# Patient Record
Sex: MALE | Race: WHITE | NOT HISPANIC OR LATINO | Employment: OTHER | URBAN - METROPOLITAN AREA
[De-identification: names, ages, dates, MRNs, and addresses within clinical notes are randomized per-mention and may not be internally consistent; named-entity substitution may affect disease eponyms.]

---

## 2017-01-17 ENCOUNTER — TELEPHONE (OUTPATIENT)
Dept: CARDIOLOGY | Facility: CLINIC | Age: 72
End: 2017-01-17

## 2017-01-17 DIAGNOSIS — I25.10 CORONARY ARTERY DISEASE, ANGINA PRESENCE UNSPECIFIED, UNSPECIFIED VESSEL OR LESION TYPE, UNSPECIFIED WHETHER NATIVE OR TRANSPLANTED HEART: Primary | ICD-10-CM

## 2017-01-17 DIAGNOSIS — R07.9 CHEST PAIN, UNSPECIFIED TYPE: Primary | ICD-10-CM

## 2017-01-17 NOTE — TELEPHONE ENCOUNTER
"Spoke with patient and notified  of his symptoms.  said that if chest pain was happening frequently or at res, then we should schedule an angiogram. Patient does not want to do this . He wants to " do a stress echo first and then go from there". Dr. Coburn said ok.  However, patient changed his mind about this and now he feels like he would rather get the angiogram done instead. We called Interventional Cardiology and he was scheduled to see  tomorrow 1/18 at 10:40 am.  "

## 2017-01-18 ENCOUNTER — DOCUMENTATION ONLY (OUTPATIENT)
Dept: CARDIOLOGY | Facility: CLINIC | Age: 72
End: 2017-01-18

## 2017-01-18 ENCOUNTER — INITIAL CONSULT (OUTPATIENT)
Dept: CARDIOLOGY | Facility: CLINIC | Age: 72
End: 2017-01-18
Payer: MEDICARE

## 2017-01-18 VITALS
HEART RATE: 60 BPM | OXYGEN SATURATION: 95 % | SYSTOLIC BLOOD PRESSURE: 133 MMHG | WEIGHT: 246.69 LBS | DIASTOLIC BLOOD PRESSURE: 89 MMHG | BODY MASS INDEX: 31.66 KG/M2 | HEIGHT: 74 IN

## 2017-01-18 DIAGNOSIS — R07.9 CHEST PAIN, UNSPECIFIED TYPE: Primary | ICD-10-CM

## 2017-01-18 PROCEDURE — 99205 OFFICE O/P NEW HI 60 MIN: CPT | Mod: S$PBB,,, | Performed by: INTERNAL MEDICINE

## 2017-01-18 PROCEDURE — 99214 OFFICE O/P EST MOD 30 MIN: CPT | Mod: PBBFAC | Performed by: INTERNAL MEDICINE

## 2017-01-18 PROCEDURE — 99999 PR PBB SHADOW E&M-EST. PATIENT-LVL IV: CPT | Mod: PBBFAC,,, | Performed by: INTERNAL MEDICINE

## 2017-01-18 RX ORDER — WARFARIN SODIUM 5 MG/1
2.5 TABLET ORAL
COMMUNITY
Start: 2016-10-23 | End: 2020-02-28

## 2017-01-18 RX ORDER — CLOPIDOGREL BISULFATE 75 MG/1
75 TABLET ORAL DAILY
Qty: 5 TABLET | Refills: 0 | Status: SHIPPED | OUTPATIENT
Start: 2017-01-18 | End: 2017-05-29

## 2017-01-18 RX ORDER — SODIUM CHLORIDE 9 MG/ML
3 INJECTION, SOLUTION INTRAVENOUS CONTINUOUS
Status: CANCELLED | OUTPATIENT
Start: 2017-01-18 | End: 2017-01-18

## 2017-01-18 NOTE — LETTER
January 18, 2017      Angel Coburn MD  [address]  [facility] [address]           [facility]  [address]  [facility] [address]  Phone: [phone]          Patient: Amish Grossman   MR Number: [id number]   YOB: 1945   Date of Visit: 1/18/2017       Dear Dr. Angel Coburn:    Thank you for referring Amish Grossman to me for evaluation. Attached you will find relevant portions of my assessment and plan of care.    If you have questions, please do not hesitate to call me. I look forward to following Amish Grossman along with you.    Sincerely,    Grey Us MD    Enclosure  CC:  No Recipients    If you would like to receive this communication electronically, please contact [email or web address] or [phone] to request more information on [employer] Link access.    For providers and/or their staff who would like to refer a patient to [facility], please contact us through our one-stop-shop provider referral line, [facility], at [phone].    If you feel you have received this communication in error or would no longer like to receive these types of communications, please e-mail [email or web address]

## 2017-01-18 NOTE — PROGRESS NOTES
"Subjective:       Patient ID: Amish Grossman is a 71 y.o. male.    Chief Complaint: Coronary Artery Disease    HPI   Mr. 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 He stated that since last summer, he has been having dull chest pains that start substernally and travel upwards. . He states that over the past month, he has noted that they have become longer and more frequent. He states that he has also noted worsening SOB and SHOOK. His chest pain during his MI was a squeezing chest pain that radiated down both arms. Walking up his lawn or playing golf and getting to the 3rd hole has been causing SOB. He has not tried any NTG. He spoke with his primary cardiologist, Dr. Coburn and was advised to go to interventional cardiology clinic. The patient lives in [facility] and has a 5 hour travel.     Review of Systems   Constitutional: Negative for appetite change, chills, fatigue and fever.   HENT: Negative.    Eyes: Negative.    Respiratory: Positive for shortness of breath. Negative for cough.    Cardiovascular: Positive for chest pain. Negative for palpitations and leg swelling.   Gastrointestinal: Negative for abdominal pain, nausea and vomiting.   Genitourinary: Negative for dysuria and urgency.   Musculoskeletal: Negative for arthralgias and myalgias.   Skin: Negative for color change and rash.   Neurological: Negative for dizziness, light-headedness and headaches.   Psychiatric/Behavioral: Negative.        Objective:     Vitals:    01/18/17 1006 01/18/17 1017   BP: 123/87 133/89   Pulse: 60    SpO2: 95%    Weight: 111.9 kg (246 lb 11.1 oz)    Height: 6' 2" (1.88 m)      Physical Exam   Constitutional: Vital signs are normal. He appears well-developed and well-nourished. He is active and " cooperative. No distress.   HENT:   Head: Normocephalic and atraumatic.   Right Ear: Hearing and external ear normal.   Left Ear: Hearing and external ear normal.   Nose: Nose normal.   Neck: Trachea normal. No tracheal tenderness present. No thyroid mass and no thyromegaly present.   Cardiovascular: Normal rate, regular rhythm, S1 normal, S2 normal, normal heart sounds and normal pulses.  Exam reveals no gallop.    No murmur heard.  Pulses:       Radial pulses are 2+ on the right side, and 2+ on the left side.        Dorsalis pedis pulses are 2+ on the right side, and 2+ on the left side.        Posterior tibial pulses are 2+ on the right side, and 2+ on the left side.   Allens test with adequate ulnar flow.   Pulmonary/Chest: Effort normal and breath sounds normal. No respiratory distress. He has no decreased breath sounds. He has no wheezes. He has no rhonchi. He has no rales.   Abdominal: Soft. Normal appearance.   Skin: Skin is warm, dry and intact.     Chintan Risk Score: JAIRON 7.5%, Dialysis 0.04%    Assessment:       1. Chest pain, unspecified type        Plan:     1.) Chest Pain  --although different than his MI chest pain, it is suspicious enough for intervention  --prepare for C tmrw via R radial artery due to coumadin use  --load with plavix, hold coumadin tonight.  --obtain labs today: CBC, CMP, INR, APTT    The risks, benefits, and alternatives of coronary vascular angiography and possible intervention were discussed with the patient. All questions were answered and informed consent was obtained. I had a detailed discussion with the patient regarding risk of stroke, MI, bleeding access site complications, renal failure, emergent need for heart surgery, acute limb complications including ischemia and loss, contrast allergy and death. Patient understands the risks and benefits of the procedure and wishes to proceed. If stents are needed and there is preference for MAMI, patient understands that would  necessitate aspirin for life with plavix or other anti-platelet agent for at least 1 year. Additionally, patient is aware that non-compliance is likely to result in stent clotting with heart attack, heart failure, and/or death.    Staff addendum to follow    Donna Greene MD   Cardiology PGY4

## 2017-01-18 NOTE — PROGRESS NOTES
OUTPATIENT CATHETERIZATION INSTRUCTIONS    You have been scheduled for a procedure in the catheterization lab on Thursday, January 19, 2017.     Please report to the Cardiology Waiting Area on the Third floor of the hospital and check in at 12 PM.   You will then be taken to the SSCU (Short Stay Cardiac Unit) and prepared for your procedure. Please be aware that this is not the time of your procedure but the time you are to arrive. The procedures are scheduled on an hourly basis; however, emergency cases take precedence over all other cases.       You may not have anything to eat or drink after midnight the night before your test. You may take your regular morning medications with water. If there are any medications that you should not take you will be instructed to hold them that morning. If you are diabetic and on Metformin (Glucophage) do not take it the day before, the day of, and for 2 days after your procedure.      The procedure will take 1-2 hours to perform. After the procedure, you will return to SSCU on the third floor of the hospital. You will need to lie still (or keep your arm still) for the next 4 to 6 hours to minimize bleeding from the puncture site. Your family may remain in the room with you during this time.       You may be able to be discharged home that same afternoon if there is someone to drive you home and there were no complications. If you have one of the balloon, stent, or device procedures you may spend the night in the hospital. Your doctor will determine, based on your progress, the date and time of your discharge. The results of your procedure will be discussed with you before you are discharged. Any further testing or procedures will be scheduled for you either before you leave or you will be called with these appointments.       If you should have any questions, concerns, or need to change the date of your procedure, please call GLORIA Cabrales @ [phone]    Special  Instructions:    Stop Coumadin tonight.    Take 4 Plavix pills tonight and 1 pill in the morning.              THE ABOVE INSTRUCTIONS WERE GIVEN TO THE PATIENT VERBALLY AND THEY VERBALIZED UNDERSTANDING.  THEY DO NOT REQUIRE ANY SPECIAL NEEDS AND DO NOT HAVE ANY LEARNING BARRIERS.          Directions for Reporting to Cardiology Waiting Area in the Hospital  If you park in the Parking Garage:  Take elevators to the1st floor of the parking garage.  Continue past the gift shop, coffee shop, and piano.  Take a right and go to the gold elevators. (Elevator B)  Take the elevator to the 3rd floor.  Follow the arrow on the sign on the wall that says Cath Lab Registration/EP Lab Registration.  Follow the long hallway all the way around until you come to a big open area.  This is the registration area.  Check in at Reception Desk.    OR    If family is dropping you off:  Have them drop you off at the front of the Hospital under the green overhang.  Enter through the doors and take a right.  Take the E elevators to the 3rd floor Cardiology Waiting Area.  Check in at the Reception Desk in the waiting room.

## 2017-01-18 NOTE — MR AVS SNAPSHOT
[facility]  [address]  [place] [address]  Phone: [phone]                  Amish Grossman   2017 10:40 AM   Initial consult    Description:  Male : 1945   Provider:  rGey Us MD   [facility]           Reason for Visit     Coronary Artery Disease           Diagnoses this Visit        Comments    Chest pain, unspecified type    -  Primary            To Do List           Future Appointments        Provider Department Dept Phone    2017 12:00 PM LAB, APPOINTMENT [place] [facility] [phone]    2017 12:15 PM [address]PREP, [address] [facility] [phone]    2017 8:00 AM [facility] [phone]      Your Future Surgeries/Procedures     2017   Surgery with Grey Us MD   [facility]    [address] [facility] [address]   [phone]              Goals (5 Years of Data)     None       These Medications        Disp Refills Start End    clopidogrel (PLAVIX) 75 mg tablet 5 tablet 0 2017    Take 1 tablet (75 mg total) by mouth once daily. - Oral    Pharmacy: [facility] - [place], [place] - [address] Ph #: [phone]         [facility]     [facility] Nurse Care Line -  Assistance  Registered nurses in the [facility] Center provide clinical advisement, health education, appointment booking, and other advisory services.  Call for this free service at [phone].             Medications           Message regarding Medications     Verify the changes and/or additions to your medication regime listed below are the same as discussed with your clinician today.  If any of these changes or additions are incorrect, please notify your healthcare provider.        START taking these NEW  medications        Refills    clopidogrel (PLAVIX) 75 mg tablet 0    Sig: Take 1 tablet (75 mg total) by mouth once daily.    Class: Normal    Route: Oral      STOP taking these medications     apixaban (ELIQUIS) 5 mg Tab Take 1 tablet (5 mg total) by mouth 2 (two) times daily.           Verify that the below list of medications is an accurate representation of the medications you are currently taking.  If none reported, the list may be blank. If incorrect, please contact your healthcare provider. Carry this list with you in case of emergency.           Current Medications     amiodarone (PACERONE) 200 MG Tab Take 1 tablet (200 mg total) by mouth once daily.    amlodipine (NORVASC) 10 MG tablet Take 1 tablet (10 mg total) by mouth once daily.    ascorbic acid (VITAMIN C) 100 MG tablet Take 1,000 mg by mouth once daily.     aspirin (ECOTRIN) 81 MG EC tablet Take 81 mg by mouth once daily.    atorvastatin (LIPITOR) 10 MG tablet Take 1 tablet (10 mg total) by mouth once daily.    carvedilol (COREG) 6.25 MG tablet Take 1 tablet (6.25 mg total) by mouth 2 (two) times daily with meals.    fish oil-omega-3 fatty acids 300-1,000 mg capsule Take 2 g by mouth 2 (two) times daily.     furosemide (LASIX) 40 MG tablet Take 40 mg by mouth as needed.    levothyroxine (SYNTHROID) 75 MCG tablet Take 75 mcg by mouth before breakfast.     lisinopril (PRINIVIL,ZESTRIL) 20 MG tablet TAKE ONE TABLET BY MOUTH ONCE DAILY    multivitamin capsule Take 1 capsule by mouth nightly.     nitroGLYCERIN (NITROSTAT) 0.4 MG SL tablet Place 1 tablet (0.4 mg total) under the tongue every 5 (five) minutes as needed for Chest pain.    omeprazole (PRILOSEC) 20 MG capsule Take 20 mg by mouth nightly.     SLO-NIACIN 750 mg TbSR Take 1 tablet (750 mg total) by mouth 2 (two) times daily.    warfarin (COUMADIN) 5 MG tablet 5 mg. 5  mgs  4dailys   7.5  3  Times  weekly    clopidogrel (PLAVIX) 75 mg tablet Take 1 tablet (75 mg total) by mouth once daily.     "magnesium 200 mg Tab Take 250 mg by mouth as needed.            Clinical Reference Information           Vital Signs - Last Recorded  Most recent update: 1/18/2017 10:18 AM by Yemi Ray MA    BP Pulse Ht Wt SpO2 BMI    133/89 60 6' 2" (1.88 m) 111.9 kg (246 lb 11.1 oz) 95% 31.67 kg/m2      Blood Pressure          Most Recent Value    BP  133/89 [LT]      Allergies as of 1/18/2017     No Known Allergies      Immunizations Administered on Date of Encounter - 1/18/2017     None      Orders Placed During Today's Visit      Normal Orders This Visit    Case Request-Cath Lab: HEART CATH-LEFT       "

## 2017-01-19 ENCOUNTER — HOSPITAL ENCOUNTER (OUTPATIENT)
Facility: HOSPITAL | Age: 72
Discharge: HOME | End: 2017-01-19
Attending: INTERNAL MEDICINE | Admitting: INTERNAL MEDICINE
Payer: MEDICARE

## 2017-01-19 VITALS
WEIGHT: 242 LBS | HEIGHT: 74 IN | OXYGEN SATURATION: 98 % | RESPIRATION RATE: 20 BRPM | TEMPERATURE: 98 F | SYSTOLIC BLOOD PRESSURE: 151 MMHG | BODY MASS INDEX: 31.06 KG/M2 | DIASTOLIC BLOOD PRESSURE: 91 MMHG | HEART RATE: 60 BPM

## 2017-01-19 DIAGNOSIS — R07.9 CHEST PAIN, UNSPECIFIED TYPE: ICD-10-CM

## 2017-01-19 DIAGNOSIS — I25.10 CORONARY ARTERY DISEASE DUE TO LIPID RICH PLAQUE: ICD-10-CM

## 2017-01-19 DIAGNOSIS — I25.83 CORONARY ARTERY DISEASE DUE TO LIPID RICH PLAQUE: ICD-10-CM

## 2017-01-19 DIAGNOSIS — I24.0 CORONARY OCCLUSION WITHOUT MYOCARDIAL INFARCTION: Primary | ICD-10-CM

## 2017-01-19 LAB
ABO + RH BLD: NORMAL
BLD GP AB SCN CELLS X3 SERPL QL: NORMAL
INR PPP: 2.3
PROTHROMBIN TIME: 23.1 SEC

## 2017-01-19 PROCEDURE — 85610 PROTHROMBIN TIME: CPT

## 2017-01-19 PROCEDURE — 86900 BLOOD TYPING SEROLOGIC ABO: CPT

## 2017-01-19 PROCEDURE — 25000003 PHARM REV CODE 250: Performed by: INTERNAL MEDICINE

## 2017-01-19 PROCEDURE — 93005 ELECTROCARDIOGRAM TRACING: CPT

## 2017-01-19 PROCEDURE — 93010 ELECTROCARDIOGRAM REPORT: CPT | Mod: ,,, | Performed by: INTERNAL MEDICINE

## 2017-01-19 PROCEDURE — 93454 CORONARY ARTERY ANGIO S&I: CPT | Mod: 26,,, | Performed by: INTERNAL MEDICINE

## 2017-01-19 PROCEDURE — 86901 BLOOD TYPING SEROLOGIC RH(D): CPT

## 2017-01-19 PROCEDURE — 25000003 PHARM REV CODE 250

## 2017-01-19 PROCEDURE — 63600175 PHARM REV CODE 636 W HCPCS

## 2017-01-19 RX ORDER — ACETAMINOPHEN 325 MG/1
650 TABLET ORAL EVERY 4 HOURS PRN
Status: DISCONTINUED | OUTPATIENT
Start: 2017-01-19 | End: 2017-01-19 | Stop reason: HOSPADM

## 2017-01-19 RX ORDER — ONDANSETRON 8 MG/1
8 TABLET, ORALLY DISINTEGRATING ORAL EVERY 8 HOURS PRN
Status: DISCONTINUED | OUTPATIENT
Start: 2017-01-19 | End: 2017-01-19 | Stop reason: HOSPADM

## 2017-01-19 RX ORDER — SODIUM CHLORIDE 9 MG/ML
3 INJECTION, SOLUTION INTRAVENOUS CONTINUOUS
Status: ACTIVE | OUTPATIENT
Start: 2017-01-19 | End: 2017-01-19

## 2017-01-19 RX ADMIN — SODIUM CHLORIDE 3 ML/KG/HR: 0.9 INJECTION, SOLUTION INTRAVENOUS at 12:01

## 2017-01-19 NOTE — H&P (VIEW-ONLY)
"Subjective:       Patient ID: Amish Grossman is a 71 y.o. male.    Chief Complaint: Coronary Artery Disease    HPI   Mr. 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 He stated that since last summer, he has been having dull chest pains that start substernally and travel upwards. . He states that over the past month, he has noted that they have become longer and more frequent. He states that he has also noted worsening SOB and SHOOK. His chest pain during his MI was a squeezing chest pain that radiated down both arms. Walking up his lawn or playing golf and getting to the 3rd hole has been causing SOB. He has not tried any NTG. He spoke with his primary cardiologist, Dr. Coburn and was advised to go to interventional cardiology clinic. The patient lives in [facility] and has a 5 hour travel.     Review of Systems   Constitutional: Negative for appetite change, chills, fatigue and fever.   HENT: Negative.    Eyes: Negative.    Respiratory: Positive for shortness of breath. Negative for cough.    Cardiovascular: Positive for chest pain. Negative for palpitations and leg swelling.   Gastrointestinal: Negative for abdominal pain, nausea and vomiting.   Genitourinary: Negative for dysuria and urgency.   Musculoskeletal: Negative for arthralgias and myalgias.   Skin: Negative for color change and rash.   Neurological: Negative for dizziness, light-headedness and headaches.   Psychiatric/Behavioral: Negative.        Objective:     Vitals:    01/18/17 1006 01/18/17 1017   BP: 123/87 133/89   Pulse: 60    SpO2: 95%    Weight: 111.9 kg (246 lb 11.1 oz)    Height: 6' 2" (1.88 m)      Physical Exam   Constitutional: Vital signs are normal. He appears well-developed and well-nourished. He is active and " cooperative. No distress.   HENT:   Head: Normocephalic and atraumatic.   Right Ear: Hearing and external ear normal.   Left Ear: Hearing and external ear normal.   Nose: Nose normal.   Neck: Trachea normal. No tracheal tenderness present. No thyroid mass and no thyromegaly present.   Cardiovascular: Normal rate, regular rhythm, S1 normal, S2 normal, normal heart sounds and normal pulses.  Exam reveals no gallop.    No murmur heard.  Pulses:       Radial pulses are 2+ on the right side, and 2+ on the left side.        Dorsalis pedis pulses are 2+ on the right side, and 2+ on the left side.        Posterior tibial pulses are 2+ on the right side, and 2+ on the left side.   Allens test with adequate ulnar flow.   Pulmonary/Chest: Effort normal and breath sounds normal. No respiratory distress. He has no decreased breath sounds. He has no wheezes. He has no rhonchi. He has no rales.   Abdominal: Soft. Normal appearance.   Skin: Skin is warm, dry and intact.     Chintan Risk Score: JAIRON 7.5%, Dialysis 0.04%    Assessment:       1. Chest pain, unspecified type        Plan:     1.) Chest Pain  --although different than his MI chest pain, it is suspicious enough for intervention  --prepare for C tmrw via R radial artery due to coumadin use  --load with plavix, hold coumadin tonight.  --obtain labs today: CBC, CMP, INR, APTT    The risks, benefits, and alternatives of coronary vascular angiography and possible intervention were discussed with the patient. All questions were answered and informed consent was obtained. I had a detailed discussion with the patient regarding risk of stroke, MI, bleeding access site complications, renal failure, emergent need for heart surgery, acute limb complications including ischemia and loss, contrast allergy and death. Patient understands the risks and benefits of the procedure and wishes to proceed. If stents are needed and there is preference for MAMI, patient understands that would  necessitate aspirin for life with plavix or other anti-platelet agent for at least 1 year. Additionally, patient is aware that non-compliance is likely to result in stent clotting with heart attack, heart failure, and/or death.    Staff addendum to follow    Donna Greene MD   Cardiology PGY4

## 2017-01-19 NOTE — INTERVAL H&P NOTE
The patient has been examined and the H&P has been reviewed:    I concur with the findings and no changes have occurred since H&P was written.    Anesthesia/Surgery risks, benefits and alternative options discussed and understood by patient/family.    Did not take coumadin yesterday. Took plavix load yesterday, and 75mg this AM.      Active Hospital Problems    Diagnosis  POA    Chest pain [R07.9]  Yes      Resolved Hospital Problems    Diagnosis Date Resolved POA   No resolved problems to display.

## 2017-01-19 NOTE — PLAN OF CARE
Problem: Cardiac Catheterization (Diagnostic/Interventional) (Adult)  Goal: Signs and Symptoms of Listed Potential Problems Will be Absent, Minimized or Managed (Cardiac Catheterization)  Signs and symptoms of listed potential problems will be absent, minimized or managed by discharge/transition of care (reference Cardiac Catheterization (Diagnostic/Interventional) (Adult) CPG).   Outcome: Ongoing (interventions implemented as appropriate)  Palpable pulse to RUE. Vascular band CDI

## 2017-01-19 NOTE — IP AVS SNAPSHOT
[facility]  [address]  [facility] [address]  Phone: [phone]           Patient Discharge Instructions     Our goal is to set you up for success. This packet includes information on your condition, medications, and your home care. It will help you to care for yourself so you don't get sicker and need to go back to the hospital.     Please ask your nurse if you have any questions.        There are many details to remember when preparing to leave the hospital. Here is what you will need to do:    1. Take your medicine. If you are prescribed medications, review your Medication List in the following pages. You may have new medications to  at the pharmacy and others that you'll need to stop taking. Review the instructions for how and when to take your medications. Talk with your doctor or nurses if you are unsure of what to do.     2. Go to your follow-up appointments. Specific follow-up information is listed in the following pages. Your may be contacted by a transition nurse or clinical provider about future appointments. Be sure we have all of the phone numbers to reach you, if needed. Please contact your provider's office if you are unable to make an appointment.     3. Watch for warning signs. Your doctor or nurse will give you detailed warning signs to watch for and when to call for assistance. These instructions may also include educational information about your condition. If you experience any of warning signs to your health, call your doctor.               [facility]  Unless otherwise directed by your provider, please contact [facility], our nurse care line that is available for 24/7 assistance.     [phone] (toll-free)    Registered nurses in the [facility] provide clinical advisement, health education, appointment booking, and other advisory services.                    ** Verify the list of medication(s) below is accurate and up  to date. Carry this with you in case of emergency. If your medications have changed, please notify your healthcare provider.             Medication List      CONTINUE taking these medications        Additional Info                      amiodarone 200 MG Tab   Commonly known as:  PACERONE   Quantity:  30 tablet   Refills:  3   Dose:  200 mg    Instructions:  Take 1 tablet (200 mg total) by mouth once daily.     Begin Date    AM    Noon    PM    Bedtime       amlodipine 10 MG tablet   Commonly known as:  NORVASC   Quantity:  90 tablet   Refills:  3   Dose:  10 mg    Instructions:  Take 1 tablet (10 mg total) by mouth once daily.     Begin Date    AM    Noon    PM    Bedtime       aspirin 81 MG EC tablet   Commonly known as:  ECOTRIN   Refills:  0   Dose:  81 mg    Instructions:  Take 81 mg by mouth once daily.     Begin Date    AM    Noon    PM    Bedtime       atorvastatin 10 MG tablet   Commonly known as:  LIPITOR   Quantity:  90 tablet   Refills:  3   Dose:  10 mg    Instructions:  Take 1 tablet (10 mg total) by mouth once daily.     Begin Date    AM    Noon    PM    Bedtime       carvedilol 6.25 MG tablet   Commonly known as:  COREG   Quantity:  180 tablet   Refills:  3   Dose:  6.25 mg    Instructions:  Take 1 tablet (6.25 mg total) by mouth 2 (two) times daily with meals.     Begin Date    AM    Noon    PM    Bedtime       clopidogrel 75 mg tablet   Commonly known as:  PLAVIX   Quantity:  5 tablet   Refills:  0   Dose:  75 mg    Instructions:  Take 1 tablet (75 mg total) by mouth once daily.     Begin Date    AM    Noon    PM    Bedtime       fish oil-omega-3 fatty acids 300-1,000 mg capsule   Refills:  0   Dose:  2 g    Instructions:  Take 2 g by mouth 2 (two) times daily.     Begin Date    AM    Noon    PM    Bedtime       furosemide 40 MG tablet   Commonly known as:  LASIX   Refills:  0   Dose:  40 mg    Instructions:  Take 40 mg by mouth as needed.     Begin Date    AM    Noon    PM    Bedtime        levothyroxine 75 MCG tablet   Commonly known as:  SYNTHROID   Refills:  0   Dose:  75 mcg    Instructions:  Take 75 mcg by mouth before breakfast.     Begin Date    AM    Noon    PM    Bedtime       lisinopril 20 MG tablet   Commonly known as:  PRINIVIL,ZESTRIL   Quantity:  90 tablet   Refills:  3    Instructions:  TAKE ONE TABLET BY MOUTH ONCE DAILY     Begin Date    AM    Noon    PM    Bedtime       magnesium 200 mg Tab   Refills:  0   Dose:  250 mg    Instructions:  Take 250 mg by mouth as needed.     Begin Date    AM    Noon    PM    Bedtime       multivitamin capsule   Refills:  0   Dose:  1 capsule    Instructions:  Take 1 capsule by mouth nightly.     Begin Date    AM    Noon    PM    Bedtime       nitroGLYCERIN 0.4 MG SL tablet   Commonly known as:  NITROSTAT   Quantity:  25 tablet   Refills:  4   Dose:  0.4 mg    Instructions:  Place 1 tablet (0.4 mg total) under the tongue every 5 (five) minutes as needed for Chest pain.     Begin Date    AM    Noon    PM    Bedtime       omeprazole 20 MG capsule   Commonly known as:  PRILOSEC   Refills:  0   Dose:  20 mg    Instructions:  Take 20 mg by mouth nightly.     Begin Date    AM    Noon    PM    Bedtime       SLO-NIACIN 750 mg Tbsr   Quantity:  180 each   Refills:  3   Dose:  750 mg   Comments:  This is OTC, not script   Generic drug:  niacin    Instructions:  Take 1 tablet (750 mg total) by mouth 2 (two) times daily.     Begin Date    AM    Noon    PM    Bedtime       VITAMIN C 100 MG tablet   Refills:  0   Dose:  1000 mg   Generic drug:  ascorbic acid (vitamin C)    Instructions:  Take 1,000 mg by mouth once daily.     Begin Date    AM    Noon    PM    Bedtime       warfarin 5 MG tablet   Commonly known as:  COUMADIN   Refills:  0   Dose:  5 mg    Instructions:  5 mg. 5  mgs  4dailys   7.5  3  Times  weekly     Begin Date    AM    Noon    PM    Bedtime                  Please bring to all follow up appointments:    1. A copy of your discharge  "instructions.  2. All medicines you are currently taking in their original bottles.  3. Identification and insurance card.    Please arrive 15 minutes ahead of scheduled appointment time.    Please call 24 hours in advance if you must reschedule your appointment and/or time.        Your Scheduled Appointments     Jan 23, 2017  8:00 AM CST   Remote Interrogation with HOME MONITOR DEVICE CHECK, [facility]   [facility] - Arrhythmia ([facility] )    [address]  [facility] [address] [phone]                Discharge Instructions     Future Orders    Activity as tolerated     Call MD for:  difficulty breathing or increased cough     Call MD for:  increased confusion or weakness     Call MD for:  persistent dizziness, light-headedness, or visual disturbances     Call MD for:  persistent nausea and vomiting or diarrhea     Call MD for:  redness, tenderness, or signs of infection (pain, swelling, redness, odor or green/yellow discharge around incision site)     Call MD for:  severe persistent headache     Call MD for:  severe uncontrolled pain     Call MD for:  temperature >100.4     Call MD for:  worsening rash     Diet general     Questions:    Total calories:      Fat restriction, if any:      Protein restriction, if any:      Na restriction, if any:      Fluid restriction:      Additional restrictions:          Admission Information     Date & Time Provider Department CSN    1/19/2017 11:25 AM Grey Us MD [facility] [id number]      Care Providers     Provider Role Specialty Primary office phone    Grey Us MD Attending Provider Cardiology [phone]      Your Vitals Were     BP Pulse Temp Resp Height Weight    151/91 60 97.9 °F (36.6 °C) (Oral) 20 6' 2" (1.88 m) 109.8 kg (242 lb)    SpO2 BMI             98% 31.07 kg/m2         Recent Lab Values        5/21/2007 12/2/2013                        7:20 AM  2:05 PM          A1C 5.6 5.9                     Allergies as of " 1/19/2017     No Known Allergies      Advance Directives     An advance directive is a document which, in the event you are no longer able to make decisions for yourself, tells your healthcare team what kind of treatment you do or do not want to receive, or who you would like to make those decisions for you.  If you do not currently have an advance directive, [facility] encourages you to create one.  For more information call:  [phone] ([phone]),  or log on to [email or web address].        Language Assistance Services     ATTENTION: Language assistance services are available, free of charge. Please call [phone].      ATENCIÓN: Si elinor luo, tiene a weston disposición servicios gratuitos de asistencia lingüística. Llame al [phone].     CHÚ Ý: N?u b?n nói Ti?ng Vi?t, có các d?ch v? h? tr? ngôn ng? mi?n phí dành cho b?n. G?i s? [phone].        Coumadin Discharge Instructions                         Chronic Kindey Disease Education              [facility] complies with applicable Federal civil rights laws and does not discriminate on the basis of race, color, national origin, age, disability, or sex.

## 2017-01-19 NOTE — PLAN OF CARE
Problem: Patient Care Overview  Goal: Plan of Care Review  Outcome: Ongoing (interventions implemented as appropriate)  Pt arrived to floor ambulatory from home. Pt oriented to room. Iv inserted. Admit assessment completed. Report given to GLORIA Zuniga in cath lab. Will continue to monitor

## 2017-01-19 NOTE — PLAN OF CARE
Problem: Fall Risk (Adult)  Goal: Absence of Falls  Patient will demonstrate the desired outcomes by discharge/transition of care.   Outcome: Ongoing (interventions implemented as appropriate)  Post procedure protocol reviewed, Vascular band as well.

## 2017-01-19 NOTE — IP AVS SNAPSHOT
[facility]  [address]  [facility] [address]  Phone: [phone]           Patient Discharge Instructions     Our goal is to set you up for success. This packet includes information on your condition, medications, and your home care. It will help you to care for yourself so you don't get sicker and need to go back to the hospital.     Please ask your nurse if you have any questions.        There are many details to remember when preparing to leave the hospital. Here is what you will need to do:    1. Take your medicine. If you are prescribed medications, review your Medication List in the following pages. You may have new medications to  at the pharmacy and others that you'll need to stop taking. Review the instructions for how and when to take your medications. Talk with your doctor or nurses if you are unsure of what to do.     2. Go to your follow-up appointments. Specific follow-up information is listed in the following pages. Your may be contacted by a transition nurse or clinical provider about future appointments. Be sure we have all of the phone numbers to reach you, if needed. Please contact your provider's office if you are unable to make an appointment.     3. Watch for warning signs. Your doctor or nurse will give you detailed warning signs to watch for and when to call for assistance. These instructions may also include educational information about your condition. If you experience any of warning signs to your health, call your doctor.               [facility]  Unless otherwise directed by your provider, please contact [facility], our nurse care line that is available for 24/7 assistance.     [phone] (toll-free)    Registered nurses in the [facility] provide clinical advisement, health education, appointment booking, and other advisory services.                    ** Verify the list of medication(s) below is accurate and up  to date. Carry this with you in case of emergency. If your medications have changed, please notify your healthcare provider.             Medication List      CONTINUE taking these medications        Additional Info                      amiodarone 200 MG Tab   Commonly known as:  PACERONE   Quantity:  30 tablet   Refills:  3   Dose:  200 mg    Instructions:  Take 1 tablet (200 mg total) by mouth once daily.     Begin Date    AM    Noon    PM    Bedtime       amlodipine 10 MG tablet   Commonly known as:  NORVASC   Quantity:  90 tablet   Refills:  3   Dose:  10 mg    Instructions:  Take 1 tablet (10 mg total) by mouth once daily.     Begin Date    AM    Noon    PM    Bedtime       aspirin 81 MG EC tablet   Commonly known as:  ECOTRIN   Refills:  0   Dose:  81 mg    Instructions:  Take 81 mg by mouth once daily.     Begin Date    AM    Noon    PM    Bedtime       atorvastatin 10 MG tablet   Commonly known as:  LIPITOR   Quantity:  90 tablet   Refills:  3   Dose:  10 mg    Instructions:  Take 1 tablet (10 mg total) by mouth once daily.     Begin Date    AM    Noon    PM    Bedtime       carvedilol 6.25 MG tablet   Commonly known as:  COREG   Quantity:  180 tablet   Refills:  3   Dose:  6.25 mg    Instructions:  Take 1 tablet (6.25 mg total) by mouth 2 (two) times daily with meals.     Begin Date    AM    Noon    PM    Bedtime       clopidogrel 75 mg tablet   Commonly known as:  PLAVIX   Quantity:  5 tablet   Refills:  0   Dose:  75 mg    Instructions:  Take 1 tablet (75 mg total) by mouth once daily.     Begin Date    AM    Noon    PM    Bedtime       fish oil-omega-3 fatty acids 300-1,000 mg capsule   Refills:  0   Dose:  2 g    Instructions:  Take 2 g by mouth 2 (two) times daily.     Begin Date    AM    Noon    PM    Bedtime       furosemide 40 MG tablet   Commonly known as:  LASIX   Refills:  0   Dose:  40 mg    Instructions:  Take 40 mg by mouth as needed.     Begin Date    AM    Noon    PM    Bedtime        levothyroxine 75 MCG tablet   Commonly known as:  SYNTHROID   Refills:  0   Dose:  75 mcg    Instructions:  Take 75 mcg by mouth before breakfast.     Begin Date    AM    Noon    PM    Bedtime       lisinopril 20 MG tablet   Commonly known as:  PRINIVIL,ZESTRIL   Quantity:  90 tablet   Refills:  3    Instructions:  TAKE ONE TABLET BY MOUTH ONCE DAILY     Begin Date    AM    Noon    PM    Bedtime       magnesium 200 mg Tab   Refills:  0   Dose:  250 mg    Instructions:  Take 250 mg by mouth as needed.     Begin Date    AM    Noon    PM    Bedtime       multivitamin capsule   Refills:  0   Dose:  1 capsule    Instructions:  Take 1 capsule by mouth nightly.     Begin Date    AM    Noon    PM    Bedtime       nitroGLYCERIN 0.4 MG SL tablet   Commonly known as:  NITROSTAT   Quantity:  25 tablet   Refills:  4   Dose:  0.4 mg    Instructions:  Place 1 tablet (0.4 mg total) under the tongue every 5 (five) minutes as needed for Chest pain.     Begin Date    AM    Noon    PM    Bedtime       omeprazole 20 MG capsule   Commonly known as:  PRILOSEC   Refills:  0   Dose:  20 mg    Instructions:  Take 20 mg by mouth nightly.     Begin Date    AM    Noon    PM    Bedtime       SLO-NIACIN 750 mg Tbsr   Quantity:  180 each   Refills:  3   Dose:  750 mg   Comments:  This is OTC, not script   Generic drug:  niacin    Instructions:  Take 1 tablet (750 mg total) by mouth 2 (two) times daily.     Begin Date    AM    Noon    PM    Bedtime       VITAMIN C 100 MG tablet   Refills:  0   Dose:  1000 mg   Generic drug:  ascorbic acid (vitamin C)    Instructions:  Take 1,000 mg by mouth once daily.     Begin Date    AM    Noon    PM    Bedtime       warfarin 5 MG tablet   Commonly known as:  COUMADIN   Refills:  0   Dose:  5 mg    Instructions:  5 mg. 5  mgs  4dailys   7.5  3  Times  weekly     Begin Date    AM    Noon    PM    Bedtime                  Please bring to all follow up appointments:    1. A copy of your discharge  "instructions.  2. All medicines you are currently taking in their original bottles.  3. Identification and insurance card.    Please arrive 15 minutes ahead of scheduled appointment time.    Please call 24 hours in advance if you must reschedule your appointment and/or time.        Your Scheduled Appointments     Jan 23, 2017  8:00 AM CST   Remote Interrogation with HOME MONITOR DEVICE CHECK, [facility]   [facility] - Arrhythmia ([facility] )    [address]  [facility] [address] [phone]                Discharge Instructions     Future Orders    Activity as tolerated     Call MD for:  difficulty breathing or increased cough     Call MD for:  increased confusion or weakness     Call MD for:  persistent dizziness, light-headedness, or visual disturbances     Call MD for:  persistent nausea and vomiting or diarrhea     Call MD for:  redness, tenderness, or signs of infection (pain, swelling, redness, odor or green/yellow discharge around incision site)     Call MD for:  severe persistent headache     Call MD for:  severe uncontrolled pain     Call MD for:  temperature >100.4     Call MD for:  worsening rash     Diet general     Questions:    Total calories:      Fat restriction, if any:      Protein restriction, if any:      Na restriction, if any:      Fluid restriction:      Additional restrictions:          Admission Information     Date & Time Provider Department CSN    1/19/2017 11:25 AM Grey Us MD [facility] [id number]       Admisson Diagnosis: Chest pain, unspecified type, Chest pain, unspecified type, Chest pain, unspecified type      Care Providers     Provider Role Specialty Primary office phone    Grey Us MD Attending Provider Cardiology [phone]      Your Vitals Were     BP Pulse Temp Resp Height Weight    151/91 60 97.9 °F (36.6 °C) (Oral) 20 6' 2" (1.88 m) 109.8 kg (242 lb)    SpO2 BMI             98% 31.07 kg/m2         Recent Lab Values        " 5/21/2007 12/2/2013                        7:20 AM  2:05 PM          A1C 5.6 5.9                     Allergies as of 1/19/2017     No Known Allergies      Advance Directives     An advance directive is a document which, in the event you are no longer able to make decisions for yourself, tells your healthcare team what kind of treatment you do or do not want to receive, or who you would like to make those decisions for you.  If you do not currently have an advance directive, [facility] encourages you to create one.  For more information call:  [phone] ([phone]), or log on to [email or web address].        Translation Services Information     ATTENTION: Language assistance services are available, free of charge. Please call [phone].    ATENCIÓN: Si habla español, tiene a weston disposición servicios gratuitos de asistencia lingüística. Llame al [phone].     CHÚ Ý: N?u b?n nói Ti?ng Vi?t, có các d?ch v? h? tr? ngôn ng? mi?n phí dành cho b?n. G?i s? [phone].        Coumadin Discharge Instructions                         Chronic Kindey Disease Education              [facility] complies with applicable Federal civil rights laws and does not discriminate on the basis of race, color, national origin, age, disability, or sex.

## 2017-01-23 ENCOUNTER — CLINICAL SUPPORT (OUTPATIENT)
Dept: ELECTROPHYSIOLOGY | Facility: CLINIC | Age: 72
End: 2017-01-23
Payer: MEDICARE

## 2017-01-23 DIAGNOSIS — I42.9 CARDIOMYOPATHY: ICD-10-CM

## 2017-01-23 DIAGNOSIS — Z95.810 PRESENCE OF AUTOMATIC CARDIOVERTER/DEFIBRILLATOR (AICD): ICD-10-CM

## 2017-01-23 PROCEDURE — 93296 REM INTERROG EVL PM/IDS: CPT | Mod: PBBFAC | Performed by: INTERNAL MEDICINE

## 2017-01-23 PROCEDURE — 93295 DEV INTERROG REMOTE 1/2/MLT: CPT | Mod: ,,, | Performed by: INTERNAL MEDICINE

## 2017-05-26 DIAGNOSIS — I10 HTN (HYPERTENSION), BENIGN: ICD-10-CM

## 2017-05-26 DIAGNOSIS — I48.91 ATRIAL FIBRILLATION: ICD-10-CM

## 2017-05-26 RX ORDER — CARVEDILOL 6.25 MG/1
TABLET ORAL
Qty: 180 TABLET | Refills: 3 | Status: SHIPPED | OUTPATIENT
Start: 2017-05-26 | End: 2017-05-29 | Stop reason: SDUPTHER

## 2017-05-29 ENCOUNTER — HOSPITAL ENCOUNTER (OUTPATIENT)
Dept: CARDIOLOGY | Facility: CLINIC | Age: 72
Discharge: HOME | End: 2017-05-29
Payer: MEDICARE

## 2017-05-29 ENCOUNTER — CLINICAL SUPPORT (OUTPATIENT)
Dept: ELECTROPHYSIOLOGY | Facility: CLINIC | Age: 72
End: 2017-05-29
Payer: MEDICARE

## 2017-05-29 ENCOUNTER — TELEPHONE (OUTPATIENT)
Dept: CARDIOLOGY | Facility: CLINIC | Age: 72
End: 2017-05-29

## 2017-05-29 ENCOUNTER — HOSPITAL ENCOUNTER (OUTPATIENT)
Dept: PULMONOLOGY | Facility: CLINIC | Age: 72
Discharge: HOME | End: 2017-05-29
Payer: MEDICARE

## 2017-05-29 ENCOUNTER — OFFICE VISIT (OUTPATIENT)
Dept: ELECTROPHYSIOLOGY | Facility: CLINIC | Age: 72
End: 2017-05-29
Payer: MEDICARE

## 2017-05-29 VITALS
SYSTOLIC BLOOD PRESSURE: 134 MMHG | BODY MASS INDEX: 32.23 KG/M2 | HEART RATE: 60 BPM | WEIGHT: 251 LBS | DIASTOLIC BLOOD PRESSURE: 86 MMHG

## 2017-05-29 DIAGNOSIS — Z79.01 ANTICOAGULATED ON COUMADIN: ICD-10-CM

## 2017-05-29 DIAGNOSIS — I47.20 VT (VENTRICULAR TACHYCARDIA): ICD-10-CM

## 2017-05-29 DIAGNOSIS — I48.91 ATRIAL FIBRILLATION: ICD-10-CM

## 2017-05-29 DIAGNOSIS — I42.0 ISCHEMIC DILATED CARDIOMYOPATHY: ICD-10-CM

## 2017-05-29 DIAGNOSIS — I10 HTN (HYPERTENSION), BENIGN: ICD-10-CM

## 2017-05-29 DIAGNOSIS — I25.83 CORONARY ARTERY DISEASE DUE TO LIPID RICH PLAQUE: ICD-10-CM

## 2017-05-29 DIAGNOSIS — I25.5 ISCHEMIC DILATED CARDIOMYOPATHY: ICD-10-CM

## 2017-05-29 DIAGNOSIS — I24.0 CORONARY OCCLUSION WITHOUT MYOCARDIAL INFARCTION: ICD-10-CM

## 2017-05-29 DIAGNOSIS — I48.19 PERSISTENT ATRIAL FIBRILLATION: ICD-10-CM

## 2017-05-29 DIAGNOSIS — Z95.810 PRESENCE OF AUTOMATIC CARDIOVERTER/DEFIBRILLATOR (AICD): ICD-10-CM

## 2017-05-29 DIAGNOSIS — I25.10 CORONARY ARTERY DISEASE DUE TO LIPID RICH PLAQUE: ICD-10-CM

## 2017-05-29 DIAGNOSIS — I42.9 CARDIOMYOPATHY: ICD-10-CM

## 2017-05-29 DIAGNOSIS — R06.02 SHORTNESS OF BREATH: ICD-10-CM

## 2017-05-29 DIAGNOSIS — I47.29 PVT (PAROXYSMAL VENTRICULAR TACHYCARDIA): ICD-10-CM

## 2017-05-29 DIAGNOSIS — I48.19 PERSISTENT ATRIAL FIBRILLATION: Primary | ICD-10-CM

## 2017-05-29 LAB
DIASTOLIC DYSFUNCTION: YES
ESTIMATED PA SYSTOLIC PRESSURE: 19.48
MITRAL VALVE REGURGITATION: ABNORMAL
PRE FEV1 FVC: 77
PRE FEV1: 3.37
PRE FVC: 4.38
PREDICTED FEV1 FVC: 78
PREDICTED FEV1: 3.79
PREDICTED FVC: 4.77
RETIRED EF AND QEF - SEE NOTES: 33 (ref 55–65)
TRICUSPID VALVE REGURGITATION: ABNORMAL

## 2017-05-29 PROCEDURE — 93306 TTE W/DOPPLER COMPLETE: CPT | Mod: 26,S$PBB,, | Performed by: INTERNAL MEDICINE

## 2017-05-29 PROCEDURE — 99999 PR PBB SHADOW E&M-EST. PATIENT-LVL III: CPT | Mod: PBBFAC,,, | Performed by: INTERNAL MEDICINE

## 2017-05-29 PROCEDURE — 99213 OFFICE O/P EST LOW 20 MIN: CPT | Mod: PBBFAC | Performed by: INTERNAL MEDICINE

## 2017-05-29 PROCEDURE — 93283 PRGRMG EVAL IMPLANTABLE DFB: CPT | Mod: 26,S$PBB,, | Performed by: INTERNAL MEDICINE

## 2017-05-29 PROCEDURE — 94729 DIFFUSING CAPACITY: CPT | Mod: 26,S$PBB,, | Performed by: INTERNAL MEDICINE

## 2017-05-29 PROCEDURE — 93010 ELECTROCARDIOGRAM REPORT: CPT | Mod: S$PBB,,, | Performed by: INTERNAL MEDICINE

## 2017-05-29 PROCEDURE — 94010 BREATHING CAPACITY TEST: CPT | Mod: 26,S$PBB,, | Performed by: INTERNAL MEDICINE

## 2017-05-29 PROCEDURE — 93005 ELECTROCARDIOGRAM TRACING: CPT | Mod: PBBFAC | Performed by: INTERNAL MEDICINE

## 2017-05-29 PROCEDURE — 99214 OFFICE O/P EST MOD 30 MIN: CPT | Mod: S$PBB,,, | Performed by: INTERNAL MEDICINE

## 2017-05-29 RX ORDER — CARVEDILOL 12.5 MG/1
12.5 TABLET ORAL 2 TIMES DAILY WITH MEALS
Qty: 180 TABLET | Refills: 3 | Status: SHIPPED | OUTPATIENT
Start: 2017-05-29 | End: 2018-07-18 | Stop reason: SDUPTHER

## 2017-05-29 NOTE — PROGRESS NOTES
"Subjective:    Patient ID:  Amish Grossman is a 71 y.o. male who presents for follow-up of Pacemaker Check      Atrial Fibrillation   Symptoms include palpitations. Symptoms are negative for chest pain, dizziness, shortness of breath, syncope and weakness. Past medical history includes atrial fibrillation.      71 y.o. 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 Hunting. Does get some SHOOK when walking fast.  No CP. Feeling better since "R" added at prior visit. Leg swelling abated.    We did NOE/DCCV on 3/16 in hopes of starting tikosyn, but QTc was too long for that. Instead we started amiodarone. Pt says he felt better for a week, then felt down again. However he thinks that may have been multifactorial and isn't completely sure. Says he feels better now than pre-DCCV, and he's back in AF. ICD shows that NSR lasted 10 days. We repeated DCCV on 4/15. He's maintained NSR since then. Feels very well.    Hx of A noise. P waves ~4. Device shows several AMS episodes, <10 s (no egrams). Pt c/o occasional palps that are reproduced with pacing in device clinic today.    echo 12/15: 30-35%. no clot mentioned  My interpretation of today's ECG is A-pace, V-sense.    Review of Systems   Constitution: Negative. Negative for weakness and malaise/fatigue.   HENT: Negative.  Negative for ear pain and tinnitus.    Eyes: Negative.  Negative for blurred vision.   Cardiovascular: Positive for palpitations. Negative for chest pain, claudication, cyanosis, dyspnea on exertion, irregular heartbeat, leg swelling, near-syncope, orthopnea, paroxysmal nocturnal dyspnea and syncope.        Chest pain with associated left arm pain   Respiratory: Negative.  Negative for shortness of breath.    Endocrine: Negative.  Negative for polyuria.   Hematologic/Lymphatic: " Negative.  Does not bruise/bleed easily.   Skin: Negative.  Negative for rash.   Musculoskeletal: Negative.  Negative for joint pain and muscle weakness.   Gastrointestinal: Negative.  Negative for abdominal pain and change in bowel habit.   Genitourinary: Negative.  Negative for frequency.   Neurological: Positive for light-headedness. Negative for dizziness.   Psychiatric/Behavioral: Negative.  Negative for depression. The patient is not nervous/anxious.    Allergic/Immunologic: Negative for environmental allergies.        Objective:    Physical Exam   Constitutional: He is oriented to person, place, and time. He appears well-developed and well-nourished.   HENT:   Head: Normocephalic and atraumatic.   Eyes: Conjunctivae, EOM and lids are normal. Pupils are equal, round, and reactive to light. No scleral icterus.   Neck: Normal range of motion. Neck supple. No JVD present. No tracheal deviation present. No thyromegaly present.   Cardiovascular: Normal rate, regular rhythm, normal heart sounds and intact distal pulses.   No extrasystoles are present. PMI is not displaced.  Exam reveals no gallop and no friction rub.    No murmur heard.  Pulses:       Radial pulses are 2+ on the right side, and 2+ on the left side.   Pulmonary/Chest: Effort normal and breath sounds normal. No accessory muscle usage. No tachypnea. No respiratory distress. He has no wheezes. He has no rales.   Abdominal: Soft. Bowel sounds are normal. He exhibits no distension. There is no hepatosplenomegaly. There is no tenderness.   Musculoskeletal: Normal range of motion. He exhibits no edema.   Neurological: He is alert and oriented to person, place, and time. He has normal reflexes. He exhibits normal muscle tone.   Skin: Skin is warm and dry. No rash noted.   Psychiatric: He has a normal mood and affect. His behavior is normal.   Nursing note and vitals reviewed.        Assessment:       1. Persistent atrial fibrillation    2. HTN  (hypertension), benign    3. Ischemic dilated cardiomyopathy    4. VT (ventricular tachycardia)    5. Anticoagulated on Coumadin    6. Shortness of breath          Plan:       Doing better now he's in NSR.  Continue amio.    Made RA lead less sensitive to try to avoid oversensing of noise.  Increase coreg for HTN, CHF.  Discussed the indications and possible side effects of the medications prescribed to the patient by me.    Continue a/c    Return in 1 year with echo, or earlier prn.

## 2017-05-30 DIAGNOSIS — I47.29 PVT (PAROXYSMAL VENTRICULAR TACHYCARDIA): Primary | ICD-10-CM

## 2017-08-31 ENCOUNTER — CLINICAL SUPPORT (OUTPATIENT)
Dept: ELECTROPHYSIOLOGY | Facility: CLINIC | Age: 72
End: 2017-08-31
Payer: MEDICARE

## 2017-08-31 DIAGNOSIS — I42.9 CARDIOMYOPATHY: ICD-10-CM

## 2017-08-31 DIAGNOSIS — Z95.810 PRESENCE OF AUTOMATIC CARDIOVERTER/DEFIBRILLATOR (AICD): ICD-10-CM

## 2017-08-31 PROCEDURE — 93296 REM INTERROG EVL PM/IDS: CPT | Mod: PBBFAC | Performed by: INTERNAL MEDICINE

## 2017-09-01 PROCEDURE — 93295 DEV INTERROG REMOTE 1/2/MLT: CPT | Mod: ,,, | Performed by: INTERNAL MEDICINE

## 2017-10-31 ENCOUNTER — TELEPHONE (OUTPATIENT)
Dept: ELECTROPHYSIOLOGY | Facility: CLINIC | Age: 72
End: 2017-10-31

## 2017-10-31 NOTE — TELEPHONE ENCOUNTER
Contacted patient in relation to patient's remote ICD home monitor as to it is not connecting to [facility], no answer, left voicemail.

## 2017-11-01 ENCOUNTER — TELEPHONE (OUTPATIENT)
Dept: ELECTROPHYSIOLOGY | Facility: CLINIC | Age: 72
End: 2017-11-01

## 2017-11-01 NOTE — TELEPHONE ENCOUNTER
Spoke to pt about merlin home monitor being disconnected. Pt is in [employer] deer hunting, he did bring his monitor with him but it will not connect due to poor cell service. Pt will be home 11/10/17.

## 2017-12-07 ENCOUNTER — TELEPHONE (OUTPATIENT)
Dept: ELECTROPHYSIOLOGY | Facility: CLINIC | Age: 72
End: 2017-12-07

## 2017-12-07 NOTE — TELEPHONE ENCOUNTER
Did not receive remote device transmission scheduled on 12/4/17. Called patient to request transmission; however, no answer. Left voicemail for pt to call the clinic@ [phone].

## 2017-12-11 ENCOUNTER — CLINICAL SUPPORT (OUTPATIENT)
Dept: ELECTROPHYSIOLOGY | Facility: CLINIC | Age: 72
End: 2017-12-11
Attending: INTERNAL MEDICINE
Payer: MEDICARE

## 2017-12-11 DIAGNOSIS — Z95.810 AICD (AUTOMATIC CARDIOVERTER/DEFIBRILLATOR) PRESENT: ICD-10-CM

## 2017-12-11 DIAGNOSIS — I47.20 VT (VENTRICULAR TACHYCARDIA): ICD-10-CM

## 2017-12-11 DIAGNOSIS — I25.5 ISCHEMIC CARDIOMYOPATHY: ICD-10-CM

## 2017-12-11 PROCEDURE — 93296 REM INTERROG EVL PM/IDS: CPT | Mod: PBBFAC | Performed by: INTERNAL MEDICINE

## 2017-12-11 PROCEDURE — 93295 DEV INTERROG REMOTE 1/2/MLT: CPT | Mod: ,,, | Performed by: INTERNAL MEDICINE

## 2017-12-15 DIAGNOSIS — Z95.810 AICD (AUTOMATIC CARDIOVERTER/DEFIBRILLATOR) PRESENT: Primary | ICD-10-CM

## 2017-12-15 DIAGNOSIS — I25.5 ISCHEMIC CARDIOMYOPATHY: ICD-10-CM

## 2017-12-15 DIAGNOSIS — I47.20 VT (VENTRICULAR TACHYCARDIA): ICD-10-CM

## 2017-12-27 DIAGNOSIS — N18.1 CKD (CHRONIC KIDNEY DISEASE) STAGE 1, GFR 90 ML/MIN OR GREATER: ICD-10-CM

## 2017-12-27 DIAGNOSIS — I25.10 CORONARY ARTERY DISEASE INVOLVING NATIVE CORONARY ARTERY WITHOUT ANGINA PECTORIS, UNSPECIFIED WHETHER NATIVE OR TRANSPLANTED HEART: ICD-10-CM

## 2017-12-27 DIAGNOSIS — I24.0 CORONARY OCCLUSION WITHOUT MYOCARDIAL INFARCTION: ICD-10-CM

## 2017-12-27 DIAGNOSIS — I47.20 VENTRICULAR TACHYARRHYTHMIA: ICD-10-CM

## 2017-12-27 DIAGNOSIS — I47.20 VT (VENTRICULAR TACHYCARDIA): ICD-10-CM

## 2017-12-27 DIAGNOSIS — I25.2 OLD MYOCARDIAL INFARCTION: ICD-10-CM

## 2017-12-27 DIAGNOSIS — I10 HTN (HYPERTENSION), BENIGN: ICD-10-CM

## 2017-12-27 DIAGNOSIS — I25.5 ISCHEMIC DILATED CARDIOMYOPATHY: ICD-10-CM

## 2017-12-27 DIAGNOSIS — Z79.01 ANTICOAGULATED ON COUMADIN: ICD-10-CM

## 2017-12-27 DIAGNOSIS — I42.0 ISCHEMIC DILATED CARDIOMYOPATHY: ICD-10-CM

## 2017-12-27 RX ORDER — LISINOPRIL 20 MG/1
TABLET ORAL
Qty: 90 TABLET | Refills: 3 | Status: SHIPPED | OUTPATIENT
Start: 2017-12-27 | End: 2018-12-17 | Stop reason: SDUPTHER

## 2018-01-16 ENCOUNTER — TELEPHONE (OUTPATIENT)
Dept: ELECTROPHYSIOLOGY | Facility: CLINIC | Age: 73
End: 2018-01-16

## 2018-01-16 NOTE — TELEPHONE ENCOUNTER
Scheduled Remote ICD/Pacemaker transmission on 1/16/18 was not received.  Contacted the patient on this afternoon.  Patient stated he is currently in [place] and did not bring his monitor with him as to he only anticipated on being gone for a week.  Patient stated he will be returning home on Friday the 19 th after 5:00 pm.  Patient instructed to contact the clinic on Monday (1/22/18) morning for assistance in sending a manual transmission to assess the ICD battery. Patient instructed in the future when going out of town for more than a week he is to bring the monitor with him. Will also put the patient on the schedule on 1/22/18 in case he does not remember to call.  Patient verbalized understanding to all of the above.

## 2018-01-22 ENCOUNTER — TELEPHONE (OUTPATIENT)
Dept: ELECTROPHYSIOLOGY | Facility: CLINIC | Age: 73
End: 2018-01-22

## 2018-01-22 ENCOUNTER — CLINICAL SUPPORT (OUTPATIENT)
Dept: ELECTROPHYSIOLOGY | Facility: CLINIC | Age: 73
End: 2018-01-22
Attending: INTERNAL MEDICINE
Payer: MEDICARE

## 2018-01-22 DIAGNOSIS — I25.5 ISCHEMIC CARDIOMYOPATHY: ICD-10-CM

## 2018-01-22 DIAGNOSIS — I47.20 VT (VENTRICULAR TACHYCARDIA): ICD-10-CM

## 2018-01-22 DIAGNOSIS — Z95.810 AICD (AUTOMATIC CARDIOVERTER/DEFIBRILLATOR) PRESENT: ICD-10-CM

## 2018-01-22 PROCEDURE — 93295 DEV INTERROG REMOTE 1/2/MLT: CPT | Mod: ,,, | Performed by: INTERNAL MEDICINE

## 2018-01-22 PROCEDURE — 93296 REM INTERROG EVL PM/IDS: CPT | Mod: PBBFAC | Performed by: INTERNAL MEDICINE

## 2018-02-21 ENCOUNTER — CLINICAL SUPPORT (OUTPATIENT)
Dept: ELECTROPHYSIOLOGY | Facility: CLINIC | Age: 73
End: 2018-02-21
Payer: MEDICARE

## 2018-02-21 DIAGNOSIS — I47.20 VT (VENTRICULAR TACHYCARDIA): ICD-10-CM

## 2018-02-21 DIAGNOSIS — Z95.810 AICD (AUTOMATIC CARDIOVERTER/DEFIBRILLATOR) PRESENT: ICD-10-CM

## 2018-02-21 DIAGNOSIS — I25.5 ISCHEMIC CARDIOMYOPATHY: ICD-10-CM

## 2018-02-21 PROCEDURE — 93296 REM INTERROG EVL PM/IDS: CPT | Mod: PBBFAC | Performed by: INTERNAL MEDICINE

## 2018-02-21 PROCEDURE — 93295 DEV INTERROG REMOTE 1/2/MLT: CPT | Mod: ,,, | Performed by: INTERNAL MEDICINE

## 2018-03-26 ENCOUNTER — CLINICAL SUPPORT (OUTPATIENT)
Dept: ELECTROPHYSIOLOGY | Facility: CLINIC | Age: 73
End: 2018-03-26
Attending: INTERNAL MEDICINE
Payer: MEDICARE

## 2018-03-26 DIAGNOSIS — I47.20 VT (VENTRICULAR TACHYCARDIA): ICD-10-CM

## 2018-03-26 DIAGNOSIS — I25.5 ISCHEMIC CARDIOMYOPATHY: ICD-10-CM

## 2018-03-26 DIAGNOSIS — Z95.810 AICD (AUTOMATIC CARDIOVERTER/DEFIBRILLATOR) PRESENT: ICD-10-CM

## 2018-03-26 PROCEDURE — 93295 DEV INTERROG REMOTE 1/2/MLT: CPT | Mod: ,,, | Performed by: INTERNAL MEDICINE

## 2018-03-26 PROCEDURE — 93296 REM INTERROG EVL PM/IDS: CPT | Mod: PBBFAC | Performed by: INTERNAL MEDICINE

## 2018-03-29 ENCOUNTER — DOCUMENTATION ONLY (OUTPATIENT)
Dept: ELECTROPHYSIOLOGY | Facility: CLINIC | Age: 73
End: 2018-03-29

## 2018-03-29 NOTE — PROGRESS NOTES
Contacted [facility]/[employer], s/w Alla in relation to this patient as to his ICD battery is nearing GENIE and I wanted to confirm the patient's monitor is functioning properly.  This was due to the last communication from the monitor to [facility] was 3/21/18 and the last alert check date was 3/26/18 which the day his scheduled remote transmission was received.  Alla informed me the patient's monitor is connected and communicating.  However, Alla then stated he would like to call the patient to do a maintenance update.  Informed him this would be fine due to the battery status. After the patient received a call from Alla with [facility] he contacted the clinic and stated he was told his monitor is working, however, Alla informed him he would have a new cell adapter sent to him to that would provide better coverage.  Instructed the patient to contact the clinic upon receipt of the adapter for assistance with sending a manual transmission to assure his monitor is connected.  Patient verbalized understanding to all of the above.

## 2018-04-02 ENCOUNTER — TELEPHONE (OUTPATIENT)
Dept: ELECTROPHYSIOLOGY | Facility: CLINIC | Age: 73
End: 2018-04-02

## 2018-04-02 NOTE — TELEPHONE ENCOUNTER
Attempted to return the patient's call on this morning from a voice mail left on Friday, 3/30 when the clinic was closed for Good Friday.  Message left on the voice mail @ [phone] informing the patient his Remote ICD home monitor is showing as connected and communicating with last update of 4/1/18.  Device Clinic contact # also left on the voice mail.

## 2018-04-10 ENCOUNTER — TELEPHONE (OUTPATIENT)
Dept: ELECTROPHYSIOLOGY | Facility: CLINIC | Age: 73
End: 2018-04-10

## 2018-04-10 NOTE — TELEPHONE ENCOUNTER
----- Message from Camron Isbell MD sent at 4/10/2018 11:20 AM CDT -----  Regarding: RE: Remote Alert - GENIE  ok; gen change please Svitlana  continue coumadin for INR 2-3  DPM    ----- Message -----  From: Kusum Gong  Sent: 4/10/2018   9:38 AM  To: Camron Isbell MD, Hung Pereyra, #  Subject: Remote Alert - GENIE                               Dr. Isbell,    [employer] dual chamber ICD.  Device at GENIE (reached 4/5/18).  2.42 V (GENIE @ 2.45 V).    Thanks,  Kusum

## 2018-04-11 ENCOUNTER — TELEPHONE (OUTPATIENT)
Dept: ELECTROPHYSIOLOGY | Facility: CLINIC | Age: 73
End: 2018-04-11

## 2018-04-11 NOTE — TELEPHONE ENCOUNTER
Patient contacted the Device Clinic on this afternoon with questions about his remote ICD home monitor.  Patient stated the light on the cell adapter was blinking so he unplugged it and then plugged it back in and the light is now on solid.  Informed the patient his ICD has reached recommended replacement as of 4/5/18.  Patient stated he will be going [place] hunting in [place] for 2 weeks leaving on 4/21/18 and returning on 5/5/18.  Informed the patient per Dr. Isbell there is a 2-3 month window as it relates to needing to do the ICD generator change.  Patient stated, if possible he would like to have the procedure scheduled the week of May 7th.  Informed the patient a message would be sent to the doctors nurse letting her know about these dates.  Patient verbalized understanding to all of the above.

## 2018-04-19 ENCOUNTER — TELEPHONE (OUTPATIENT)
Dept: ELECTROPHYSIOLOGY | Facility: CLINIC | Age: 73
End: 2018-04-19

## 2018-04-19 NOTE — TELEPHONE ENCOUNTER
----- Message from Bernadette Masterson MA sent at 4/18/2018  4:03 PM CDT -----  Contact: patient called   Please call the patient at [phone] he would like to talk to someone about his device and his batteries for his device. Thank you.       I spoke with patient. He wanted to be sure the it is fine to wait until the beginning of June for his device generator to be changed out. I informed him that this is fine.

## 2018-05-07 ENCOUNTER — TELEPHONE (OUTPATIENT)
Dept: ELECTROPHYSIOLOGY | Facility: CLINIC | Age: 73
End: 2018-05-07

## 2018-05-07 DIAGNOSIS — I47.29 PVT (PAROXYSMAL VENTRICULAR TACHYCARDIA): Primary | ICD-10-CM

## 2018-05-07 NOTE — TELEPHONE ENCOUNTER
----- Message from Sweetie Ellis MA sent at 5/7/2018  4:18 PM CDT -----  Contact: self  Pt. is calling to make an appt. with  from recall,also said he needs a device check and also talked about a stress test by . Please call [phone].

## 2018-05-07 NOTE — TELEPHONE ENCOUNTER
Returned patient call. Appointment scheduled for follow-up appointment.  Patient requested appointments be made in June 2018 and not sooner due to he's  Going out of town. All appointments  mailed to patient.

## 2018-05-09 ENCOUNTER — TELEPHONE (OUTPATIENT)
Dept: ELECTROPHYSIOLOGY | Facility: CLINIC | Age: 73
End: 2018-05-09

## 2018-05-09 NOTE — TELEPHONE ENCOUNTER
Attempted to reach Pt to schedule procedure. No answer. I left message with number for Pt to return call.

## 2018-05-11 ENCOUNTER — TELEPHONE (OUTPATIENT)
Dept: ELECTROPHYSIOLOGY | Facility: CLINIC | Age: 73
End: 2018-05-11

## 2018-05-11 NOTE — TELEPHONE ENCOUNTER
Returned call to Pt. Procedure scheduled.      ----- Message from Bernadette Masterson MA sent at 5/11/2018 11:13 AM CDT -----  Contact: pt called   Patient called to schedule an appointment to replace the batteries in his  Defibrillator. Please call the patient back on [phone]. Thank you.

## 2018-05-11 NOTE — TELEPHONE ENCOUNTER
Attempted to call Pt back. No anser. Left message with number for Pt to return call.      ----- Message from Dinora Mascorro sent at 5/10/2018  4:43 PM CDT -----  Contact: patient  Please call pt at [phone]. Returning your call.     Thank you

## 2018-05-22 ENCOUNTER — TELEPHONE (OUTPATIENT)
Dept: ELECTROPHYSIOLOGY | Facility: CLINIC | Age: 73
End: 2018-05-22

## 2018-05-22 NOTE — TELEPHONE ENCOUNTER
Returned call to Pt. His INR was 3.4 today. Dr Devi adjusted dosage. Advised Pt that we need his INR between 2-3.0 and needed to have it done weekly until his procedure. Pt voice understanding.        ----- Message from Shannon Solano sent at 5/22/2018  1:05 PM CDT -----  Mr. Grossman called and left a voice message this morning. He is scheduled to have his device generator changed out on 06/07/2018. He said his INR is 3.4 today. (His Coumadin dose was 7.5mg on M/W/F and 5mg others.  He said Dr. Devi in [place], [place]  Follows his INR. He said Dr. Devi knows that he is having his generator changed out on 06/07/18. Dr. Devi changed his Coumadin dose to (7.5mg on M/F and 5mg others.) In his chart is shows that he is on Eliquis but he is not he said Coumadin. He said he could not afford Eliquis. I am not able to change medications in the patient chart. He wants to know if this is an okay INR for his surgery. He would like for you to call him back please.

## 2018-05-28 ENCOUNTER — TELEPHONE (OUTPATIENT)
Dept: ELECTROPHYSIOLOGY | Facility: CLINIC | Age: 73
End: 2018-05-28

## 2018-05-28 DIAGNOSIS — I48.19 PERSISTENT ATRIAL FIBRILLATION: Primary | ICD-10-CM

## 2018-05-28 NOTE — TELEPHONE ENCOUNTER
EXTRACTION / IMPLANTABLE DEVICE EDUCATION CHECKLIST  Dual ICD Gen Change    PRE PROCEDURE TESTING    06/06/18 @ 7:30 AM  Pre-procedure labs have been ordered for you at:  [facility]  Be sure to arrive at your scheduled time. YOU DO NOT HAVE TO FAST FOR THIS LABWORK!      DAY OF PROCEDURE    06/07/18 @ 8:30 AM  Report to Cardiology Waiting Room on 3rd floor of the hospital   · WASH YOUR CHEST WITH HIBICLENS OR AN ANTIBACTERIAL SOAP (SUCH AS DIAL) ON THE NIGHT BEFORE AND THE MORNING OF YOUR PROCEDURE.  · DO NOT EAT OR DRINK ANYTHING AFTER: 12 MN ON THE NIGHT BEFORE YOUR PROCEDURE    Medications  · You may take your usual morning medications with a sip of water      DIRECTIONS TO CARDIOLOGY WAITING ROOM  If you park in the Parking Garage:  Take elevators to the 2nd floor  Walk up ramp and turn right by Gold Elevators  Take elevator to the 3rd floor  Upon exiting the elevator, turn away from the clinic areas  Walk long ching around to front of hospital to area with windows overlooking [facility]  Check in at Reception Desk  OR  If family is dropping you off:  Have them drop you off at the front of the Hospital  (Near the ER, where all the flags are hung).  Take the E elevators to the 3rd floor.  Check in at the Reception Desk in the waiting room.    YOU WILL BE SPENDING THE NIGHT AFTER YOUR PROCEDURE  YOU WILL NEED SOMEONE TO DRIVE YOU HOME THE DAY AFTER YOUR PROCEDURE    YOUR PAIN DURING YOUR PROCEDURE WILL BE MANAGED BY THE ANESTHESIA TEAM    Any need to reschedule or cancel procedures, or any questions regarding your procedures should be addressed directly with the Arrhythmia Department Nurses at the following phone number: [phone]      THE ABOVE INSTRUCTIONS WERE GIVEN TO THE PATIENT VERBALLY AND THEY VERBALIZED UNDERSTANDING. THEY DO NOT REQUIRE ANY SPECIAL NEEDS AND DO NOT HAVE ANY LEARNING BARRIERS.

## 2018-05-29 ENCOUNTER — TELEPHONE (OUTPATIENT)
Dept: ELECTROPHYSIOLOGY | Facility: CLINIC | Age: 73
End: 2018-05-29

## 2018-05-29 NOTE — TELEPHONE ENCOUNTER
Returned call to Pt. He wanted to move his procedure to 6/11. Advised that spot was already filled.         ----- Message from Chula Nguyen sent at 5/29/2018  1:53 PM CDT -----  Contact: Patient  The Pt would like top speak with you regarding his procedures. Please call him back @ [phone]. Thanks, Chula

## 2018-06-04 DIAGNOSIS — I48.19 PERSISTENT ATRIAL FIBRILLATION: Primary | ICD-10-CM

## 2018-06-06 ENCOUNTER — OFFICE VISIT (OUTPATIENT)
Dept: ELECTROPHYSIOLOGY | Facility: CLINIC | Age: 73
End: 2018-06-06
Payer: MEDICARE

## 2018-06-06 ENCOUNTER — TELEPHONE (OUTPATIENT)
Dept: CARDIOLOGY | Facility: CLINIC | Age: 73
End: 2018-06-06

## 2018-06-06 ENCOUNTER — DOCUMENTATION ONLY (OUTPATIENT)
Dept: CARDIOLOGY | Facility: CLINIC | Age: 73
End: 2018-06-06

## 2018-06-06 ENCOUNTER — HOSPITAL ENCOUNTER (OUTPATIENT)
Dept: CARDIOLOGY | Facility: CLINIC | Age: 73
Discharge: HOME | End: 2018-06-06
Payer: MEDICARE

## 2018-06-06 ENCOUNTER — TELEPHONE (OUTPATIENT)
Dept: NEUROLOGY | Facility: CLINIC | Age: 73
End: 2018-06-06

## 2018-06-06 ENCOUNTER — OFFICE VISIT (OUTPATIENT)
Dept: NEUROLOGY | Facility: CLINIC | Age: 73
End: 2018-06-06
Payer: MEDICARE

## 2018-06-06 ENCOUNTER — TELEPHONE (OUTPATIENT)
Dept: ELECTROPHYSIOLOGY | Facility: CLINIC | Age: 73
End: 2018-06-06

## 2018-06-06 VITALS
WEIGHT: 251.13 LBS | SYSTOLIC BLOOD PRESSURE: 114 MMHG | BODY MASS INDEX: 32.23 KG/M2 | DIASTOLIC BLOOD PRESSURE: 70 MMHG | HEART RATE: 58 BPM | HEIGHT: 74 IN

## 2018-06-06 VITALS
BODY MASS INDEX: 32.21 KG/M2 | HEART RATE: 60 BPM | DIASTOLIC BLOOD PRESSURE: 80 MMHG | HEIGHT: 74 IN | WEIGHT: 251 LBS | SYSTOLIC BLOOD PRESSURE: 141 MMHG

## 2018-06-06 DIAGNOSIS — R25.1 TREMOR: ICD-10-CM

## 2018-06-06 DIAGNOSIS — E78.5 DYSLIPIDEMIA: ICD-10-CM

## 2018-06-06 DIAGNOSIS — Z98.61 S/P PTCA (PERCUTANEOUS TRANSLUMINAL CORONARY ANGIOPLASTY): ICD-10-CM

## 2018-06-06 DIAGNOSIS — I48.19 PERSISTENT ATRIAL FIBRILLATION: ICD-10-CM

## 2018-06-06 DIAGNOSIS — I47.20 VENTRICULAR TACHYARRHYTHMIA: ICD-10-CM

## 2018-06-06 DIAGNOSIS — I25.5 ISCHEMIC DILATED CARDIOMYOPATHY: ICD-10-CM

## 2018-06-06 DIAGNOSIS — I42.0 ISCHEMIC DILATED CARDIOMYOPATHY: ICD-10-CM

## 2018-06-06 DIAGNOSIS — I25.10 CORONARY ARTERY DISEASE DUE TO LIPID RICH PLAQUE: ICD-10-CM

## 2018-06-06 DIAGNOSIS — G25.0 BENIGN ESSENTIAL TREMOR: ICD-10-CM

## 2018-06-06 DIAGNOSIS — E03.4 HYPOTHYROIDISM DUE TO ACQUIRED ATROPHY OF THYROID: ICD-10-CM

## 2018-06-06 DIAGNOSIS — I25.2 OLD MYOCARDIAL INFARCTION: Primary | ICD-10-CM

## 2018-06-06 DIAGNOSIS — Z79.01 ANTICOAGULATED ON COUMADIN: Primary | ICD-10-CM

## 2018-06-06 DIAGNOSIS — I10 HTN (HYPERTENSION), BENIGN: ICD-10-CM

## 2018-06-06 DIAGNOSIS — I25.83 CORONARY ARTERY DISEASE DUE TO LIPID RICH PLAQUE: ICD-10-CM

## 2018-06-06 DIAGNOSIS — I47.29 PVT (PAROXYSMAL VENTRICULAR TACHYCARDIA): ICD-10-CM

## 2018-06-06 DIAGNOSIS — Z79.01 ANTICOAGULATED ON COUMADIN: ICD-10-CM

## 2018-06-06 PROBLEM — R07.9 CHEST PAIN: Status: RESOLVED | Noted: 2017-01-19 | Resolved: 2018-06-06

## 2018-06-06 LAB
AORTIC VALVE REGURGITATION: ABNORMAL
DIASTOLIC DYSFUNCTION: YES
ESTIMATED PA SYSTOLIC PRESSURE: 24.16
MITRAL VALVE REGURGITATION: ABNORMAL
RETIRED EF AND QEF - SEE NOTES: 25 (ref 55–65)
TRICUSPID VALVE REGURGITATION: ABNORMAL

## 2018-06-06 PROCEDURE — 93010 ELECTROCARDIOGRAM REPORT: CPT | Mod: S$PBB,,, | Performed by: INTERNAL MEDICINE

## 2018-06-06 PROCEDURE — 99215 OFFICE O/P EST HI 40 MIN: CPT | Mod: S$PBB,,, | Performed by: INTERNAL MEDICINE

## 2018-06-06 PROCEDURE — 99999 PR PBB SHADOW E&M-EST. PATIENT-LVL III: CPT | Mod: PBBFAC,,, | Performed by: PSYCHIATRY & NEUROLOGY

## 2018-06-06 PROCEDURE — 99213 OFFICE O/P EST LOW 20 MIN: CPT | Mod: PBBFAC,25,27 | Performed by: PSYCHIATRY & NEUROLOGY

## 2018-06-06 PROCEDURE — 99204 OFFICE O/P NEW MOD 45 MIN: CPT | Mod: S$PBB,,, | Performed by: PSYCHIATRY & NEUROLOGY

## 2018-06-06 PROCEDURE — 93005 ELECTROCARDIOGRAM TRACING: CPT | Mod: PBBFAC | Performed by: INTERNAL MEDICINE

## 2018-06-06 PROCEDURE — 93306 TTE W/DOPPLER COMPLETE: CPT | Mod: 26,S$PBB,, | Performed by: INTERNAL MEDICINE

## 2018-06-06 PROCEDURE — 99213 OFFICE O/P EST LOW 20 MIN: CPT | Mod: PBBFAC,25 | Performed by: INTERNAL MEDICINE

## 2018-06-06 PROCEDURE — 99999 PR PBB SHADOW E&M-EST. PATIENT-LVL III: CPT | Mod: PBBFAC,,, | Performed by: INTERNAL MEDICINE

## 2018-06-06 NOTE — LETTER
June 6, 2018      Camron Isbell MD  [address]  [facility] [address]           [facility]  Neurology  [address]  [facility] [address]  Phone: [phone]  Fax: [phone]          Patient: Amish Grossman   MR Number: [id number]   YOB: 1945   Date of Visit: 6/6/2018       Dear Dr. Camron Isbell:    Thank you for referring Amish Grossman to me for evaluation. Attached you will find relevant portions of my assessment and plan of care.    If you have questions, please do not hesitate to call me. I look forward to following Amish Grossman along with you.    Sincerely,    Syeda Longoria MD    Enclosure  CC:  No Recipients    If you would like to receive this communication electronically, please contact [email or web address] or [phone] to request more information on "[employer] Link access.    For providers and/or their staff who would like to refer a patient to [facility], please contact us through our one-stop-shop provider referral line, [facility] [place], at [phone].    If you feel you have received this communication in error or would no longer like to receive these types of communications, please e-mail [email or web address]

## 2018-06-06 NOTE — TELEPHONE ENCOUNTER
----- Message from Lashawn Mcneil MA sent at 6/6/2018 11:16 AM CDT -----  Regarding: referral  Hello Dr Longoria.  Dr Isbell in Arrhythmia is referring Mr Amish Grossman to establish care with you.  Unfortunately your schedule is closed, and I am unable to schedule the appt.  He lives about 5 hours away.  He will be having a procedure tomorrow, and will need to come back in about 3 months.  Is there any way that you can see him when he comes back in that time frame?  Thanks, Lashawn Mcneil.

## 2018-06-06 NOTE — PROGRESS NOTES
Amish ROBLES Chief Complaints during this visit:  New Patient visit for  tremor    Camron Isbell MD  [address] [place] [address]    Primary Care Physician  Angel Coburn MD  [address]  [facility] [address]      History of present illness:   72 y.o.  male seen in consultation at the request of  Dr. Isbell for evaluation of tremor.  Accompanied by wife.  In town for a new defibrillator battery tomorrow and it was suggested that he be seen in Neurology to have tremor evaluated.    Reports tremor for about 3-5 years.  Worse with writing, eating.  On my scale of 1-10, he says he is a 2-3.  Not particularly bothered, but here today because doc wanted it checked out.  Father and father's first cousin both had bad tremors.  46 yo son also has tremor.  Mother had drug-induced PD.    Worse when tired.  Drinks 36oz of coffee every day!     No trouble with gait or balance.  Wife says he repeats himself sometimes.    Prior history of anxiety after his heart attack, but doing fine since last angiogram.      II.  Review of systems:  As in HPI,  otherwise, balance 10 systems reviewed and are negative.    III.  Past Medical History:   Diagnosis Date    Anticoagulant long-term use     Atrial fibrillation     Benign essential tremor 6/6/2018    Cardiomyopathy     CHF (congestive heart failure)     Coronary artery disease     Hyperlipidemia     Hypertension     Left ventricular thrombus     Syncope and collapse     Thyroid disease      Family History   Problem Relation Age of Onset    Heart disease Mother     Heart disease Father     Heart disease Brother      Social History     Social History    Marital status:      Spouse name: N/A    Number of children: N/A    Years of education: N/A     Social History Main Topics    Smoking status: Never Smoker    Smokeless tobacco: None    Alcohol use Yes      Comment: occasionally    Drug use: No    Sexual activity: Not Asked     Other  "Topics Concern    None     Social History Narrative    None         Current Outpatient Prescriptions on File Prior to Visit   Medication Sig Dispense Refill    amiodarone (PACERONE) 200 MG Tab Take 1 tablet (200 mg total) by mouth once daily. 30 tablet 3    amlodipine (NORVASC) 10 MG tablet Take 1 tablet (10 mg total) by mouth once daily. 90 tablet 3    ascorbic acid (VITAMIN C) 100 MG tablet Take 1,000 mg by mouth once daily.       aspirin (ECOTRIN) 81 MG EC tablet Take 81 mg by mouth once daily.      atorvastatin (LIPITOR) 10 MG tablet Take 1 tablet (10 mg total) by mouth once daily. 90 tablet 3    carvedilol (COREG) 12.5 MG tablet Take 1 tablet (12.5 mg total) by mouth 2 (two) times daily with meals. 180 tablet 3    fish oil-omega-3 fatty acids 300-1,000 mg capsule Take 2 g by mouth 2 (two) times daily.       furosemide (LASIX) 40 MG tablet Take 40 mg by mouth as needed.      levothyroxine (SYNTHROID) 75 MCG tablet Take 75 mcg by mouth before breakfast.       lisinopril (PRINIVIL,ZESTRIL) 20 MG tablet TAKE ONE TABLET BY MOUTH ONCE DAILY 90 tablet 3    multivitamin capsule Take 1 capsule by mouth nightly.       nitroGLYCERIN (NITROSTAT) 0.4 MG SL tablet Place 1 tablet (0.4 mg total) under the tongue every 5 (five) minutes as needed for Chest pain. 25 tablet 4    omeprazole (PRILOSEC) 20 MG capsule Take 20 mg by mouth nightly.       SLO-NIACIN 750 mg TbSR Take 1 tablet (750 mg total) by mouth 2 (two) times daily. 180 each 3    warfarin (COUMADIN) 5 MG tablet 5 mg. 5  mgs  4dailys   7.5  3  Times  weekly       No current facility-administered medications on file prior to visit.        PRIOR problem-specific medications tried:  NA    Review of patient's allergies indicates:  No Known Allergies    IV. Physical Exam    Vitals:    06/06/18 1434   BP: 114/70   Pulse: (!) 58   Weight: 113.9 kg (251 lb 1.7 oz)   Height: 6' 2" (1.88 m)     General appearance: Well nourished, well developed, no acute " distress.         Cardiovascular:  pedal pulses 2, no edema or cyanosis, heart regular rate and rhythym, no carotid bruits.         -------------------------------------------------------------  Facial Expression: normal       Affect: full       Orientation to time & place:  Oriented to time, place, person and situation       Attention & concentration:  Normal attention span and concentration       Memory:  Recent and remote memory intact  Language: Spontaneous, fluent; able to repeat and name objects        Fund of knowledge:  Aware of current events        Speech:  normal (not dysarthric)  -------------------------------------------------------  Cranial nerves: normal visual acuity, visual fields full, optic discs not visualized, pupils equal round and reactive, extraocular movements intact,       facial sensation intact, face symmetrical, hearing intact to whisper, palate raises midline, shoulder shrug strength normal, tongue protrudes midline.        -------------------------------------------------------  Musculoskeletal  Muscle tone: all 4 extremities normal        Muscle Bulk: all 4 extremities normal        Muscle strength:  5/5 in all 4 extremities        No pronator drift  Sensation: Intact to light touch, pin prick, vibration in all extremities        Deep tendon Reflexes: 2+ bilateral biceps, triceps, patella and ankles        --------------------------------------------------------------  Cerebellar and Coordination  Gait:  normal, able to tandem without difficulty        Finger-nose: no dysmetria       Rapid Alternating Movements (pronation/supination):  R normal; L normal  --------------------------------------------------------------  Abnormality of movement (bradykinesia, hyperkinesia) present? No    Tremor present?   Yes, action tremors of hands, best seen with handwriting     Posture:  normal  Postural stability:  no Geoffrey CASAREZ  Laboratory/ Radiological Data:          Lab Results    Component Value Date    WBC 4.96 06/06/2018    HGB 13.5 (L) 06/06/2018    HCT 40.9 06/06/2018     (H) 06/06/2018     06/06/2018     Lab Results   Component Value Date    TSH 5.190 (H) 06/06/2018        VI. Assessment and Plan            Problem List Items Addressed This Visit        1 - High    Benign essential tremor    Current Assessment & Plan     ET by exam and family history.  Currently annoying, but not disabling.     -> advised to try lowering his caffeine intake   -> given handout on ET   -> explained risks of s/e currently outweigh benefits as he is not bothered by tremor   -> if he gets to point of needing medication, I would suggest either switching carvedilol to nadolol or propranolol OR trying topamax.  He use of coumadin, while not a contraindication, makes primidone a more complicated option (require increase monitoring).            2     Hypothyroid    Current Assessment & Plan     Patient was just advised by PCP RN today to not change synthroid dose despite high tsh.            3     Persistent atrial fibrillation    Anticoagulated on Coumadin - Primary                Follow-up if symptoms worsen or fail to improve.       ___________________________________________________________    I appreciate the opportunity to participate in the care of this patient and will communicate my assessment and plan back to the referring physician via copy of this note.

## 2018-06-06 NOTE — TELEPHONE ENCOUNTER
----- Message from Angel Coburn MD sent at 6/6/2018  3:47 PM CDT -----  Tell him echo was a bit weaker but did not show increased fluid . But with heart weaker and BNP value higher, let's at least do a half of the Furosimide 3 times weekly,CJL  ----- Message -----  From: Berenice Fields MA  Sent: 6/6/2018   2:49 PM  To: Angel Coburn MD    I spoke with the patient he says he gets swelling when he drives and he drove yesterday 5 hours.  He does have Lasix but has taken any it's only PRN 40 mg. Do you think he needs to take some?       ----- Message from Agnel Coburn MD sent at 6/6/2018  8:57 AM CDT -----  Release most labs good byt thyroid a bit low-may increase L-thyroxine if T4 on low side. BNP slightly high-has he been retaining fluid-if so or if echo shows increased fluid, we may increase Lasix a bit-what is dose he is taking now

## 2018-06-06 NOTE — PROGRESS NOTES
"Subjective:    Patient ID:  Amish Grossman is a 72 y.o. male who presents for follow-up of Atrial Fibrillation      Atrial Fibrillation   Symptoms include palpitations. Symptoms are negative for chest pain, dizziness, shortness of breath, syncope and weakness. Past medical history includes atrial fibrillation.      72 y.o. 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 Hunting. Does get some SHOOK when walking fast.  No CP. Feeling better since "R" added at prior visit. Leg swelling abated.    We did NOE/DCCV on 3/16 in hopes of starting tikosyn, but QTc was too long for that. Instead we started amiodarone. Pt says he felt better for a week, then felt down again. However he thinks that may have been multifactorial and isn't completely sure. Says he feels better now than pre-DCCV, and he's back in AF. ICD shows that NSR lasted 10 days. We repeated DCCV on 4/15. He's maintained NSR since then. Feels very well.  Hx of A noise. P waves ~4. Device shows few AMS episodes, <10 s (no egrams). Pt c/o occasional palps that are reproduced with pacing in device clinic.    His ICD has reached GENIE. Leads work well.  TSH, LFTs ok.    echo 12/15: 30-35%. no clot mentioned. Today's pending.  My interpretation of today's ECG is A-pace, V-sense.    Review of Systems   Constitution: Negative. Negative for weakness and malaise/fatigue.   HENT: Negative.  Negative for ear pain and tinnitus.    Eyes: Negative.  Negative for blurred vision.   Cardiovascular: Positive for palpitations. Negative for chest pain, claudication, cyanosis, dyspnea on exertion, irregular heartbeat, leg swelling, near-syncope, orthopnea, paroxysmal nocturnal dyspnea and syncope.        Chest pain with associated left arm pain   Respiratory: Negative.  Negative for shortness of breath.  "   Endocrine: Negative.  Negative for polyuria.   Hematologic/Lymphatic: Negative.  Does not bruise/bleed easily.   Skin: Negative.  Negative for rash.   Musculoskeletal: Negative.  Negative for joint pain and muscle weakness.   Gastrointestinal: Negative.  Negative for abdominal pain and change in bowel habit.   Genitourinary: Negative.  Negative for frequency.   Neurological: Positive for light-headedness and tremors (worsening, bilat hands, on intention.). Negative for dizziness.   Psychiatric/Behavioral: Negative.  Negative for depression. The patient is not nervous/anxious.    Allergic/Immunologic: Negative for environmental allergies.        Objective:    Physical Exam   Constitutional: He is oriented to person, place, and time. He appears well-developed and well-nourished.   HENT:   Head: Normocephalic and atraumatic.   Eyes: Conjunctivae, EOM and lids are normal. Pupils are equal, round, and reactive to light. No scleral icterus.   Neck: Normal range of motion. Neck supple. No JVD present. No tracheal deviation present. No thyromegaly present.   Cardiovascular: Normal rate, regular rhythm, normal heart sounds and intact distal pulses.   No extrasystoles are present. PMI is not displaced.  Exam reveals no gallop and no friction rub.    No murmur heard.  Pulses:       Radial pulses are 2+ on the right side, and 2+ on the left side.   Pulmonary/Chest: Effort normal and breath sounds normal. No accessory muscle usage. No tachypnea. No respiratory distress. He has no wheezes. He has no rales.   Abdominal: Soft. Bowel sounds are normal. He exhibits no distension. There is no hepatosplenomegaly. There is no tenderness.   Musculoskeletal: Normal range of motion. He exhibits no edema.   Neurological: He is alert and oriented to person, place, and time. He has normal reflexes. He exhibits normal muscle tone.   Skin: Skin is warm and dry. No rash noted.   Psychiatric: He has a normal mood and affect. His behavior is  normal.   Nursing note and vitals reviewed.        Assessment:       1. Old myocardial infarction    2. Coronary artery disease due to lipid rich plaque    3. S/P PTCA (percutaneous transluminal coronary angioplasty)    4. Dyslipidemia    5. HTN (hypertension), benign    6. Ischemic dilated cardiomyopathy    7. Ventricular tachyarrhythmia    8. Persistent atrial fibrillation    9. Anticoagulated on Coumadin    10. Tremor         Plan:       Continue amio, which is maintaining NSR well.  Continue a/c.    I spent about a half hour discussing the risks and benefits of ICD generator replacement. Our discussion of risks included (but was not limited to) the possibility of infection, death, stroke, MI, pneumothorax, embolism, cardiac perforation, cardiac tamponade, renal injury, and bleeding.  I discussed with patient risks, indications, benefits, and alternatives of the planned procedure. All questions were answered. Patient understands and wishes to proceed.  Gen change tomorrow.    c/o tremor. Will refer to Dr. Syeda Longoria.

## 2018-06-06 NOTE — TELEPHONE ENCOUNTER
----- Message from Angel Coburn MD sent at 6/6/2018  8:57 AM CDT -----  Release most labs good byt thyroid a bit low-may increase L-thyroxine if T4 on low side. BNP slightly high-has he been retaining fluid-if so or if echo shows increased fluid, we may increase Lasix a bit-what is dose he is taking now?

## 2018-06-06 NOTE — TELEPHONE ENCOUNTER
Attempted to call Pt to confirm procedure for tomm. No answer. Left message reviewing pre op instructions and arrival time. I also left number for Pt to call with any questions.

## 2018-06-06 NOTE — TELEPHONE ENCOUNTER
----- Message from Angel Coburn MD sent at 6/6/2018  9:00 AM CDT -----  T4 fine so we do not need to increase Synthroid-the thyroid abn is due to the Amiodarone.

## 2018-06-06 NOTE — ASSESSMENT & PLAN NOTE
ET by exam and family history.  Currently annoying, but not disabling.     -> advised to try lowering his caffeine intake   -> given handout on ET   -> explained risks of s/e currently outweigh benefits as he is not bothered by tremor   -> if he gets to point of needing medication, I would suggest either switching carvedilol to nadolol or propranolol OR trying topamax.  He use of coumadin, while not a contraindication, makes primidone a more complicated option (require increase monitoring).

## 2018-06-07 ENCOUNTER — ANESTHESIA EVENT (OUTPATIENT)
Dept: MEDSURG UNIT | Facility: HOSPITAL | Age: 73
End: 2018-06-07
Payer: MEDICARE

## 2018-06-07 ENCOUNTER — ANESTHESIA (OUTPATIENT)
Dept: MEDSURG UNIT | Facility: HOSPITAL | Age: 73
End: 2018-06-07
Payer: MEDICARE

## 2018-06-07 ENCOUNTER — HOSPITAL ENCOUNTER (OUTPATIENT)
Facility: HOSPITAL | Age: 73
Discharge: HOME | End: 2018-06-07
Attending: INTERNAL MEDICINE | Admitting: INTERNAL MEDICINE
Payer: MEDICARE

## 2018-06-07 VITALS
HEART RATE: 58 BPM | BODY MASS INDEX: 32.08 KG/M2 | TEMPERATURE: 98 F | DIASTOLIC BLOOD PRESSURE: 73 MMHG | SYSTOLIC BLOOD PRESSURE: 119 MMHG | WEIGHT: 250 LBS | HEIGHT: 74 IN | OXYGEN SATURATION: 94 % | RESPIRATION RATE: 18 BRPM

## 2018-06-07 DIAGNOSIS — I25.5 ISCHEMIC CARDIOMYOPATHY: ICD-10-CM

## 2018-06-07 DIAGNOSIS — Z45.09 ENCOUNTER FOR ADJUSTMENT AND MANAGEMENT OF OTHER CARDIAC DEVICE: ICD-10-CM

## 2018-06-07 DIAGNOSIS — I25.83 CORONARY ATHEROSCLEROSIS DUE TO LIPID RICH PLAQUE (CODE): ICD-10-CM

## 2018-06-07 DIAGNOSIS — Z45.02 AICD AT END OF BATTERY LIFE: Primary | ICD-10-CM

## 2018-06-07 LAB
INR PPP: 2.2
PROTHROMBIN TIME: 21.1 SEC

## 2018-06-07 PROCEDURE — D9220A PRA ANESTHESIA: Mod: CRNA,,, | Performed by: NURSE ANESTHETIST, CERTIFIED REGISTERED

## 2018-06-07 PROCEDURE — 25000003 PHARM REV CODE 250: Performed by: NURSE PRACTITIONER

## 2018-06-07 PROCEDURE — 85610 PROTHROMBIN TIME: CPT

## 2018-06-07 PROCEDURE — 25000003 PHARM REV CODE 250: Performed by: NURSE ANESTHETIST, CERTIFIED REGISTERED

## 2018-06-07 PROCEDURE — 37000008 HC ANESTHESIA 1ST 15 MINUTES: Performed by: INTERNAL MEDICINE

## 2018-06-07 PROCEDURE — D9220A PRA ANESTHESIA: Mod: ANES,,, | Performed by: ANESTHESIOLOGY

## 2018-06-07 PROCEDURE — 93005 ELECTROCARDIOGRAM TRACING: CPT | Mod: 59

## 2018-06-07 PROCEDURE — 33218 REPAIR LEAD PACE-DEFIB ONE: CPT | Mod: ,,, | Performed by: INTERNAL MEDICINE

## 2018-06-07 PROCEDURE — 63600175 PHARM REV CODE 636 W HCPCS: Performed by: NURSE ANESTHETIST, CERTIFIED REGISTERED

## 2018-06-07 PROCEDURE — 63600175 PHARM REV CODE 636 W HCPCS

## 2018-06-07 PROCEDURE — 63600175 PHARM REV CODE 636 W HCPCS: Performed by: NURSE PRACTITIONER

## 2018-06-07 PROCEDURE — 93010 ELECTROCARDIOGRAM REPORT: CPT | Mod: 76,,, | Performed by: INTERNAL MEDICINE

## 2018-06-07 PROCEDURE — 37000009 HC ANESTHESIA EA ADD 15 MINS: Performed by: INTERNAL MEDICINE

## 2018-06-07 PROCEDURE — 33218 REPAIR LEAD PACE-DEFIB ONE: CPT

## 2018-06-07 PROCEDURE — 93010 ELECTROCARDIOGRAM REPORT: CPT | Mod: ,,, | Performed by: INTERNAL MEDICINE

## 2018-06-07 PROCEDURE — 27200117 EP LAB PROCEDURE

## 2018-06-07 PROCEDURE — 33263 RMVL & RPLCMT DFB GEN 2 LEAD: CPT | Mod: 59,,, | Performed by: INTERNAL MEDICINE

## 2018-06-07 PROCEDURE — 25000003 PHARM REV CODE 250

## 2018-06-07 RX ORDER — KETAMINE HYDROCHLORIDE 10 MG/ML
INJECTION, SOLUTION INTRAMUSCULAR; INTRAVENOUS
Status: DISCONTINUED | OUTPATIENT
Start: 2018-06-07 | End: 2018-06-07

## 2018-06-07 RX ORDER — SODIUM CHLORIDE 0.9 % (FLUSH) 0.9 %
3 SYRINGE (ML) INJECTION
Status: DISCONTINUED | OUTPATIENT
Start: 2018-06-07 | End: 2018-06-07 | Stop reason: HOSPADM

## 2018-06-07 RX ORDER — FENTANYL CITRATE 50 UG/ML
INJECTION, SOLUTION INTRAMUSCULAR; INTRAVENOUS
Status: DISCONTINUED | OUTPATIENT
Start: 2018-06-07 | End: 2018-06-07

## 2018-06-07 RX ORDER — SODIUM CHLORIDE 9 MG/ML
INJECTION, SOLUTION INTRAVENOUS CONTINUOUS
Status: DISCONTINUED | OUTPATIENT
Start: 2018-06-07 | End: 2018-06-07 | Stop reason: HOSPADM

## 2018-06-07 RX ORDER — DEXMEDETOMIDINE HYDROCHLORIDE 100 UG/ML
INJECTION, SOLUTION INTRAVENOUS
Status: DISCONTINUED | OUTPATIENT
Start: 2018-06-07 | End: 2018-06-07

## 2018-06-07 RX ORDER — MIDAZOLAM HYDROCHLORIDE 1 MG/ML
INJECTION, SOLUTION INTRAMUSCULAR; INTRAVENOUS
Status: DISCONTINUED | OUTPATIENT
Start: 2018-06-07 | End: 2018-06-07

## 2018-06-07 RX ORDER — CEPHALEXIN 500 MG/1
500 CAPSULE ORAL EVERY 12 HOURS
Qty: 10 CAPSULE | Refills: 0 | Status: SHIPPED | OUTPATIENT
Start: 2018-06-07 | End: 2018-10-05

## 2018-06-07 RX ORDER — DEXMEDETOMIDINE HYDROCHLORIDE 4 UG/ML
INJECTION, SOLUTION INTRAVENOUS CONTINUOUS PRN
Status: DISCONTINUED | OUTPATIENT
Start: 2018-06-07 | End: 2018-06-07

## 2018-06-07 RX ORDER — CEFAZOLIN SODIUM 1 G/3ML
2 INJECTION, POWDER, FOR SOLUTION INTRAMUSCULAR; INTRAVENOUS
Status: COMPLETED | OUTPATIENT
Start: 2018-06-07 | End: 2018-06-07

## 2018-06-07 RX ADMIN — FENTANYL CITRATE 25 MCG: 50 INJECTION, SOLUTION INTRAMUSCULAR; INTRAVENOUS at 10:06

## 2018-06-07 RX ADMIN — DEXMEDETOMIDINE HYDROCHLORIDE 8 MCG: 100 INJECTION, SOLUTION, CONCENTRATE INTRAVENOUS at 11:06

## 2018-06-07 RX ADMIN — DEXMEDETOMIDINE HYDROCHLORIDE 0.5 MCG/KG/HR: 4 INJECTION, SOLUTION INTRAVENOUS at 10:06

## 2018-06-07 RX ADMIN — MIDAZOLAM 1 MG: 1 INJECTION INTRAMUSCULAR; INTRAVENOUS at 10:06

## 2018-06-07 RX ADMIN — SODIUM CHLORIDE, SODIUM GLUCONATE, SODIUM ACETATE, POTASSIUM CHLORIDE, MAGNESIUM CHLORIDE, SODIUM PHOSPHATE, DIBASIC, AND POTASSIUM PHOSPHATE: .53; .5; .37; .037; .03; .012; .00082 INJECTION, SOLUTION INTRAVENOUS at 10:06

## 2018-06-07 RX ADMIN — SODIUM CHLORIDE 1000 ML: 0.9 INJECTION, SOLUTION INTRAVENOUS at 09:06

## 2018-06-07 RX ADMIN — KETAMINE HYDROCHLORIDE 20 MG: 10 INJECTION, SOLUTION INTRAMUSCULAR; INTRAVENOUS at 11:06

## 2018-06-07 RX ADMIN — DEXMEDETOMIDINE HYDROCHLORIDE 8 MCG: 100 INJECTION, SOLUTION, CONCENTRATE INTRAVENOUS at 10:06

## 2018-06-07 RX ADMIN — CEFAZOLIN 2 G: 330 INJECTION, POWDER, FOR SOLUTION INTRAMUSCULAR; INTRAVENOUS at 10:06

## 2018-06-07 RX ADMIN — FENTANYL CITRATE 25 MCG: 50 INJECTION, SOLUTION INTRAMUSCULAR; INTRAVENOUS at 11:06

## 2018-06-07 NOTE — TRANSFER OF CARE
"Anesthesia Transfer of Care Note    Patient: Amish Grossman    Procedure(s) Performed: Procedure(s) (LRB):  REPLACEMENT-GENERATOR-ICD (Left)    Patient location: [facility]    Anesthesia Type: general    Transport from OR: Transported from OR on room air with adequate spontaneous ventilation    Post pain: adequate analgesia    Post assessment: no apparent anesthetic complications    Post vital signs: stable    Level of consciousness: responds to stimulation and awake    Nausea/Vomiting: no nausea/vomiting    Complications: none    Transfer of care protocol was followed      Last vitals:   Visit Vitals  /76 (BP Location: Right arm, Patient Position: Lying)   Pulse 61   Temp 36.8 °C (98.2 °F) (Oral)   Resp 18   Ht 6' 2" (1.88 m)   Wt 113.4 kg (250 lb)   SpO2 97%   BMI 32.10 kg/m²     "

## 2018-06-07 NOTE — PLAN OF CARE
Problem: Patient Care Overview  Goal: Plan of Care Review  Outcome: Ongoing (interventions implemented as appropriate)  Pt arrived to unit accompanied by spouse.  Vss.  Oriented pt to rm and unit.  Pre op orders and assessment initiated.  Pt remains npo.  Pt in no acute distress and verbalizes no complaints. Will continue to monitor. Call bell within reach.

## 2018-06-07 NOTE — ANESTHESIA POSTPROCEDURE EVALUATION
"Anesthesia Post Evaluation    Patient: Amish Grossman    Procedure(s) Performed: Procedure(s) (LRB):  REPLACEMENT-GENERATOR-ICD (Left)    Final Anesthesia Type: general  Patient location during evaluation: PACU  Patient participation: Yes- Able to Participate  Level of consciousness: awake and alert  Post-procedure vital signs: reviewed and stable  Pain management: adequate  Airway patency: patent  PONV status at discharge: No PONV  Anesthetic complications: no      Cardiovascular status: blood pressure returned to baseline  Respiratory status: unassisted, spontaneous ventilation and room air  Hydration status: euvolemic  Follow-up not needed.        Visit Vitals  /73 (BP Location: Left arm, Patient Position: Lying)   Pulse (!) 58   Temp 36.4 °C (97.6 °F) (Temporal)   Resp 18   Ht 6' 2" (1.88 m)   Wt 113.4 kg (250 lb)   SpO2 (!) 94%   BMI 32.10 kg/m²       Pain/Talya Score: Pain Assessment Performed: Yes (6/7/2018  1:11 PM)  Presence of Pain: denies (6/7/2018  1:11 PM)  Talya Score: 10 (6/7/2018 12:55 PM)      "

## 2018-06-07 NOTE — ANESTHESIA PREPROCEDURE EVALUATION
06/07/2018  Amish Grossman is a 72 y.o., male.  Pre-operative evaluation for Procedure(s) (LRB):  REPLACEMENT-GENERATOR-ICD (Left)    Amish Grossman is a 72 y.o. male     Patient Active Problem List   Diagnosis    Coronary artery disease due to lipid rich plaque    Old myocardial infarction    S/P PTCA (percutaneous transluminal coronary angioplasty)    Anticoagulated on Coumadin    CKD (chronic kidney disease) stage 1, GFR 90 ml/min or greater    BPH (benign prostatic hyperplasia)    Dyslipidemia    Venous insufficiency of leg    Erectile dysfunction    HTN (hypertension), benign    Hypothyroid    Automatic implantable cardioverter-defibrillator in situ    Ischemic dilated cardiomyopathy    Ventricular tachyarrhythmia    PVT (paroxysmal ventricular tachycardia)    Persistent atrial fibrillation    Benign essential tremor    AICD at end of battery life       Review of patient's allergies indicates:  No Known Allergies    No current facility-administered medications on file prior to encounter.      Current Outpatient Prescriptions on File Prior to Encounter   Medication Sig Dispense Refill    amiodarone (PACERONE) 200 MG Tab Take 1 tablet (200 mg total) by mouth once daily. 30 tablet 3    amlodipine (NORVASC) 10 MG tablet Take 1 tablet (10 mg total) by mouth once daily. 90 tablet 3    ascorbic acid (VITAMIN C) 100 MG tablet Take 1,000 mg by mouth once daily.       aspirin (ECOTRIN) 81 MG EC tablet Take 81 mg by mouth once daily.      atorvastatin (LIPITOR) 10 MG tablet Take 1 tablet (10 mg total) by mouth once daily. 90 tablet 3    carvedilol (COREG) 12.5 MG tablet Take 1 tablet (12.5 mg total) by mouth 2 (two) times daily with meals. 180 tablet 3    fish oil-omega-3 fatty acids 300-1,000 mg capsule Take 2 g by mouth 2 (two) times daily.       furosemide (LASIX) 40 MG tablet Take 40  mg by mouth as needed.      levothyroxine (SYNTHROID) 75 MCG tablet Take 75 mcg by mouth before breakfast.       lisinopril (PRINIVIL,ZESTRIL) 20 MG tablet TAKE ONE TABLET BY MOUTH ONCE DAILY 90 tablet 3    multivitamin capsule Take 1 capsule by mouth nightly.       nitroGLYCERIN (NITROSTAT) 0.4 MG SL tablet Place 1 tablet (0.4 mg total) under the tongue every 5 (five) minutes as needed for Chest pain. 25 tablet 4    omeprazole (PRILOSEC) 20 MG capsule Take 20 mg by mouth nightly.       SLO-NIACIN 750 mg TbSR Take 1 tablet (750 mg total) by mouth 2 (two) times daily. 180 each 3    warfarin (COUMADIN) 5 MG tablet 5 mg. 5  mgs  4dailys   7.5  3  Times  weekly         Past Surgical History:   Procedure Laterality Date    CARDIAC CATHETERIZATION      CARDIAC DEFIBRILLATOR PLACEMENT      CORONARY ANGIOPLASTY      HERNIA REPAIR      KNEE ARTHROSCOPY      VASCULAR SURGERY      stents placed       Social History     Social History    Marital status:      Spouse name: N/A    Number of children: N/A    Years of education: N/A     Occupational History    Not on file.     Social History Main Topics    Smoking status: Never Smoker    Smokeless tobacco: Not on file    Alcohol use Yes      Comment: occasionally    Drug use: No    Sexual activity: Not on file     Other Topics Concern    Not on file     Social History Narrative    No narrative on file         CBC:   Recent Labs      18   07   WBC  4.96   RBC  3.97*   HGB  13.5*   HCT  40.9   PLT  183   MCV  103*   MCH  34.0*   MCHC  33.0       CMP:   Recent Labs      18   NA  139  139   K  4.9  4.9   CL  108  108   CO2  25  25   BUN  20  20   CREATININE  1.5*  1.5*   GLU  110  110   CALCIUM  8.8  8.8   ALBUMIN  3.4*   PROT  6.5   ALKPHOS  47*   ALT  21   AST  22   BILITOT  0.7       INR  Recent Labs      18   INR  2.2*   APTT  28.1           Diagnostic Studies:      EKD Echo:  Results for orders placed  "or performed during the hospital encounter of 06/06/18   2D echo with color flow doppler   Result Value Ref Range    EF 25 (A) 55 - 65    Mitral Valve Regurgitation MILD     Diastolic Dysfunction Yes (A)     Aortic Valve Regurgitation TRIVIAL     Est. PA Systolic Pressure 24.16     Tricuspid Valve Regurgitation MILD      Last 3 sets of Vitals    Vitals - 1 value per visit 5/29/2017 6/6/2018 6/6/2018   SYSTOLIC 134 141 114   DIASTOLIC 86 80 70   PULSE 60 60 58   TEMPERATURE - - -   RESPIRATIONS - - -   SPO2 - - -   Weight (lb) 251 251 251.1   Weight (kg) 113.853 113.853 113.9   HEIGHT - 6' 2" 6' 2"   BODY MASS INDEX 32.23 32.23 32.24   VISIT REPORT - - -   Pain Score  0 0 0       Anesthesia Evaluation    I have reviewed the Patient Summary Reports.     I have reviewed the Medications.     Review of Systems  Social:  Social Alcohol Use, Non-Smoker    Hematology/Oncology:  Hematology Normal   Oncology Normal     EENT/Dental:EENT/Dental Normal   Cardiovascular:   Exercise tolerance: poor Pacemaker Hypertension Past MI CAD  CABG/stent Dysrhythmias (paroxysmal ventricular tachycardia) atrial fibrillation CHF hyperlipidemia    Pulmonary:  Pulmonary Normal    Renal/:   Chronic Renal Disease (Stage 1 ), CRI BPH    Musculoskeletal:  Musculoskeletal Normal    Neurological:   Syncope   Endocrine:   Hypothyroidism    Dermatological:  Skin Normal    Psych:  Psychiatric Normal           Physical Exam  General:  Well nourished    Airway/Jaw/Neck:  Airway Findings: Mouth Opening: Normal Tongue: Normal  Mallampati: II  TM Distance: Normal, at least 6 cm      Dental:  Dental Findings: In tact        Mental Status:  Mental Status Findings:  Cooperative, Alert and Oriented         Anesthesia Plan  Type of Anesthesia, risks & benefits discussed:  Anesthesia Type:  general  Patient's Preference: same  Intra-op Monitoring Plan: standard ASA monitors  Intra-op Monitoring Plan Comments:   Post Op Pain Control Plan: per primary service " following discharge from PACU  Post Op Pain Control Plan Comments:   Induction:   IV  Beta Blocker:  Patient is on a Beta-Blocker and has received one dose within the past 24 hours (No further documentation required).       Informed Consent: Patient understands risks and agrees with Anesthesia plan.  Questions answered. Anesthesia consent signed with patient.  ASA Score: 3     Day of Surgery Review of History & Physical:    H&P update referred to the surgeon.         Ready For Surgery From Anesthesia Perspective.

## 2018-06-07 NOTE — DISCHARGE SUMMARY
[facility]  Cardiac Electrophysiology  Discharge Summary      Patient Name: Amish Grossman  MRN: [id number]  Admission Date: 6/7/2018  Hospital Length of Stay: 0 days  Discharge Date and Time:  06/07/2018 2:13 PM  Attending Physician: No att. providers found    Discharging Provider: Devante Hubbard NP  Primary Care Physician: Angel Coburn MD    HPI:   72 y.o. M  CAD/MI (1993)/PCI (with stent placement in 2000 and 2004)  HTN on meds  HL on meds  CKD, stable  hypothyroid on meds  RV thrombus hx; on coumadin  VT (12/2014), resulting in ICD shock  AF>past DCCV, on amiodarone     Patient's ICD has reached GENIE. Pt presented to SSCU today for planned generator replacement. He denies any acute symptoms at present     Procedure(s) (LRB):  REPLACEMENT-GENERATOR-ICD (Left)        Hospital Course:  Pt underwent  Device Generator Change  (see procedure note for details), tolerated procedure, no acute complications noted. Left pectoral site with aquacel dressing CDI, no bleeding or hematoma noted.   Pt to continue antibiotics keflex for 5 days. Pt on coumadin  INR 2.2 > close follow up with PCP who manages INR/coumadin.   Pt with aquacel dressing to remain intact until wound clinic visit. Pt to follow up with device clinic in 1 week for wound check , and 3 months with MD Akilah Lyon   Discharge plans/instructions discussed with patient and spouse who verbalized understanding and agreement of plans of care.  No further questions or concerns  voiced at this time.      Consults: Anesthesia     Final Active Diagnoses:    Diagnosis Date Noted POA    PRINCIPAL PROBLEM:  AICD at end of battery life [Z45.02] 06/07/2018 Not Applicable      Problems Resolved During this Admission:    Diagnosis Date Noted Date Resolved POA     Discharged Condition: good    Disposition: Home or Self Care    Follow Up:  Follow-up Information     PROV C ARRHYTHMIA In 1 week.    Specialty:  Arrhythmia  Why:  For wound re-check  Contact  information:  [address]  [facility] [address]  [phone]           Camron Isbell MD In 3 months.    Specialties:  Cardiology, Electrophysiology  Contact information:  [address]  [facility] [address]  [phone]                 Patient Instructions:     Diet Cardiac     Notify your health care provider if you experience any of the following:  redness, tenderness, or signs of infection (pain, swelling, redness, odor or green/yellow discharge around incision site)     Notify your health care provider if you experience any of the following:  temperature >100.4     Notify your health care provider if you experience any of the following:  persistent nausea and vomiting or diarrhea     Notify your health care provider if you experience any of the following:  severe uncontrolled pain     Notify your health care provider if you experience any of the following:  difficulty breathing or increased cough     Notify your health care provider if you experience any of the following:  severe persistent headache     Notify your health care provider if you experience any of the following:  worsening rash     Notify your health care provider if you experience any of the following:  increased confusion or weakness     Notify your health care provider if you experience any of the following:  persistent dizziness, light-headedness, or visual disturbances     Notify your health care provider if you experience any of the following:   Scheduling Instructions: For any concerning medical symptoms     Leave dressing on - Keep it clean, dry, and intact until clinic visit       Medications:  Reconciled Home Medications:      Medication List      START taking these medications    cephALEXin 500 MG capsule  Commonly known as:  KEFLEX  Take 1 capsule (500 mg total) by mouth every 12 (twelve) hours.        CONTINUE taking these medications    amiodarone 200 MG Tab  Commonly known as:  PACERONE  Take 1 tablet (200 mg  total) by mouth once daily.     amLODIPine 10 MG tablet  Commonly known as:  NORVASC  Take 1 tablet (10 mg total) by mouth once daily.     aspirin 81 MG EC tablet  Commonly known as:  ECOTRIN  Take 81 mg by mouth once daily.     atorvastatin 10 MG tablet  Commonly known as:  LIPITOR  Take 1 tablet (10 mg total) by mouth once daily.     carvedilol 12.5 MG tablet  Commonly known as:  COREG  Take 1 tablet (12.5 mg total) by mouth 2 (two) times daily with meals.     fish oil-omega-3 fatty acids 300-1,000 mg capsule  Take 2 g by mouth 2 (two) times daily.     furosemide 40 MG tablet  Commonly known as:  LASIX  Take 40 mg by mouth as needed.     levothyroxine 75 MCG tablet  Commonly known as:  SYNTHROID  Take 75 mcg by mouth before breakfast.     lisinopril 20 MG tablet  Commonly known as:  PRINIVIL,ZESTRIL  TAKE ONE TABLET BY MOUTH ONCE DAILY     multivitamin capsule  Take 1 capsule by mouth nightly.     nitroGLYCERIN 0.4 MG SL tablet  Commonly known as:  NITROSTAT  Place 1 tablet (0.4 mg total) under the tongue every 5 (five) minutes as needed for Chest pain.     omeprazole 20 MG capsule  Commonly known as:  PRILOSEC  Take 20 mg by mouth nightly.     SLO-NIACIN 750 mg Tbsr  Generic drug:  niacin  Take 1 tablet (750 mg total) by mouth 2 (two) times daily.     VITAMIN C 100 MG tablet  Generic drug:  ascorbic acid (vitamin C)  Take 1,000 mg by mouth once daily.     warfarin 5 MG tablet  Commonly known as:  COUMADIN  5 mg. 5  mgs  4dailys   7.5  3  Times  weekly              Devante Hubbard NP  Cardiac Electrophysiology  [facility]    STAFF MD Camron Isbell

## 2018-06-07 NOTE — PROGRESS NOTES
Patient admitted to recovery see [facility] for complete assessment pacu bcg's maintained safety measures verified patient instructed on pain scale and patient verbalized understanding. Also called patient's wife and updated on patient location also ekg done and placed in patient's chart.

## 2018-06-07 NOTE — INTERVAL H&P NOTE
The patient has been examined and the H&P has been reviewed:    I concur with the findings and no changes have occurred since H&P was written. pt denies any fevers, chills, malaise. Pt is on OMT includes > coreg and lisinopril.       PE:   General: Well developed, well nourished, No distress on room air   HEENT: Head is normocephalic, atraumatic;  Lungs: Clear to auscultation bilaterally.  No wheezing. Normal respiratory effort.  Cardiovascular:  S1 S2 Normal rate, regular rhythm and normal heart sounds.    PMI is not displaced  Extremities: No LE edema.   Abdomen: Abdomen is soft, non-tender non-distended with normal bowel sounds.  Musculoskeletal: normal range of motion   Neurologic: Normal strength and tone. No focal numbness or weakness.   Psychiatric: normal mood and affect.  behavior is normal. Alert and oriented X 3.  Labs reviewed       * Implantable cardioverter-defibrillator (ICD) at end of battery life     Generator replacement at GENIE >  Anesthesia for sedation                 Prior to procedure, extensive discussion with patient by STAFF regarding risks and benefits of  ICD generator at GENIE , and patient  would like to proceed.  The patient/ spouse  voices understanding and all questions have been answered. No further questions/concerns voiced at this time.           MONET Miller-BC  Cardiology Electrophysiology  NP   [facility]    Attending: MD Akilah Lyon           Active Hospital Problems    Diagnosis  POA    AICD at end of battery life [Z45.02]  Not Applicable      Resolved Hospital Problems    Diagnosis Date Resolved POA   No resolved problems to display.

## 2018-06-07 NOTE — PLAN OF CARE
Problem: Patient Care Overview  Goal: Plan of Care Review  Outcome: Ongoing (interventions implemented as appropriate)  Pt transferred from recovery via stretcher.  Vss.  Pt aao, tolerating po.  Post op orders and assessment initiated. l cw incision remains flower.  aquacel drg to site.  Pt in no acute distress and verbalizes no complaints.  Will continue to monitor.  Call bell within reach.

## 2018-06-14 ENCOUNTER — CLINICAL SUPPORT (OUTPATIENT)
Dept: ELECTROPHYSIOLOGY | Facility: CLINIC | Age: 73
End: 2018-06-14
Attending: INTERNAL MEDICINE
Payer: MEDICARE

## 2018-06-14 DIAGNOSIS — Z95.810 AICD (AUTOMATIC CARDIOVERTER/DEFIBRILLATOR) PRESENT: ICD-10-CM

## 2018-06-14 DIAGNOSIS — I47.20 VT (VENTRICULAR TACHYCARDIA): ICD-10-CM

## 2018-06-14 DIAGNOSIS — I25.5 ISCHEMIC CARDIOMYOPATHY: ICD-10-CM

## 2018-06-14 PROCEDURE — 93283 PRGRMG EVAL IMPLANTABLE DFB: CPT | Mod: PBBFAC | Performed by: INTERNAL MEDICINE

## 2018-07-18 DIAGNOSIS — I10 HTN (HYPERTENSION), BENIGN: ICD-10-CM

## 2018-07-18 DIAGNOSIS — I48.19 PERSISTENT ATRIAL FIBRILLATION: ICD-10-CM

## 2018-07-18 RX ORDER — CARVEDILOL 12.5 MG/1
TABLET ORAL
Qty: 180 TABLET | Refills: 3 | Status: SHIPPED | OUTPATIENT
Start: 2018-07-18 | End: 2018-10-05 | Stop reason: SDUPTHER

## 2018-09-07 ENCOUNTER — TELEPHONE (OUTPATIENT)
Dept: ELECTROPHYSIOLOGY | Facility: CLINIC | Age: 73
End: 2018-09-07

## 2018-09-07 NOTE — TELEPHONE ENCOUNTER
----- Message from Hung Pereyra sent at 9/7/2018  8:35 AM CDT -----  Contact: pt [phone]  Good morning,    I spoke to the patient on this am.  Please schedule this patient on 10/5 @ 9:30 for EKG and 10:20 for Dr. Kevin.  His ICD check is scheduled @ 9:00 on 10/5/18.    ThanksHung  ----- Message -----  From: Nakia Epps MA  Sent: 9/6/2018   3:55 PM  To: Hung Persaud,  Pt is asking to speak to you.. stated "She Knows who I am".  Wants to rescheduled dr kevin appointment and ppm .    I  can squeeze him in on ..     9-28-18   10am                                                10-5-18    10;20am.  He wants  Friday only because comes from a long distance.      Thanks  Vielka.          ----- Message -----  From: Tresa Chaves  Sent: 9/6/2018   3:41 PM  To: Akilah CORNELL Staff    Please call pt to rs his appts on 9/14/18    Thanks

## 2018-09-07 NOTE — TELEPHONE ENCOUNTER
Returned the patient's call on this morning.  He is currently in [place] and will be returning home this weekend.  Patient instructed to contact the clinic on Monday 9/10/18 for assistance in sending a manual transmission via his remote ICD home monitor.  Patient's clinic appointment with Dr. Isbell will be rescheduled from 9/14/18 to 9/28/18 by the doctor's MA per patient request.  Patient verbalized understanding to all of the above.        After further review of patient's last ICD check in the clinic, there are programming changes needed therefor he would need an in clinic ICD check.  The ICD check has been rescheduled to 10/5/18 @ 9:00 am with an EKG and clinic appointment with Dr. Isbell to follow.  Message left on the voice mail @ [phone] with this information as well as the contact # for the Device Clinic.

## 2018-10-05 ENCOUNTER — CLINICAL SUPPORT (OUTPATIENT)
Dept: ELECTROPHYSIOLOGY | Facility: CLINIC | Age: 73
End: 2018-10-05
Payer: MEDICARE

## 2018-10-05 ENCOUNTER — OFFICE VISIT (OUTPATIENT)
Dept: ELECTROPHYSIOLOGY | Facility: CLINIC | Age: 73
End: 2018-10-05
Payer: MEDICARE

## 2018-10-05 ENCOUNTER — HOSPITAL ENCOUNTER (OUTPATIENT)
Dept: CARDIOLOGY | Facility: CLINIC | Age: 73
Discharge: HOME | End: 2018-10-05
Payer: MEDICARE

## 2018-10-05 VITALS
WEIGHT: 251 LBS | HEART RATE: 60 BPM | BODY MASS INDEX: 32.21 KG/M2 | SYSTOLIC BLOOD PRESSURE: 141 MMHG | DIASTOLIC BLOOD PRESSURE: 89 MMHG | HEIGHT: 74 IN

## 2018-10-05 DIAGNOSIS — I48.19 PERSISTENT ATRIAL FIBRILLATION: ICD-10-CM

## 2018-10-05 DIAGNOSIS — I47.20 VENTRICULAR TACHYARRHYTHMIA: ICD-10-CM

## 2018-10-05 DIAGNOSIS — Z79.01 ANTICOAGULATED ON COUMADIN: ICD-10-CM

## 2018-10-05 DIAGNOSIS — I50.22 CHRONIC SYSTOLIC CONGESTIVE HEART FAILURE: ICD-10-CM

## 2018-10-05 DIAGNOSIS — Z98.61 S/P PTCA (PERCUTANEOUS TRANSLUMINAL CORONARY ANGIOPLASTY): ICD-10-CM

## 2018-10-05 DIAGNOSIS — I47.29 PVT (PAROXYSMAL VENTRICULAR TACHYCARDIA): ICD-10-CM

## 2018-10-05 DIAGNOSIS — I10 HTN (HYPERTENSION), BENIGN: ICD-10-CM

## 2018-10-05 DIAGNOSIS — I25.2 OLD MYOCARDIAL INFARCTION: Primary | ICD-10-CM

## 2018-10-05 DIAGNOSIS — Z95.810 AUTOMATIC IMPLANTABLE CARDIOVERTER-DEFIBRILLATOR IN SITU: ICD-10-CM

## 2018-10-05 DIAGNOSIS — Z45.02 AICD AT END OF BATTERY LIFE: ICD-10-CM

## 2018-10-05 PROCEDURE — 93010 ELECTROCARDIOGRAM REPORT: CPT | Mod: S$PBB,,, | Performed by: INTERNAL MEDICINE

## 2018-10-05 PROCEDURE — 93005 ELECTROCARDIOGRAM TRACING: CPT | Mod: PBBFAC | Performed by: INTERNAL MEDICINE

## 2018-10-05 PROCEDURE — 99214 OFFICE O/P EST MOD 30 MIN: CPT | Mod: S$PBB,,, | Performed by: INTERNAL MEDICINE

## 2018-10-05 PROCEDURE — 93283 PRGRMG EVAL IMPLANTABLE DFB: CPT | Mod: PBBFAC | Performed by: INTERNAL MEDICINE

## 2018-10-05 PROCEDURE — 99999 PR PBB SHADOW E&M-EST. PATIENT-LVL III: CPT | Mod: PBBFAC,,, | Performed by: INTERNAL MEDICINE

## 2018-10-05 PROCEDURE — 99213 OFFICE O/P EST LOW 20 MIN: CPT | Mod: PBBFAC,25 | Performed by: INTERNAL MEDICINE

## 2018-10-05 RX ORDER — CARVEDILOL 25 MG/1
25 TABLET ORAL 2 TIMES DAILY WITH MEALS
Qty: 180 TABLET | Refills: 3 | Status: ON HOLD | OUTPATIENT
Start: 2018-10-05 | End: 2019-10-31 | Stop reason: SDUPTHER

## 2018-12-17 DIAGNOSIS — I25.2 OLD MYOCARDIAL INFARCTION: ICD-10-CM

## 2018-12-17 DIAGNOSIS — I24.0 CORONARY OCCLUSION WITHOUT MYOCARDIAL INFARCTION: ICD-10-CM

## 2018-12-17 DIAGNOSIS — I25.5 ISCHEMIC DILATED CARDIOMYOPATHY: ICD-10-CM

## 2018-12-17 DIAGNOSIS — I10 HTN (HYPERTENSION), BENIGN: ICD-10-CM

## 2018-12-17 DIAGNOSIS — I25.10 CORONARY ARTERY DISEASE INVOLVING NATIVE CORONARY ARTERY WITHOUT ANGINA PECTORIS, UNSPECIFIED WHETHER NATIVE OR TRANSPLANTED HEART: ICD-10-CM

## 2018-12-17 DIAGNOSIS — I47.20 VENTRICULAR TACHYARRHYTHMIA: ICD-10-CM

## 2018-12-17 DIAGNOSIS — I42.0 ISCHEMIC DILATED CARDIOMYOPATHY: ICD-10-CM

## 2018-12-17 DIAGNOSIS — I47.20 VT (VENTRICULAR TACHYCARDIA): ICD-10-CM

## 2018-12-17 DIAGNOSIS — Z79.01 ANTICOAGULATED ON COUMADIN: ICD-10-CM

## 2018-12-17 DIAGNOSIS — N18.1 CKD (CHRONIC KIDNEY DISEASE) STAGE 1, GFR 90 ML/MIN OR GREATER: ICD-10-CM

## 2018-12-17 RX ORDER — LISINOPRIL 20 MG/1
TABLET ORAL
Qty: 90 TABLET | Refills: 3 | Status: SHIPPED | OUTPATIENT
Start: 2018-12-17 | End: 2019-12-06 | Stop reason: SDUPTHER

## 2019-01-09 ENCOUNTER — TELEPHONE (OUTPATIENT)
Dept: CARDIOLOGY | Facility: HOSPITAL | Age: 74
End: 2019-01-09

## 2019-01-10 ENCOUNTER — CLINICAL SUPPORT (OUTPATIENT)
Dept: CARDIOLOGY | Facility: HOSPITAL | Age: 74
End: 2019-01-10
Attending: INTERNAL MEDICINE
Payer: MEDICARE

## 2019-01-10 DIAGNOSIS — I25.5 ISCHEMIC CARDIOMYOPATHY: ICD-10-CM

## 2019-01-10 DIAGNOSIS — Z95.810 AICD (AUTOMATIC CARDIOVERTER/DEFIBRILLATOR) PRESENT: ICD-10-CM

## 2019-01-10 DIAGNOSIS — I47.20 VT (VENTRICULAR TACHYCARDIA): ICD-10-CM

## 2019-01-10 PROCEDURE — 93296 REM INTERROG EVL PM/IDS: CPT

## 2019-04-03 ENCOUNTER — TELEPHONE (OUTPATIENT)
Dept: ELECTROPHYSIOLOGY | Facility: CLINIC | Age: 74
End: 2019-04-03

## 2019-04-03 NOTE — TELEPHONE ENCOUNTER
Called pt back to adv he saw Dr. Isbell in October, & per Dr. Isbell's ov note from October, requested to see pt again in 1yr with echo & amio tests prior.  Pt verbalized understanding & stated he was calling bc he had the echo last yr in May, so was wondering if he had to have the echo this May.  I adv pt no he does not have too, he can if he would like however.  Pt adv he lives in [place] about [place]hrs away, so he would like to have the echo, amio tests & ekg/ f.u apt with Dr Isbell all on same day.  I told pt I understood & to call back in August & we will get all his apts scheduled for him.  Pt thanked me for calling him back & letting him know.

## 2019-04-03 NOTE — TELEPHONE ENCOUNTER
----- Message from Lisandra Manzano sent at 4/3/2019 12:34 PM CDT -----  Contact: Pt called   Pt has calling to find out if he needs to have Echo in May. Explained to the pt that his last visit was 10/5/18 and will complete the echo then. Please call pt @ [phone]. Thank you.

## 2019-04-09 ENCOUNTER — CLINICAL SUPPORT (OUTPATIENT)
Dept: CARDIOLOGY | Facility: HOSPITAL | Age: 74
End: 2019-04-09
Attending: INTERNAL MEDICINE
Payer: MEDICARE

## 2019-04-09 DIAGNOSIS — I48.19 PERSISTENT ATRIAL FIBRILLATION: ICD-10-CM

## 2019-04-24 DIAGNOSIS — Z95.810 AICD (AUTOMATIC CARDIOVERTER/DEFIBRILLATOR) PRESENT: Primary | ICD-10-CM

## 2019-04-24 DIAGNOSIS — I25.5 ISCHEMIC CARDIOMYOPATHY: ICD-10-CM

## 2019-07-09 ENCOUNTER — CLINICAL SUPPORT (OUTPATIENT)
Dept: CARDIOLOGY | Facility: HOSPITAL | Age: 74
End: 2019-07-09
Attending: INTERNAL MEDICINE
Payer: MEDICARE

## 2019-07-09 DIAGNOSIS — I47.20 VT (VENTRICULAR TACHYCARDIA): ICD-10-CM

## 2019-07-09 DIAGNOSIS — I25.5 ISCHEMIC CARDIOMYOPATHY: ICD-10-CM

## 2019-07-09 DIAGNOSIS — Z95.810 AICD (AUTOMATIC CARDIOVERTER/DEFIBRILLATOR) PRESENT: ICD-10-CM

## 2019-08-03 DIAGNOSIS — I48.19 PERSISTENT ATRIAL FIBRILLATION: ICD-10-CM

## 2019-08-03 DIAGNOSIS — I10 HTN (HYPERTENSION), BENIGN: ICD-10-CM

## 2019-08-05 DIAGNOSIS — I48.19 PERSISTENT ATRIAL FIBRILLATION: ICD-10-CM

## 2019-08-05 DIAGNOSIS — I10 HTN (HYPERTENSION), BENIGN: ICD-10-CM

## 2019-08-05 RX ORDER — CARVEDILOL 12.5 MG/1
TABLET ORAL
Qty: 180 TABLET | Refills: 3 | OUTPATIENT
Start: 2019-08-05

## 2019-08-06 ENCOUNTER — TELEPHONE (OUTPATIENT)
Dept: CARDIOLOGY | Facility: HOSPITAL | Age: 74
End: 2019-08-06

## 2019-08-06 DIAGNOSIS — I49.8 OTHER SPECIFIED CARDIAC ARRHYTHMIAS: Primary | ICD-10-CM

## 2019-08-06 NOTE — TELEPHONE ENCOUNTER
2nd Attempt to contact patient and schedule f/u appointments but once again I did not receive an answer.    ----- Message from Anna Pena sent at 8/6/2019 11:34 AM CDT -----  Contact: Self  .Patient Returning Call from [facility]    Who Left Message for Patient: Luis Antonio  Communication Preference: [phone]  Additional Information: Pt has device needs to schedule 1 yr FU.

## 2019-08-06 NOTE — TELEPHONE ENCOUNTER
Left patient a voicemail in regards to scheduling f/u appointments. (Anyone can schedule)     ----- Message from Anna Pena sent at 8/6/2019  8:49 AM CDT -----  Contact: Self  .Needs Advice    Reason for call:  Pt needs to schedule his 1 yr FU w/device. Pt will be out of town during the fall if can call to go over with Pt. Thanks        Communication Preference:  [phone]    Additional Information:

## 2019-08-06 NOTE — TELEPHONE ENCOUNTER
left pt a message regarding his f/u appts. Instructed pt that he will need an echo & device chk prior. Instructed him to call me back.  ----- Message from RT Lnida sent at 8/6/2019 12:32 PM CDT -----  Contact: Patient  This patient is trying to schedule f/u appointments with Akilah. I tried calling this patient twice it'll ring then go to [employer]. Please schedule, thanks.  ----- Message -----  From: Megan Oliva MA  Sent: 8/6/2019  11:51 AM  To: RT Linda        ----- Message -----  From: Chula Nguyen  Sent: 8/6/2019  11:49 AM  To: Akilah CORNELL Staff    The Pt is returning a call. Please let his phone ring longer. Thanks, Chula

## 2019-09-05 ENCOUNTER — CLINICAL SUPPORT (OUTPATIENT)
Dept: CARDIOLOGY | Facility: HOSPITAL | Age: 74
End: 2019-09-05
Payer: MEDICARE

## 2019-09-05 DIAGNOSIS — I25.5 ISCHEMIC CARDIOMYOPATHY: ICD-10-CM

## 2019-09-05 DIAGNOSIS — Z95.810 PRESENCE OF AUTOMATIC (IMPLANTABLE) CARDIAC DEFIBRILLATOR: ICD-10-CM

## 2019-09-24 DIAGNOSIS — I49.8 OTHER SPECIFIED CARDIAC ARRHYTHMIAS: Primary | ICD-10-CM

## 2019-10-08 ENCOUNTER — CLINICAL SUPPORT (OUTPATIENT)
Dept: CARDIOLOGY | Facility: HOSPITAL | Age: 74
End: 2019-10-08
Attending: INTERNAL MEDICINE
Payer: MEDICARE

## 2019-10-08 DIAGNOSIS — Z95.810 AICD (AUTOMATIC CARDIOVERTER/DEFIBRILLATOR) PRESENT: ICD-10-CM

## 2019-10-08 DIAGNOSIS — I25.5 ISCHEMIC CARDIOMYOPATHY: ICD-10-CM

## 2019-10-08 PROCEDURE — 93296 REM INTERROG EVL PM/IDS: CPT

## 2019-10-16 ENCOUNTER — TELEPHONE (OUTPATIENT)
Dept: ELECTROPHYSIOLOGY | Facility: CLINIC | Age: 74
End: 2019-10-16

## 2019-10-16 NOTE — TELEPHONE ENCOUNTER
Called pt to schedule appt per message below, but pt didn't answer each #  ----- Message from Megan Oliva MA sent at 10/14/2019 12:19 PM CDT -----      ----- Message -----  From: Yulissa Stroud RN  Sent: 10/11/2019   1:12 PM CDT  To: Megan Oliva MA    Please schedule this pt in clinic to see Akilah or Laila next week for persistent AF.   Thanks  ----- Message -----  From: Camron Isbell MD  Sent: 10/11/2019   1:03 PM CDT  To: Yulissa Stroud RN    sounds like a office visit would make sense. Either me or Fide.  thanks  DPM    ----- Message -----  From: Yulissa Stroud RN  Sent: 10/11/2019  11:43 AM CDT  To: Camron Isbell MD    Alert transmission received for AF burden. Pt has been in AF since 10/1/19. Pt has a documented diagnosis of persistent AF but hasn't had any over the last year. He is taking Coumadin. I called both numbers in his chart to find out if he's symptomatic but haven't been able to reach him. Should we continue to monitor or do you want him to come in to discuss options?

## 2019-10-22 ENCOUNTER — TELEPHONE (OUTPATIENT)
Dept: ELECTROPHYSIOLOGY | Facility: CLINIC | Age: 74
End: 2019-10-22

## 2019-10-22 ENCOUNTER — TELEPHONE (OUTPATIENT)
Dept: CARDIOLOGY | Facility: HOSPITAL | Age: 74
End: 2019-10-22

## 2019-10-22 NOTE — TELEPHONE ENCOUNTER
----- Message from Dion Cardenas sent at 10/22/2019  9:14 AM CDT -----  Contact: [phone]  Hung,   Mr. Grossman asked that you give him a call in relation to his HR at 80 while resting.  He can be reached at the number provided above.

## 2019-10-22 NOTE — TELEPHONE ENCOUNTER
Returned the patient's call on this afternoon.  Patient stated he is currently in [place] and stuck some sort of thorn in his hand and went to see a doctor for this. He stated when they measure his HR it was in the 80's and ordinarily his HR stays in the 60's.  Informed the patient that a transmission was received via his remote ICD home monitor @ 2:00 am that reveals he is and has been in AF since 10/1/19.  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  Informed the patient I will have the Device RN review the report with Dr. Isbell and he would received a return phone call once the doctor has reviewed the report.  Patient verbalized understanding.

## 2019-10-25 ENCOUNTER — OFFICE VISIT (OUTPATIENT)
Dept: ELECTROPHYSIOLOGY | Facility: CLINIC | Age: 74
End: 2019-10-25
Payer: MEDICARE

## 2019-10-25 ENCOUNTER — PATIENT MESSAGE (OUTPATIENT)
Dept: ELECTROPHYSIOLOGY | Facility: CLINIC | Age: 74
End: 2019-10-25

## 2019-10-25 ENCOUNTER — HOSPITAL ENCOUNTER (OUTPATIENT)
Dept: CARDIOLOGY | Facility: CLINIC | Age: 74
Discharge: HOME | End: 2019-10-25
Payer: MEDICARE

## 2019-10-25 VITALS
WEIGHT: 251 LBS | BODY MASS INDEX: 32.21 KG/M2 | DIASTOLIC BLOOD PRESSURE: 83 MMHG | SYSTOLIC BLOOD PRESSURE: 124 MMHG | HEIGHT: 74 IN | HEART RATE: 80 BPM

## 2019-10-25 DIAGNOSIS — I25.10 CORONARY ARTERY DISEASE DUE TO LIPID RICH PLAQUE: Primary | ICD-10-CM

## 2019-10-25 DIAGNOSIS — I47.29 PVT (PAROXYSMAL VENTRICULAR TACHYCARDIA): ICD-10-CM

## 2019-10-25 DIAGNOSIS — I48.19 PERSISTENT ATRIAL FIBRILLATION: Primary | ICD-10-CM

## 2019-10-25 DIAGNOSIS — I48.19 PERSISTENT ATRIAL FIBRILLATION: ICD-10-CM

## 2019-10-25 DIAGNOSIS — I49.8 OTHER SPECIFIED CARDIAC ARRHYTHMIAS: ICD-10-CM

## 2019-10-25 DIAGNOSIS — I25.5 ISCHEMIC CARDIOMYOPATHY: Primary | ICD-10-CM

## 2019-10-25 DIAGNOSIS — I42.8 OTHER CARDIOMYOPATHIES: ICD-10-CM

## 2019-10-25 DIAGNOSIS — Z95.810 AUTOMATIC IMPLANTABLE CARDIOVERTER-DEFIBRILLATOR IN SITU: ICD-10-CM

## 2019-10-25 DIAGNOSIS — I25.83 CORONARY ARTERY DISEASE DUE TO LIPID RICH PLAQUE: Primary | ICD-10-CM

## 2019-10-25 DIAGNOSIS — Z98.61 S/P PTCA (PERCUTANEOUS TRANSLUMINAL CORONARY ANGIOPLASTY): ICD-10-CM

## 2019-10-25 PROBLEM — Z45.02 AICD AT END OF BATTERY LIFE: Status: RESOLVED | Noted: 2018-06-07 | Resolved: 2019-10-25

## 2019-10-25 PROCEDURE — 93010 RHYTHM STRIP: ICD-10-PCS | Mod: S$PBB,,, | Performed by: INTERNAL MEDICINE

## 2019-10-25 PROCEDURE — 99999 PR PBB SHADOW E&M-EST. PATIENT-LVL III: CPT | Mod: PBBFAC,,, | Performed by: INTERNAL MEDICINE

## 2019-10-25 PROCEDURE — 99213 OFFICE O/P EST LOW 20 MIN: CPT | Mod: PBBFAC,25 | Performed by: INTERNAL MEDICINE

## 2019-10-25 PROCEDURE — 99215 OFFICE O/P EST HI 40 MIN: CPT | Mod: S$PBB,,, | Performed by: INTERNAL MEDICINE

## 2019-10-25 PROCEDURE — 99215 PR OFFICE/OUTPT VISIT, EST, LEVL V, 40-54 MIN: ICD-10-PCS | Mod: S$PBB,,, | Performed by: INTERNAL MEDICINE

## 2019-10-25 PROCEDURE — 99999 PR PBB SHADOW E&M-EST. PATIENT-LVL III: ICD-10-PCS | Mod: PBBFAC,,, | Performed by: INTERNAL MEDICINE

## 2019-10-25 PROCEDURE — 93005 ELECTROCARDIOGRAM TRACING: CPT | Mod: PBBFAC | Performed by: INTERNAL MEDICINE

## 2019-10-25 PROCEDURE — 93010 ELECTROCARDIOGRAM REPORT: CPT | Mod: S$PBB,,, | Performed by: INTERNAL MEDICINE

## 2019-10-25 RX ORDER — SULFAMETHOXAZOLE AND TRIMETHOPRIM 800; 160 MG/1; MG/1
1 TABLET ORAL 2 TIMES DAILY
Status: ON HOLD | COMMUNITY
End: 2019-12-10 | Stop reason: HOSPADM

## 2019-10-25 NOTE — PROGRESS NOTES
"Subjective:    Patient ID:  Amish Grossman is a 73 y.o. male who presents for follow-up of Atrial Fibrillation      Atrial Fibrillation   Symptoms include palpitations. Symptoms are negative for chest pain, dizziness, shortness of breath, syncope and weakness. Past medical history includes atrial fibrillation.      73 y.o. M  CAD/MI (1993)/PCI (with stent placement in 2000 and 2004), stable  HTN on meds  HL on meds  CKD, stable  hypothyroid on meds  RV thrombus hx; on coumadin  VT (12/2014), resulting in ICD shock  persistent AF    Actively doing forestry work. Hunting. Does get some SHOOK when walking fast.  No CP. Feeling better since "R" added at prior visit. Leg swelling abated.    We did NOE/DCCV on 3/16 in hopes of starting tikosyn, but QTc was too long for that. Instead we started amiodarone. Pt says he felt better for a week, then felt down again. However he thinks that may have been multifactorial and isn't completely sure. Says he feels better now than pre-DCCV, and he's back in AF. ICD shows that NSR lasted 10 days. We repeated DCCV on 4/15. He's maintained NSR since then. Feels very well.  Hx of A noise. P waves ~4. Device shows few AMS episodes, <10 s (no egrams).    We did gen change 6/7/18. This was c/b VT intraop, requiring external rescue to NSR. Also, a breach in the RV lead's insulation was repaired at that time.    He's felt unwell x weeks. ICD shows AF recurred on 10/1/19 and continues. Much lower exertion tolerance. Frequent V pacing.    My interpretation of today's ECG is AF with V pacing.    Review of Systems   Constitution: Negative. Negative for malaise/fatigue.   HENT: Negative.  Negative for ear pain and tinnitus.    Eyes: Negative.  Negative for blurred vision.   Cardiovascular: Positive for dyspnea on exertion and palpitations. Negative for chest pain, claudication, cyanosis, irregular heartbeat, leg swelling, near-syncope, orthopnea, paroxysmal nocturnal dyspnea and syncope.        " Chest pain with associated left arm pain   Respiratory: Negative.  Negative for shortness of breath.    Endocrine: Negative.  Negative for polyuria.   Hematologic/Lymphatic: Negative.  Does not bruise/bleed easily.   Skin: Negative.  Negative for rash.   Musculoskeletal: Negative.  Negative for joint pain and muscle weakness.   Gastrointestinal: Negative.  Negative for abdominal pain and change in bowel habit.   Genitourinary: Negative.  Negative for frequency.   Neurological: Positive for light-headedness and tremors (worsening, bilat hands, on intention.). Negative for dizziness and weakness.   Psychiatric/Behavioral: Negative.  Negative for depression. The patient is not nervous/anxious.    Allergic/Immunologic: Negative for environmental allergies.        Objective:    Physical Exam   Constitutional: He is oriented to person, place, and time. He appears well-developed and well-nourished.   HENT:   Head: Normocephalic and atraumatic.   Eyes: Pupils are equal, round, and reactive to light. Conjunctivae, EOM and lids are normal. No scleral icterus.   Neck: Normal range of motion. Neck supple. No JVD present. No tracheal deviation present. No thyromegaly present.   Cardiovascular: Normal rate, regular rhythm, normal heart sounds and intact distal pulses.  No extrasystoles are present. PMI is not displaced. Exam reveals no gallop and no friction rub.   No murmur heard.  Pulses:       Radial pulses are 2+ on the right side, and 2+ on the left side.   Pulmonary/Chest: Effort normal and breath sounds normal. No accessory muscle usage. No tachypnea. No respiratory distress. He has no wheezes. He has no rales.   Abdominal: Soft. Bowel sounds are normal. He exhibits no distension. There is no hepatosplenomegaly. There is no tenderness.   Musculoskeletal: Normal range of motion. He exhibits no edema.   Neurological: He is alert and oriented to person, place, and time. He has normal reflexes. He exhibits normal muscle tone.    Skin: Skin is warm and dry. No rash noted.   Psychiatric: He has a normal mood and affect. His behavior is normal.   Nursing note and vitals reviewed.        Assessment:       1. Coronary artery disease due to lipid rich plaque    2. S/P PTCA (percutaneous transluminal coronary angioplasty)    3. PVT (paroxysmal ventricular tachycardia)    4. Automatic implantable cardioverter-defibrillator in situ    5. Persistent atrial fibrillation         Plan:       Recurrent symptomatic AF, despite amio.  d/c amio today  NOE/DCCV next week.    Informed consent was obtained, we discussed the risks and benefits of  the procedure (pulmonary vein isolation) which included (but was not limited to) the possibility of bleeding or injury to the vessels involved, embolism, cardiac perforation, cardiac tamponade requiring emergent drainage with a pericardial drain or surgery, esophageal injury or fistula formation, phrenic nerve injury, pulmonary vein stenosis, injury to the native conduction system of the heart requiring a PPM, renal injury if contrast used, MI, stroke, and death. We also reviewed indications, and alternatives of the planned procedure. All questions were answered. Patient wishes to proceed  I discussed with patient risks, indications, benefits, and alternatives of the planned procedure. All questions were answered. Patient understands and wishes to proceed.  CT before PVI  NOE before PVI, even if in NSR, given hx of PRISCA sludge/clot.  Aim for cryo PVI, anatomy permitting.

## 2019-10-25 NOTE — PROGRESS NOTES
Patient Instructions  NOE (Transesophageal Echocardiogram) with Cardioversion     Pre-Procedure Testing:     Pre-Procedure Testing Needed for Ablation Procedure:     10/31/19 at 8 AM   Cardiac Imaging - CTA Scan     · The purpose of this CT scan is to visualize and measure the pulmonary veins (area where your doctor will be performing ablation on the day of your procedure).  This will be done at  the [facility] ([address] [facility]) located across the street from the main hospital.   · Please do not eat or drink anything FOUR hours prior to your scheduled CT scan.  · Please arrive 30 minutes prior to your scheduled time listed above.  · If you take the medication Metformin, please do NOT take it the day of your CT scan and two days following it (due to kidney pre-cautions).        Important Medication Information for your ABLATION procedure:    · You may take all your usual morning medications with a sip of water on the day of your procedure.         Important Medication Information:    · You may take your usual morning medications with a sip of water on the day of your procedure.       Day of Procedure:    10/31/19 at 9 AM  - NOE / Cardioversion  Please report to Cardiology Waiting Room on the 3rd floor of the Hospital    · Do not eat or drink anything after 12 midnight on the night before your procedure.  · Please do not wear makeup, especially mascara, when arriving for your procedure.  · You will be going home after your procedure.  · You will need someone to drive you home from the hospital due to anesthesia.       Directions to Cardiology Waiting Room  If you park in the Parking Garage:  Take elevators to the 2nd floor  Walk up ramp and turn right by Gold Elevators  Take elevator to the 3rd floor  Upon exiting the elevator, turn away from the clinic areas  Walk long ching around to front of hospital to area with windows overlooking [facility]  Check in at Reception Desk  OR  If family is  dropping you off:  Have them drop you off at the front of the Hospital  (Near the ER, where all the flags are hung).  Take the E elevators to the 3rd floor.  Check in at the Reception Desk in the waiting room.      Any need to reschedule or cancel procedures, or any questions regarding your procedures should be addressed directly with the Arrhythmia Department Nurses at the following phone number: [phone].

## 2019-10-30 ENCOUNTER — TELEPHONE (OUTPATIENT)
Dept: ELECTROPHYSIOLOGY | Facility: CLINIC | Age: 74
End: 2019-10-30

## 2019-10-30 NOTE — TELEPHONE ENCOUNTER
Spoke to Amish Grossman  CONFIRMED procedure arrival time of 9am on 10/31  Reiterated instructions including:  -Directions to check in desk  -NPO after midnight night prior to procedure  -Pre-procedure LABS 10/25/19. Hgb/Jct low but at patient's baseline. Creatinine elevated. Pt has history of CKD I. Will address with Dr. Isbell.      Pt verbalizes understanding of above and appreciates call.

## 2019-10-31 ENCOUNTER — HOSPITAL ENCOUNTER (OUTPATIENT)
Facility: HOSPITAL | Age: 74
Discharge: HOME | End: 2019-10-31
Attending: INTERNAL MEDICINE | Admitting: INTERNAL MEDICINE
Payer: MEDICARE

## 2019-10-31 ENCOUNTER — ANESTHESIA EVENT (OUTPATIENT)
Dept: MEDSURG UNIT | Facility: HOSPITAL | Age: 74
End: 2019-10-31
Payer: MEDICARE

## 2019-10-31 ENCOUNTER — ANESTHESIA (OUTPATIENT)
Dept: MEDSURG UNIT | Facility: HOSPITAL | Age: 74
End: 2019-10-31
Payer: MEDICARE

## 2019-10-31 ENCOUNTER — HOSPITAL ENCOUNTER (OUTPATIENT)
Dept: RADIOLOGY | Facility: HOSPITAL | Age: 74
Discharge: HOME | End: 2019-10-31
Attending: INTERNAL MEDICINE
Payer: MEDICARE

## 2019-10-31 ENCOUNTER — HOSPITAL ENCOUNTER (OUTPATIENT)
Dept: CARDIOLOGY | Facility: CLINIC | Age: 74
Discharge: HOME | End: 2019-10-31
Attending: INTERNAL MEDICINE | Admitting: INTERNAL MEDICINE
Payer: MEDICARE

## 2019-10-31 VITALS
HEART RATE: 61 BPM | WEIGHT: 250 LBS | OXYGEN SATURATION: 94 % | RESPIRATION RATE: 20 BRPM | TEMPERATURE: 98 F | HEIGHT: 74 IN | BODY MASS INDEX: 32.08 KG/M2 | DIASTOLIC BLOOD PRESSURE: 75 MMHG | SYSTOLIC BLOOD PRESSURE: 111 MMHG

## 2019-10-31 VITALS — DIASTOLIC BLOOD PRESSURE: 58 MMHG | OXYGEN SATURATION: 94 % | SYSTOLIC BLOOD PRESSURE: 92 MMHG | HEART RATE: 82 BPM

## 2019-10-31 DIAGNOSIS — I25.10 CAD (CORONARY ARTERY DISEASE): ICD-10-CM

## 2019-10-31 DIAGNOSIS — I48.11 LONGSTANDING PERSISTENT ATRIAL FIBRILLATION: Primary | ICD-10-CM

## 2019-10-31 DIAGNOSIS — I48.19 PERSISTENT ATRIAL FIBRILLATION: ICD-10-CM

## 2019-10-31 DIAGNOSIS — I48.91 ATRIAL FIBRILLATION: ICD-10-CM

## 2019-10-31 DIAGNOSIS — I42.8 OTHER CARDIOMYOPATHIES: ICD-10-CM

## 2019-10-31 DIAGNOSIS — I10 HTN (HYPERTENSION), BENIGN: ICD-10-CM

## 2019-10-31 LAB
AORTIC ROOT ANNULUS: 2.1 CM
APTT BLDCRRT: 33.8 SEC (ref 21–32)
BSA FOR ECHO PROCEDURE: 2.43 M2
CREAT SERPL-MCNC: 1.9 MG/DL (ref 0.5–1.4)
INR PPP: 2.4 (ref 0.8–1.2)
PROTHROMBIN TIME: 23.4 SEC (ref 9–12.5)
PROX AORTA: 4.2 CM
SAMPLE: ABNORMAL
SINUS: 4 CM
STJ: 3.4 CM

## 2019-10-31 PROCEDURE — 92960 CARDIOVERSION ELECTRIC EXT: CPT | Mod: ,,, | Performed by: INTERNAL MEDICINE

## 2019-10-31 PROCEDURE — 93010 ELECTROCARDIOGRAM REPORT: CPT | Mod: ,,, | Performed by: INTERNAL MEDICINE

## 2019-10-31 PROCEDURE — D9220A PRA ANESTHESIA: Mod: CRNA,,, | Performed by: NURSE ANESTHETIST, CERTIFIED REGISTERED

## 2019-10-31 PROCEDURE — 92960 CARDIOVERSION ELECTRIC EXT: CPT

## 2019-10-31 PROCEDURE — 63600175 PHARM REV CODE 636 W HCPCS: Performed by: NURSE ANESTHETIST, CERTIFIED REGISTERED

## 2019-10-31 PROCEDURE — 93325 DOPPLER ECHO COLOR FLOW MAPG: CPT | Mod: 26,,, | Performed by: INTERNAL MEDICINE

## 2019-10-31 PROCEDURE — 25000003 PHARM REV CODE 250: Performed by: INTERNAL MEDICINE

## 2019-10-31 PROCEDURE — 93320 TRANSESOPHAGEAL ECHO (TEE) (CUPID ONLY): ICD-10-PCS | Mod: 26,,, | Performed by: INTERNAL MEDICINE

## 2019-10-31 PROCEDURE — 37000008 HC ANESTHESIA 1ST 15 MINUTES: Performed by: INTERNAL MEDICINE

## 2019-10-31 PROCEDURE — 93312 ECHO TRANSESOPHAGEAL: CPT | Mod: 26,,, | Performed by: INTERNAL MEDICINE

## 2019-10-31 PROCEDURE — 85610 PROTHROMBIN TIME: CPT

## 2019-10-31 PROCEDURE — 93325 TRANSESOPHAGEAL ECHO (TEE) (CUPID ONLY): ICD-10-PCS | Mod: 26,,, | Performed by: INTERNAL MEDICINE

## 2019-10-31 PROCEDURE — D9220A PRA ANESTHESIA: ICD-10-PCS | Mod: CRNA,,, | Performed by: NURSE ANESTHETIST, CERTIFIED REGISTERED

## 2019-10-31 PROCEDURE — 71275 CTA CHEST NON CORONARY: ICD-10-PCS | Mod: 26,,, | Performed by: RADIOLOGY

## 2019-10-31 PROCEDURE — D9220A PRA ANESTHESIA: Mod: ANES,,, | Performed by: ANESTHESIOLOGY

## 2019-10-31 PROCEDURE — 93005 ELECTROCARDIOGRAM TRACING: CPT

## 2019-10-31 PROCEDURE — 37000009 HC ANESTHESIA EA ADD 15 MINS: Performed by: INTERNAL MEDICINE

## 2019-10-31 PROCEDURE — 93005 ELECTROCARDIOGRAM TRACING: CPT | Mod: 59

## 2019-10-31 PROCEDURE — 25500020 PHARM REV CODE 255: Performed by: INTERNAL MEDICINE

## 2019-10-31 PROCEDURE — 92960 PR CARDIOVERSION, ELECTIVE;EXTERN: ICD-10-PCS | Mod: ,,, | Performed by: INTERNAL MEDICINE

## 2019-10-31 PROCEDURE — 93325 DOPPLER ECHO COLOR FLOW MAPG: CPT

## 2019-10-31 PROCEDURE — 63600175 PHARM REV CODE 636 W HCPCS: Performed by: NURSE PRACTITIONER

## 2019-10-31 PROCEDURE — 85730 THROMBOPLASTIN TIME PARTIAL: CPT

## 2019-10-31 PROCEDURE — 25000003 PHARM REV CODE 250: Performed by: NURSE ANESTHETIST, CERTIFIED REGISTERED

## 2019-10-31 PROCEDURE — D9220A PRA ANESTHESIA: ICD-10-PCS | Mod: ANES,,, | Performed by: ANESTHESIOLOGY

## 2019-10-31 PROCEDURE — 93312 TRANSESOPHAGEAL ECHO (TEE) (CUPID ONLY): ICD-10-PCS | Mod: 26,,, | Performed by: INTERNAL MEDICINE

## 2019-10-31 PROCEDURE — 93010 ELECTROCARDIOGRAM REPORT: CPT | Mod: 76,,, | Performed by: INTERNAL MEDICINE

## 2019-10-31 PROCEDURE — 93010 EKG 12-LEAD: ICD-10-PCS | Mod: 76,,, | Performed by: INTERNAL MEDICINE

## 2019-10-31 PROCEDURE — 93320 DOPPLER ECHO COMPLETE: CPT | Mod: 26,,, | Performed by: INTERNAL MEDICINE

## 2019-10-31 PROCEDURE — 71275 CT ANGIOGRAPHY CHEST: CPT | Mod: 26,,, | Performed by: RADIOLOGY

## 2019-10-31 PROCEDURE — 71275 CT ANGIOGRAPHY CHEST: CPT | Mod: TC

## 2019-10-31 RX ORDER — CARVEDILOL 12.5 MG/1
12.5 TABLET ORAL 2 TIMES DAILY WITH MEALS
Qty: 60 TABLET | Refills: 11 | Status: SHIPPED | OUTPATIENT
Start: 2019-10-31 | End: 2021-02-11 | Stop reason: SDUPTHER

## 2019-10-31 RX ORDER — SODIUM CHLORIDE 9 MG/ML
INJECTION, SOLUTION INTRAVENOUS CONTINUOUS
Status: ACTIVE | OUTPATIENT
Start: 2019-10-31

## 2019-10-31 RX ORDER — DIPHENHYDRAMINE HYDROCHLORIDE 50 MG/ML
25 INJECTION INTRAMUSCULAR; INTRAVENOUS EVERY 6 HOURS PRN
Status: DISCONTINUED | OUTPATIENT
Start: 2019-10-31 | End: 2019-10-31 | Stop reason: HOSPADM

## 2019-10-31 RX ORDER — HYDROMORPHONE HYDROCHLORIDE 1 MG/ML
0.2 INJECTION, SOLUTION INTRAMUSCULAR; INTRAVENOUS; SUBCUTANEOUS EVERY 5 MIN PRN
Status: DISCONTINUED | OUTPATIENT
Start: 2019-10-31 | End: 2019-10-31 | Stop reason: HOSPADM

## 2019-10-31 RX ORDER — SODIUM CHLORIDE 0.9 % (FLUSH) 0.9 %
5 SYRINGE (ML) INJECTION
Status: ACTIVE | OUTPATIENT
Start: 2019-10-31

## 2019-10-31 RX ORDER — AMLODIPINE BESYLATE 5 MG/1
5 TABLET ORAL DAILY
Qty: 30 TABLET | Refills: 11 | Status: SHIPPED | OUTPATIENT
Start: 2019-10-31 | End: 2019-10-31 | Stop reason: HOSPADM

## 2019-10-31 RX ORDER — LIDOCAINE HCL/PF 100 MG/5ML
SYRINGE (ML) INTRAVENOUS
Status: DISCONTINUED | OUTPATIENT
Start: 2019-10-31 | End: 2019-10-31

## 2019-10-31 RX ORDER — EPHEDRINE SULFATE 50 MG/ML
INJECTION, SOLUTION INTRAVENOUS
Status: DISCONTINUED | OUTPATIENT
Start: 2019-10-31 | End: 2019-10-31

## 2019-10-31 RX ORDER — FENTANYL CITRATE 50 UG/ML
25 INJECTION, SOLUTION INTRAMUSCULAR; INTRAVENOUS EVERY 5 MIN PRN
Status: DISCONTINUED | OUTPATIENT
Start: 2019-10-31 | End: 2019-10-31 | Stop reason: HOSPADM

## 2019-10-31 RX ORDER — PROPOFOL 10 MG/ML
VIAL (ML) INTRAVENOUS
Status: DISCONTINUED | OUTPATIENT
Start: 2019-10-31 | End: 2019-10-31

## 2019-10-31 RX ORDER — ETOMIDATE 2 MG/ML
INJECTION INTRAVENOUS
Status: DISCONTINUED | OUTPATIENT
Start: 2019-10-31 | End: 2019-10-31

## 2019-10-31 RX ORDER — SILVER SULFADIAZINE 10 G/1000G
CREAM TOPICAL
Status: DISCONTINUED | OUTPATIENT
Start: 2019-10-31 | End: 2019-10-31 | Stop reason: HOSPADM

## 2019-10-31 RX ORDER — KETAMINE HCL IN 0.9 % NACL 50 MG/5 ML
SYRINGE (ML) INTRAVENOUS
Status: DISCONTINUED | OUTPATIENT
Start: 2019-10-31 | End: 2019-10-31

## 2019-10-31 RX ORDER — AMLODIPINE BESYLATE 5 MG/1
5 TABLET ORAL DAILY
Qty: 30 TABLET | Refills: 5 | Status: SHIPPED | OUTPATIENT
Start: 2019-10-31 | End: 2020-11-20 | Stop reason: SDUPTHER

## 2019-10-31 RX ADMIN — EPHEDRINE SULFATE 10 MG: 50 INJECTION, SOLUTION INTRAMUSCULAR; INTRAVENOUS; SUBCUTANEOUS at 11:10

## 2019-10-31 RX ADMIN — PROPOFOL 20 MG: 10 INJECTION, EMULSION INTRAVENOUS at 11:10

## 2019-10-31 RX ADMIN — ETOMIDATE 4 MG: 2 INJECTION, SOLUTION INTRAVENOUS at 11:10

## 2019-10-31 RX ADMIN — Medication 10 MG: at 11:10

## 2019-10-31 RX ADMIN — ETOMIDATE 2 MG: 2 INJECTION, SOLUTION INTRAVENOUS at 11:10

## 2019-10-31 RX ADMIN — ETOMIDATE 6 MG: 2 INJECTION, SOLUTION INTRAVENOUS at 11:10

## 2019-10-31 RX ADMIN — EPHEDRINE SULFATE 15 MG: 50 INJECTION, SOLUTION INTRAMUSCULAR; INTRAVENOUS; SUBCUTANEOUS at 11:10

## 2019-10-31 RX ADMIN — PROPOFOL 30 MG: 10 INJECTION, EMULSION INTRAVENOUS at 11:10

## 2019-10-31 RX ADMIN — LIDOCAINE HYDROCHLORIDE 100 MG: 20 INJECTION, SOLUTION INTRAVENOUS at 11:10

## 2019-10-31 RX ADMIN — PROPOFOL 40 MG: 10 INJECTION, EMULSION INTRAVENOUS at 11:10

## 2019-10-31 RX ADMIN — ETOMIDATE 12 MG: 2 INJECTION, SOLUTION INTRAVENOUS at 11:10

## 2019-10-31 RX ADMIN — SODIUM CHLORIDE: 0.9 INJECTION, SOLUTION INTRAVENOUS at 11:10

## 2019-10-31 RX ADMIN — IOHEXOL 100 ML: 350 INJECTION, SOLUTION INTRAVENOUS at 09:10

## 2019-10-31 NOTE — ANESTHESIA PREPROCEDURE EVALUATION
10/31/2019  Amish Grossman is a 73 y.o., male.  Patient Active Problem List   Diagnosis    Coronary artery disease due to lipid rich plaque    Old myocardial infarction    S/P PTCA (percutaneous transluminal coronary angioplasty)    Anticoagulated on Coumadin    CKD (chronic kidney disease) stage 1, GFR 90 ml/min or greater    BPH (benign prostatic hyperplasia)    Dyslipidemia    Venous insufficiency of leg    Erectile dysfunction    HTN (hypertension), benign    Hypothyroid    Automatic implantable cardioverter-defibrillator in situ    Ischemic cardiomyopathy    Ventricular tachyarrhythmia    PVT (paroxysmal ventricular tachycardia)    Persistent atrial fibrillation    Benign essential tremor    Longstanding persistent atrial fibrillation         Anesthesia Evaluation         Review of Systems      Physical Exam  General:  Well nourished    Airway/Jaw/Neck:  Airway Findings: Mouth Opening: Normal Tongue: Normal  General Airway Assessment: Adult  Mallampati: II  Improves to II with phonation.  TM Distance: Normal, at least 6 cm      Dental:  Dental Findings: In tact   Chest/Lungs:  Chest/Lungs Findings: Clear to auscultation     Heart/Vascular:  Heart Findings: Rate: Normal  Rhythm: Irregularly Irregular  Sounds: Normal        Mental Status:  Mental Status Findings:  Cooperative, Alert and Oriented         Anesthesia Plan  Type of Anesthesia, risks & benefits discussed:  Anesthesia Type:  general  Patient's Preference: General  Intra-op Monitoring Plan: standard ASA monitors  Intra-op Monitoring Plan Comments: Standard ASA monitors.   Post Op Pain Control Plan: per primary service following discharge from PACU  Post Op Pain Control Plan Comments: Per primary service.     Induction:   IV  Beta Blocker:  Patient is not currently on a Beta-Blocker (No further documentation required).        Informed Consent: Patient understands risks and agrees with Anesthesia plan.  Questions answered. Anesthesia consent signed with patient.  ASA Score: 4     Day of Surgery Review of History & Physical:    H&P update referred to the surgeon.     Anesthesia Plan Notes: Chart reviewed, patient interviewed and examined.  The plan for general anesthesia was explained.  Questions were answered and the consent was signed.  Tejinder RASMUSSEN         Ready For Surgery From Anesthesia Perspective.

## 2019-10-31 NOTE — HPI
73 year old male here for NOE/DCCV. He has a history of AF, CAD s/p PCI with stent placement in 2000 and 2004, cardiomyopathy (EF 25% s/p ICD), HTN, HLD, CKD, VT resulting in ICD shock. He has been fatigued for the past week and has chronic peripheral edema and mild SHOOK. He denies orthopnea or PND and reports no syncope or symptoms of angina.      TTE 6/6/18    CONCLUSIONS     1 - Severely depressed left ventricular systolic function (EF 25-30%).     2 - Impaired LV relaxation, normal LAP (grade 1 diastolic dysfunction).     3 - Mild mitral regurgitation.     4 - Mild tricuspid regurgitation.     5 - The estimated PA systolic pressure is 24 mmHg.       NOE 4/15/16    CONCLUSIONS     1 - Moderately depressed left ventricular systolic function with evidence of prior infarct in the distal LAD territory.     2 - Biatrial enlargement.     3 - Left atrial appendage is single lobed with no visualized thrombus.     Dysphagia or odynophagia:  No  Liver Disease, esophageal disease, or known varices:  No  Upper GI Bleeding: No  Snoring:  Yes  Sleep Apnea:  unknown  Prior neck surgery or radiation:  No  History of anesthetic difficulties:  No  Family history of anesthetic difficulties:  No  Last oral intake:  12 hours ago  Able to move neck in all directions:  Yes

## 2019-10-31 NOTE — DISCHARGE SUMMARY
"[facility]  Cardiac Electrophysiology  Discharge Summary      Patient Name: Amish Grossman  MRN: [id number]  Admission Date: 10/31/2019  Hospital Length of Stay: 0 days  Discharge Date and Time:  10/31/2019 1:46 PM  Attending Physician: Camron Isbell MD    Discharging Provider: Devante Hubbard NP  Primary Care Physician: Angel Coburn MD    HPI:   73 year old male presented to short stay unit for planned  NOE/DCCV .    Background per EP clinic note on 10/25/19   73 y.o. 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  No CP. Feeling better since "R" added at prior visit. Leg swelling abated.     We did NOE/DCCV on 3/16 in hopes of starting tikosyn, but QTc was too long for that. Instead we started amiodarone. Pt says he felt better for a week, then felt down again. However he thinks that may have been multifactorial and isn't completely sure. Says he feels better now than pre-DCCV, and he's back in AF. ICD shows that NSR lasted 10 days. We repeated DCCV on 4/15. He's maintained NSR since then. Feels very well.  Hx of A noise. P waves ~4. Device shows few AMS episodes, <10 s (no egrams).     We did gen change 6/7/18. This was c/b VT intraop, requiring external rescue to NSR. Also, a breach in the RV lead's insulation was repaired at that time.     He's felt unwell x weeks. ICD shows AF recurred on 10/1/19 and continues. Much lower exertion tolerance. Frequent V pacing.          ON last EP clinic visit with MD Akilah Lyon on 10/25/19 >  Given Recurrent symptomatic AF, despite amio. Pt reccommended for PVI.   Patient elected to proceed for planned PVI ( tentative schedule in 12/20/19), amiodarone was stopped at that clinic visit.   In the interim, planned for " NOE/Cardioversion.       Patient presented to short stay cardiac unit on 10/31/19  for planned cardioversion  , patient denies any bleeding, chest pains, rash , fevers, chills, or any other acute symptoms.  Patient reports is on coumadin  denies any bleeding episodes.        Procedure(s) (LRB):  CARDIOVERSION (N/A)  ECHOCARDIOGRAM, TRANSESOPHAGEAL (N/A)         Hospital Course: INR 2.4 NOE done showed no PRISCA thrombus, hence proceeded to DCCV which converted patient from AF to sinus rhythm ( see procedure note for details).   Patient tolerated procedure with no immediate complications . Post procedure  EKG showed atrial paced rhythm at 60 BPM .   Plan to continue home medications including coumadin, close follow up with PCP who manages patient's coumadin and INR levels   Pt reports noted low BP > SBP in the 90's, will drop down amlodipine to 5 mg daily ( from  Amlodipine 10 mg once daily). Instructed to continue to trend Blood pressures at home.  Pt to have device remote interrogation in 1 week, and 4 weeks follow up with MD Isbell. Patient for planned ablation in 12/2019     Discharge plans/instructions discussed with patient and his spouse Mrs Grossman    who verbalized understanding  and agreement of plans of care. No further questions or concerns  voiced at this time.       New Medications:  -None.         Consults:    -Anesthesia       Significant Diagnostic Studies: NOE    Final Active Diagnoses:    Diagnosis Date Noted POA    PRINCIPAL PROBLEM:  Longstanding persistent atrial fibrillation [I48.11] 10/31/2019 Yes      Problems Resolved During this Admission:       Discharged Condition: good    Disposition: Home or Self Care    Follow Up:  Follow-up Information     Camron Isbell MD In 4 weeks.    Specialties:  Electrophysiology, Cardiology  Why:  ILR remote in 1 week   Contact information:  [address]  [facility] [address]  [phone]                 Patient Instructions:      Diet Cardiac     No  driving until:   Order Comments: If you were driving prior to procedure, NO driving for 24 hours post procedure     Other restrictions (specify):   Order Comments: Diet  -You may resume oral intake after you are discharged, as long you have no swallowing difficulties.     Side effects from your sedation:  -You may be drowsy for the remainder of the day.     Because you have received sedation for this procedure:  -Limit activity for the remainder of the day.  -Do not drive or operate any equipment for  24 hours   -Do not smoke for at least 6 hours and until you are fully awake and alert.  -Do not drink alcoholic beverage for 24 hours.  -Defer important decision making until the following day.     Go to the Emergency Department if you develop:  -Bleeding  -Weakness or numbness  -Visual, gait or speech disturbance  -New chest pain, palpitations, shortness of breath, rapid heart beat, or fainting  -Fever        Notify your health care provider if you experience any of the following:  temperature >100.4     Notify your health care provider if you experience any of the following:  persistent nausea and vomiting or diarrhea     Notify your health care provider if you experience any of the following:  severe uncontrolled pain     Notify your health care provider if you experience any of the following:  redness, tenderness, or signs of infection (pain, swelling, redness, odor or green/yellow discharge around incision site)     Notify your health care provider if you experience any of the following:  difficulty breathing or increased cough     Notify your health care provider if you experience any of the following:  severe persistent headache     Notify your health care provider if you experience any of the following:  worsening rash     Notify your health care provider if you experience any of the following:  persistent dizziness, light-headedness, or visual disturbances     Notify your health care provider if you experience  any of the following:  increased confusion or weakness     Notify your health care provider if you experience any of the following:   Order Comments: For any concerning medical symptoms     Medications:  Reconciled Home Medications:      Medication List      CHANGE how you take these medications    carvedilol 12.5 MG tablet  Commonly known as:  COREG  Take 1 tablet (12.5 mg total) by mouth 2 (two) times daily with meals.  What changed:    ·  ( pt reports this is his current coreg dose for more than 1 year  He states he  has been on Coreg 12.5 mg BID)   ·       amlodipine 5 MG Tab  Commonly known as:  NORVASC  Take 1 tablet (5 mg total) by mouth once daily.  What changed:    · medication strength  · how much to take     SLO-NIACIN 750 mg Tbsr  Generic drug:  niacin  Take 1 tablet (750 mg total) by mouth 2 (two) times daily.  What changed:  how much to take        CONTINUE taking these medications    aspirin 81 MG EC tablet  Commonly known as:  ECOTRIN  Take 81 mg by mouth once daily.     atorvastatin 10 MG tablet  Commonly known as:  LIPITOR  Take 1 tablet (10 mg total) by mouth once daily.     fish oil-omega-3 fatty acids 300-1,000 mg capsule  Take 2 g by mouth 2 (two) times daily.     furosemide 40 MG tablet  Commonly known as:  LASIX  Take 40 mg by mouth as needed.     levothyroxine 75 MCG tablet  Commonly known as:  SYNTHROID  Take 75 mcg by mouth before breakfast.     lisinopril 20 MG tablet  Commonly known as:  PRINIVIL,ZESTRIL  TAKE ONE TABLET BY MOUTH ONCE DAILY     multivitamin capsule  Take 1 capsule by mouth nightly.     nitroGLYCERIN 0.4 MG SL tablet  Commonly known as:  NITROSTAT  Place 1 tablet (0.4 mg total) under the tongue every 5 (five) minutes as needed for Chest pain.     VITAMIN C 100 MG tablet  Generic drug:  ascorbic acid (vitamin C)  Take 1,000 mg by mouth once daily.     warfarin 5 MG tablet  Commonly known as:  COUMADIN  5 mg. 5  mgs  4dailys   7.5  3  Times  Weekly > instructed to closely  follow up with PCP to follow INR/ coumadin dosing         STOP taking these medications    amiodarone 200 MG Tab  Commonly known as:  PACERONE > pt stopped amiodarone on EP clinic visit on 10/25/19         ASK your doctor about these medications    omeprazole 20 MG capsule  Commonly known as:  PRILOSEC  Take 20 mg by mouth nightly.     sulfamethoxazole-trimethoprim 800-160mg 800-160 mg Tab  Commonly known as:  BACTRIM DS  Take 1 tablet by mouth 2 (two) times daily.            Time spent on the discharge of patient: 40  minutes    Devante Hubbard NP  Cardiac Electrophysiology  [facility]    STAFF MD Camron Isbell

## 2019-10-31 NOTE — PROGRESS NOTES
"Informed by CT-Tech pt. C/O feeling a little weak prior to CT exam, states, "I  thinks blood pressure may be running low", "I took my blood pressure pill this morning". Vitals taken, stable, see flow sheet, patient declined the need for ED transport at this time, states, "Going to see my doctor at the hospital after the CT". Patient will be assisted across the street to hospital via transportation van accompanied by wife once CT-scan exam is completed.  "

## 2019-10-31 NOTE — NURSING TRANSFER
Nursing Transfer Note      10/31/2019     Transfer to SSCU 11 from EP PACU    Transfer via stretcher    Transfer with cardiac monitoring    Transported by PACU RN    Medicines sent: yes    Chart send with patient: Yes    Notified:

## 2019-10-31 NOTE — SUBJECTIVE & OBJECTIVE
Past Medical History:   Diagnosis Date    Anticoagulant long-term use     Atrial fibrillation     Benign essential tremor 6/6/2018    Cardiomyopathy     CHF (congestive heart failure)     Coronary artery disease     Hyperlipidemia     Hypertension     Left ventricular thrombus     Syncope and collapse     Thyroid disease        Past Surgical History:   Procedure Laterality Date    CARDIAC CATHETERIZATION      CARDIAC DEFIBRILLATOR PLACEMENT      CORONARY ANGIOPLASTY      HERNIA REPAIR      KNEE ARTHROSCOPY      REPLACEMENT OF IMPLANTABLE CARDIOVERTER-DEFIBRILLATOR (ICD) GENERATOR Left 6/7/2018    Procedure: REPLACEMENT-GENERATOR-ICD;  Surgeon: Camron Isbell MD;  Location: [facility] CATH LAB;  Service: Cardiology;  Laterality: Left;  GENIE, DUAL ICD GEN CHG, SJM, MAC, DM, 3 PREP    VASCULAR SURGERY      stents placed       Review of patient's allergies indicates:  No Known Allergies    No current facility-administered medications on file prior to encounter.      Current Outpatient Medications on File Prior to Encounter   Medication Sig    amlodipine (NORVASC) 10 MG tablet Take 1 tablet (10 mg total) by mouth once daily.    ascorbic acid (VITAMIN C) 100 MG tablet Take 1,000 mg by mouth once daily.     aspirin (ECOTRIN) 81 MG EC tablet Take 81 mg by mouth once daily.    atorvastatin (LIPITOR) 10 MG tablet Take 1 tablet (10 mg total) by mouth once daily.    carvedilol (COREG) 25 MG tablet Take 1 tablet (25 mg total) by mouth 2 (two) times daily with meals. (Patient taking differently: Take 12.5 mg by mouth 2 (two) times daily with meals. )    fish oil-omega-3 fatty acids 300-1,000 mg capsule Take 2 g by mouth 2 (two) times daily.     furosemide (LASIX) 40 MG tablet Take 40 mg by mouth as needed.    levothyroxine (SYNTHROID) 75 MCG tablet Take 75 mcg by mouth before breakfast.     lisinopril (PRINIVIL,ZESTRIL) 20 MG tablet TAKE ONE TABLET BY MOUTH ONCE DAILY    multivitamin capsule Take 1 capsule  by mouth nightly.     warfarin (COUMADIN) 5 MG tablet 5 mg. 5  mgs  4dailys   7.5  3  Times  weekly    amiodarone (PACERONE) 200 MG Tab Take 1 tablet (200 mg total) by mouth once daily.    nitroGLYCERIN (NITROSTAT) 0.4 MG SL tablet Place 1 tablet (0.4 mg total) under the tongue every 5 (five) minutes as needed for Chest pain.    omeprazole (PRILOSEC) 20 MG capsule Take 20 mg by mouth nightly.     SLO-NIACIN 750 mg TbSR Take 1 tablet (750 mg total) by mouth 2 (two) times daily. (Patient taking differently: Take 500 mg by mouth 2 (two) times daily. )    sulfamethoxazole-trimethoprim 800-160mg (BACTRIM DS) 800-160 mg Tab Take 1 tablet by mouth 2 (two) times daily.     Family History     Problem Relation (Age of Onset)    Heart disease Mother, Father, Brother        Tobacco Use    Smoking status: Never Smoker   Substance and Sexual Activity    Alcohol use: Yes     Comment: occasionally    Drug use: No    Sexual activity: Not on file     Review of Systems   All other systems reviewed and are negative.    Objective:     Vital Signs (Most Recent):  Pulse: 80 (10/31/19 1007)  Resp: 18 (10/31/19 1007)  BP: 114/76 (10/31/19 1008)  SpO2: 95 % (10/31/19 1007) Vital Signs (24h Range):  Pulse:  [80-82] 80  Resp:  [18] 18  SpO2:  [93 %-95 %] 95 %  BP: ()/(58-77) 114/76        There is no height or weight on file to calculate BMI.    SpO2: 95 %  O2 Device (Oxygen Therapy): room air    No intake or output data in the 24 hours ending 10/31/19 1026    Lines/Drains/Airways     None                 Physical Exam   Constitutional: He is oriented to person, place, and time. He appears well-developed and well-nourished. No distress.   HENT:   Head: Normocephalic and atraumatic.   Neck: No JVD present.   Cardiovascular: Normal rate, regular rhythm, normal heart sounds and intact distal pulses. Exam reveals no gallop and no friction rub.   No murmur heard.  Irregularly irregular rhythm   Pulmonary/Chest: Effort normal and  breath sounds normal. No respiratory distress. He has no wheezes. He has no rales.   Abdominal: Soft. Bowel sounds are normal. He exhibits no distension. There is no tenderness.   Musculoskeletal: He exhibits no edema.   Neurological: He is alert and oriented to person, place, and time.   Skin: He is not diaphoretic.       Significant Labs:     Recent Results (from the past 24 hour(s))   ISTAT CREATININE    Collection Time: 10/31/19  8:29 AM   Result Value Ref Range    POC Creatinine 1.9 (H) 0.5 - 1.4 mg/dL    Sample VENOUS      INR pending    Significant Imaging:     I have reviewed all pertinent imaging studies

## 2019-10-31 NOTE — H&P
[facility]  Cardiology  History and Physical     Patient Name: Amish Grossman  MRN: [id number]  Admission Date: 10/31/2019  Code Status: Prior   Attending Provider: Camron Isbell MD   Primary Care Physician: Angel Coburn MD  Principal Problem:<principal problem not specified>    Patient information was obtained from patient and ER records.     Subjective:     Chief Complaint:  AFib     HPI:  73 year old male here for NOE/DCCV. He has a history of AF, CAD s/p PCI with stent placement in 2000 and 2004, cardiomyopathy (EF 25% s/p ICD), HTN, HLD, CKD, VT resulting in ICD shock. He has been fatigued for the past week and has chronic peripheral edema and mild SHOOK. He denies orthopnea or PND and reports no syncope or symptoms of angina.      TTE 6/6/18    CONCLUSIONS     1 - Severely depressed left ventricular systolic function (EF 25-30%).     2 - Impaired LV relaxation, normal LAP (grade 1 diastolic dysfunction).     3 - Mild mitral regurgitation.     4 - Mild tricuspid regurgitation.     5 - The estimated PA systolic pressure is 24 mmHg.       NOE 4/15/16    CONCLUSIONS     1 - Moderately depressed left ventricular systolic function with evidence of prior infarct in the distal LAD territory.     2 - Biatrial enlargement.     3 - Left atrial appendage is single lobed with no visualized thrombus.     Dysphagia or odynophagia:  No  Liver Disease, esophageal disease, or known varices:  No  Upper GI Bleeding: No  Snoring:  Yes  Sleep Apnea:  unknown  Prior neck surgery or radiation:  No  History of anesthetic difficulties:  No  Family history of anesthetic difficulties:  No  Last oral intake:  12 hours ago  Able to move neck in all directions:  Yes          Past Medical History:   Diagnosis Date    Anticoagulant long-term use     Atrial fibrillation     Benign essential tremor 6/6/2018    Cardiomyopathy     CHF (congestive heart failure)     Coronary artery disease     Hyperlipidemia      Hypertension     Left ventricular thrombus     Syncope and collapse     Thyroid disease        Past Surgical History:   Procedure Laterality Date    CARDIAC CATHETERIZATION      CARDIAC DEFIBRILLATOR PLACEMENT      CORONARY ANGIOPLASTY      HERNIA REPAIR      KNEE ARTHROSCOPY      REPLACEMENT OF IMPLANTABLE CARDIOVERTER-DEFIBRILLATOR (ICD) GENERATOR Left 6/7/2018    Procedure: REPLACEMENT-GENERATOR-ICD;  Surgeon: Camron Isbell MD;  Location: [facility] CATH LAB;  Service: Cardiology;  Laterality: Left;  GENIE, DUAL ICD GEN CHG, SJM, MAC, DM, 3 PREP    VASCULAR SURGERY      stents placed       Review of patient's allergies indicates:  No Known Allergies    No current facility-administered medications on file prior to encounter.      Current Outpatient Medications on File Prior to Encounter   Medication Sig    amlodipine (NORVASC) 10 MG tablet Take 1 tablet (10 mg total) by mouth once daily.    ascorbic acid (VITAMIN C) 100 MG tablet Take 1,000 mg by mouth once daily.     aspirin (ECOTRIN) 81 MG EC tablet Take 81 mg by mouth once daily.    atorvastatin (LIPITOR) 10 MG tablet Take 1 tablet (10 mg total) by mouth once daily.    carvedilol (COREG) 25 MG tablet Take 1 tablet (25 mg total) by mouth 2 (two) times daily with meals. (Patient taking differently: Take 12.5 mg by mouth 2 (two) times daily with meals. )    fish oil-omega-3 fatty acids 300-1,000 mg capsule Take 2 g by mouth 2 (two) times daily.     furosemide (LASIX) 40 MG tablet Take 40 mg by mouth as needed.    levothyroxine (SYNTHROID) 75 MCG tablet Take 75 mcg by mouth before breakfast.     lisinopril (PRINIVIL,ZESTRIL) 20 MG tablet TAKE ONE TABLET BY MOUTH ONCE DAILY    multivitamin capsule Take 1 capsule by mouth nightly.     warfarin (COUMADIN) 5 MG tablet 5 mg. 5  mgs  4dailys   7.5  3  Times  weekly    amiodarone (PACERONE) 200 MG Tab Take 1 tablet (200 mg total) by mouth once daily.    nitroGLYCERIN (NITROSTAT) 0.4 MG SL tablet Place 1  tablet (0.4 mg total) under the tongue every 5 (five) minutes as needed for Chest pain.    omeprazole (PRILOSEC) 20 MG capsule Take 20 mg by mouth nightly.     SLO-NIACIN 750 mg TbSR Take 1 tablet (750 mg total) by mouth 2 (two) times daily. (Patient taking differently: Take 500 mg by mouth 2 (two) times daily. )    sulfamethoxazole-trimethoprim 800-160mg (BACTRIM DS) 800-160 mg Tab Take 1 tablet by mouth 2 (two) times daily.     Family History     Problem Relation (Age of Onset)    Heart disease Mother, Father, Brother        Tobacco Use    Smoking status: Never Smoker   Substance and Sexual Activity    Alcohol use: Yes     Comment: occasionally    Drug use: No    Sexual activity: Not on file     Review of Systems   All other systems reviewed and are negative.    Objective:     Vital Signs (Most Recent):  Pulse: 80 (10/31/19 1007)  Resp: 18 (10/31/19 1007)  BP: 114/76 (10/31/19 1008)  SpO2: 95 % (10/31/19 1007) Vital Signs (24h Range):  Pulse:  [80-82] 80  Resp:  [18] 18  SpO2:  [93 %-95 %] 95 %  BP: ()/(58-77) 114/76        There is no height or weight on file to calculate BMI.    SpO2: 95 %  O2 Device (Oxygen Therapy): room air    No intake or output data in the 24 hours ending 10/31/19 1026    Lines/Drains/Airways     None                 Physical Exam   Constitutional: He is oriented to person, place, and time. He appears well-developed and well-nourished. No distress.   HENT:   Head: Normocephalic and atraumatic.   Neck: No JVD present.   Cardiovascular: Normal rate, regular rhythm, normal heart sounds and intact distal pulses. Exam reveals no gallop and no friction rub.   No murmur heard.  Irregularly irregular rhythm   Pulmonary/Chest: Effort normal and breath sounds normal. No respiratory distress. He has no wheezes. He has no rales.   Abdominal: Soft. Bowel sounds are normal. He exhibits no distension. There is no tenderness.   Musculoskeletal: He exhibits no edema.   Neurological: He is alert  and oriented to person, place, and time.   Skin: He is not diaphoretic.       Significant Labs:     Recent Results (from the past 24 hour(s))   ISTAT CREATININE    Collection Time: 10/31/19  8:29 AM   Result Value Ref Range    POC Creatinine 1.9 (H) 0.5 - 1.4 mg/dL    Sample VENOUS      INR pending    Significant Imaging:     I have reviewed all pertinent imaging studies      Assessment and Plan:     Longstanding persistent atrial fibrillation  1. NOE for evaluation of AFib  -No absolute contraindications of esophageal stricture, tumor, perforation, laceration,or diverticulum and/or active GI bleed  -The risks, benefits & alternatives of the procedure were explained to the patient.   -The risks of transesophageal echo include but are not limited to:  Dental trauma, esophageal trauma/perforation, bleeding, laryngospasm/brochospasm, aspiration, sore throat/hoarseness, & dislodgement of the endotracheal tube/nasogastric tube (where applicable).    -The risks of moderate sedation include hypotension, respiratory depression, arrhythmias, bronchospasm, & death.    -Informed consent was obtained. The patient is agreeable to proceed with the procedure and all questions and concerns addressed.    Case discussed with an attending in echocardiography lab.     Further recommendations per attending addendum          VTE Risk Mitigation (From admission, onward)    None          Darwin Thomason MD  Cardiology   [facility]

## 2019-10-31 NOTE — TRANSFER OF CARE
"Anesthesia Transfer of Care Note    Patient: Amish Grossman    Procedure(s) Performed: Procedure(s) (LRB):  CARDIOVERSION (N/A)  ECHOCARDIOGRAM, TRANSESOPHAGEAL (N/A)    Patient location: PACU    Anesthesia Type: general    Transport from OR: Transported from OR on 2-3 L/min O2 by NC with adequate spontaneous ventilation    Post pain: adequate analgesia    Post assessment: no apparent anesthetic complications and tolerated procedure well    Post vital signs: stable    Level of consciousness: awake, alert and oriented    Nausea/Vomiting: no nausea/vomiting    Complications: none    Transfer of care protocol was followed      Last vitals:   Visit Vitals  /76 (BP Location: Left arm, Patient Position: Lying)   Pulse 80   Temp 36.4 °C (97.5 °F) (Oral)   Resp 18   Ht 6' 2" (1.88 m)   Wt 113.4 kg (250 lb)   SpO2 95%   BMI 32.10 kg/m²     "

## 2019-10-31 NOTE — NURSING
Discharge instructions and medlist given.  Patient and spouse verbalized understanding.  Denies need for escort or wheelchair off unit.  Off unit walking with spouse who will drive him home.

## 2019-10-31 NOTE — PLAN OF CARE
Received report from PACU, RN. Patient s/p DCCV, AAOx3. VSS, no c/o pain or discomfort at this time, resp even and unlabored.  Post procedure protocol reviewed with patient and patient's family. Understanding verbalized. Family members called to bedside. Nurse call bell within reach. Will continue to monitor per post procedure protocol.

## 2019-10-31 NOTE — ASSESSMENT & PLAN NOTE
1. NOE for evaluation of AFib  -No absolute contraindications of esophageal stricture, tumor, perforation, laceration,or diverticulum and/or active GI bleed  -The risks, benefits & alternatives of the procedure were explained to the patient.   -The risks of transesophageal echo include but are not limited to:  Dental trauma, esophageal trauma/perforation, bleeding, laryngospasm/brochospasm, aspiration, sore throat/hoarseness, & dislodgement of the endotracheal tube/nasogastric tube (where applicable).    -The risks of moderate sedation include hypotension, respiratory depression, arrhythmias, bronchospasm, & death.    -Informed consent was obtained. The patient is agreeable to proceed with the procedure and all questions and concerns addressed.    Case discussed with an attending in echocardiography lab.     Further recommendations per attending addendum

## 2019-11-01 NOTE — ANESTHESIA POSTPROCEDURE EVALUATION
Anesthesia Post Evaluation    Patient: Amish Grossman    Procedure(s) Performed: Procedure(s) (LRB):  CARDIOVERSION (N/A)  ECHOCARDIOGRAM, TRANSESOPHAGEAL (N/A)    Final Anesthesia Type: general  Patient location during evaluation: PACU  Patient participation: Yes- Able to Participate  Level of consciousness: awake and alert  Post-procedure vital signs: reviewed and stable  Pain management: adequate  Airway patency: patent  PONV status at discharge: No PONV  Anesthetic complications: no      Cardiovascular status: hemodynamically stable  Respiratory status: unassisted  Hydration status: euvolemic  Follow-up not needed.          Vitals Value Taken Time   /75 10/31/2019 12:45 PM   Temp 36.7 °C (98 °F) 10/31/2019 12:45 PM   Pulse 61 10/31/2019 12:45 PM   Resp 20 10/31/2019 12:45 PM   SpO2 94 % 10/31/2019 12:45 PM         Event Time     Out of Recovery 12:44:00          Pain/Talya Score: Talya Score: 10 (10/31/2019 12:15 PM)

## 2019-11-07 ENCOUNTER — DOCUMENTATION ONLY (OUTPATIENT)
Dept: CARDIOLOGY | Facility: HOSPITAL | Age: 74
End: 2019-11-07

## 2019-11-07 NOTE — PROGRESS NOTES
Patient initiated transmission received via patient's remote ICD home monitor.  Patient had NOE/DCCV on 10/31/19 and prior to being discharged from the hospital he was instructed to send a manual tx to assess his heart rhythm.  Remote transmission reveals the presenting rhythm as Ap/Vs with no AF post DCCV.  Patient was informed of the above information. Understanding was verbalized.

## 2019-11-11 ENCOUNTER — PATIENT MESSAGE (OUTPATIENT)
Dept: ELECTROPHYSIOLOGY | Facility: CLINIC | Age: 74
End: 2019-11-11

## 2019-11-11 DIAGNOSIS — I47.29 PVT (PAROXYSMAL VENTRICULAR TACHYCARDIA): ICD-10-CM

## 2019-11-11 DIAGNOSIS — I48.19 PERSISTENT ATRIAL FIBRILLATION: Primary | ICD-10-CM

## 2019-11-11 DIAGNOSIS — Z01.812 PRE-PROCEDURE LAB EXAM: ICD-10-CM

## 2019-11-13 ENCOUNTER — TELEPHONE (OUTPATIENT)
Dept: CARDIOLOGY | Facility: CLINIC | Age: 74
End: 2019-11-13

## 2019-11-13 DIAGNOSIS — I25.10 CORONARY ARTERY DISEASE, ANGINA PRESENCE UNSPECIFIED, UNSPECIFIED VESSEL OR LESION TYPE, UNSPECIFIED WHETHER NATIVE OR TRANSPLANTED HEART: Primary | ICD-10-CM

## 2019-11-13 DIAGNOSIS — R06.02 SHORTNESS OF BREATH: ICD-10-CM

## 2019-11-13 DIAGNOSIS — I25.5 ISCHEMIC CARDIOMYOPATHY: ICD-10-CM

## 2019-11-13 NOTE — TELEPHONE ENCOUNTER
----- Message from Alicia Beach sent at 11/13/2019 10:30 AM CST -----  Contact: pt  Pt is requesting to speak to you about his upcoming procedure that has been ordered by Dr. Isbell and can be reached at [phone].    Thank you

## 2019-11-13 NOTE — TELEPHONE ENCOUNTER
"Returned patient's call and addressed his questions about whether or not Dr. Coburn wanted to " do a test to check for blockages" in view of increased shortness of breath.  Spoke with Dr. Coburn and he has asked that we do a Pet Stress Test on pt. Will schedule and call pt      .   Alicia CAMERON Staff   Caller: pt (Today, 10:30 AM)             Pt is requesting to speak to you about his upcoming procedure that has been ordered by Dr. Isbell and can be reached at [phone].     Thank you        "

## 2019-11-25 ENCOUNTER — TELEPHONE (OUTPATIENT)
Dept: CARDIOLOGY | Facility: CLINIC | Age: 74
End: 2019-11-25

## 2019-11-25 ENCOUNTER — TELEPHONE (OUTPATIENT)
Dept: CARDIOLOGY | Facility: HOSPITAL | Age: 74
End: 2019-11-25

## 2019-11-25 NOTE — TELEPHONE ENCOUNTER
Patient contacted the [facility] on this am with c/o SOB with little exertion and fatigue.  Patient stated this is not normal for him.  Patient had successful DCCV on 10/31/19 and as seen on manual ICD home monitor report from this am, pt has not had any AF since prior to the DCCV.  Patient stated he spoke to Dr. Coburn's MA on 11/13/19.  He contacted Dr. Coburn's office as to he has not seen the Cardiologist since March of 2016.  As per the note in the patient's chart Dr. Coburn ordered a PET Stress test.  Patient stated he called and spoke to the doctor's MA and per the pt was told she had not scheduled the test as of that time.  Patient requested to have the PET sress test done a day prior to his already scheduled Ablation which is scheduled on 12/9/19. Informed the patient I would reach out to that office to ask that they try and schedule the test on 12/8/19.  A message will be sent to that office and to Dr. Isbell's nurse in relation to how he has been feeling.  Patient verbalized understanding to all of the above.

## 2019-11-27 ENCOUNTER — TELEPHONE (OUTPATIENT)
Dept: CARDIOLOGY | Facility: HOSPITAL | Age: 74
End: 2019-11-27

## 2019-11-27 ENCOUNTER — CLINICAL SUPPORT (OUTPATIENT)
Dept: CARDIOLOGY | Facility: CLINIC | Age: 74
End: 2019-11-27
Attending: INTERNAL MEDICINE
Payer: MEDICARE

## 2019-11-27 ENCOUNTER — TELEPHONE (OUTPATIENT)
Dept: CARDIOLOGY | Facility: CLINIC | Age: 74
End: 2019-11-27

## 2019-11-27 VITALS — BODY MASS INDEX: 32.08 KG/M2 | HEIGHT: 74 IN | WEIGHT: 250 LBS

## 2019-11-27 DIAGNOSIS — I25.10 CORONARY ARTERY DISEASE, ANGINA PRESENCE UNSPECIFIED, UNSPECIFIED VESSEL OR LESION TYPE, UNSPECIFIED WHETHER NATIVE OR TRANSPLANTED HEART: ICD-10-CM

## 2019-11-27 DIAGNOSIS — I25.5 ISCHEMIC CARDIOMYOPATHY: ICD-10-CM

## 2019-11-27 DIAGNOSIS — R06.02 SHORTNESS OF BREATH: ICD-10-CM

## 2019-11-27 LAB
CFR FLOW - ANTERIOR: 1.39
CFR FLOW - INFERIOR: 1.53
CFR FLOW - LATERAL: 1.27
CFR FLOW - MAX: 2.1
CFR FLOW - MIN: 0.84
CFR FLOW - SEPTAL: 1.31
CFR FLOW - WHOLE HEART: 1.38
CV PHARM DOSE: 60 MG
CV STRESS BASE HR: 60 BPM
DIASTOLIC BLOOD PRESSURE: 93 MMHG
END DIASTOLIC INDEX-HIGH: 170 ML/M2
END SYSTOLIC INDEX-HIGH: 70 ML/M2
NUC REST DIASTOLIC VOLUME INDEX: 149
NUC REST EJECTION FRACTION: 40
NUC REST SYSTOLIC VOLUME INDEX: 90
NUC STRESS DIASTOLIC VOLUME INDEX: 173
NUC STRESS EJECTION FRACTION: 46 %
NUC STRESS SYSTOLIC VOLUME INDEX: 94
OHS CV CPX 85 PERCENT MAX PREDICTED HEART RATE MALE: 125
OHS CV CPX MAX PREDICTED HEART RATE: 147
OHS CV CPX PATIENT IS FEMALE: 0
OHS CV CPX PATIENT IS MALE: 1
OHS CV CPX PEAK DIASTOLIC BLOOD PRESSURE: 67 MMHG
OHS CV CPX PEAK HEAR RATE: 60 BPM
OHS CV CPX PEAK RATE PRESSURE PRODUCT: 7320
OHS CV CPX PEAK SYSTOLIC BLOOD PRESSURE: 122 MMHG
OHS CV CPX PERCENT MAX PREDICTED HEART RATE ACHIEVED: 41
OHS CV CPX RATE PRESSURE PRODUCT PRESENTING: 6780
PERFUSION DEFECT 1 SIZE IN %: 25 %
REST FLOW - ANTERIOR: 0.45 CC/MIN/G
REST FLOW - INFERIOR: 0.61 CC/MIN/G
REST FLOW - LATERAL: 0.66 CC/MIN/G
REST FLOW - MAX: 0.9 CC/MIN/G
REST FLOW - MIN: 0.2 CC/MIN/G
REST FLOW - SEPTAL: 0.51 CC/MIN/G
REST FLOW - WHOLE HEART: 0.56 CC/MIN/G
RETIRED EF AND QEF - SEE NOTES: 51 %
STRESS ECHO TARGET HR: 125 BPM
STRESS FLOW - ANTERIOR: 0.63 CC/MIN/G
STRESS FLOW - INFERIOR: 0.92 CC/MIN/G
STRESS FLOW - LATERAL: 0.84 CC/MIN/G
STRESS FLOW - MAX: 1.2 CC/MIN/G
STRESS FLOW - MIN: 0.2 CC/MIN/G
STRESS FLOW - SEPTAL: 0.6 CC/MIN/G
STRESS FLOW - WHOLE HEART: 0.75 CC/MIN/G
SYSTOLIC BLOOD PRESSURE: 113 MMHG

## 2019-11-27 PROCEDURE — 93016 CV STRESS TEST SUPVJ ONLY: CPT | Mod: S$PBB,,, | Performed by: INTERNAL MEDICINE

## 2019-11-27 PROCEDURE — 99999 PR PBB SHADOW E&M-EST. PATIENT-LVL II: ICD-10-PCS | Mod: PBBFAC,,,

## 2019-11-27 PROCEDURE — 78492 CARDIAC PET SCAN STRESS (CUPID ONLY): ICD-10-PCS | Mod: 26,S$PBB,, | Performed by: INTERNAL MEDICINE

## 2019-11-27 PROCEDURE — 78492 MYOCRD IMG PET MLT RST&STRS: CPT | Mod: 26,S$PBB,, | Performed by: INTERNAL MEDICINE

## 2019-11-27 PROCEDURE — 93016 CARDIAC PET SCAN STRESS (CUPID ONLY): ICD-10-PCS | Mod: S$PBB,,, | Performed by: INTERNAL MEDICINE

## 2019-11-27 PROCEDURE — 93018 CARDIAC PET SCAN STRESS (CUPID ONLY): ICD-10-PCS | Mod: S$PBB,,, | Performed by: INTERNAL MEDICINE

## 2019-11-27 PROCEDURE — 93018 CV STRESS TEST I&R ONLY: CPT | Mod: S$PBB,,, | Performed by: INTERNAL MEDICINE

## 2019-11-27 PROCEDURE — 99212 OFFICE O/P EST SF 10 MIN: CPT | Mod: PBBFAC,25

## 2019-11-27 PROCEDURE — 99999 PR PBB SHADOW E&M-EST. PATIENT-LVL II: CPT | Mod: PBBFAC,,,

## 2019-11-27 PROCEDURE — 93017 CV STRESS TEST TRACING ONLY: CPT | Mod: PBBFAC | Performed by: INTERNAL MEDICINE

## 2019-11-27 RX ORDER — DIPYRIDAMOLE 5 MG/ML
60 INJECTION INTRAVENOUS
Status: COMPLETED | OUTPATIENT
Start: 2019-11-27 | End: 2019-11-27

## 2019-11-27 RX ADMIN — DIPYRIDAMOLE 60 MG: 5 INJECTION INTRAVENOUS at 09:11

## 2019-11-27 NOTE — TELEPHONE ENCOUNTER
Returned patient's call and informed him that Pet results are no ready yet.     Alicia CAMERON Staff   Caller: pt (Today,  4:07 PM)             Pt is awaiting results of his PET Stress from this morning and can be reached at [phone].     Thank

## 2019-11-27 NOTE — TELEPHONE ENCOUNTER
Contacted the patient on this am and informed him as per Dr. Isbell's note that the ablation may have to be cancelled should the PET Stress test show any blockages. If no blockages, shall proceed with ablation as scheduled.  Patient verbalized understanding and appreciated the return call.

## 2019-11-27 NOTE — TELEPHONE ENCOUNTER
----- Message from Jessica Dougherty RN sent at 11/25/2019  2:49 PM CST -----  Per Dr. Isbell, will review PET results prior to ablation. May be necessary to cancel ablation if PET shows blockage. If PET shows no blockage, OK to proceed with ablation  ----- Message -----  From: Camron Isbell MD  Sent: 11/25/2019   1:05 PM CST  To: Jessica Dougherty RN    Need the result of that PET. Please see if you can get the PET done well ahead of the PVI, in case we need to cancel PVI due to the PET results.    ----- Message -----  From: Jessica Dougherty RN  Sent: 11/25/2019  12:50 PM CST  To: Camron Isbell MD    Pt having SOB and fatigue. Scheduled for PVI ablation on 12/9/19. No afib since [facility] on 10/31/19. Pt scheduled for PET stress test by general cardiology. Any changes to his plan between now and the ablation?       ----- Message -----  From: Hung Pereyra  Sent: 11/25/2019  12:05 PM CST  To: Jessica Dougherty RN, Svitlana Valdez RN    Good afternoon Ladies,    The above patient called on this am with c/o SOB and fatigue that is not normal for him.  He is scheduled on 12/9/19 for a PVI Ablation with Dr. Isbell.  I had him send a manual transmission this am and he has not had any AF since before the [facility] on 10/31/19 and all is WNL.      Of note, he is re-establishing care with Dr. Coburn (last CV 3/2016) with a PET Stress scheduled on 11/27/19.      He stated he wanted to let Dr. Isbell know so he can find out if he needs to do anything else prior to his procedure. I did put a telephone message in his chart with the above information.     Thanks,  Hung

## 2019-11-27 NOTE — TELEPHONE ENCOUNTER
----- Message from Alicia Beach sent at 11/27/2019  4:07 PM CST -----  Contact: pt  Pt is awaiting results of his PET Stress from this morning and can be reached at [phone].    Thank you

## 2019-12-04 ENCOUNTER — CLINICAL SUPPORT (OUTPATIENT)
Dept: CARDIOLOGY | Facility: HOSPITAL | Age: 74
End: 2019-12-04
Payer: MEDICARE

## 2019-12-04 DIAGNOSIS — Z95.810 AICD (AUTOMATIC CARDIOVERTER/DEFIBRILLATOR) PRESENT: ICD-10-CM

## 2019-12-04 DIAGNOSIS — I25.5 ISCHEMIC CARDIOMYOPATHY: ICD-10-CM

## 2019-12-04 PROCEDURE — 93295 DEV INTERROG REMOTE 1/2/MLT: CPT | Mod: ,,, | Performed by: INTERNAL MEDICINE

## 2019-12-04 PROCEDURE — 93296 REM INTERROG EVL PM/IDS: CPT | Performed by: INTERNAL MEDICINE

## 2019-12-04 PROCEDURE — 93295 CARDIAC DEVICE CHECK - REMOTE: ICD-10-PCS | Mod: ,,, | Performed by: INTERNAL MEDICINE

## 2019-12-05 ENCOUNTER — TELEPHONE (OUTPATIENT)
Dept: ELECTROPHYSIOLOGY | Facility: CLINIC | Age: 74
End: 2019-12-05

## 2019-12-05 NOTE — TELEPHONE ENCOUNTER
Spoke to Amish Johnston    CONFIRMED procedure arrival time of 530am on 12/9  Reiterated instructions including:  -Directions to check in desk  -NPO after midnight night prior to procedure  -High importance of HOLDING Amiodarone   -Confirmed compliance of coumadin  -Pre-procedure LABS 12/2. Labs receivd. Creatinine elevated but appear to be pt's baseline. Hx of CKD I     Pt verbalizes understanding of above and appreciates call.

## 2019-12-06 ENCOUNTER — TELEPHONE (OUTPATIENT)
Dept: ELECTROPHYSIOLOGY | Facility: CLINIC | Age: 74
End: 2019-12-06

## 2019-12-06 DIAGNOSIS — I47.20 VT (VENTRICULAR TACHYCARDIA): ICD-10-CM

## 2019-12-06 DIAGNOSIS — N18.1 CKD (CHRONIC KIDNEY DISEASE) STAGE 1, GFR 90 ML/MIN OR GREATER: ICD-10-CM

## 2019-12-06 DIAGNOSIS — I24.0 CORONARY OCCLUSION WITHOUT MYOCARDIAL INFARCTION: ICD-10-CM

## 2019-12-06 DIAGNOSIS — I25.5 ISCHEMIC DILATED CARDIOMYOPATHY: ICD-10-CM

## 2019-12-06 DIAGNOSIS — I10 HTN (HYPERTENSION), BENIGN: ICD-10-CM

## 2019-12-06 DIAGNOSIS — I25.2 OLD MYOCARDIAL INFARCTION: ICD-10-CM

## 2019-12-06 DIAGNOSIS — I42.0 ISCHEMIC DILATED CARDIOMYOPATHY: ICD-10-CM

## 2019-12-06 DIAGNOSIS — Z79.01 ANTICOAGULATED ON COUMADIN: ICD-10-CM

## 2019-12-06 DIAGNOSIS — I25.10 CORONARY ARTERY DISEASE INVOLVING NATIVE CORONARY ARTERY WITHOUT ANGINA PECTORIS, UNSPECIFIED WHETHER NATIVE OR TRANSPLANTED HEART: ICD-10-CM

## 2019-12-06 DIAGNOSIS — I47.20 VENTRICULAR TACHYARRHYTHMIA: ICD-10-CM

## 2019-12-06 RX ORDER — LISINOPRIL 20 MG/1
TABLET ORAL
Qty: 90 TABLET | Refills: 3 | Status: SHIPPED | OUTPATIENT
Start: 2019-12-06 | End: 2020-02-28

## 2019-12-09 ENCOUNTER — ANESTHESIA (OUTPATIENT)
Dept: MEDSURG UNIT | Facility: HOSPITAL | Age: 74
End: 2019-12-09
Payer: MEDICARE

## 2019-12-09 ENCOUNTER — HOSPITAL ENCOUNTER (OUTPATIENT)
Dept: CARDIOLOGY | Facility: CLINIC | Age: 74
Discharge: HOME | End: 2019-12-09
Attending: INTERNAL MEDICINE | Admitting: INTERNAL MEDICINE
Payer: MEDICARE

## 2019-12-09 ENCOUNTER — HOSPITAL ENCOUNTER (OUTPATIENT)
Facility: HOSPITAL | Age: 74
Discharge: HOME | End: 2019-12-10
Attending: INTERNAL MEDICINE | Admitting: INTERNAL MEDICINE
Payer: MEDICARE

## 2019-12-09 ENCOUNTER — ANESTHESIA EVENT (OUTPATIENT)
Dept: MEDSURG UNIT | Facility: HOSPITAL | Age: 74
End: 2019-12-09
Payer: MEDICARE

## 2019-12-09 ENCOUNTER — TELEPHONE (OUTPATIENT)
Dept: ELECTROPHYSIOLOGY | Facility: CLINIC | Age: 74
End: 2019-12-09

## 2019-12-09 DIAGNOSIS — I48.91 ATRIAL FIBRILLATION: ICD-10-CM

## 2019-12-09 DIAGNOSIS — I48.19 PERSISTENT ATRIAL FIBRILLATION: Primary | ICD-10-CM

## 2019-12-09 DIAGNOSIS — I42.8 OTHER CARDIOMYOPATHIES: ICD-10-CM

## 2019-12-09 DIAGNOSIS — I25.5 ISCHEMIC CARDIOMYOPATHY: ICD-10-CM

## 2019-12-09 DIAGNOSIS — I48.19 PERSISTENT ATRIAL FIBRILLATION: ICD-10-CM

## 2019-12-09 LAB
ABO + RH BLD: NORMAL
APTT BLDCRRT: 30.2 SEC (ref 21–32)
ASCENDING AORTA: 4 CM
BLD GP AB SCN CELLS X3 SERPL QL: NORMAL
BSA FOR ECHO PROCEDURE: 2.38 M2
INR PPP: 1.8 (ref 0.8–1.2)
POC ACTIVATED CLOTTING TIME K: 246 SEC (ref 74–137)
POC ACTIVATED CLOTTING TIME K: 257 SEC (ref 74–137)
POC ACTIVATED CLOTTING TIME K: 296 SEC (ref 74–137)
POC ACTIVATED CLOTTING TIME K: 301 SEC (ref 74–137)
POC ACTIVATED CLOTTING TIME K: 345 SEC (ref 74–137)
POC ACTIVATED CLOTTING TIME K: 367 SEC (ref 74–137)
POC ACTIVATED CLOTTING TIME K: 384 SEC (ref 74–137)
POC ACTIVATED CLOTTING TIME K: 411 SEC (ref 74–137)
POC ACTIVATED CLOTTING TIME K: 434 SEC (ref 74–137)
PROTHROMBIN TIME: 17.9 SEC (ref 9–12.5)
SAMPLE: ABNORMAL

## 2019-12-09 PROCEDURE — 93010 EKG 12-LEAD: ICD-10-PCS | Mod: ,,, | Performed by: INTERNAL MEDICINE

## 2019-12-09 PROCEDURE — 93320 DOPPLER ECHO COMPLETE: CPT | Mod: 26,,, | Performed by: INTERNAL MEDICINE

## 2019-12-09 PROCEDURE — 93656 COMPRE EP EVAL ABLTJ ATR FIB: CPT | Performed by: INTERNAL MEDICINE

## 2019-12-09 PROCEDURE — 93613 PR INTRACARD ELECTROPHYS 3-DIMENS MAPPING: ICD-10-PCS | Mod: ,,, | Performed by: INTERNAL MEDICINE

## 2019-12-09 PROCEDURE — 99220 PR INITIAL OBSERVATION CARE,LEVL III: CPT | Mod: 25,,, | Performed by: INTERNAL MEDICINE

## 2019-12-09 PROCEDURE — 99220 PR INITIAL OBSERVATION CARE,LEVL III: ICD-10-PCS | Mod: 25,,, | Performed by: INTERNAL MEDICINE

## 2019-12-09 PROCEDURE — C1751 CATH, INF, PER/CENT/MIDLINE: HCPCS | Performed by: NURSE ANESTHETIST, CERTIFIED REGISTERED

## 2019-12-09 PROCEDURE — C1894 INTRO/SHEATH, NON-LASER: HCPCS | Performed by: INTERNAL MEDICINE

## 2019-12-09 PROCEDURE — 93320 DOPPLER ECHO COMPLETE: CPT

## 2019-12-09 PROCEDURE — 93613 INTRACARDIAC EPHYS 3D MAPG: CPT | Performed by: INTERNAL MEDICINE

## 2019-12-09 PROCEDURE — 93005 ELECTROCARDIOGRAM TRACING: CPT | Mod: 59

## 2019-12-09 PROCEDURE — 93312 ECHO TRANSESOPHAGEAL: CPT | Mod: 26,,, | Performed by: INTERNAL MEDICINE

## 2019-12-09 PROCEDURE — C1733 CATH, EP, OTHR THAN COOL-TIP: HCPCS | Performed by: INTERNAL MEDICINE

## 2019-12-09 PROCEDURE — D9220A PRA ANESTHESIA: ICD-10-PCS | Mod: ANES,,, | Performed by: ANESTHESIOLOGY

## 2019-12-09 PROCEDURE — 93656 PR ELECTROPHYS EVAL, COMPREHEN, W/ABLATION OF ATRIAL FIBR: ICD-10-PCS | Mod: ,,, | Performed by: INTERNAL MEDICINE

## 2019-12-09 PROCEDURE — C1730 CATH, EP, 19 OR FEW ELECT: HCPCS | Performed by: INTERNAL MEDICINE

## 2019-12-09 PROCEDURE — 86901 BLOOD TYPING SEROLOGIC RH(D): CPT

## 2019-12-09 PROCEDURE — 93662 INTRACARDIAC ECG (ICE): CPT | Mod: 26,,, | Performed by: INTERNAL MEDICINE

## 2019-12-09 PROCEDURE — 85730 THROMBOPLASTIN TIME PARTIAL: CPT

## 2019-12-09 PROCEDURE — 93325 DOPPLER ECHO COLOR FLOW MAPG: CPT | Mod: 26,,, | Performed by: INTERNAL MEDICINE

## 2019-12-09 PROCEDURE — 37000009 HC ANESTHESIA EA ADD 15 MINS: Performed by: INTERNAL MEDICINE

## 2019-12-09 PROCEDURE — 93662 PR INTRACARD ECHO, THER/DX INTERVENT: ICD-10-PCS | Mod: 26,,, | Performed by: INTERNAL MEDICINE

## 2019-12-09 PROCEDURE — 25000003 PHARM REV CODE 250: Performed by: INTERNAL MEDICINE

## 2019-12-09 PROCEDURE — 27201423 OPTIME MED/SURG SUP & DEVICES STERILE SUPPLY: Performed by: INTERNAL MEDICINE

## 2019-12-09 PROCEDURE — 63600175 PHARM REV CODE 636 W HCPCS: Performed by: NURSE PRACTITIONER

## 2019-12-09 PROCEDURE — 37000008 HC ANESTHESIA 1ST 15 MINUTES: Performed by: INTERNAL MEDICINE

## 2019-12-09 PROCEDURE — 93325 TRANSESOPHAGEAL ECHO (TEE) (CUPID ONLY): ICD-10-PCS | Mod: 26,,, | Performed by: INTERNAL MEDICINE

## 2019-12-09 PROCEDURE — 36620 INSERTION CATHETER ARTERY: CPT | Mod: 59,,, | Performed by: ANESTHESIOLOGY

## 2019-12-09 PROCEDURE — 93320 TRANSESOPHAGEAL ECHO (TEE) (CUPID ONLY): ICD-10-PCS | Mod: 26,,, | Performed by: INTERNAL MEDICINE

## 2019-12-09 PROCEDURE — 93010 ELECTROCARDIOGRAM REPORT: CPT | Mod: ,,, | Performed by: INTERNAL MEDICINE

## 2019-12-09 PROCEDURE — C1769 GUIDE WIRE: HCPCS | Performed by: INTERNAL MEDICINE

## 2019-12-09 PROCEDURE — 93613 INTRACARDIAC EPHYS 3D MAPG: CPT | Mod: ,,, | Performed by: INTERNAL MEDICINE

## 2019-12-09 PROCEDURE — D9220A PRA ANESTHESIA: ICD-10-PCS | Mod: CRNA,,, | Performed by: NURSE ANESTHETIST, CERTIFIED REGISTERED

## 2019-12-09 PROCEDURE — 36620 PR INSERT CATH,ART,PERCUT,SHORTTERM: ICD-10-PCS | Mod: 59,,, | Performed by: ANESTHESIOLOGY

## 2019-12-09 PROCEDURE — 25000003 PHARM REV CODE 250: Performed by: NURSE ANESTHETIST, CERTIFIED REGISTERED

## 2019-12-09 PROCEDURE — 27200677 HC TRANSDUCER MONITOR KIT SINGLE: Performed by: NURSE ANESTHETIST, CERTIFIED REGISTERED

## 2019-12-09 PROCEDURE — 25000003 PHARM REV CODE 250: Performed by: NURSE PRACTITIONER

## 2019-12-09 PROCEDURE — D9220A PRA ANESTHESIA: Mod: CRNA,,, | Performed by: NURSE ANESTHETIST, CERTIFIED REGISTERED

## 2019-12-09 PROCEDURE — 93312 TRANSESOPHAGEAL ECHO (TEE) (CUPID ONLY): ICD-10-PCS | Mod: 26,,, | Performed by: INTERNAL MEDICINE

## 2019-12-09 PROCEDURE — D9220A PRA ANESTHESIA: Mod: ANES,,, | Performed by: ANESTHESIOLOGY

## 2019-12-09 PROCEDURE — C1753 CATH, INTRAVAS ULTRASOUND: HCPCS | Performed by: INTERNAL MEDICINE

## 2019-12-09 PROCEDURE — 63600175 PHARM REV CODE 636 W HCPCS: Performed by: NURSE ANESTHETIST, CERTIFIED REGISTERED

## 2019-12-09 PROCEDURE — 93662 INTRACARDIAC ECG (ICE): CPT | Performed by: INTERNAL MEDICINE

## 2019-12-09 PROCEDURE — 85610 PROTHROMBIN TIME: CPT

## 2019-12-09 PROCEDURE — C1766 INTRO/SHEATH,STRBLE,NON-PEEL: HCPCS | Performed by: INTERNAL MEDICINE

## 2019-12-09 PROCEDURE — 93656 COMPRE EP EVAL ABLTJ ATR FIB: CPT | Mod: ,,, | Performed by: INTERNAL MEDICINE

## 2019-12-09 RX ORDER — WARFARIN SODIUM 5 MG/1
5 TABLET ORAL
COMMUNITY
End: 2020-02-28

## 2019-12-09 RX ORDER — LISINOPRIL 20 MG/1
20 TABLET ORAL DAILY
Status: DISCONTINUED | OUTPATIENT
Start: 2019-12-09 | End: 2019-12-10 | Stop reason: HOSPADM

## 2019-12-09 RX ORDER — HEPARIN SODIUM 1000 [USP'U]/ML
INJECTION, SOLUTION INTRAVENOUS; SUBCUTANEOUS
Status: DISCONTINUED | OUTPATIENT
Start: 2019-12-09 | End: 2019-12-09

## 2019-12-09 RX ORDER — WARFARIN SODIUM 5 MG/1
5 TABLET ORAL ONCE
Status: COMPLETED | OUTPATIENT
Start: 2019-12-09 | End: 2019-12-09

## 2019-12-09 RX ORDER — LIDOCAINE HYDROCHLORIDE 20 MG/ML
INJECTION, SOLUTION INFILTRATION; PERINEURAL
Status: DISCONTINUED | OUTPATIENT
Start: 2019-12-09 | End: 2019-12-09

## 2019-12-09 RX ORDER — CARVEDILOL 12.5 MG/1
12.5 TABLET ORAL 2 TIMES DAILY WITH MEALS
Status: DISCONTINUED | OUTPATIENT
Start: 2019-12-09 | End: 2019-12-10 | Stop reason: HOSPADM

## 2019-12-09 RX ORDER — KETAMINE HCL IN 0.9 % NACL 50 MG/5 ML
SYRINGE (ML) INTRAVENOUS
Status: DISCONTINUED | OUTPATIENT
Start: 2019-12-09 | End: 2019-12-09

## 2019-12-09 RX ORDER — ROCURONIUM BROMIDE 10 MG/ML
INJECTION, SOLUTION INTRAVENOUS
Status: DISCONTINUED | OUTPATIENT
Start: 2019-12-09 | End: 2019-12-09

## 2019-12-09 RX ORDER — NOREPINEPHRINE BITARTRATE/D5W 4MG/250ML
0.02 PLASTIC BAG, INJECTION (ML) INTRAVENOUS CONTINUOUS
Status: DISCONTINUED | OUTPATIENT
Start: 2019-12-09 | End: 2019-12-09

## 2019-12-09 RX ORDER — SODIUM CHLORIDE 0.9 % (FLUSH) 0.9 %
5 SYRINGE (ML) INJECTION
Status: ACTIVE | OUTPATIENT
Start: 2019-12-09

## 2019-12-09 RX ORDER — ONDANSETRON HYDROCHLORIDE 2 MG/ML
INJECTION, SOLUTION INTRAMUSCULAR; INTRAVENOUS
Status: DISCONTINUED | OUTPATIENT
Start: 2019-12-09 | End: 2019-12-09

## 2019-12-09 RX ORDER — PROTAMINE SULFATE 10 MG/ML
INJECTION, SOLUTION INTRAVENOUS
Status: DISCONTINUED | OUTPATIENT
Start: 2019-12-09 | End: 2019-12-09

## 2019-12-09 RX ORDER — ETOMIDATE 2 MG/ML
INJECTION INTRAVENOUS
Status: DISCONTINUED | OUTPATIENT
Start: 2019-12-09 | End: 2019-12-09

## 2019-12-09 RX ORDER — LIDOCAINE HCL/PF 100 MG/5ML
SYRINGE (ML) INTRAVENOUS
Status: DISCONTINUED | OUTPATIENT
Start: 2019-12-09 | End: 2019-12-09

## 2019-12-09 RX ORDER — HEPARIN SODIUM 1000 [USP'U]/ML
INJECTION, SOLUTION INTRAVENOUS; SUBCUTANEOUS CONTINUOUS PRN
Status: DISCONTINUED | OUTPATIENT
Start: 2019-12-09 | End: 2019-12-09

## 2019-12-09 RX ORDER — WARFARIN SODIUM 5 MG/1
5 TABLET ORAL DAILY
COMMUNITY
End: 2020-02-28

## 2019-12-09 RX ORDER — SUCCINYLCHOLINE CHLORIDE 20 MG/ML
INJECTION INTRAMUSCULAR; INTRAVENOUS
Status: DISCONTINUED | OUTPATIENT
Start: 2019-12-09 | End: 2019-12-09

## 2019-12-09 RX ORDER — SODIUM CHLORIDE 0.9 % (FLUSH) 0.9 %
10 SYRINGE (ML) INJECTION
Status: DISCONTINUED | OUTPATIENT
Start: 2019-12-09 | End: 2019-12-09

## 2019-12-09 RX ORDER — ENOXAPARIN SODIUM 150 MG/ML
1 INJECTION SUBCUTANEOUS
Status: DISCONTINUED | OUTPATIENT
Start: 2019-12-09 | End: 2019-12-10 | Stop reason: HOSPADM

## 2019-12-09 RX ORDER — ACETAMINOPHEN 325 MG/1
650 TABLET ORAL EVERY 6 HOURS PRN
Status: DISCONTINUED | OUTPATIENT
Start: 2019-12-09 | End: 2019-12-10 | Stop reason: HOSPADM

## 2019-12-09 RX ORDER — FENTANYL CITRATE 50 UG/ML
25 INJECTION, SOLUTION INTRAMUSCULAR; INTRAVENOUS EVERY 5 MIN PRN
Status: DISCONTINUED | OUTPATIENT
Start: 2019-12-09 | End: 2019-12-09

## 2019-12-09 RX ORDER — AMIODARONE HYDROCHLORIDE 200 MG/1
400 TABLET ORAL 2 TIMES DAILY
Status: DISCONTINUED | OUTPATIENT
Start: 2019-12-09 | End: 2019-12-10 | Stop reason: HOSPADM

## 2019-12-09 RX ORDER — CEFAZOLIN SODIUM 1 G/3ML
2 INJECTION, POWDER, FOR SOLUTION INTRAMUSCULAR; INTRAVENOUS
Status: COMPLETED | OUTPATIENT
Start: 2019-12-09 | End: 2019-12-09

## 2019-12-09 RX ORDER — EPINEPHRINE 0.1 MG/ML
INJECTION INTRAVENOUS
Status: DISCONTINUED | OUTPATIENT
Start: 2019-12-09 | End: 2019-12-09

## 2019-12-09 RX ORDER — PROPOFOL 10 MG/ML
VIAL (ML) INTRAVENOUS
Status: DISCONTINUED | OUTPATIENT
Start: 2019-12-09 | End: 2019-12-09

## 2019-12-09 RX ORDER — MIDAZOLAM HYDROCHLORIDE 1 MG/ML
INJECTION INTRAMUSCULAR; INTRAVENOUS
Status: DISCONTINUED | OUTPATIENT
Start: 2019-12-09 | End: 2019-12-09

## 2019-12-09 RX ORDER — LEVOTHYROXINE SODIUM 75 UG/1
75 TABLET ORAL
Status: DISCONTINUED | OUTPATIENT
Start: 2019-12-10 | End: 2019-12-10 | Stop reason: HOSPADM

## 2019-12-09 RX ORDER — FENTANYL CITRATE 50 UG/ML
INJECTION, SOLUTION INTRAMUSCULAR; INTRAVENOUS
Status: DISCONTINUED | OUTPATIENT
Start: 2019-12-09 | End: 2019-12-09

## 2019-12-09 RX ORDER — SODIUM CHLORIDE 9 MG/ML
INJECTION, SOLUTION INTRAVENOUS CONTINUOUS
Status: DISCONTINUED | OUTPATIENT
Start: 2019-12-09 | End: 2019-12-09

## 2019-12-09 RX ORDER — PANTOPRAZOLE SODIUM 40 MG/1
40 TABLET, DELAYED RELEASE ORAL DAILY
Status: DISCONTINUED | OUTPATIENT
Start: 2019-12-09 | End: 2019-12-10 | Stop reason: HOSPADM

## 2019-12-09 RX ORDER — AMLODIPINE BESYLATE 5 MG/1
5 TABLET ORAL DAILY
Status: DISCONTINUED | OUTPATIENT
Start: 2019-12-10 | End: 2019-12-10 | Stop reason: HOSPADM

## 2019-12-09 RX ORDER — ATORVASTATIN CALCIUM 10 MG/1
10 TABLET, FILM COATED ORAL DAILY
Status: DISCONTINUED | OUTPATIENT
Start: 2019-12-10 | End: 2019-12-10 | Stop reason: HOSPADM

## 2019-12-09 RX ORDER — ASPIRIN 81 MG/1
81 TABLET ORAL DAILY
Status: DISCONTINUED | OUTPATIENT
Start: 2019-12-10 | End: 2019-12-10 | Stop reason: HOSPADM

## 2019-12-09 RX ADMIN — PROPOFOL 30 MG: 10 INJECTION, EMULSION INTRAVENOUS at 11:12

## 2019-12-09 RX ADMIN — HEPARIN SODIUM 3000 UNITS: 1000 INJECTION INTRAVENOUS; SUBCUTANEOUS at 12:12

## 2019-12-09 RX ADMIN — PROTAMINE SULFATE 20 MG: 10 INJECTION, SOLUTION INTRAVENOUS at 03:12

## 2019-12-09 RX ADMIN — MIDAZOLAM HYDROCHLORIDE 0.5 MG: 1 INJECTION, SOLUTION INTRAMUSCULAR; INTRAVENOUS at 10:12

## 2019-12-09 RX ADMIN — LIDOCAINE HYDROCHLORIDE 100 MG: 20 INJECTION, SOLUTION INTRAVENOUS at 10:12

## 2019-12-09 RX ADMIN — EPINEPHRINE 0.02 MCG/KG/MIN: 1 INJECTION INTRAMUSCULAR; INTRAVENOUS; SUBCUTANEOUS at 11:12

## 2019-12-09 RX ADMIN — SODIUM CHLORIDE: 0.9 INJECTION, SOLUTION INTRAVENOUS at 10:12

## 2019-12-09 RX ADMIN — MIDAZOLAM HYDROCHLORIDE 0.5 MG: 1 INJECTION, SOLUTION INTRAMUSCULAR; INTRAVENOUS at 11:12

## 2019-12-09 RX ADMIN — SUGAMMADEX 200 MG: 100 INJECTION, SOLUTION INTRAVENOUS at 03:12

## 2019-12-09 RX ADMIN — CARVEDILOL 12.5 MG: 12.5 TABLET, FILM COATED ORAL at 08:12

## 2019-12-09 RX ADMIN — PROTAMINE SULFATE 10 MG: 10 INJECTION, SOLUTION INTRAVENOUS at 03:12

## 2019-12-09 RX ADMIN — FENTANYL CITRATE 25 MCG: 50 INJECTION, SOLUTION INTRAMUSCULAR; INTRAVENOUS at 03:12

## 2019-12-09 RX ADMIN — EPINEPHRINE 10 MCG: 0.1 INJECTION, SOLUTION ENDOTRACHEAL; INTRACARDIAC; INTRAVENOUS at 11:12

## 2019-12-09 RX ADMIN — HEPARIN SODIUM 2000 UNITS: 1000 INJECTION INTRAVENOUS; SUBCUTANEOUS at 12:12

## 2019-12-09 RX ADMIN — LISINOPRIL 20 MG: 20 TABLET ORAL at 08:12

## 2019-12-09 RX ADMIN — SUCCINYLCHOLINE CHLORIDE 120 MG: 20 INJECTION, SOLUTION INTRAMUSCULAR; INTRAVENOUS at 10:12

## 2019-12-09 RX ADMIN — PANTOPRAZOLE SODIUM 40 MG: 40 TABLET, DELAYED RELEASE ORAL at 08:12

## 2019-12-09 RX ADMIN — PROPOFOL 90 MG: 10 INJECTION, EMULSION INTRAVENOUS at 10:12

## 2019-12-09 RX ADMIN — HEPARIN SODIUM 2000 UNITS: 1000 INJECTION INTRAVENOUS; SUBCUTANEOUS at 01:12

## 2019-12-09 RX ADMIN — HEPARIN SODIUM 3000 UNITS/HR: 1000 INJECTION INTRAVENOUS; SUBCUTANEOUS at 12:12

## 2019-12-09 RX ADMIN — EPINEPHRINE 5 MCG: 0.1 INJECTION, SOLUTION ENDOTRACHEAL; INTRACARDIAC; INTRAVENOUS at 11:12

## 2019-12-09 RX ADMIN — EPINEPHRINE 5 MCG: 0.1 INJECTION, SOLUTION ENDOTRACHEAL; INTRACARDIAC; INTRAVENOUS at 12:12

## 2019-12-09 RX ADMIN — ONDANSETRON 4 MG: 2 INJECTION, SOLUTION INTRAMUSCULAR; INTRAVENOUS at 03:12

## 2019-12-09 RX ADMIN — SODIUM CHLORIDE, SODIUM GLUCONATE, SODIUM ACETATE, POTASSIUM CHLORIDE, MAGNESIUM CHLORIDE, SODIUM PHOSPHATE, DIBASIC, AND POTASSIUM PHOSPHATE: .53; .5; .37; .037; .03; .012; .00082 INJECTION, SOLUTION INTRAVENOUS at 10:12

## 2019-12-09 RX ADMIN — CEFAZOLIN 2 G: 330 INJECTION, POWDER, FOR SOLUTION INTRAMUSCULAR; INTRAVENOUS at 11:12

## 2019-12-09 RX ADMIN — MIDAZOLAM HYDROCHLORIDE 1 MG: 1 INJECTION, SOLUTION INTRAMUSCULAR; INTRAVENOUS at 10:12

## 2019-12-09 RX ADMIN — FENTANYL CITRATE 25 MCG: 50 INJECTION, SOLUTION INTRAMUSCULAR; INTRAVENOUS at 02:12

## 2019-12-09 RX ADMIN — ACETAMINOPHEN 650 MG: 325 TABLET ORAL at 05:12

## 2019-12-09 RX ADMIN — NOREPINEPHRINE BITARTRATE 0.02 MCG/KG/MIN: 1 INJECTION, SOLUTION, CONCENTRATE INTRAVENOUS at 12:12

## 2019-12-09 RX ADMIN — WARFARIN SODIUM 5 MG: 5 TABLET ORAL at 10:12

## 2019-12-09 RX ADMIN — ETOMIDATE 6 MG: 2 INJECTION, SOLUTION INTRAVENOUS at 02:12

## 2019-12-09 RX ADMIN — ROCURONIUM BROMIDE 5 MG: 10 INJECTION, SOLUTION INTRAVENOUS at 10:12

## 2019-12-09 RX ADMIN — ETOMIDATE 4 MG: 2 INJECTION, SOLUTION INTRAVENOUS at 02:12

## 2019-12-09 RX ADMIN — AMIODARONE HYDROCHLORIDE 400 MG: 200 TABLET ORAL at 08:12

## 2019-12-09 RX ADMIN — FENTANYL CITRATE 100 MCG: 50 INJECTION, SOLUTION INTRAMUSCULAR; INTRAVENOUS at 10:12

## 2019-12-09 RX ADMIN — CEFAZOLIN 2 G: 330 INJECTION, POWDER, FOR SOLUTION INTRAMUSCULAR; INTRAVENOUS at 03:12

## 2019-12-09 RX ADMIN — ROCURONIUM BROMIDE 15 MG: 10 INJECTION, SOLUTION INTRAVENOUS at 11:12

## 2019-12-09 RX ADMIN — ENOXAPARIN SODIUM 105 MG: 150 INJECTION SUBCUTANEOUS at 10:12

## 2019-12-09 RX ADMIN — HEPARIN SODIUM 12000 UNITS: 1000 INJECTION INTRAVENOUS; SUBCUTANEOUS at 12:12

## 2019-12-09 RX ADMIN — Medication 50 MG: at 10:12

## 2019-12-09 NOTE — NURSING TRANSFER
Nursing Transfer Note      12/9/2019     Transfer To: ep pacu 3 to pacu 14    Transfer via stretcher    Transfer with cardiac monitoring, portable pacu monitor transport, 2l nc portable oxygen tank    Transported by marce cisneros pacu rn and jada pacu pct    Medicines sent: none. Levophed drip off    Chart send with patient: Yes    Notified: spouse    Patient reassessed at: 12/9/19 1700, next due 1715    Upon arrival to floor: cardiac monitor applied, patient oriented to room, call bell in reach and bed in lowest position, connected to wall oxygen 2l nc

## 2019-12-09 NOTE — PROGRESS NOTES
Levophed drip infusing from ep lab procedure, weaned to off at 1649.  Pt awake and aware.  miller groin sutures placed at 1600 left and right x1 each groin. No hematoma or drainage noted. Palpable pulses noted. Removal time at 2000, bedrest complete at 2200. Fellow ep to be called if any questions after 5pm.  Pt's wife re updated that pt transferring to 2nd floor recovery.  Pt's wife to go to 2nd floor waiting room.  pacu rn marce fuchs Aware that wife in 2nd floor waiting room.

## 2019-12-09 NOTE — HPI
73 year old male with history of CAD, HTN, CKD, Rv thrombus on coumadin, VT s/p ICD and persistent atrial fibrillation who presents for NOE prior to his PVI.     Dysphagia or odynophagia:  No  Liver Disease, esophageal disease, or known varices:  No  Upper GI Bleeding: No  Snoring:  No  Sleep Apnea:  No  Prior neck surgery or radiation:  No  History of anesthetic difficulties:  No  Family history of anesthetic difficulties:  No  Last oral intake:  12 hours ago  Able to move neck in all directions:  Yes    NOE (10/31/19):   Severely decreased left ventricular systolic function. The EF low to mid 20s with large ines-pical septal RWMAs.  · Severe global hypokinetic wall motion.  · Normal right ventricular systolic function.  · Mild mitral regurgitation.  · Grade 2 plaque present.  · Normal appearing left atrial appendage. No thrombus is present in the appendage. Normal appendage velocities.    TTE (06/06/2018):     1 - Severely depressed LVEF 25-30%     2 - Impaired LV relaxation, normal LAP (grade 1 diastolic dysfunction).     3 - Mild mitral regurgitation.     4 - Mild tricuspid regurgitation.    5 - The estimated PA systolic pressure is 24 mmHg.

## 2019-12-09 NOTE — ASSESSMENT & PLAN NOTE
1. NOE for evaluation of PRISCA  -No absolute contraindications of esophageal stricture, tumor, perforation, laceration,or diverticulum and/or active GI bleed  -The risks, benefits & alternatives of the procedure were explained to the patient.   -The risks of transesophageal echo include but are not limited to:  Dental trauma, esophageal trauma/perforation, bleeding, laryngospasm/brochospasm, aspiration, sore throat/hoarseness, & dislodgement of the endotracheal tube/nasogastric tube (where applicable).    -The risks of moderate sedation include hypotension, respiratory depression, arrhythmias, bronchospasm, & death.    -Informed consent was obtained. The patient is agreeable to proceed with the procedure and all questions and concerns addressed.    Case discussed with an attending in echocardiography lab.     Further recommendations per attending addendum

## 2019-12-09 NOTE — Clinical Note
34.5 ml injected throughout the case. 65.5 mL total wasted during the case. 100 mL total used in the case.

## 2019-12-09 NOTE — TRANSFER OF CARE
"Anesthesia Transfer of Care Note    Patient: Amish Grossman    Procedure(s) Performed: Procedure(s) (LRB):  ABLATION, ARRHYTHMOGENIC FOCUS, FOR ATRIAL FIBRILLATION (N/A)  ECHOCARDIOGRAM, TRANSESOPHAGEAL (N/A)    Patient location: PACU    Anesthesia Type: general    Transport from OR: Transported from OR on 6-10 L/min O2 by face mask with adequate spontaneous ventilation    Post pain: adequate analgesia    Post assessment: no apparent anesthetic complications and tolerated procedure well    Post vital signs: stable    Level of consciousness: awake    Nausea/Vomiting: no nausea/vomiting    Complications: none    Transfer of care protocol was followed      Last vitals: 12/09/19 1614  Visit Vitals  /70   Pulse 69   Temp 96.6   Resp 12   Ht 6' 2" (1.88 m)   Wt 108.9 kg (240 lb)   SpO2 98%   BMI 30.81 kg/m²     "

## 2019-12-09 NOTE — HPI
"Atrial Fibrillation   Symptoms include palpitations. Symptoms are negative for chest pain, dizziness, shortness of breath, syncope and weakness. Past medical history includes atrial fibrillation.      73 y.o. 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  No CP. Feeling better since "R" added at prior visit. Leg swelling abated.     We did NOE/DCCV on 3/16 in hopes of starting tikosyn, but QTc was too long for that. Instead we started amiodarone. Pt says he felt better for a week, then felt down again. However he thinks that may have been multifactorial and isn't completely sure. Says he feels better now than pre-DCCV, and he's back in AF. ICD shows that NSR lasted 10 days. We repeated DCCV on 4/15. He's maintained NSR since then. Feels very well.  Hx of A noise. P waves ~4. Device shows few AMS episodes, <10 s (no egrams).     We did gen change 6/7/18. This was c/b VT intraop, requiring external rescue to NSR. Also, a breach in the RV lead's insulation was repaired at that time.     He's felt unwell x weeks. ICD shows AF recurred on 10/1/19 and continues. Much lower exertion tolerance. Frequent V pacing.     My interpretation of today's ECG is AF with V pacing.    Recurrent symptomatic AF, despite amio.  d/c amio today  NOE/DCCV next week.     Informed consent was obtained, we discussed the risks and benefits of  the procedure (pulmonary vein isolation) which included (but was not limited to) the possibility of bleeding or injury to the vessels involved, embolism, cardiac perforation, cardiac tamponade requiring emergent drainage with a pericardial drain or surgery, esophageal injury or fistula formation, phrenic nerve injury, pulmonary vein stenosis, injury to the native " conduction system of the heart requiring a PPM, renal injury if contrast used, MI, stroke, and death. We also reviewed indications, and alternatives of the planned procedure. All questions were answered. Patient wishes to proceed  I discussed with patient risks, indications, benefits, and alternatives of the planned procedure. All questions were answered. Patient understands and wishes to proceed.  CT before PVI  NOE before PVI, even if in NSR, given hx of PRISCA sludge/clot.  Aim for cryo PVI, anatomy permitting.

## 2019-12-09 NOTE — TREATMENT PLAN
Discussed anticoagulation dosing with Dr. Isbell and Vonnie HARRIS RP. Dr. Isbell would like patient to take coumadin 5 mg PO tonight 6 hours after hemostasis achieved, as well as lovenox injection 100 mg/kg every 12 hours. First lovenox injection also to be given tonight 6 hours after hemostasis achieved. Goal for patient's INR 2.0-3.0. Will recheck INR in AM. Cr. Clearance 58 (outpatient labs under media tab show Cr. 1.49 on 12/2/19).

## 2019-12-09 NOTE — SUBJECTIVE & OBJECTIVE
Past Medical History:   Diagnosis Date    Anticoagulant long-term use     Atrial fibrillation     Benign essential tremor 6/6/2018    Cardiomyopathy     CHF (congestive heart failure)     Coronary artery disease     Hyperlipidemia     Hypertension     Left ventricular thrombus     Syncope and collapse     Thyroid disease        Past Surgical History:   Procedure Laterality Date    CARDIAC CATHETERIZATION      CARDIAC DEFIBRILLATOR PLACEMENT      CORONARY ANGIOPLASTY      HERNIA REPAIR      KNEE ARTHROSCOPY      REPLACEMENT OF IMPLANTABLE CARDIOVERTER-DEFIBRILLATOR (ICD) GENERATOR Left 6/7/2018    Procedure: REPLACEMENT-GENERATOR-ICD;  Surgeon: Camron Isbell MD;  Location: [facility] CATH LAB;  Service: Cardiology;  Laterality: Left;  GENIE, DUAL ICD GEN CHG, SJM, MAC, DM, 3 PREP    TRANSESOPHAGEAL ECHOCARDIOGRAPHY N/A 10/31/2019    Procedure: ECHOCARDIOGRAM, TRANSESOPHAGEAL;  Surgeon: [facility] Diagnostic Provider;  Location: [facility] EP LAB;  Service: Cardiology;  Laterality: N/A;  AF, NOE, DCCV, MAC, DM, 3 PREP    TREATMENT OF CARDIAC ARRHYTHMIA N/A 10/31/2019    Procedure: CARDIOVERSION;  Surgeon: Camron Isbell MD;  Location: [facility] EP LAB;  Service: Cardiology;  Laterality: N/A;  AF, NOE, DCCV, MAC, DM, 3 PREP*SJM  Dual ICD in situ*    VASCULAR SURGERY      stents placed       Review of patient's allergies indicates:  No Known Allergies    Current Facility-Administered Medications on File Prior to Encounter   Medication    0.9%  NaCl infusion    sodium chloride 0.9% flush 5 mL     Current Outpatient Medications on File Prior to Encounter   Medication Sig    amLODIPine (NORVASC) 5 MG tablet Take 1 tablet (5 mg total) by mouth once daily.    ascorbic acid (VITAMIN C) 100 MG tablet Take 1,000 mg by mouth once daily.     aspirin (ECOTRIN) 81 MG EC tablet Take 81 mg by mouth once daily.    atorvastatin (LIPITOR) 10 MG tablet Take 1 tablet (10 mg total) by mouth once daily.    carvedilol (COREG) 12.5 MG  tablet Take 1 tablet (12.5 mg total) by mouth 2 (two) times daily with meals.    fish oil-omega-3 fatty acids 300-1,000 mg capsule Take 2 g by mouth 2 (two) times daily.     levothyroxine (SYNTHROID) 75 MCG tablet Take 75 mcg by mouth before breakfast.     multivitamin capsule Take 1 capsule by mouth nightly.     SLO-NIACIN 750 mg TbSR Take 1 tablet (750 mg total) by mouth 2 (two) times daily. (Patient taking differently: Take 500 mg by mouth 2 (two) times daily. )    warfarin (COUMADIN) 5 MG tablet 2.5 mg every Mon, Wed, Fri.     warfarin (COUMADIN) 5 MG tablet Take 5 mg by mouth every Tues, Thurs, Sat.    warfarin (COUMADIN) 5 MG tablet Take 5 mg by mouth every Sunday.    furosemide (LASIX) 40 MG tablet Take 40 mg by mouth as needed.    nitroGLYCERIN (NITROSTAT) 0.4 MG SL tablet Place 1 tablet (0.4 mg total) under the tongue every 5 (five) minutes as needed for Chest pain.    omeprazole (PRILOSEC) 20 MG capsule Take 20 mg by mouth nightly.     sulfamethoxazole-trimethoprim 800-160mg (BACTRIM DS) 800-160 mg Tab Take 1 tablet by mouth 2 (two) times daily.     Family History     Problem Relation (Age of Onset)    Heart disease Mother, Father, Brother        Tobacco Use    Smoking status: Never Smoker    Smokeless tobacco: Never Used   Substance and Sexual Activity    Alcohol use: Yes     Comment: occasionally    Drug use: No    Sexual activity: Not on file     Review of Systems   Constitution: Negative for chills and fever.   HENT: Negative for congestion.    Eyes: Negative for blurred vision.   Cardiovascular: Negative for leg swelling, near-syncope, orthopnea, palpitations, paroxysmal nocturnal dyspnea and syncope.   Respiratory: Negative for cough.    Gastrointestinal: Negative for abdominal pain, nausea and vomiting.   Neurological: Negative for dizziness, focal weakness, headaches, light-headedness and weakness.     Objective:     Vital Signs (Most Recent):  Temp: 96.9 °F (36.1 °C) (12/09/19  0834)  Pulse: 60 (12/09/19 0834)  Resp: 18 (12/09/19 0834)  BP: 130/81 (12/09/19 0835)  SpO2: (!) 93 % (12/09/19 0834) Vital Signs (24h Range):  Temp:  [96.9 °F (36.1 °C)] 96.9 °F (36.1 °C)  Pulse:  [60] 60  Resp:  [18] 18  SpO2:  [93 %] 93 %  BP: (130-140)/(81-87) 130/81     Weight: 108.9 kg (240 lb)  Body mass index is 30.81 kg/m².    SpO2: (!) 93 %  O2 Device (Oxygen Therapy): room air    No intake or output data in the 24 hours ending 12/09/19 0851    Lines/Drains/Airways     None                 Physical Exam   Constitutional: He is oriented to person, place, and time. He appears well-developed and well-nourished. No distress.   HENT:   Mouth/Throat: Oropharynx is clear and moist.   Eyes: Pupils are equal, round, and reactive to light.   Cardiovascular: Intact distal pulses.   Pulmonary/Chest: Effort normal and breath sounds normal.   Musculoskeletal: He exhibits no edema.   Neurological: He is alert and oriented to person, place, and time.   Skin: Skin is warm and dry. He is not diaphoretic.   Psychiatric: He has a normal mood and affect. His behavior is normal.   Vitals reviewed.      Significant Labs: All pertinent lab results from the last 24 hours have been reviewed.    Significant Imaging: Echocardiogram:   2D echo with color flow doppler:   Results for orders placed or performed during the hospital encounter of 06/06/18   2D echo with color flow doppler   Result Value Ref Range    QEF 25 (A) 55 - 65    Mitral Valve Regurgitation MILD     Diastolic Dysfunction Yes (A)     Aortic Valve Regurgitation TRIVIAL     Est. PA Systolic Pressure 24.16     Tricuspid Valve Regurgitation MILD     Narrative    Date of Procedure: 06/06/2018        TEST DESCRIPTION   Technical Quality: This is a technically adequate study. This study was performed in conjunction with a 3ml intravenous injection of Optison contrast agent.     General: A catheter is present in the right-sided cardiac chambers.     Aorta: The aortic root is  mildly enlarged, measuring 3.3 cm at sinotubular junction and 4.0 cm at Sinuses of Valsalva. The proximal ascending aorta is normal in size, measuring 3.8 cm across.     Left Atrium: The left atrial volume index is severely enlarged, measuring 51.92 cc/m2.     Left Ventricle: The left ventricle is normal in size, with an end-diastolic diameter of 6.0 cm, and an end-systolic diameter of 5.1 cm. LV wall thickness is normal, with the septum measuring 0.9 cm and the posterior wall measuring 0.7 cm across. Relative   wall thickness was normal at 0.23, and the LV mass index was 90.6 g/m2 consistent with normal left ventricular mass. The apex is akinetic.   The following segments were akinetic: mid anteroseptum, apical septum, apical anterior wall, mid anterior wall.  There is global hypokinesis. Left ventricular systolic function appears severely depressed. Visually estimated ejection fraction is 25-30%. The LV Doppler derived stroke volume equals 54.0 ccs.     Diastolic indices: E wave velocity 0.6 m/s, E/A ratio 0.8,  msec., E/e' ratio(avg) 12. There is diastolic dysfunction secondary to relaxation abnormality.     Right Atrium: The right atrium is normal in size, measuring 5.9 cm in length and 4.6 cm in width in the apical view.     Right Ventricle: The right ventricle is normal in size measuring 4.2 cm at the base in the apical right ventricle-focused view. Global right ventricular systolic function appears normal. Tricuspid annular plane systolic excursion (TAPSE) is 2.0 cm.   Tissue Doppler-derived tricuspid annular peak systolic velocity (S prime) is 11.3 cm/s. The estimated PA systolic pressure is 24 mmHg.     Aortic Valve:  The aortic valve is normal in structure. The aortic valve is tri-leaflet in structure. Additionally, there is trivial aortic regurgitation.     Mitral Valve:  The mitral valve is normal in structure. There is mild mitral regurgitation.     Tricuspid Valve:  The tricuspid valve is normal  in structure. There is mild tricuspid regurgitation.     Pulmonary Valve:  The pulmonic valve is normal in structure.     IVC: IVC is normal in size and collapses > 50% with a sniff, suggesting normal right atrial pressure of 3 mmHg.     Intracavitary: There is no evidence of pericardial effusion, intracavity mass, thrombi, or vegetation.         CONCLUSIONS     1 - Severely depressed left ventricular systolic function (EF 25-30%).     2 - Impaired LV relaxation, normal LAP (grade 1 diastolic dysfunction).     3 - Mild mitral regurgitation.     4 - Mild tricuspid regurgitation.     5 - The estimated PA systolic pressure is 24 mmHg.             This document has been electronically    SIGNED BY: Sun Merchant MD On: 06/06/2018 12:34

## 2019-12-09 NOTE — H&P
"[facility] Cardiac Unit  Cardiac Electrophysiology  History and Physical     Admission Date: 12/9/2019  Code Status: Prior   Attending Provider: Camron Isbell MD   Principal Problem:Persistent atrial fibrillation    Subjective:     Chief Complaint:  Atrial fibrillation     HPI: Mr. Grossman is a 74 year old male with a PMHx of CAD/MI (1993)/PCI (with stent placement in 2000 and 2004), stable, HTN, HLD, CKD, stable, hypothyroid, RV thrombus hx; on coumadin, VT (12/2014), resulting in ICD shock  persistent AF.  Actively doing forestry work. Hunting. Does get some SHOOK when walking fast.  No CP. Feeling better since "R" added at prior visit. Leg swelling abated.  We did NOE/DCCV on 3/16 in hopes of starting tikosyn, but QTc was too long for that. Instead we started amiodarone. Pt says he felt better for a week, then felt down again. However he thinks that may have been multifactorial and isn't completely sure. Says he feels better now than pre-DCCV, and he's back in AF. ICD shows that NSR lasted 10 days. We repeated DCCV on 4/15. He's maintained NSR since then. Feels very well.  Hx of A noise. P waves ~4. Device shows few AMS episodes, <10 s (no egrams).    We did gen change 6/7/18. This was c/b VT intraop, requiring external rescue to NSR. Also, a breach in the RV lead's insulation was repaired at that time.    At last office visit 10/25/19, he felt unwell x weeks. ICD shows AF recurred on 10/1/19 and continues. Much lower exertion tolerance. Frequent V pacing.  Recurrent symptomatic AF, despite amio.  d/c amio.  NOE/DCCV.  Then schedule PVI.   CT before PVI  NOE before PVI, even if in NSR, given hx of PRISCA sludge/clot.  Aim for cryo PVI, anatomy permitting.     11/27/19 PET stress done.    10/31/19 successful NOE/DCCV.    10/31/19 CTA completed-reviewed with Dr. Isbell.    He presents today to [facility] for scheduled EP study with  AF ablation with Dr. Isbell. He denies any chest pain, SOB, " dizziness, light headedness, weakness, syncope, or near syncopal episodes. He does state he has SHOOK and palpitations. He denies any bleeding, infections, fevers, rashes, or surgeries in the past 30 days. He is currently taking coumadin (last dose last night 5 mg). INR today 1.8, Dr. Isbell notified, OK to proceed with ablation. INRs for the past several months have been therapeutic. Plan use lovenox and coumadin over night to attain therapeutic INR. He is also currently taking carvedilol 12.5 mg BID (last dose last night).     EKG today shows A paced at 60 bpm. . . QTc 416.    Past Medical History:   Diagnosis Date    Anticoagulant long-term use     Atrial fibrillation     Benign essential tremor 6/6/2018    Cardiomyopathy     CHF (congestive heart failure)     Coronary artery disease     Hyperlipidemia     Hypertension     Left ventricular thrombus     Syncope and collapse     Thyroid disease      Past Surgical History:   Procedure Laterality Date    CARDIAC CATHETERIZATION      CARDIAC DEFIBRILLATOR PLACEMENT      CORONARY ANGIOPLASTY      HERNIA REPAIR      KNEE ARTHROSCOPY      REPLACEMENT OF IMPLANTABLE CARDIOVERTER-DEFIBRILLATOR (ICD) GENERATOR Left 6/7/2018    Procedure: REPLACEMENT-GENERATOR-ICD;  Surgeon: Camron Isbell MD;  Location: [facility] CATH LAB;  Service: Cardiology;  Laterality: Left;  GENIE, DUAL ICD GEN CHG, SJM, MAC, DM, 3 PREP    TRANSESOPHAGEAL ECHOCARDIOGRAPHY N/A 10/31/2019    Procedure: ECHOCARDIOGRAM, TRANSESOPHAGEAL;  Surgeon: [facility] Diagnostic Provider;  Location: [facility] EP LAB;  Service: Cardiology;  Laterality: N/A;  AF, NOE, DCCV, MAC, DM, 3 PREP    TREATMENT OF CARDIAC ARRHYTHMIA N/A 10/31/2019    Procedure: CARDIOVERSION;  Surgeon: Camron Isbell MD;  Location: [facility] EP LAB;  Service: Cardiology;  Laterality: N/A;  AF, NOE, DCCV, MAC, DM, 3 PREP*SJM  Dual ICD in situ*    VASCULAR SURGERY      stents placed       Review of patient's allergies indicates:  No  Known Allergies    Current Facility-Administered Medications on File Prior to Encounter   Medication    0.9%  NaCl infusion    sodium chloride 0.9% flush 5 mL     Current Outpatient Medications on File Prior to Encounter   Medication Sig    amLODIPine (NORVASC) 5 MG tablet Take 1 tablet (5 mg total) by mouth once daily.    ascorbic acid (VITAMIN C) 100 MG tablet Take 1,000 mg by mouth once daily.     aspirin (ECOTRIN) 81 MG EC tablet Take 81 mg by mouth once daily.    atorvastatin (LIPITOR) 10 MG tablet Take 1 tablet (10 mg total) by mouth once daily.    carvedilol (COREG) 12.5 MG tablet Take 1 tablet (12.5 mg total) by mouth 2 (two) times daily with meals.    fish oil-omega-3 fatty acids 300-1,000 mg capsule Take 2 g by mouth 2 (two) times daily.     levothyroxine (SYNTHROID) 75 MCG tablet Take 75 mcg by mouth before breakfast.     multivitamin capsule Take 1 capsule by mouth nightly.     SLO-NIACIN 750 mg TbSR Take 1 tablet (750 mg total) by mouth 2 (two) times daily. (Patient taking differently: Take 500 mg by mouth 2 (two) times daily. )    warfarin (COUMADIN) 5 MG tablet 2.5 mg every Mon, Wed, Fri.     warfarin (COUMADIN) 5 MG tablet Take 5 mg by mouth every Tues, Thurs, Sat.    warfarin (COUMADIN) 5 MG tablet Take 5 mg by mouth every Sunday.    furosemide (LASIX) 40 MG tablet Take 40 mg by mouth as needed.    nitroGLYCERIN (NITROSTAT) 0.4 MG SL tablet Place 1 tablet (0.4 mg total) under the tongue every 5 (five) minutes as needed for Chest pain.    omeprazole (PRILOSEC) 20 MG capsule Take 20 mg by mouth nightly.     sulfamethoxazole-trimethoprim 800-160mg (BACTRIM DS) 800-160 mg Tab Take 1 tablet by mouth 2 (two) times daily.     Family History     Problem Relation (Age of Onset)    Heart disease Mother, Father, Brother        Tobacco Use    Smoking status: Never Smoker    Smokeless tobacco: Never Used   Substance and Sexual Activity    Alcohol use: Yes     Comment: occasionally    Drug  use: No    Sexual activity: Not on file     Review of Systems   Constitution: Negative for chills, fever and malaise/fatigue.   HENT: Negative for congestion and nosebleeds.    Eyes: Negative for blurred vision.   Cardiovascular: Negative for chest pain, dyspnea on exertion, irregular heartbeat, leg swelling, near-syncope, orthopnea, palpitations, paroxysmal nocturnal dyspnea and syncope.   Respiratory: Negative for cough, hemoptysis, shortness of breath, sleep disturbances due to breathing, sputum production and wheezing.    Endocrine: Negative for polyphagia.   Hematologic/Lymphatic: Negative for bleeding problem. Does not bruise/bleed easily.   Skin: Negative for itching and rash.   Musculoskeletal: Negative for back pain, joint swelling, muscle cramps and muscle weakness.   Gastrointestinal: Negative for bloating, abdominal pain, hematemesis, hematochezia, nausea and vomiting.   Genitourinary: Negative for dysuria and hematuria.   Neurological: Negative for dizziness, focal weakness, headaches, light-headedness, loss of balance, numbness and weakness.   Psychiatric/Behavioral: Negative for altered mental status.     Objective:     Vital Signs (Most Recent):  Temp: 96.9 °F (36.1 °C) (12/09/19 0834)  Pulse: 60 (12/09/19 0834)  Resp: 18 (12/09/19 0834)  BP: 130/81 (12/09/19 0835)  SpO2: (!) 93 % (12/09/19 0834) Vital Signs (24h Range):  Temp:  [96.9 °F (36.1 °C)] 96.9 °F (36.1 °C)  Pulse:  [60] 60  Resp:  [18] 18  SpO2:  [93 %] 93 %  BP: (130-140)/(81-87) 130/81       Weight: 108.9 kg (240 lb)  Body mass index is 30.81 kg/m².    SpO2: (!) 93 %  O2 Device (Oxygen Therapy): room air    Physical Exam   Constitutional: He is oriented to person, place, and time. He appears well-developed and well-nourished. No distress.   HENT:   Head: Normocephalic and atraumatic.   Mouth/Throat: No oropharyngeal exudate.   Eyes: Pupils are equal, round, and reactive to light. Conjunctivae are normal. Right eye exhibits no discharge.  Left eye exhibits no discharge.   Neck: Normal range of motion. Neck supple.   Cardiovascular: Normal rate, regular rhythm, S1 normal, S2 normal, normal heart sounds, intact distal pulses and normal pulses.  No extrasystoles are present. PMI is not displaced. Exam reveals no gallop and no friction rub.   No murmur heard.  Pulses:       Radial pulses are 2+ on the right side, and 2+ on the left side.        Dorsalis pedis pulses are 2+ on the right side, and 2+ on the left side.   Pulmonary/Chest: Effort normal and breath sounds normal. No accessory muscle usage. No respiratory distress. He has no decreased breath sounds. He has no wheezes. He has no rhonchi. He has no rales. He exhibits no tenderness.   Left chest wall device site in good condition.    Abdominal: Soft. Bowel sounds are normal. He exhibits no distension. There is no tenderness. There is no guarding.   Musculoskeletal: Normal range of motion. He exhibits no edema.   Neurological: He is alert and oriented to person, place, and time. He has normal strength. He is not disoriented. No cranial nerve deficit or sensory deficit. He exhibits normal muscle tone. Coordination and gait normal.   Skin: Skin is warm and dry. No rash noted. He is not diaphoretic. No erythema.   Psychiatric: He has a normal mood and affect. His behavior is normal. Judgment and thought content normal.   Nursing note and vitals reviewed.    Significant Labs: Outside labs from 12/2/19 reviewed.    Significant Imaging: Echocardiogram:   2D echo with color flow doppler:   Results for orders placed or performed during the hospital encounter of 06/06/18   2D echo with color flow doppler   Result Value Ref Range    QEF 25 (A) 55 - 65    Mitral Valve Regurgitation MILD     Diastolic Dysfunction Yes (A)     Aortic Valve Regurgitation TRIVIAL     Est. PA Systolic Pressure 24.16     Tricuspid Valve Regurgitation MILD     Narrative    Date of Procedure: 06/06/2018        TEST DESCRIPTION    Technical Quality: This is a technically adequate study. This study was performed in conjunction with a 3ml intravenous injection of Optison contrast agent.     General: A catheter is present in the right-sided cardiac chambers.     Aorta: The aortic root is mildly enlarged, measuring 3.3 cm at sinotubular junction and 4.0 cm at Sinuses of Valsalva. The proximal ascending aorta is normal in size, measuring 3.8 cm across.     Left Atrium: The left atrial volume index is severely enlarged, measuring 51.92 cc/m2.     Left Ventricle: The left ventricle is normal in size, with an end-diastolic diameter of 6.0 cm, and an end-systolic diameter of 5.1 cm. LV wall thickness is normal, with the septum measuring 0.9 cm and the posterior wall measuring 0.7 cm across. Relative   wall thickness was normal at 0.23, and the LV mass index was 90.6 g/m2 consistent with normal left ventricular mass. The apex is akinetic.   The following segments were akinetic: mid anteroseptum, apical septum, apical anterior wall, mid anterior wall.  There is global hypokinesis. Left ventricular systolic function appears severely depressed. Visually estimated ejection fraction is 25-30%. The LV Doppler derived stroke volume equals 54.0 ccs.     Diastolic indices: E wave velocity 0.6 m/s, E/A ratio 0.8,  msec., E/e' ratio(avg) 12. There is diastolic dysfunction secondary to relaxation abnormality.     Right Atrium: The right atrium is normal in size, measuring 5.9 cm in length and 4.6 cm in width in the apical view.     Right Ventricle: The right ventricle is normal in size measuring 4.2 cm at the base in the apical right ventricle-focused view. Global right ventricular systolic function appears normal. Tricuspid annular plane systolic excursion (TAPSE) is 2.0 cm.   Tissue Doppler-derived tricuspid annular peak systolic velocity (S prime) is 11.3 cm/s. The estimated PA systolic pressure is 24 mmHg.     Aortic Valve:  The aortic valve is normal  in structure. The aortic valve is tri-leaflet in structure. Additionally, there is trivial aortic regurgitation.     Mitral Valve:  The mitral valve is normal in structure. There is mild mitral regurgitation.     Tricuspid Valve:  The tricuspid valve is normal in structure. There is mild tricuspid regurgitation.     Pulmonary Valve:  The pulmonic valve is normal in structure.     IVC: IVC is normal in size and collapses > 50% with a sniff, suggesting normal right atrial pressure of 3 mmHg.     Intracavitary: There is no evidence of pericardial effusion, intracavity mass, thrombi, or vegetation.         CONCLUSIONS     1 - Severely depressed left ventricular systolic function (EF 25-30%).     2 - Impaired LV relaxation, normal LAP (grade 1 diastolic dysfunction).     3 - Mild mitral regurgitation.     4 - Mild tricuspid regurgitation.     5 - The estimated PA systolic pressure is 24 mmHg.     This document has been electronically    SIGNED BY: Sun Merchant MD On: 06/06/2018 12:34     Assessment and Plan:     * Persistent atrial fibrillation  -AF ablation.  -NOE prior.  -CTA prior.  -Anesthesia for sedation.    Dr. Isbell at bedside speaking with patient and family. Informed consent was obtained, we discussed the risks and benefits of  the procedure (pulmonary vein isolation) which included (but was not limited to) the possibility of bleeding or injury to the vessels involved, embolism, cardiac perforation, cardiac tamponade requiring emergent drainage with a pericardial drain or surgery, esophageal injury or fistula formation, phrenic nerve injury, pulmonary vein stenosis, injury to the native conduction system of the heart requiring a PPM, renal injury if contrast used, MI, stroke, and death. We also reviewed indications, and alternatives of the planned procedure. All questions were answered. Patient wishes to proceed.    Brittney Gardner, GOYO  Cardiac Electrophysiology  [facility]  Cardiac Unit    Attending: Camron Isbell MD

## 2019-12-09 NOTE — ANESTHESIA PREPROCEDURE EVALUATION
12/09/2019  Pre-operative evaluation for Procedure(s) (LRB):  ABLATION, ARRHYTHMOGENIC FOCUS, FOR ATRIAL FIBRILLATION (N/A)  ECHOCARDIOGRAM, TRANSESOPHAGEAL (N/A)    Amish Grosmsan is a 74 y.o. male hx of CAD s/p PCI(with stents in 2000 and 2004), CKD, HTN, ICD, EF 25%, hx of RV thrombus    · Severely decreased left ventricular systolic function. The estimated ejection fraction is low to mid 20s with large ines-pical septal RWMAs.  · Severe global hypokinetic wall motion.  · Normal right ventricular systolic function.  · Mild mitral regurgitation.  · Grade 2 plaque present.  · Normal appearing left atrial appendage. No thrombus is present in the appendage. Normal appendage velocities.    There is a large sized, severe intensity mid to apical anteroseptal resting perfusion abnormality involving 25% of the LV myocardium in the distribution of the mid LAD consistent with transmural  infarct.    Whole heart absolute myocardial perfusion (cc/min/g) averaged 0.56 cc/min/g at rest (which is reduced), 0.75 cc/min/g at stress (which is severely reduced), and 1.38 CFR (which is moderately reduced). Stress flow is severely reduced in part due to large infarct.    Although no significant perfusion abnormalities are present in the remaining territories, there is moderately reduced coronary flow capacity indicative of diffuse coronary artery disease.    Gated perfusion images showed an ejection fraction of 40% at rest and 46% during stress. Normal ejection fraction is greater than 51%.    There is apical wall akinesis at rest and stress.    There is moderate global hypokinesis at rest and mild global hypokinesis at stress.    LV cavity size is normal at rest and stress.    The EKG portion of this study is negative for ischemia.    Arrhythmias during stress: rare PVCs.    The patient reported chest pain during  the stress test.    When compared to the prior study on 3/14/2016, there are no significant changes.    Patient Active Problem List   Diagnosis    Coronary artery disease due to lipid rich plaque    Old myocardial infarction    S/P PTCA (percutaneous transluminal coronary angioplasty)    Anticoagulated on Coumadin    CKD (chronic kidney disease) stage 1, GFR 90 ml/min or greater    BPH (benign prostatic hyperplasia)    Dyslipidemia    Venous insufficiency of leg    Erectile dysfunction    HTN (hypertension), benign    Hypothyroid    Automatic implantable cardioverter-defibrillator in situ    Ischemic cardiomyopathy    Ventricular tachyarrhythmia    PVT (paroxysmal ventricular tachycardia)    Persistent atrial fibrillation    Benign essential tremor    Longstanding persistent atrial fibrillation       Review of patient's allergies indicates:  No Known Allergies    Current Facility-Administered Medications on File Prior to Encounter   Medication Dose Route Frequency Provider Last Rate Last Dose    0.9%  NaCl infusion   Intravenous Continuous Brittney Gardner NP        sodium chloride 0.9% flush 5 mL  5 mL Intravenous PRN Brittney Gardner NP         Current Outpatient Medications on File Prior to Encounter   Medication Sig Dispense Refill    amLODIPine (NORVASC) 5 MG tablet Take 1 tablet (5 mg total) by mouth once daily. 30 tablet 5    ascorbic acid (VITAMIN C) 100 MG tablet Take 1,000 mg by mouth once daily.       aspirin (ECOTRIN) 81 MG EC tablet Take 81 mg by mouth once daily.      atorvastatin (LIPITOR) 10 MG tablet Take 1 tablet (10 mg total) by mouth once daily. 90 tablet 3    carvedilol (COREG) 12.5 MG tablet Take 1 tablet (12.5 mg total) by mouth 2 (two) times daily with meals. 60 tablet 11    fish oil-omega-3 fatty acids 300-1,000 mg capsule Take 2 g by mouth 2 (two) times daily.       levothyroxine (SYNTHROID) 75 MCG tablet Take 75 mcg by mouth before breakfast.        multivitamin capsule Take 1 capsule by mouth nightly.       SLO-NIACIN 750 mg TbSR Take 1 tablet (750 mg total) by mouth 2 (two) times daily. (Patient taking differently: Take 500 mg by mouth 2 (two) times daily. ) 180 each 3    warfarin (COUMADIN) 5 MG tablet 2.5 mg every Mon, Wed, Fri.       warfarin (COUMADIN) 5 MG tablet Take 5 mg by mouth every Tues, Thurs, Sat.      warfarin (COUMADIN) 5 MG tablet Take 5 mg by mouth every Sunday.      furosemide (LASIX) 40 MG tablet Take 40 mg by mouth as needed.      nitroGLYCERIN (NITROSTAT) 0.4 MG SL tablet Place 1 tablet (0.4 mg total) under the tongue every 5 (five) minutes as needed for Chest pain. 25 tablet 4    omeprazole (PRILOSEC) 20 MG capsule Take 20 mg by mouth nightly.       sulfamethoxazole-trimethoprim 800-160mg (BACTRIM DS) 800-160 mg Tab Take 1 tablet by mouth 2 (two) times daily.         Past Surgical History:   Procedure Laterality Date    CARDIAC CATHETERIZATION      CARDIAC DEFIBRILLATOR PLACEMENT      CORONARY ANGIOPLASTY      HERNIA REPAIR      KNEE ARTHROSCOPY      REPLACEMENT OF IMPLANTABLE CARDIOVERTER-DEFIBRILLATOR (ICD) GENERATOR Left 6/7/2018    Procedure: REPLACEMENT-GENERATOR-ICD;  Surgeon: Camron Isbell MD;  Location: [facility] CATH LAB;  Service: Cardiology;  Laterality: Left;  GENIE, DUAL ICD GEN CHG, SJM, MAC, DM, 3 PREP    TRANSESOPHAGEAL ECHOCARDIOGRAPHY N/A 10/31/2019    Procedure: ECHOCARDIOGRAM, TRANSESOPHAGEAL;  Surgeon: [facility] Diagnostic Provider;  Location: [facility] EP LAB;  Service: Cardiology;  Laterality: N/A;  AF, NOE, DCCV, MAC, DM, 3 PREP    TREATMENT OF CARDIAC ARRHYTHMIA N/A 10/31/2019    Procedure: CARDIOVERSION;  Surgeon: Camron Isbell MD;  Location: [facility] EP LAB;  Service: Cardiology;  Laterality: N/A;  AF, NOE, DCCV, MAC, DM, 3 PREP*SJM  Dual ICD in situ*    VASCULAR SURGERY      stents placed       Social History     Socioeconomic History    Marital status:      Spouse name: Not on file    Number of  children: Not on file    Years of education: Not on file    Highest education level: Not on file   Occupational History    Not on file   Social Needs    Financial resource strain: Not on file    Food insecurity:     Worry: Not on file     Inability: Not on file    Transportation needs:     Medical: Not on file     Non-medical: Not on file   Tobacco Use    Smoking status: Never Smoker    Smokeless tobacco: Never Used   Substance and Sexual Activity    Alcohol use: Yes     Comment: occasionally    Drug use: No    Sexual activity: Not on file   Lifestyle    Physical activity:     Days per week: Not on file     Minutes per session: Not on file    Stress: Not on file   Relationships    Social connections:     Talks on phone: Not on file     Gets together: Not on file     Attends Jain service: Not on file     Active member of club or organization: Not on file     Attends meetings of clubs or organizations: Not on file     Relationship status: Not on file   Other Topics Concern    Not on file   Social History Narrative    Not on file         CBC: No results for input(s): WBC, RBC, HGB, HCT, PLT, MCV, MCH, MCHC in the last 72 hours.    CMP: No results for input(s): NA, K, CL, CO2, BUN, CREATININE, GLU, MG, PHOS, CALCIUM, ALBUMIN, PROT, ALKPHOS, ALT, AST, BILITOT in the last 72 hours.    INR  No results for input(s): PT, INR, PROTIME, APTT in the last 72 hours.        Diagnostic Studies:      EKG:  Atrial-paced rhythm  Anteroseptal infarct ,age undetermined  ST and T wave abnormality, consider inferolateral ischemia  Abnormal ECG  When compared with ECG of 31-OCT-2019 10:01,  Electronic atrial pacemaker has replaced Electronic ventricular pacemaker  Confirmed by FRANCESCO SHABAZZ, JL ([id number] on 10/31/2019 3:23:41 PM    2D Echo:  Results for orders placed or performed during the hospital encounter of 06/06/18   2D echo with color flow doppler   Result Value Ref Range    QEF 25 (A) 55 - 65    Mitral Valve  Regurgitation MILD     Diastolic Dysfunction Yes (A)     Aortic Valve Regurgitation TRIVIAL     Est. PA Systolic Pressure 24.16     Tricuspid Valve Regurgitation MILD          Anesthesia Evaluation    I have reviewed the Patient Summary Reports.    I have reviewed the Nursing Notes.   I have reviewed the Medications.     Review of Systems  Anesthesia Hx:  No problems with previous Anesthesia  History of prior surgery of interest to airway management or planning: Denies Family Hx of Anesthesia complications.   Denies Personal Hx of Anesthesia complications.   Hematology/Oncology:         -- Denies Anemia:   Cardiovascular:   Exercise tolerance: poor Pacemaker Hypertension Past MI CAD  CABG/stent Dysrhythmias  CHF SHOOK ECG has been reviewed.    Pulmonary:   Denies COPD.  Denies Asthma.  Denies Sleep Apnea.    Renal/:   Chronic Renal Disease, CRI    Hepatic/GI:   GERD, well controlled Denies Liver Disease.    Neurological:   Denies CVA. Denies Seizures.    Endocrine:   Denies Diabetes. Hypothyroidism        Physical Exam  General:  Well nourished, Obesity    Airway/Jaw/Neck:  Airway Findings: Mouth Opening: Normal Tongue: Normal  General Airway Assessment: Adult  Mallampati: IV  Improves to II with phonation.  TM Distance: Normal, at least 6 cm  Jaw/Neck Findings:  Neck ROM: Normal ROM      Dental:  Dental Findings: In tact   Chest/Lungs:  Chest/Lungs Findings: Clear to auscultation, Normal Respiratory Rate     Heart/Vascular:  Heart Findings: Rate: Normal  Rhythm: Regular Rhythm  Sounds: Normal        Mental Status:  Mental Status Findings:  Alert and Oriented, Cooperative         Anesthesia Plan  Type of Anesthesia, risks & benefits discussed:  Anesthesia Type:  general  Patient's Preference:   Intra-op Monitoring Plan: standard ASA monitors  Intra-op Monitoring Plan Comments:   Post Op Pain Control Plan: per primary service following discharge from PACU  Post Op Pain Control Plan Comments:   Induction:   IV  Beta  Blocker:  Patient is on a Beta-Blocker and has received one dose within the past 24 hours (No further documentation required).       Informed Consent: Patient understands risks and agrees with Anesthesia plan.  Questions answered. Anesthesia consent signed with patient.  ASA Score: 4     Day of Surgery Review of History & Physical:            Ready For Surgery From Anesthesia Perspective.

## 2019-12-09 NOTE — Clinical Note
Cartilage Graft Text: The defect edges were debeveled with a #15 scalpel blade.  Given the location of the defect, shape of the defect, the fact the defect involved a full thickness cartilage defect a cartilage graft was deemed most appropriate.  An appropriate donor site was identified, cleansed, and anesthetized. The cartilage graft was then harvested and transferred to the recipient site, oriented appropriately and then sutured into place.  The secondary defect was then repaired using a primary closure. Sheath exchanged in the right femoral vein.  Patient Will Remove Sutures At Home?: No Advancement Flap (Double) Text: The defect edges were debeveled with a #15 scalpel blade.  Given the location of the defect and the proximity to free margins a double advancement flap was deemed most appropriate.  Using a sterile surgical marker, the appropriate advancement flaps were drawn incorporating the defect and placing the expected incisions within the relaxed skin tension lines where possible.    The area thus outlined was incised deep to adipose tissue with a #15 scalpel blade.  The skin margins were undermined to an appropriate distance in all directions utilizing iris scissors. Stage 13: Number Of Blocks?: 0 Stage 1: Number Of Blocks?: 1 Stage 3: Additional Anesthesia Type: 1% lidocaine with epinephrine Mohs Method Verbiage: An incision at a 45 degree angle following the standard Mohs approach was done and the specimen was harvested as a microscopic controlled layer. Referring Physician (Optional): Bryan Ley Suture Removal: 7 days Alar Island Pedicle Flap Text: The defect edges were debeveled with a #15 scalpel blade.  Given the location of the defect, shape of the defect and the proximity to the alar rim an island pedicle advancement flap was deemed most appropriate.  Using a sterile surgical marker, an appropriate advancement flap was drawn incorporating the defect, outlining the appropriate donor tissue and placing the expected incisions within the nasal ala running parallel to the alar rim. The area thus outlined was incised with a #15 scalpel blade.  The skin margins were undermined minimally to an appropriate distance in all directions around the primary defect and laterally outward around the island pedicle utilizing iris scissors.  There was minimal undermining beneath the pedicle flap. Consent (Marginal Mandibular)/Introductory Paragraph: The rationale for Mohs was explained to the patient and consent was obtained. The risks, benefits and alternatives to therapy were discussed in detail. Specifically, the risks of damage to the marginal mandibular branch of the facial nerve, infection, scarring, bleeding, prolonged wound healing, incomplete removal, allergy to anesthesia, and recurrence were addressed. Prior to the procedure, the treatment site was clearly identified and confirmed by the patient. All components of Universal Protocol/PAUSE Rule completed. Initial Size Of Lesion: 1.7 Show Date Of Previous Biopsy Variable: Yes Otolaryngologist Procedure Text (C): After obtaining clear surgical margins the patient was sent to otolaryngology for surgical repair.  The patient understands they will receive post-surgical care and follow-up from the referring physician's office. Plastic Surgeon Procedure Text (A): After obtaining clear surgical margins the patient was sent to plastics for surgical repair.  The patient understands they will receive post-surgical care and follow-up from the referring physician's office. Localized Dermabrasion With Wire Brush Text: The patient was draped in routine manner.  Localized dermabrasion using 3 x 17 mm wire brush was performed in routine manner to papillary dermis. This spot dermabrasion is being performed to complete skin cancer reconstruction. It also will eliminate the other sun damaged precancerous cells that are known to be part of the regional effect of a lifetime's worth of sun exposure. This localized dermabrasion is therapeutic and should not be considered cosmetic in any regard. Lazy S Complex Repair Preamble Text (Leave Blank If You Do Not Want): Extensive wide undermining was performed. Complex Repair And Flap Additional Text (Will Appearing After The Standard Complex Repair Text): The complex repair was not sufficient to completely close the primary defect. The remaining additional defect was repaired with the flap mentioned below. Provider Procedure Text (D): After obtaining clear surgical margins the defect was repaired by another provider. Advancement-Rotation Flap Text: The defect edges were debeveled with a #15 scalpel blade.  Given the location of the defect, shape of the defect and the proximity to free margins an advancement-rotation flap was deemed most appropriate.  Using a sterile surgical marker, an appropriate flap was drawn incorporating the defect and placing the expected incisions within the relaxed skin tension lines where possible. The area thus outlined was incised deep to adipose tissue with a #15 scalpel blade.  The skin margins were undermined to an appropriate distance in all directions utilizing iris scissors. Dressing (No Sutures): dry sterile dressing Donor Site Anesthesia Type: same as repair anesthesia W Plasty Text: The lesion was extirpated to the level of the fat with a #15 scalpel blade.  Given the location of the defect, shape of the defect and the proximity to free margins a W-plasty was deemed most appropriate for repair.  Using a sterile surgical marker, the appropriate transposition arms of the W-plasty were drawn incorporating the defect and placing the expected incisions within the relaxed skin tension lines where possible.    The area thus outlined was incised deep to adipose tissue with a #15 scalpel blade.  The skin margins were undermined to an appropriate distance in all directions utilizing iris scissors.  The opposing transposition arms were then transposed into place in opposite direction and anchored with interrupted buried subcutaneous sutures. O-L Flap Text: The defect edges were debeveled with a #15 scalpel blade.  Given the location of the defect, shape of the defect and the proximity to free margins an O-L flap was deemed most appropriate.  Using a sterile surgical marker, an appropriate advancement flap was drawn incorporating the defect and placing the expected incisions within the relaxed skin tension lines where possible.    The area thus outlined was incised deep to adipose tissue with a #15 scalpel blade.  The skin margins were undermined to an appropriate distance in all directions utilizing iris scissors. Asc Procedure Text (D): After obtaining clear surgical margins the patient was sent to an ASC for surgical repair.  The patient understands they will receive post-surgical care and follow-up from the ASC physician. Bilobed Transposition Flap Text: The defect edges were debeveled with a #15 scalpel blade.  Given the location of the defect and the proximity to free margins a bilobed transposition flap was deemed most appropriate.  Using a sterile surgical marker, an appropriate bilobe flap drawn around the defect.    The area thus outlined was incised deep to adipose tissue with a #15 scalpel blade.  The skin margins were undermined to an appropriate distance in all directions utilizing iris scissors. Transposition Flap Text: The defect edges were debeveled with a #15 scalpel blade.  Given the location of the defect and the proximity to free margins a transposition flap was deemed most appropriate.  Using a sterile surgical marker, an appropriate transposition flap was drawn incorporating the defect.    The area thus outlined was incised deep to adipose tissue with a #15 scalpel blade.  The skin margins were undermined to an appropriate distance in all directions utilizing iris scissors. Mid-Level Procedure Text (D): After obtaining clear surgical margins the patient was sent to a mid-level provider for surgical repair.  The patient understands they will receive post-surgical care and follow-up from the mid-level provider. Purse String (Intermediate) Text: Given the location of the defect and the characteristics of the surrounding skin a purse string intermediate closure was deemed most appropriate.  Undermining was performed circumfirentially around the surgical defect.  A purse string suture was then placed and tightened. Crescentic Advancement Flap Text: The defect edges were debeveled with a #15 scalpel blade.  Given the location of the defect and the proximity to free margins a crescentic advancement flap was deemed most appropriate.  Using a sterile surgical marker, the appropriate advancement flap was drawn incorporating the defect and placing the expected incisions within the relaxed skin tension lines where possible.    The area thus outlined was incised deep to adipose tissue with a #15 scalpel blade.  The skin margins were undermined to an appropriate distance in all directions utilizing iris scissors. Helical Rim Advancement Flap Text: The defect edges were debeveled with a #15 blade scalpel.  Given the location of the defect and the proximity to free margins (helical rim) a double helical rim advancement flap was deemed most appropriate.  Using a sterile surgical marker, the appropriate advancement flaps were drawn incorporating the defect and placing the expected incisions between the helical rim and antihelix where possible.  The area thus outlined was incised through and through with a #15 scalpel blade.  With a skin hook and iris scissors, the flaps were gently and sharply undermined and freed up. Hatchet Flap Text: The defect edges were debeveled with a #15 scalpel blade.  Given the location of the defect, shape of the defect and the proximity to free margins a hatchet flap was deemed most appropriate.  Using a sterile surgical marker, an appropriate hatchet flap was drawn incorporating the defect and placing the expected incisions within the relaxed skin tension lines where possible.    The area thus outlined was incised deep to adipose tissue with a #15 scalpel blade.  The skin margins were undermined to an appropriate distance in all directions utilizing iris scissors. Consent (Spinal Accessory)/Introductory Paragraph: The rationale for Mohs was explained to the patient and consent was obtained. The risks, benefits and alternatives to therapy were discussed in detail. Specifically, the risks of damage to the spinal accessory nerve, infection, scarring, bleeding, prolonged wound healing, incomplete removal, allergy to anesthesia, and recurrence were addressed. Prior to the procedure, the treatment site was clearly identified and confirmed by the patient. All components of Universal Protocol/PAUSE Rule completed. Surgical Defect Length In Cm (Optional): 2.5 Referred To Oculoplastics For Closure Text (Leave Blank If You Do Not Want): After obtaining clear surgical margins the patient was sent to oculoplastics for surgical repair.  The patient understands they will receive post-surgical care and follow-up from the referring physician's office. Consent (Ear)/Introductory Paragraph: The rationale for Mohs was explained to the patient and consent was obtained. The risks, benefits and alternatives to therapy were discussed in detail. Specifically, the risks of ear deformity, infection, scarring, bleeding, prolonged wound healing, incomplete removal, allergy to anesthesia, nerve injury and recurrence were addressed. Prior to the procedure, the treatment site was clearly identified and confirmed by the patient. All components of Universal Protocol/PAUSE Rule completed. Interpolation Flap Text: A decision was made to reconstruct the defect utilizing an interpolation axial flap and a staged reconstruction.  A telfa template was made of the defect.  This telfa template was then used to outline the interpolation flap.  The donor area for the pedicle flap was then injected with anesthesia.  The flap was excised through the skin and subcutaneous tissue down to the layer of the underlying musculature.  The interpolation flap was carefully excised within this deep plane to maintain its blood supply.  The edges of the donor site were undermined.   The donor site was closed in a primary fashion.  The pedicle was then rotated into position and sutured.  Once the tube was sutured into place, adequate blood supply was confirmed with blanching and refill.  The pedicle was then wrapped with xeroform gauze and dressed appropriately with a telfa and gauze bandage to ensure continued blood supply and protect the attached pedicle. Deep Sutures: 5-0 Vicryl Island Pedicle Flap-Requiring Vessel Identification Text: The defect edges were debeveled with a #15 scalpel blade.  Given the location of the defect, shape of the defect and the proximity to free margins an island pedicle advancement flap was deemed most appropriate.  Using a sterile surgical marker, an appropriate advancement flap was drawn, based on the axial vessel mentioned above, incorporating the defect, outlining the appropriate donor tissue and placing the expected incisions within the relaxed skin tension lines where possible.    The area thus outlined was incised deep to adipose tissue with a #15 scalpel blade.  The skin margins were undermined to an appropriate distance in all directions around the primary defect and laterally outward around the island pedicle utilizing iris scissors.  There was minimal undermining beneath the pedicle flap. V-Y Plasty Text: The defect edges were debeveled with a #15 scalpel blade.  Given the location of the defect, shape of the defect and the proximity to free margins an V-Y advancement flap was deemed most appropriate.  Using a sterile surgical marker, an appropriate advancement flap was drawn incorporating the defect and placing the expected incisions within the relaxed skin tension lines where possible.    The area thus outlined was incised deep to adipose tissue with a #15 scalpel blade.  The skin margins were undermined to an appropriate distance in all directions utilizing iris scissors. Consent (Scalp)/Introductory Paragraph: The rationale for Mohs was explained to the patient and consent was obtained. The risks, benefits and alternatives to therapy were discussed in detail. Specifically, the risks of changes in hair growth pattern secondary to repair, infection, scarring, bleeding, prolonged wound healing, incomplete removal, allergy to anesthesia, nerve injury and recurrence were addressed. Prior to the procedure, the treatment site was clearly identified and confirmed by the patient. All components of Universal Protocol/PAUSE Rule completed. H Plasty Text: Given the location of the defect, shape of the defect and the proximity to free margins a H-plasty was deemed most appropriate for repair.  Using a sterile surgical marker, the appropriate advancement arms of the H-plasty were drawn incorporating the defect and placing the expected incisions within the relaxed skin tension lines where possible. The area thus outlined was incised deep to adipose tissue with a #15 scalpel blade. The skin margins were undermined to an appropriate distance in all directions utilizing iris scissors.  The opposing advancement arms were then advanced into place in opposite direction and anchored with interrupted buried subcutaneous sutures. Partial Purse String (Simple) Text: Given the location of the defect and the characteristics of the surrounding skin a simple purse string closure was deemed most appropriate.  Undermining was performed circumfirentially around the surgical defect.  A purse string suture was then placed and tightened. Wound tension only allowed a partial closure of the circular defect. Bilateral Helical Rim Advancement Flap Text: The defect edges were debeveled with a #15 blade scalpel.  Given the location of the defect and the proximity to free margins (helical rim) a bilateral helical rim advancement flap was deemed most appropriate.  Using a sterile surgical marker, the appropriate advancement flaps were drawn incorporating the defect and placing the expected incisions between the helical rim and antihelix where possible.  The area thus outlined was incised through and through with a #15 scalpel blade.  With a skin hook and iris scissors, the flaps were gently and sharply undermined and freed up. Date Of Previous Biopsy (Optional): 09/04/18 S Plasty Text: Given the location and shape of the defect, and the orientation of relaxed skin tension lines, an S-plasty was deemed most appropriate for repair.  Using a sterile surgical marker, the appropriate outline of the S-plasty was drawn, incorporating the defect and placing the expected incisions within the relaxed skin tension lines where possible.  The area thus outlined was incised deep to adipose tissue with a #15 scalpel blade.  The skin margins were undermined to an appropriate distance in all directions utilizing iris scissors. The skin flaps were advanced over the defect.  The opposing margins were then approximated with interrupted buried subcutaneous sutures. Surgical Defect Width In Cm (Optional): 2 Bcc Histology Text: There were numerous aggregates of basaloid cells. Double Island Pedicle Flap Text: The defect edges were debeveled with a #15 scalpel blade.  Given the location of the defect, shape of the defect and the proximity to free margins a double island pedicle advancement flap was deemed most appropriate.  Using a sterile surgical marker, an appropriate advancement flap was drawn incorporating the defect, outlining the appropriate donor tissue and placing the expected incisions within the relaxed skin tension lines where possible.    The area thus outlined was incised deep to adipose tissue with a #15 scalpel blade.  The skin margins were undermined to an appropriate distance in all directions around the primary defect and laterally outward around the island pedicle utilizing iris scissors.  There was minimal undermining beneath the pedicle flap. Previous Accession (Optional): [id number] Graft Donor Site Bandage (Optional-Leave Blank If You Don't Want In Note): Steri-strips and a pressure bandage were applied to the donor site. Wound Care: Petrolatum Full Thickness Lip Wedge Repair (Flap) Text: Given the location of the defect and the proximity to free margins a full thickness wedge repair was deemed most appropriate.  Using a sterile surgical marker, the appropriate repair was drawn incorporating the defect and placing the expected incisions perpendicular to the vermilion border.  The vermilion border was also meticulously outlined to ensure appropriate reapproximation during the repair.  The area thus outlined was incised through and through with a #15 scalpel blade.  The muscularis and dermis were reaproximated with deep sutures following hemostasis. Care was taken to realign the vermilion border before proceeding with the superficial closure.  Once the vermilion was realigned the superfical and mucosal closure was finished. Mauc Instructions: By selecting yes to the question below the MAUC number will be added into the note.  This will be calculated automatically based on the diagnosis chosen, the size entered, the body zone selected (H,M,L) and the specific indications you chose. You will also have the option to override the Mohs AUC if you disagree with the automatically calculated number and this option is found in the Case Summary tab. Spiral Flap Text: The defect edges were debeveled with a #15 scalpel blade.  Given the location of the defect, shape of the defect and the proximity to free margins a spiral flap was deemed most appropriate.  Using a sterile surgical marker, an appropriate rotation flap was drawn incorporating the defect and placing the expected incisions within the relaxed skin tension lines where possible. The area thus outlined was incised deep to adipose tissue with a #15 scalpel blade.  The skin margins were undermined to an appropriate distance in all directions utilizing iris scissors. Non-Graft Cartilage Fenestration Text: The cartilage was fenestrated with a 2mm punch biopsy to help facilitate healing. Anesthesia Volume In Cc: 6 Muscle Hinge Flap Text: The defect edges were debeveled with a #15 scalpel blade.  Given the size, depth and location of the defect and the proximity to free margins a muscle hinge flap was deemed most appropriate.  Using a sterile surgical marker, an appropriate hinge flap was drawn incorporating the defect. The area thus outlined was incised with a #15 scalpel blade.  The skin margins were undermined to an appropriate distance in all directions utilizing iris scissors. Location Indication Override (Is Already Calculated Based On Selected Body Location): Area H Melolabial Transposition Flap Text: The defect edges were debeveled with a #15 scalpel blade.  Given the location of the defect and the proximity to free margins a melolabial flap was deemed most appropriate.  Using a sterile surgical marker, an appropriate melolabial transposition flap was drawn incorporating the defect.    The area thus outlined was incised deep to adipose tissue with a #15 scalpel blade.  The skin margins were undermined to an appropriate distance in all directions utilizing iris scissors. A-T Advancement Flap Text: The defect edges were debeveled with a #15 scalpel blade.  Given the location of the defect, shape of the defect and the proximity to free margins an A-T advancement flap was deemed most appropriate.  Using a sterile surgical marker, an appropriate advancement flap was drawn incorporating the defect and placing the expected incisions within the relaxed skin tension lines where possible.    The area thus outlined was incised deep to adipose tissue with a #15 scalpel blade.  The skin margins were undermined to an appropriate distance in all directions utilizing iris scissors. Mercedes Flap Text: The defect edges were debeveled with a #15 scalpel blade.  Given the location of the defect, shape of the defect and the proximity to free margins a Mercedes flap was deemed most appropriate.  Using a sterile surgical marker, an appropriate advancement flap was drawn incorporating the defect and placing the expected incisions within the relaxed skin tension lines where possible. The area thus outlined was incised deep to adipose tissue with a #15 scalpel blade.  The skin margins were undermined to an appropriate distance in all directions utilizing iris scissors. Posterior Auricular Interpolation Flap Text: A decision was made to reconstruct the defect utilizing an interpolation axial flap and a staged reconstruction.  A telfa template was made of the defect.  This telfa template was then used to outline the posterior auricular interpolation flap.  The donor area for the pedicle flap was then injected with anesthesia.  The flap was excised through the skin and subcutaneous tissue down to the layer of the underlying musculature.  The pedicle flap was carefully excised within this deep plane to maintain its blood supply.  The edges of the donor site were undermined.   The donor site was closed in a primary fashion.  The pedicle was then rotated into position and sutured.  Once the tube was sutured into place, adequate blood supply was confirmed with blanching and refill.  The pedicle was then wrapped with xeroform gauze and dressed appropriately with a telfa and gauze bandage to ensure continued blood supply and protect the attached pedicle. Cheek-To-Nose Interpolation Flap Text: A decision was made to reconstruct the defect utilizing an interpolation axial flap and a staged reconstruction.  A telfa template was made of the defect.  This telfa template was then used to outline the Cheek-To-Nose Interpolation flap.  The donor area for the pedicle flap was then injected with anesthesia.  The flap was excised through the skin and subcutaneous tissue down to the layer of the underlying musculature.  The interpolation flap was carefully excised within this deep plane to maintain its blood supply.  The edges of the donor site were undermined.   The donor site was closed in a primary fashion.  The pedicle was then rotated into position and sutured.  Once the tube was sutured into place, adequate blood supply was confirmed with blanching and refill.  The pedicle was then wrapped with xeroform gauze and dressed appropriately with a telfa and gauze bandage to ensure continued blood supply and protect the attached pedicle. Rhombic Flap Text: The defect edges were debeveled with a #15 scalpel blade.  Given the location of the defect and the proximity to free margins a rhombic flap was deemed most appropriate.  Using a sterile surgical marker, an appropriate rhombic flap was drawn incorporating the defect.    The area thus outlined was incised deep to adipose tissue with a #15 scalpel blade.  The skin margins were undermined to an appropriate distance in all directions utilizing iris scissors. O-T Advancement Flap Text: The defect edges were debeveled with a #15 scalpel blade.  Given the location of the defect, shape of the defect and the proximity to free margins an O-T advancement flap was deemed most appropriate.  Using a sterile surgical marker, an appropriate advancement flap was drawn incorporating the defect and placing the expected incisions within the relaxed skin tension lines where possible.    The area thus outlined was incised deep to adipose tissue with a #15 scalpel blade.  The skin margins were undermined to an appropriate distance in all directions utilizing iris scissors. Subsequent Stages Histo Method Verbiage: Using a similar technique to that described above, a thin layer of tissue was removed from all areas where tumor was visible on the previous stage.  The tissue was again oriented, mapped, dyed, and processed as above. Crescentic Intermediate Repair Preamble Text (Leave Blank If You Do Not Want): Undermining was performed with blunt dissection. Epidermal Autograft Text: The defect edges were debeveled with a #15 scalpel blade.  Given the location of the defect, shape of the defect and the proximity to free margins an epidermal autograft was deemed most appropriate.  Using a sterile surgical marker, the primary defect shape was transferred to the donor site. The epidermal graft was then harvested.  The skin graft was then placed in the primary defect and oriented appropriately. Mucosal Advancement Flap Text: Given the location of the defect, shape of the defect and the proximity to free margins a mucosal advancement flap was deemed most appropriate. Incisions were made with a 15 blade scalpel in the appropriate fashion along the cutaneous vermilion border and the mucosal lip. The remaining actinically damaged mucosal tissue was excised.  The mucosal advancement flap was then elevated to the gingival sulcus with care taken to preserve the neurovascular structures and advanced into the primary defect. Care was taken to ensure that precise realignment of the vermilion border was achieved. Surgeon Performing Repair (Optional): Dacia Mccormick Xenograft Text: The defect edges were debeveled with a #15 scalpel blade.  Given the location of the defect, shape of the defect and the proximity to free margins a xenograft was deemed most appropriate.  The graft was then trimmed to fit the size of the defect.  The graft was then placed in the primary defect and oriented appropriately. Graft Cartilage Fenestration Text: The cartilage was fenestrated with a 2mm punch biopsy to help facilitate graft survival and healing. O-Z Plasty Text: The defect edges were debeveled with a #15 scalpel blade.  Given the location of the defect, shape of the defect and the proximity to free margins an O-Z plasty (double transposition flap) was deemed most appropriate.  Using a sterile surgical marker, the appropriate transposition flaps were drawn incorporating the defect and placing the expected incisions within the relaxed skin tension lines where possible.    The area thus outlined was incised deep to adipose tissue with a #15 scalpel blade.  The skin margins were undermined to an appropriate distance in all directions utilizing iris scissors.  Hemostasis was achieved with electrocautery.  The flaps were then transposed into place, one clockwise and the other counterclockwise, and anchored with interrupted buried subcutaneous sutures. Partial Purse String (Intermediate) Text: Given the location of the defect and the characteristics of the surrounding skin an intermediate purse string closure was deemed most appropriate.  Undermining was performed circumfirentially around the surgical defect.  A purse string suture was then placed and tightened. Wound tension only allowed a partial closure of the circular defect. Ear Wedge Repair Text: A wedge excision was completed by carrying down an excision through the full thickness of the ear and cartilage with an inward facing Burow's triangle. The wound was then closed in a layered fashion. Eye Protection Verbiage: Before proceeding with the stage, a plastic scleral shield was inserted. The globe was anesthetized with a few drops of 1% lidocaine with 1:100,000 epinephrine. Then, an appropriate sized scleral shield was chosen and coated with lacrilube ointment. The shield was gently inserted and left in place for the duration of each stage. After the stage was completed, the shield was gently removed. Area L Indication Text: Tumors in this location are included in Area L (trunk and extremities).  Mohs surgery is indicated for larger tumors, or tumors with aggressive histologic features, in these anatomic locations. Area H Indication Text: Tumors in this location are included in Area H (eyelids, eyebrows, nose, lips, chin, ear, pre-auricular, post-auricular, temple, genitalia, hands, feet, ankles and areola).  Tissue conservation is critical in these anatomic locations. Manual Repair Warning Statement: We plan on removing the manually selected variable below in favor of our much easier automatic structured text blocks found in the previous tab. We decided to do this to help make the flow better and give you the full power of structured data. Manual selection is never going to be ideal in our platform and I would encourage you to avoid using manual selection from this point on, especially since I will be sunsetting this feature. It is important that you do one of two things with the customized text below. First, you can save all of the text in a word file so you can have it for future reference. Second, transfer the text to the appropriate area in the Library tab. Lastly, if there is a flap or graft type which we do not have you need to let us know right away so I can add it in before the variable is hidden. No need to panic, we plan to give you roughly 6 months to make the change. Banner Transposition Flap Text: The defect edges were debeveled with a #15 scalpel blade.  Given the location of the defect and the proximity to free margins a Banner transposition flap was deemed most appropriate.  Using a sterile surgical marker, an appropriate flap drawn around the defect. The area thus outlined was incised deep to adipose tissue with a #15 scalpel blade.  The skin margins were undermined to an appropriate distance in all directions utilizing iris scissors. Estimated Blood Loss (Cc): minimal Closure 2 Information: This tab is for additional flaps and grafts, including complex repair and grafts and complex repair and flaps. You can also specify a different location for the additional defect, if the location is the same you do not need to select a new one. We will insert the automated text for the repair you select below just as we do for solitary flaps and grafts. Please note that at this time if you select a location with a different insurance zone you will need to override the ICD10 and CPT if appropriate. Keystone Flap Text: The defect edges were debeveled with a #15 scalpel blade.  Given the location of the defect, shape of the defect a keystone flap was deemed most appropriate.  Using a sterile surgical marker, an appropriate keystone flap was drawn incorporating the defect, outlining the appropriate donor tissue and placing the expected incisions within the relaxed skin tension lines where possible. The area thus outlined was incised deep to adipose tissue with a #15 scalpel blade.  The skin margins were undermined to an appropriate distance in all directions around the primary defect and laterally outward around the flap utilizing iris scissors. Detail Level: Detailed Epidermal Closure: running Surgeon/Pathologist Verbiage (Will Incorporate Name Of Surgeon From Intro If Not Blank): operated in two distinct and integrated capacities as the surgeon and pathologist. Same Histology In Subsequent Stages Text: The pattern and morphology of the tumor is as described in the first stage. Epidermal Sutures: 5-0 Fast Absorbing Gut Hemostasis: Electrocautery Closure 3 Information: This tab is for additional flaps and grafts above and beyond our usual structured repairs.  Please note if you enter information here it will not currently bill and you will need to add the billing information manually. Rotation Flap Text: The defect edges were debeveled with a #15 scalpel blade.  Given the location of the defect, shape of the defect and the proximity to free margins a rotation flap was deemed most appropriate.  Using a sterile surgical marker, an appropriate rotation flap was drawn incorporating the defect and placing the expected incisions within the relaxed skin tension lines where possible.    The area thus outlined was incised deep to adipose tissue with a #15 scalpel blade.  The skin margins were undermined to an appropriate distance in all directions utilizing iris scissors. O-T Plasty Text: The defect edges were debeveled with a #15 scalpel blade.  Given the location of the defect, shape of the defect and the proximity to free margins an O-T plasty was deemed most appropriate.  Using a sterile surgical marker, an appropriate O-T plasty was drawn incorporating the defect and placing the expected incisions within the relaxed skin tension lines where possible.    The area thus outlined was incised deep to adipose tissue with a #15 scalpel blade.  The skin margins were undermined to an appropriate distance in all directions utilizing iris scissors. Repair Anesthesia Method: local infiltration X Size Of Lesion In Cm (Optional): 1.3 Consent (Lip)/Introductory Paragraph: The rationale for Mohs was explained to the patient and consent was obtained. The risks, benefits and alternatives to therapy were discussed in detail. Specifically, the risks of lip deformity, changes in the oral aperture, infection, scarring, bleeding, prolonged wound healing, incomplete removal, allergy to anesthesia, nerve injury and recurrence were addressed. Prior to the procedure, the treatment site was clearly identified and confirmed by the patient. All components of Universal Protocol/PAUSE Rule completed. Inflammation Suggestive Of Cancer Camouflage Histology Text: There was a dense lymphocytic infiltrate which prevented adequate histologic evaluation of adjacent structures. Dorsal Nasal Flap Text: The defect edges were debeveled with a #15 scalpel blade.  Given the location of the defect and the proximity to free margins a dorsal nasal flap was deemed most appropriate.  Using a sterile surgical marker, an appropriate dorsal nasal flap was drawn around the defect.    The area thus outlined was incised deep to adipose tissue with a #15 scalpel blade.  The skin margins were undermined to an appropriate distance in all directions utilizing iris scissors. Consent (Nose)/Introductory Paragraph: The rationale for Mohs was explained to the patient and consent was obtained. The risks, benefits and alternatives to therapy were discussed in detail. Specifically, the risks of nasal deformity, changes in the flow of air through the nose, infection, scarring, bleeding, prolonged wound healing, incomplete removal, allergy to anesthesia, nerve injury and recurrence were addressed. Prior to the procedure, the treatment site was clearly identified and confirmed by the patient. All components of Universal Protocol/PAUSE Rule completed. Ear Star Wedge Flap Text: The defect edges were debeveled with a #15 blade scalpel.  Given the location of the defect and the proximity to free margins (helical rim) an ear star wedge flap was deemed most appropriate.  Using a sterile surgical marker, the appropriate flap was drawn incorporating the defect and placing the expected incisions between the helical rim and antihelix where possible.  The area thus outlined was incised through and through with a #15 scalpel blade. Mastoid Interpolation Flap Text: A decision was made to reconstruct the defect utilizing an interpolation axial flap and a staged reconstruction.  A telfa template was made of the defect.  This telfa template was then used to outline the mastoid interpolation flap.  The donor area for the pedicle flap was then injected with anesthesia.  The flap was excised through the skin and subcutaneous tissue down to the layer of the underlying musculature.  The pedicle flap was carefully excised within this deep plane to maintain its blood supply.  The edges of the donor site were undermined.   The donor site was closed in a primary fashion.  The pedicle was then rotated into position and sutured.  Once the tube was sutured into place, adequate blood supply was confirmed with blanching and refill.  The pedicle was then wrapped with xeroform gauze and dressed appropriately with a telfa and gauze bandage to ensure continued blood supply and protect the attached pedicle. Bilobed Flap Text: The defect edges were debeveled with a #15 scalpel blade.  Given the location of the defect and the proximity to free margins a bilobe flap was deemed most appropriate.  Using a sterile surgical marker, an appropriate bilobe flap drawn around the defect.    The area thus outlined was incised deep to adipose tissue with a #15 scalpel blade.  The skin margins were undermined to an appropriate distance in all directions utilizing iris scissors. Trilobed Flap Text: The defect edges were debeveled with a #15 scalpel blade.  Given the location of the defect and the proximity to free margins a trilobed flap was deemed most appropriate.  Using a sterile surgical marker, an appropriate trilobed flap drawn around the defect.    The area thus outlined was incised deep to adipose tissue with a #15 scalpel blade.  The skin margins were undermined to an appropriate distance in all directions utilizing iris scissors. Consent (Temporal Branch)/Introductory Paragraph: The rationale for Mohs was explained to the patient and consent was obtained. The risks, benefits and alternatives to therapy were discussed in detail. Specifically, the risks of damage to the temporal branch of the facial nerve, infection, scarring, bleeding, prolonged wound healing, incomplete removal, allergy to anesthesia, and recurrence were addressed. Prior to the procedure, the treatment site was clearly identified and confirmed by the patient. All components of Universal Protocol/PAUSE Rule completed. Burow's Advancement Flap Text: The defect edges were debeveled with a #15 scalpel blade.  Given the location of the defect and the proximity to free margins a Burow's advancement flap was deemed most appropriate.  Using a sterile surgical marker, the appropriate advancement flap was drawn incorporating the defect and placing the expected incisions within the relaxed skin tension lines where possible.    The area thus outlined was incised deep to adipose tissue with a #15 scalpel blade.  The skin margins were undermined to an appropriate distance in all directions utilizing iris scissors. Secondary Intention Text (Leave Blank If You Do Not Want): The defect will heal with secondary intention. Island Pedicle Flap With Canthal Suspension Text: The defect edges were debeveled with a #15 scalpel blade.  Given the location of the defect, shape of the defect and the proximity to free margins an island pedicle advancement flap was deemed most appropriate.  Using a sterile surgical marker, an appropriate advancement flap was drawn incorporating the defect, outlining the appropriate donor tissue and placing the expected incisions within the relaxed skin tension lines where possible. The area thus outlined was incised deep to adipose tissue with a #15 scalpel blade.  The skin margins were undermined to an appropriate distance in all directions around the primary defect and laterally outward around the island pedicle utilizing iris scissors.  There was minimal undermining beneath the pedicle flap. A suspension suture was placed in the canthal tendon to prevent tension and prevent ectropion. Advancement Flap (Single) Text: The defect edges were debeveled with a #15 scalpel blade.  Given the location of the defect and the proximity to free margins a single advancement flap was deemed most appropriate.  Using a sterile surgical marker, an appropriate advancement flap was drawn incorporating the defect and placing the expected incisions within the relaxed skin tension lines where possible.    The area thus outlined was incised deep to adipose tissue with a #15 scalpel blade.  The skin margins were undermined to an appropriate distance in all directions utilizing iris scissors. Stage 2: Additional Anesthesia Type: 1% lidocaine with epinephrine and a 1:10 solution of 8.4% sodium bicarbonate Postop Diagnosis: same V-Y Flap Text: The defect edges were debeveled with a #15 scalpel blade.  Given the location of the defect, shape of the defect and the proximity to free margins a V-Y flap was deemed most appropriate.  Using a sterile surgical marker, an appropriate advancement flap was drawn incorporating the defect and placing the expected incisions within the relaxed skin tension lines where possible.    The area thus outlined was incised deep to adipose tissue with a #15 scalpel blade.  The skin margins were undermined to an appropriate distance in all directions utilizing iris scissors. Melolabial Interpolation Flap Text: A decision was made to reconstruct the defect utilizing an interpolation axial flap and a staged reconstruction.  A telfa template was made of the defect.  This telfa template was then used to outline the melolabial interpolation flap.  The donor area for the pedicle flap was then injected with anesthesia.  The flap was excised through the skin and subcutaneous tissue down to the layer of the underlying musculature.  The pedicle flap was carefully excised within this deep plane to maintain its blood supply.  The edges of the donor site were undermined.   The donor site was closed in a primary fashion.  The pedicle was then rotated into position and sutured.  Once the tube was sutured into place, adequate blood supply was confirmed with blanching and refill.  The pedicle was then wrapped with xeroform gauze and dressed appropriately with a telfa and gauze bandage to ensure continued blood supply and protect the attached pedicle. Tissue Cultured Epidermal Autograft Text: The defect edges were debeveled with a #15 scalpel blade.  Given the location of the defect, shape of the defect and the proximity to free margins a tissue cultured epidermal autograft was deemed most appropriate.  The graft was then trimmed to fit the size of the defect.  The graft was then placed in the primary defect and oriented appropriately. Consent (Near Eyelid Margin)/Introductory Paragraph: The rationale for Mohs was explained to the patient and consent was obtained. The risks, benefits and alternatives to therapy were discussed in detail. Specifically, the risks of ectropion or eyelid deformity, infection, scarring, bleeding, prolonged wound healing, incomplete removal, allergy to anesthesia, nerve injury and recurrence were addressed. Prior to the procedure, the treatment site was clearly identified and confirmed by the patient. All components of Universal Protocol/PAUSE Rule completed. Medical Necessity Statement: Based on my medical judgement, Mohs surgery is the most appropriate treatment for this cancer compared to other treatments. Purse String (Simple) Text: Given the location of the defect and the characteristics of the surrounding skin a purse string closure was deemed most appropriate.  Undermining was performed circumfirentially around the surgical defect.  A purse string suture was then placed and tightened. Consent Type: Consent 1 (Standard) Ftsg Text: The defect edges were debeveled with a #15 scalpel blade.  Given the location of the defect, shape of the defect and the proximity to free margins a full thickness skin graft was deemed most appropriate.  Using a sterile surgical marker, the primary defect shape was transferred to the donor site. The area thus outlined was incised deep to adipose tissue with a #15 scalpel blade.  The harvested graft was then trimmed of adipose tissue until only dermis and epidermis was left.  The skin margins of the secondary defect were undermined to an appropriate distance in all directions utilizing iris scissors.  The secondary defect was closed with interrupted buried subcutaneous sutures.  The skin edges were then re-apposed with running  sutures.  The skin graft was then placed in the primary defect and oriented appropriately. Modified Advancement Flap Text: The defect edges were debeveled with a #15 scalpel blade.  Given the location of the defect, shape of the defect and the proximity to free margins a modified advancement flap was deemed most appropriate.  Using a sterile surgical marker, an appropriate advancement flap was drawn incorporating the defect and placing the expected incisions within the relaxed skin tension lines where possible.    The area thus outlined was incised deep to adipose tissue with a #15 scalpel blade.  The skin margins were undermined to an appropriate distance in all directions utilizing iris scissors. Skin Substitute Text: The defect edges were debeveled with a #15 scalpel blade.  Given the location of the defect, shape of the defect and the proximity to free margins a skin substitute graft was deemed most appropriate.  The graft material was trimmed to fit the size of the defect. The graft was then placed in the primary defect and oriented appropriately. Paramedian Forehead Flap Text: A decision was made to reconstruct the defect utilizing an interpolation axial flap and a staged reconstruction.  A telfa template was made of the defect.  This telfa template was then used to outline the paramedian forehead pedicle flap.  The donor area for the pedicle flap was then injected with anesthesia.  The flap was excised through the skin and subcutaneous tissue down to the layer of the underlying musculature.  The pedicle flap was carefully excised within this deep plane to maintain its blood supply.  The edges of the donor site were undermined.   The donor site was closed in a primary fashion.  The pedicle was then rotated into position and sutured.  Once the tube was sutured into place, adequate blood supply was confirmed with blanching and refill.  The pedicle was then wrapped with xeroform gauze and dressed appropriately with a telfa and gauze bandage to ensure continued blood supply and protect the attached pedicle. Post-Care Instructions: I reviewed with the patient in detail post-care instructions. Patient is not to engage in any heavy lifting, exercise, or swimming for the next 14 days. Should the patient develop any fevers, chills, bleeding, severe pain patient will contact the office immediately. Dermal Autograft Text: The defect edges were debeveled with a #15 scalpel blade.  Given the location of the defect, shape of the defect and the proximity to free margins a dermal autograft was deemed most appropriate.  Using a sterile surgical marker, the primary defect shape was transferred to the donor site. The area thus outlined was incised deep to adipose tissue with a #15 scalpel blade.  The harvested graft was then trimmed of adipose and epidermal tissue until only dermis was left.  The skin graft was then placed in the primary defect and oriented appropriately. Simple / Intermediate / Complex Repair - Final Wound Length In Cm: 5.6 Area M Indication Text: Tumors in this location are included in Area M (cheek, forehead, scalp, neck, jawline and pretibial skin).  Mohs surgery is indicated for tumors in these anatomic locations. Complex Repair And Graft Additional Text (Will Appearing After The Standard Complex Repair Text): The complex repair was not sufficient to completely close the primary defect. The remaining additional defect was repaired with the graft mentioned below. Star Wedge Flap Text: The defect edges were debeveled with a #15 scalpel blade.  Given the location of the defect, shape of the defect and the proximity to free margins a star wedge flap was deemed most appropriate.  Using a sterile surgical marker, an appropriate rotation flap was drawn incorporating the defect and placing the expected incisions within the relaxed skin tension lines where possible. The area thus outlined was incised deep to adipose tissue with a #15 scalpel blade.  The skin margins were undermined to an appropriate distance in all directions utilizing iris scissors. Island Pedicle Flap Text: The defect edges were debeveled with a #15 scalpel blade.  Given the location of the defect, shape of the defect and the proximity to free margins an island pedicle advancement flap was deemed most appropriate.  Using a sterile surgical marker, an appropriate advancement flap was drawn incorporating the defect, outlining the appropriate donor tissue and placing the expected incisions within the relaxed skin tension lines where possible.    The area thus outlined was incised deep to adipose tissue with a #15 scalpel blade.  The skin margins were undermined to an appropriate distance in all directions around the primary defect and laterally outward around the island pedicle utilizing iris scissors.  There was minimal undermining beneath the pedicle flap. Mohs Histo Method Verbiage: Each section was then chromacoded and processed in the Mohs lab using the Mohs protocol and submitted for frozen section. Consent 2/Introductory Paragraph: Mohs surgery was explained to the patient and consent was obtained. The risks, benefits and alternatives to therapy were discussed in detail. Specifically, the risks of infection, scarring, bleeding, prolonged wound healing, incomplete removal, allergy to anesthesia, nerve injury and recurrence were addressed. Prior to the procedure, the treatment site was clearly identified and confirmed by the patient. All components of Universal Protocol/PAUSE Rule completed. Cheiloplasty (Complex) Text: A decision was made to reconstruct the defect with a  cheiloplasty.  The defect was undermined extensively.  Additional obicularis oris muscle was excised with a 15 blade scalpel.  The defect was converted into a full thickness wedge to facilite a better cosmetic result.  Small vessels were then tied off with 5-0 monocyrl. The obicularis oris, superficial fascia, adipose and dermis were then reapproximated.  After the deeper layers were approximated the epidermis was reapproximated with particular care given to realign the vermilion border. Bcc Infiltrative Histology Text: There were numerous aggregates of basaloid cells demonstrating an infiltrative pattern. Repair Type: Complex Repair Alternatives Discussed Intro (Do Not Add Period): I discussed alternative treatments to Mohs surgery and specifically discussed the risks and benefits of Mohs Case Number: [id number] Cheiloplasty (Less Than 50%) Text: A decision was made to reconstruct the defect with a  cheiloplasty.  The defect was undermined extensively.  Additional obicularis oris muscle was excised with a 15 blade scalpel.  The defect was converted into a full thickness wedge, of less than 50% of the vertical height of the lip, to facilite a better cosmetic result.  Small vessels were then tied off with 5-0 monocyrl. The obicularis oris, superficial fascia, adipose and dermis were then reapproximated.  After the deeper layers were approximated the epidermis was reapproximated with particular care given to realign the vermilion border. Split-Thickness Skin Graft Text: The defect edges were debeveled with a #15 scalpel blade.  Given the location of the defect, shape of the defect and the proximity to free margins a split thickness skin graft was deemed most appropriate.  Using a sterile surgical marker, the primary defect shape was transferred to the donor site. The split thickness graft was then harvested.  The skin graft was then placed in the primary defect and oriented appropriately. Consent 3/Introductory Paragraph: I gave the patient a chance to ask questions they had about the procedure.  Following this I explained the Mohs procedure and consent was obtained. The risks, benefits and alternatives to therapy were discussed in detail. Specifically, the risks of infection, scarring, bleeding, prolonged wound healing, incomplete removal, allergy to anesthesia, nerve injury and recurrence were addressed. Prior to the procedure, the treatment site was clearly identified and confirmed by the patient. All components of Universal Protocol/PAUSE Rule completed. No Residual Tumor Seen Histology Text: There were no malignant cells seen in the sections examined. Surgical Defect Width In Cm (Optional): 1.5 Dressing: pressure dressing with telfa No Repair - Repaired With Adjacent Surgical Defect Text (Leave Blank If You Do Not Want): After obtaining clear surgical margins the defect was repaired concurrently with another surgical defect which was in close approximation. Epidermal Closure Graft Donor Site (Optional): simple interrupted Composite Graft Text: The defect edges were debeveled with a #15 scalpel blade.  Given the location of the defect, shape of the defect, the proximity to free margins and the fact the defect was full thickness a composite graft was deemed most appropriate.  The defect was outline and then transferred to the donor site.  A full thickness graft was then excised from the donor site. The graft was then placed in the primary defect, oriented appropriately and then sutured into place.  The secondary defect was then repaired using a primary closure. Tarsorrhaphy Text: A tarsorrhaphy was performed using Frost sutures. Z Plasty Text: The lesion was extirpated to the level of the fat with a #15 scalpel blade.  Given the location of the defect, shape of the defect and the proximity to free margins a Z-plasty was deemed most appropriate for repair.  Using a sterile surgical marker, the appropriate transposition arms of the Z-plasty were drawn incorporating the defect and placing the expected incisions within the relaxed skin tension lines where possible.    The area thus outlined was incised deep to adipose tissue with a #15 scalpel blade.  The skin margins were undermined to an appropriate distance in all directions utilizing iris scissors.  The opposing transposition arms were then transposed into place in opposite direction and anchored with interrupted buried subcutaneous sutures. Consent 1/Introductory Paragraph: The rationale for Mohs was explained to the patient and consent was obtained. The risks, benefits and alternatives to therapy were discussed in detail. Specifically, the risks of infection, scarring, bleeding, prolonged wound healing, incomplete removal, allergy to anesthesia, nerve injury and recurrence were addressed. Prior to the procedure, the treatment site was clearly identified and confirmed by the patient. All components of Universal Protocol/PAUSE Rule completed. Mohs Rapid Report Verbiage: The area of clinically evident tumor was marked with skin marking ink and appropriately hatched.  The initial incision was made following the Mohs approach through the skin.  The specimen was taken to the lab, divided into the necessary number of pieces, chromacoded and processed according to the Mohs protocol.  This was repeated in successive stages until a tumor free defect was achieved. Bi-Rhombic Flap Text: The defect edges were debeveled with a #15 scalpel blade.  Given the location of the defect and the proximity to free margins a bi-rhombic flap was deemed most appropriate.  Using a sterile surgical marker, an appropriate rhombic flap was drawn incorporating the defect. The area thus outlined was incised deep to adipose tissue with a #15 scalpel blade.  The skin margins were undermined to an appropriate distance in all directions utilizing iris scissors. Cheek Interpolation Flap Text: A decision was made to reconstruct the defect utilizing an interpolation axial flap and a staged reconstruction.  A telfa template was made of the defect.  This telfa template was then used to outline the Cheek Interpolation flap.  The donor area for the pedicle flap was then injected with anesthesia.  The flap was excised through the skin and subcutaneous tissue down to the layer of the underlying musculature.  The interpolation flap was carefully excised within this deep plane to maintain its blood supply.  The edges of the donor site were undermined.   The donor site was closed in a primary fashion.  The pedicle was then rotated into position and sutured.  Once the tube was sutured into place, adequate blood supply was confirmed with blanching and refill.  The pedicle was then wrapped with xeroform gauze and dressed appropriately with a telfa and gauze bandage to ensure continued blood supply and protect the attached pedicle.

## 2019-12-09 NOTE — TELEPHONE ENCOUNTER
Spoke with Daja in [facility]. Pt will be started on Eliquis 5mg BID. Test claim price comes to $47 for 30-day supply.

## 2019-12-09 NOTE — CONSULTS
[facility] Short Stay Cardiac Unit  Cardiology  Consult Note    Patient Name: Amish Grossman  MRN: [id number]  Admission Date: 12/9/2019  Hospital Length of Stay: 0 days  Code Status: Prior   Attending Provider: Camron Isbell MD   Consulting Provider: Melissa Busch MD  Primary Care Physician: Angel Coburn MD    Patient information was obtained from patient and ER records.     Consults  Subjective:     HPI: 73 year old male with history of CAD, HTN, CKD, Rv thrombus on coumadin, VT s/p ICD and persistent atrial fibrillation who presents for NOE prior to his PVI.     Dysphagia or odynophagia:  No  Liver Disease, esophageal disease, or known varices:  No  Upper GI Bleeding: No  Snoring:  No  Sleep Apnea:  No  Prior neck surgery or radiation:  No  History of anesthetic difficulties:  No  Family history of anesthetic difficulties:  No  Last oral intake:  12 hours ago  Able to move neck in all directions:  Yes    NOE (10/31/19):   Severely decreased left ventricular systolic function. The EF low to mid 20s with large ines-pical septal RWMAs.  · Severe global hypokinetic wall motion.  · Normal right ventricular systolic function.  · Mild mitral regurgitation.  · Grade 2 plaque present.  · Normal appearing left atrial appendage. No thrombus is present in the appendage. Normal appendage velocities.    TTE (06/06/2018):     1 - Severely depressed LVEF 25-30%     2 - Impaired LV relaxation, normal LAP (grade 1 diastolic dysfunction).     3 - Mild mitral regurgitation.     4 - Mild tricuspid regurgitation.    5 - The estimated PA systolic pressure is 24 mmHg.     Past Medical History:   Diagnosis Date    Anticoagulant long-term use     Atrial fibrillation     Benign essential tremor 6/6/2018    Cardiomyopathy     CHF (congestive heart failure)     Coronary artery disease     Hyperlipidemia     Hypertension     Left ventricular thrombus     Syncope and collapse     Thyroid disease        Past  Surgical History:   Procedure Laterality Date    CARDIAC CATHETERIZATION      CARDIAC DEFIBRILLATOR PLACEMENT      CORONARY ANGIOPLASTY      HERNIA REPAIR      KNEE ARTHROSCOPY      REPLACEMENT OF IMPLANTABLE CARDIOVERTER-DEFIBRILLATOR (ICD) GENERATOR Left 6/7/2018    Procedure: REPLACEMENT-GENERATOR-ICD;  Surgeon: Camron Isbell MD;  Location: [facility] CATH LAB;  Service: Cardiology;  Laterality: Left;  GENIE, DUAL ICD GEN CHG, SJM, MAC, DM, 3 PREP    TRANSESOPHAGEAL ECHOCARDIOGRAPHY N/A 10/31/2019    Procedure: ECHOCARDIOGRAM, TRANSESOPHAGEAL;  Surgeon: [facility] Provider;  Location: [facility] EP LAB;  Service: Cardiology;  Laterality: N/A;  AF, NOE, DCCV, MAC, DM, 3 PREP    TREATMENT OF CARDIAC ARRHYTHMIA N/A 10/31/2019    Procedure: CARDIOVERSION;  Surgeon: Camron Isbell MD;  Location: [facility] EP LAB;  Service: Cardiology;  Laterality: N/A;  AF, NOE, DCCV, MAC, DM, 3 PREP*SJM  Dual ICD in situ*    VASCULAR SURGERY      stents placed       Review of patient's allergies indicates:  No Known Allergies    Current Facility-Administered Medications on File Prior to Encounter   Medication    0.9%  NaCl infusion    sodium chloride 0.9% flush 5 mL     Current Outpatient Medications on File Prior to Encounter   Medication Sig    amLODIPine (NORVASC) 5 MG tablet Take 1 tablet (5 mg total) by mouth once daily.    ascorbic acid (VITAMIN C) 100 MG tablet Take 1,000 mg by mouth once daily.     aspirin (ECOTRIN) 81 MG EC tablet Take 81 mg by mouth once daily.    atorvastatin (LIPITOR) 10 MG tablet Take 1 tablet (10 mg total) by mouth once daily.    carvedilol (COREG) 12.5 MG tablet Take 1 tablet (12.5 mg total) by mouth 2 (two) times daily with meals.    fish oil-omega-3 fatty acids 300-1,000 mg capsule Take 2 g by mouth 2 (two) times daily.     levothyroxine (SYNTHROID) 75 MCG tablet Take 75 mcg by mouth before breakfast.     multivitamin capsule Take 1 capsule by mouth nightly.     SLO-NIACIN 750 mg TbSR Take  1 tablet (750 mg total) by mouth 2 (two) times daily. (Patient taking differently: Take 500 mg by mouth 2 (two) times daily. )    warfarin (COUMADIN) 5 MG tablet 2.5 mg every Mon, Wed, Fri.     warfarin (COUMADIN) 5 MG tablet Take 5 mg by mouth every Tues, Thurs, Sat.    warfarin (COUMADIN) 5 MG tablet Take 5 mg by mouth every Sunday.    furosemide (LASIX) 40 MG tablet Take 40 mg by mouth as needed.    nitroGLYCERIN (NITROSTAT) 0.4 MG SL tablet Place 1 tablet (0.4 mg total) under the tongue every 5 (five) minutes as needed for Chest pain.    omeprazole (PRILOSEC) 20 MG capsule Take 20 mg by mouth nightly.     sulfamethoxazole-trimethoprim 800-160mg (BACTRIM DS) 800-160 mg Tab Take 1 tablet by mouth 2 (two) times daily.     Family History     Problem Relation (Age of Onset)    Heart disease Mother, Father, Brother        Tobacco Use    Smoking status: Never Smoker    Smokeless tobacco: Never Used   Substance and Sexual Activity    Alcohol use: Yes     Comment: occasionally    Drug use: No    Sexual activity: Not on file     Review of Systems   Constitution: Negative for chills and fever.   HENT: Negative for congestion.    Eyes: Negative for blurred vision.   Cardiovascular: Negative for leg swelling, near-syncope, orthopnea, palpitations, paroxysmal nocturnal dyspnea and syncope.   Respiratory: Negative for cough.    Gastrointestinal: Negative for abdominal pain, nausea and vomiting.   Neurological: Negative for dizziness, focal weakness, headaches, light-headedness and weakness.     Objective:     Vital Signs (Most Recent):  Temp: 96.9 °F (36.1 °C) (12/09/19 0834)  Pulse: 60 (12/09/19 0834)  Resp: 18 (12/09/19 0834)  BP: 130/81 (12/09/19 0835)  SpO2: (!) 93 % (12/09/19 0834) Vital Signs (24h Range):  Temp:  [96.9 °F (36.1 °C)] 96.9 °F (36.1 °C)  Pulse:  [60] 60  Resp:  [18] 18  SpO2:  [93 %] 93 %  BP: (130-140)/(81-87) 130/81     Weight: 108.9 kg (240 lb)  Body mass index is 30.81 kg/m².    SpO2: (!) 93  %  O2 Device (Oxygen Therapy): room air    No intake or output data in the 24 hours ending 12/09/19 0851    Lines/Drains/Airways     None                 Physical Exam   Constitutional: He is oriented to person, place, and time. He appears well-developed and well-nourished. No distress.   HENT:   Mouth/Throat: Oropharynx is clear and moist.   Eyes: Pupils are equal, round, and reactive to light.   Cardiovascular: Intact distal pulses.   Pulmonary/Chest: Effort normal and breath sounds normal.   Musculoskeletal: He exhibits no edema.   Neurological: He is alert and oriented to person, place, and time.   Skin: Skin is warm and dry. He is not diaphoretic.   Psychiatric: He has a normal mood and affect. His behavior is normal.   Vitals reviewed.      Significant Labs: All pertinent lab results from the last 24 hours have been reviewed.    Significant Imaging: Echocardiogram:   2D echo with color flow doppler:   Results for orders placed or performed during the hospital encounter of 06/06/18   2D echo with color flow doppler   Result Value Ref Range    QEF 25 (A) 55 - 65    Mitral Valve Regurgitation MILD     Diastolic Dysfunction Yes (A)     Aortic Valve Regurgitation TRIVIAL     Est. PA Systolic Pressure 24.16     Tricuspid Valve Regurgitation MILD     Narrative    Date of Procedure: 06/06/2018        TEST DESCRIPTION   Technical Quality: This is a technically adequate study. This study was performed in conjunction with a 3ml intravenous injection of Optison contrast agent.     General: A catheter is present in the right-sided cardiac chambers.     Aorta: The aortic root is mildly enlarged, measuring 3.3 cm at sinotubular junction and 4.0 cm at Sinuses of Valsalva. The proximal ascending aorta is normal in size, measuring 3.8 cm across.     Left Atrium: The left atrial volume index is severely enlarged, measuring 51.92 cc/m2.     Left Ventricle: The left ventricle is normal in size, with an end-diastolic diameter of  6.0 cm, and an end-systolic diameter of 5.1 cm. LV wall thickness is normal, with the septum measuring 0.9 cm and the posterior wall measuring 0.7 cm across. Relative   wall thickness was normal at 0.23, and the LV mass index was 90.6 g/m2 consistent with normal left ventricular mass. The apex is akinetic.   The following segments were akinetic: mid anteroseptum, apical septum, apical anterior wall, mid anterior wall.  There is global hypokinesis. Left ventricular systolic function appears severely depressed. Visually estimated ejection fraction is 25-30%. The LV Doppler derived stroke volume equals 54.0 ccs.     Diastolic indices: E wave velocity 0.6 m/s, E/A ratio 0.8,  msec., E/e' ratio(avg) 12. There is diastolic dysfunction secondary to relaxation abnormality.     Right Atrium: The right atrium is normal in size, measuring 5.9 cm in length and 4.6 cm in width in the apical view.     Right Ventricle: The right ventricle is normal in size measuring 4.2 cm at the base in the apical right ventricle-focused view. Global right ventricular systolic function appears normal. Tricuspid annular plane systolic excursion (TAPSE) is 2.0 cm.   Tissue Doppler-derived tricuspid annular peak systolic velocity (S prime) is 11.3 cm/s. The estimated PA systolic pressure is 24 mmHg.     Aortic Valve:  The aortic valve is normal in structure. The aortic valve is tri-leaflet in structure. Additionally, there is trivial aortic regurgitation.     Mitral Valve:  The mitral valve is normal in structure. There is mild mitral regurgitation.     Tricuspid Valve:  The tricuspid valve is normal in structure. There is mild tricuspid regurgitation.     Pulmonary Valve:  The pulmonic valve is normal in structure.     IVC: IVC is normal in size and collapses > 50% with a sniff, suggesting normal right atrial pressure of 3 mmHg.     Intracavitary: There is no evidence of pericardial effusion, intracavity mass, thrombi, or vegetation.          CONCLUSIONS     1 - Severely depressed left ventricular systolic function (EF 25-30%).     2 - Impaired LV relaxation, normal LAP (grade 1 diastolic dysfunction).     3 - Mild mitral regurgitation.     4 - Mild tricuspid regurgitation.     5 - The estimated PA systolic pressure is 24 mmHg.             This document has been electronically    SIGNED BY: Sun Merchant MD On: 06/06/2018 12:34     Assessment and Plan:     Persistent atrial fibrillation  1. NOE for evaluation of PRISCA  -No absolute contraindications of esophageal stricture, tumor, perforation, laceration,or diverticulum and/or active GI bleed  -The risks, benefits & alternatives of the procedure were explained to the patient.   -The risks of transesophageal echo include but are not limited to:  Dental trauma, esophageal trauma/perforation, bleeding, laryngospasm/brochospasm, aspiration, sore throat/hoarseness, & dislodgement of the endotracheal tube/nasogastric tube (where applicable).    -The risks of moderate sedation include hypotension, respiratory depression, arrhythmias, bronchospasm, & death.    -Informed consent was obtained. The patient is agreeable to proceed with the procedure and all questions and concerns addressed.    Case discussed with an attending in echocardiography lab.     Further recommendations per attending addendum    Thank you for your consult.     Melissa Busch MD  Cardiology Fellow, PGY-5   [facility] Cardiac [facility]

## 2019-12-09 NOTE — SUBJECTIVE & OBJECTIVE
Past Medical History:   Diagnosis Date    Anticoagulant long-term use     Atrial fibrillation     Benign essential tremor 6/6/2018    Cardiomyopathy     CHF (congestive heart failure)     Coronary artery disease     Hyperlipidemia     Hypertension     Left ventricular thrombus     Syncope and collapse     Thyroid disease      A paced at 60 bpm. . . QTc 416.  Past Surgical History:   Procedure Laterality Date    CARDIAC CATHETERIZATION      CARDIAC DEFIBRILLATOR PLACEMENT      CORONARY ANGIOPLASTY      HERNIA REPAIR      KNEE ARTHROSCOPY      REPLACEMENT OF IMPLANTABLE CARDIOVERTER-DEFIBRILLATOR (ICD) GENERATOR Left 6/7/2018    Procedure: REPLACEMENT-GENERATOR-ICD;  Surgeon: Camron Isbell MD;  Location: [facility] CATH [facility];  Service: Cardiology;  Laterality: Left;  GENIE, DUAL ICD GEN CHG, SJM, MAC, DM, 3 PREP    TRANSESOPHAGEAL ECHOCARDIOGRAPHY N/A 10/31/2019    Procedure: ECHOCARDIOGRAM, TRANSESOPHAGEAL;  Surgeon: [facility] Diagnostic Provider;  Location: [facility] EP LAB;  Service: Cardiology;  Laterality: N/A;  AF, NOE, DCCV, MAC, DM, 3 PREP    TREATMENT OF CARDIAC ARRHYTHMIA N/A 10/31/2019    Procedure: CARDIOVERSION;  Surgeon: Camron Isbell MD;  Location: [facility] EP LAB;  Service: Cardiology;  Laterality: N/A;  AF, NOE, DCCV, MAC, DM, 3 PREP*SJM  Dual ICD in situ*    VASCULAR SURGERY      stents placed       Review of patient's allergies indicates:  No Known Allergies    Current Facility-Administered Medications on File Prior to Encounter   Medication    0.9%  NaCl infusion    sodium chloride 0.9% flush 5 mL     Current Outpatient Medications on File Prior to Encounter   Medication Sig    amLODIPine (NORVASC) 5 MG tablet Take 1 tablet (5 mg total) by mouth once daily.    ascorbic acid (VITAMIN C) 100 MG tablet Take 1,000 mg by mouth once daily.     aspirin (ECOTRIN) 81 MG EC tablet Take 81 mg by mouth once daily.    atorvastatin (LIPITOR) 10 MG tablet Take 1 tablet (10 mg total) by mouth  once daily.    carvedilol (COREG) 12.5 MG tablet Take 1 tablet (12.5 mg total) by mouth 2 (two) times daily with meals.    fish oil-omega-3 fatty acids 300-1,000 mg capsule Take 2 g by mouth 2 (two) times daily.     levothyroxine (SYNTHROID) 75 MCG tablet Take 75 mcg by mouth before breakfast.     multivitamin capsule Take 1 capsule by mouth nightly.     SLO-NIACIN 750 mg TbSR Take 1 tablet (750 mg total) by mouth 2 (two) times daily. (Patient taking differently: Take 500 mg by mouth 2 (two) times daily. )    warfarin (COUMADIN) 5 MG tablet 2.5 mg every Mon, Wed, Fri.     warfarin (COUMADIN) 5 MG tablet Take 5 mg by mouth every Tues, Thurs, Sat.    warfarin (COUMADIN) 5 MG tablet Take 5 mg by mouth every Sunday.    furosemide (LASIX) 40 MG tablet Take 40 mg by mouth as needed.    nitroGLYCERIN (NITROSTAT) 0.4 MG SL tablet Place 1 tablet (0.4 mg total) under the tongue every 5 (five) minutes as needed for Chest pain.    omeprazole (PRILOSEC) 20 MG capsule Take 20 mg by mouth nightly.     sulfamethoxazole-trimethoprim 800-160mg (BACTRIM DS) 800-160 mg Tab Take 1 tablet by mouth 2 (two) times daily.     Family History     Problem Relation (Age of Onset)    Heart disease Mother, Father, Brother        Tobacco Use    Smoking status: Never Smoker    Smokeless tobacco: Never Used   Substance and Sexual Activity    Alcohol use: Yes     Comment: occasionally    Drug use: No    Sexual activity: Not on file     Review of Systems   Constitution: Negative for chills, fever and malaise/fatigue.   HENT: Negative for congestion and nosebleeds.    Eyes: Negative for blurred vision.   Cardiovascular: Negative for chest pain, dyspnea on exertion, irregular heartbeat, leg swelling, near-syncope, orthopnea, palpitations, paroxysmal nocturnal dyspnea and syncope.   Respiratory: Negative for cough, hemoptysis, shortness of breath, sleep disturbances due to breathing, sputum production and wheezing.    Endocrine: Negative  for polyphagia.   Hematologic/Lymphatic: Negative for bleeding problem. Does not bruise/bleed easily.   Skin: Negative for itching and rash.   Musculoskeletal: Negative for back pain, joint swelling, muscle cramps and muscle weakness.   Gastrointestinal: Negative for bloating, abdominal pain, hematemesis, hematochezia, nausea and vomiting.   Genitourinary: Negative for dysuria and hematuria.   Neurological: Negative for dizziness, focal weakness, headaches, light-headedness, loss of balance, numbness and weakness.   Psychiatric/Behavioral: Negative for altered mental status.     Objective:     Vital Signs (Most Recent):  Temp: 96.9 °F (36.1 °C) (12/09/19 0834)  Pulse: 60 (12/09/19 0834)  Resp: 18 (12/09/19 0834)  BP: 130/81 (12/09/19 0835)  SpO2: (!) 93 % (12/09/19 0834) Vital Signs (24h Range):  Temp:  [96.9 °F (36.1 °C)] 96.9 °F (36.1 °C)  Pulse:  [60] 60  Resp:  [18] 18  SpO2:  [93 %] 93 %  BP: (130-140)/(81-87) 130/81       Weight: 108.9 kg (240 lb)  Body mass index is 30.81 kg/m².    SpO2: (!) 93 %  O2 Device (Oxygen Therapy): room air    Physical Exam   Constitutional: He is oriented to person, place, and time. He appears well-developed and well-nourished. No distress.   HENT:   Head: Normocephalic and atraumatic.   Mouth/Throat: No oropharyngeal exudate.   Eyes: Pupils are equal, round, and reactive to light. Conjunctivae are normal. Right eye exhibits no discharge. Left eye exhibits no discharge.   Neck: Normal range of motion. Neck supple.   Cardiovascular: Normal rate, regular rhythm, S1 normal, S2 normal, normal heart sounds, intact distal pulses and normal pulses.  No extrasystoles are present. PMI is not displaced. Exam reveals no gallop and no friction rub.   No murmur heard.  Pulses:       Radial pulses are 2+ on the right side, and 2+ on the left side.        Dorsalis pedis pulses are 2+ on the right side, and 2+ on the left side.   Pulmonary/Chest: Effort normal and breath sounds normal. No  accessory muscle usage. No respiratory distress. He has no decreased breath sounds. He has no wheezes. He has no rhonchi. He has no rales. He exhibits no tenderness.   Left chest wall device site in good condition.    Abdominal: Soft. Bowel sounds are normal. He exhibits no distension. There is no tenderness. There is no guarding.   Musculoskeletal: Normal range of motion. He exhibits no edema.   Neurological: He is alert and oriented to person, place, and time. He has normal strength. He is not disoriented. No cranial nerve deficit or sensory deficit. He exhibits normal muscle tone. Coordination and gait normal.   Skin: Skin is warm and dry. No rash noted. He is not diaphoretic. No erythema.   Psychiatric: He has a normal mood and affect. His behavior is normal. Judgment and thought content normal.   Nursing note and vitals reviewed.      Significant Labs: BMP: No results for input(s): GLU, NA, K, CL, CO2, BUN, CREATININE, CALCIUM, MG in the last 48 hours.    Significant Imaging: Echocardiogram:   2D echo with color flow doppler:   Results for orders placed or performed during the hospital encounter of 06/06/18   2D echo with color flow doppler   Result Value Ref Range    QEF 25 (A) 55 - 65    Mitral Valve Regurgitation MILD     Diastolic Dysfunction Yes (A)     Aortic Valve Regurgitation TRIVIAL     Est. PA Systolic Pressure 24.16     Tricuspid Valve Regurgitation MILD     Narrative    Date of Procedure: 06/06/2018        TEST DESCRIPTION   Technical Quality: This is a technically adequate study. This study was performed in conjunction with a 3ml intravenous injection of Optison contrast agent.     General: A catheter is present in the right-sided cardiac chambers.     Aorta: The aortic root is mildly enlarged, measuring 3.3 cm at sinotubular junction and 4.0 cm at Sinuses of Valsalva. The proximal ascending aorta is normal in size, measuring 3.8 cm across.     Left Atrium: The left atrial volume index is severely  enlarged, measuring 51.92 cc/m2.     Left Ventricle: The left ventricle is normal in size, with an end-diastolic diameter of 6.0 cm, and an end-systolic diameter of 5.1 cm. LV wall thickness is normal, with the septum measuring 0.9 cm and the posterior wall measuring 0.7 cm across. Relative   wall thickness was normal at 0.23, and the LV mass index was 90.6 g/m2 consistent with normal left ventricular mass. The apex is akinetic.   The following segments were akinetic: mid anteroseptum, apical septum, apical anterior wall, mid anterior wall.  There is global hypokinesis. Left ventricular systolic function appears severely depressed. Visually estimated ejection fraction is 25-30%. The LV Doppler derived stroke volume equals 54.0 ccs.     Diastolic indices: E wave velocity 0.6 m/s, E/A ratio 0.8,  msec., E/e' ratio(avg) 12. There is diastolic dysfunction secondary to relaxation abnormality.     Right Atrium: The right atrium is normal in size, measuring 5.9 cm in length and 4.6 cm in width in the apical view.     Right Ventricle: The right ventricle is normal in size measuring 4.2 cm at the base in the apical right ventricle-focused view. Global right ventricular systolic function appears normal. Tricuspid annular plane systolic excursion (TAPSE) is 2.0 cm.   Tissue Doppler-derived tricuspid annular peak systolic velocity (S prime) is 11.3 cm/s. The estimated PA systolic pressure is 24 mmHg.     Aortic Valve:  The aortic valve is normal in structure. The aortic valve is tri-leaflet in structure. Additionally, there is trivial aortic regurgitation.     Mitral Valve:  The mitral valve is normal in structure. There is mild mitral regurgitation.     Tricuspid Valve:  The tricuspid valve is normal in structure. There is mild tricuspid regurgitation.     Pulmonary Valve:  The pulmonic valve is normal in structure.     IVC: IVC is normal in size and collapses > 50% with a sniff, suggesting normal right atrial pressure  of 3 mmHg.     Intracavitary: There is no evidence of pericardial effusion, intracavity mass, thrombi, or vegetation.         CONCLUSIONS     1 - Severely depressed left ventricular systolic function (EF 25-30%).     2 - Impaired LV relaxation, normal LAP (grade 1 diastolic dysfunction).     3 - Mild mitral regurgitation.     4 - Mild tricuspid regurgitation.     5 - The estimated PA systolic pressure is 24 mmHg.             This document has been electronically    SIGNED BY: Sun Merchant MD On: 06/06/2018 12:34    and Transthoracic echo (TTE) complete (Cupid Only): No results found for this or any previous visit. and Ejection fraction: No results for input(s): EF in the last 168 hours.

## 2019-12-09 NOTE — ANESTHESIA PROCEDURE NOTES
Arterial    Diagnosis: afib  Doctor requesting consult: dorita    Patient location during procedure: done in OR  Procedure start time: 12/9/2019 10:56 AM  Timeout: 12/9/2019 10:55 AM  Procedure end time: 12/9/2019 11:00 AM    Staffing  Authorizing Provider: Osmany Moore Jr., MD  Performing Provider: Osmany Moore Jr., MD    Anesthesiologist was present at the time of the procedure.    Preanesthetic Checklist  Completed: patient identified, site marked, surgical consent, pre-op evaluation, timeout performed, IV checked, risks and benefits discussed, monitors and equipment checked and anesthesia consent givenArterial  Skin Prep: chlorhexidine gluconate and isopropyl alcohol  Local Infiltration: none  Orientation: right  Location: radial  Catheter Size: 20 G  Catheter placement by Anatomical landmarks. Heme positive aspiration all ports.Insertion Attempts: 1  Assessment  Dressing: secured with tape and tegaderm  Patient: Tolerated well

## 2019-12-10 VITALS
TEMPERATURE: 98 F | BODY MASS INDEX: 30.8 KG/M2 | RESPIRATION RATE: 20 BRPM | SYSTOLIC BLOOD PRESSURE: 122 MMHG | OXYGEN SATURATION: 94 % | HEIGHT: 74 IN | WEIGHT: 240 LBS | HEART RATE: 65 BPM | DIASTOLIC BLOOD PRESSURE: 77 MMHG

## 2019-12-10 LAB
INR PPP: 2.2 (ref 0.8–1.2)
PROTHROMBIN TIME: 21.1 SEC (ref 9–12.5)

## 2019-12-10 PROCEDURE — 25000003 PHARM REV CODE 250: Performed by: INTERNAL MEDICINE

## 2019-12-10 PROCEDURE — 85610 PROTHROMBIN TIME: CPT

## 2019-12-10 PROCEDURE — 25000003 PHARM REV CODE 250: Performed by: NURSE PRACTITIONER

## 2019-12-10 PROCEDURE — 36415 COLL VENOUS BLD VENIPUNCTURE: CPT

## 2019-12-10 RX ORDER — PANTOPRAZOLE SODIUM 40 MG/1
40 TABLET, DELAYED RELEASE ORAL DAILY
Qty: 30 TABLET | Refills: 0 | Status: SHIPPED | OUTPATIENT
Start: 2019-12-10 | End: 2020-02-06

## 2019-12-10 RX ORDER — AMIODARONE HYDROCHLORIDE 200 MG/1
TABLET ORAL
Qty: 90 TABLET | Refills: 3 | Status: SHIPPED | OUTPATIENT
Start: 2019-12-10 | End: 2020-01-15

## 2019-12-10 RX ADMIN — LEVOTHYROXINE SODIUM 75 MCG: 75 TABLET ORAL at 05:12

## 2019-12-10 RX ADMIN — CARVEDILOL 12.5 MG: 12.5 TABLET, FILM COATED ORAL at 08:12

## 2019-12-10 RX ADMIN — ATORVASTATIN CALCIUM 10 MG: 10 TABLET, FILM COATED ORAL at 08:12

## 2019-12-10 RX ADMIN — AMLODIPINE BESYLATE 5 MG: 5 TABLET ORAL at 08:12

## 2019-12-10 RX ADMIN — ACETAMINOPHEN 650 MG: 325 TABLET ORAL at 12:12

## 2019-12-10 RX ADMIN — AMIODARONE HYDROCHLORIDE 400 MG: 200 TABLET ORAL at 08:12

## 2019-12-10 RX ADMIN — ASPIRIN 81 MG: 81 TABLET, COATED ORAL at 08:12

## 2019-12-10 RX ADMIN — PANTOPRAZOLE SODIUM 40 MG: 40 TABLET, DELAYED RELEASE ORAL at 08:12

## 2019-12-10 NOTE — ANESTHESIA POSTPROCEDURE EVALUATION
Anesthesia Post Evaluation    Patient: Amish Grossman    Procedure(s) Performed: Procedure(s) (LRB):  ABLATION, ARRHYTHMOGENIC FOCUS, FOR ATRIAL FIBRILLATION (N/A)  ECHOCARDIOGRAM, TRANSESOPHAGEAL (N/A)    Final Anesthesia Type: general    Patient location during evaluation: floor  Patient participation: Yes- Able to Participate  Level of consciousness: awake and alert and oriented  Post-procedure vital signs: reviewed and stable  Pain management: adequate  Airway patency: patent    PONV status at discharge: No PONV  Anesthetic complications: no      Cardiovascular status: blood pressure returned to baseline, hemodynamically stable and stable  Respiratory status: unassisted, room air and spontaneous ventilation  Hydration status: euvolemic  Follow-up not needed.          Vitals Value Taken Time   /74 12/10/2019  4:15 AM   Temp 36.9 °C (98.5 °F) 12/10/2019  4:15 AM   Pulse 77 12/10/2019  5:00 AM   Resp 18 12/10/2019  4:15 AM   SpO2 92 % 12/10/2019  4:15 AM         No case tracking events are documented in the log.      Pain/Talya Score: Pain Rating Prior to Med Admin: 4 (12/10/2019 12:16 AM)  Pain Rating Post Med Admin: 0 (12/10/2019  1:16 AM)  Talya Score: 9 (12/9/2019  6:00 PM)

## 2019-12-10 NOTE — NURSING
Bilat groin sutures removed as ordered, no active bleeding and no hematoma noted, gauze and tegaderm applied, will continue to monitor.

## 2019-12-10 NOTE — PLAN OF CARE
Pt AAO x3, NAD, s/p PVI per Dr Isbell on 12/9/19, bilat groin sites, dsgs CDI, no active bleeding and no hematoma noted. Pt voids and ambulates without any difficulty. Plan of care reviewed with pt. Call light within pt reach, pt instructed to call staff for any needed assistance, safety maintained. Pt denies needs/concerns at this time, will continue to monitor.

## 2019-12-10 NOTE — NURSING
Bedrest completed, pt ambulated in hallway without any difficulty, bilat groin site dsgs CDI, no active bleeding and no hematoma noted. Condom cath removed and pt voided without any difficulty.

## 2019-12-10 NOTE — PLAN OF CARE
Report received from GLORIA [place]. Patient doing well with no complaints. Bilat groin dressings cdi, soft. + pulses. Ambulating and voiding without difficulty. Plan for dc home this am.

## 2019-12-10 NOTE — NURSING TRANSFER
Nursing Transfer Note      12/9/2019     Transfer To: [id number]    Transfer via stretcher    Transfer with 2 L nasal cannula to O2 & cardiac monitoring    Transported by PCT    Chart send with patient: Yes    Notified: son, grandson    Patient reassessed at: 12/9/19 @ 1800

## 2019-12-11 NOTE — DISCHARGE SUMMARY
"[facility] Stay Cardiac Unit  Cardiac Electrophysiology  Discharge Summary      Patient Name: Amish Grossman  MRN: [id number]  Admission Date: 12/9/2019  Hospital Length of Stay: 0 days  Discharge Date and Time: 12/10/2019  9:30 AM  Attending Physician: Camron Isbell MD   Discharging Provider: Brittney Gardner NP  Primary Care Physician: Angel Coburn MD    HPI: Mr. Grossman is a 74 year old male with a PMHx of CAD/MI (1993)/PCI (with stent placement in 2000 and 2004), stable, HTN, HLD, CKD, stable, hypothyroid, RV thrombus hx; on coumadin, VT (12/2014), resulting in ICD shock  persistent AF.  Actively doing forestry work. Hunting. Does get some SHOOK when walking fast.  No CP. Feeling better since "R" added at prior visit. Leg swelling abated.  We did ONE/DCCV on 3/16 in hopes of starting tikosyn, but QTc was too long for that. Instead we started amiodarone. Pt says he felt better for a week, then felt down again. However he thinks that may have been multifactorial and isn't completely sure. Says he feels better now than pre-DCCV, and he's back in AF. ICD shows that NSR lasted 10 days. We repeated DCCV on 4/15. He's maintained NSR since then. Feels very well.  Hx of A noise. P waves ~4. Device shows few AMS episodes, <10 s (no egrams).     We did gen change 6/7/18. This was c/b VT intraop, requiring external rescue to NSR. Also, a breach in the RV lead's insulation was repaired at that time.     At last office visit 10/25/19, he felt unwell x weeks. ICD shows AF recurred on 10/1/19 and continues. Much lower exertion tolerance. Frequent V pacing.  Recurrent symptomatic AF, despite amio.  d/c amio.  NOE/DCCV.  Then schedule PVI.   CT before PVI  NOE before PVI, even if in NSR, given hx of PRISCA sludge/clot.  Aim for cryo PVI, anatomy permitting.      11/27/19 PET stress done.     10/31/19 successful NOE/DCCV.     10/31/19 CTA completed-reviewed with Dr. Isbell.     He presents today to [facility] for " scheduled EP study with  AF ablation with Dr. Isbell. He denies any chest pain, SOB, dizziness, light headedness, weakness, syncope, or near syncopal episodes. He does state he has SHOOK and palpitations. He denies any bleeding, infections, fevers, rashes, or surgeries in the past 30 days. He is currently taking coumadin (last dose last night 5 mg). INR today 1.8, Dr. Isbell notified, OK to proceed with ablation. INRs for the past several months have been therapeutic. Plan use lovenox and coumadin over night to attain therapeutic INR. He is also currently taking carvedilol 12.5 mg BID (last dose last night).      EKG today shows A paced at 60 bpm. . . QTc 416.    Procedure(s) (LRB):  ABLATION, ARRHYTHMOGENIC FOCUS, FOR ATRIAL FIBRILLATION (N/A)  ECHOCARDIOGRAM, TRANSESOPHAGEAL (N/A)     Indwelling Lines/Drains at time of discharge:  Lines/Drains/Airways     None           Hospital Course: Patient underwent PVI ablation (see procedure note for details), tolerated procedure well with no acute complications. Post EKG normal sinus rhythm at 74 bpm  QRS 96 QTc 386 ms. Monitored overnight with no events. Bilateral groins with dressings intact, sites C/D/I. Dressings removed bilaterally, no bleeding, hematoma, or bruit noted. Patient ambulating, tolerating PO intake, and voiding with no issues. He denies any chest pain or SOB. Patient seen by Dr. Isbell and discharge plans were discussed. Patient to continue all home medications. Start pantoprazole 40 mg daily x 30 days. Follow up with Dr. Isbell in 3 months. Per Dr. Isbell, patient to take 5 mg of coumadin tonight. No need for home lovenox as patient's INR this AM was 2.2. Patient also to follow up with Dr. Devi's office for repeat INR tomorrow morning. I spoke with Laila at Dr. Devi's and scheduled for patient to have his INR drawn tomorrow AM. Patient to follow up with Dr. Devi regarding pulmonary findings on chest CTA. Discharge  plans/instructions discussed with patient and wife who verbalized understanding and agreement of plans of care. No further questions or concerns voiced at this time. VSS. NSR HR 60s on telemetry.    Physical Exam   Constitutional: He is oriented to person, place, and time. He appears well-developed and well-nourished. No distress.   HENT:   Head: Normocephalic and atraumatic.   Mouth/Throat: No oropharyngeal exudate.   Eyes: Pupils are equal, round, and reactive to light. Conjunctivae are normal. Right eye exhibits no discharge. Left eye exhibits no discharge.   Neck: Normal range of motion. Neck supple.   Cardiovascular: Normal rate, regular rhythm, S1 normal, S2 normal, normal heart sounds, intact distal pulses and normal pulses.  No extrasystoles are present. PMI is not displaced. Exam reveals no gallop and no friction rub.   No murmur heard.  Pulses:       Radial pulses are 2+ on the right side, and 2+ on the left side.        Dorsalis pedis pulses are 2+ on the right side, and 2+ on the left side.   Pulmonary/Chest: Effort normal and breath sounds normal. No accessory muscle usage. No respiratory distress. He has no decreased breath sounds. He has no wheezes. He has no rhonchi. He has no rales. He exhibits no tenderness.   Left chest wall device site in good condition.    Abdominal: Soft. Bowel sounds are normal. He exhibits no distension. There is no tenderness. There is no guarding.   Musculoskeletal: Normal range of motion. He exhibits no edema. Bilateral groin sites C/D/I with no bleeding, hematoma, or bruit noted.  Neurological: He is alert and oriented to person, place, and time. He has normal strength. He is not disoriented. No cranial nerve deficit or sensory deficit. He exhibits normal muscle tone. Coordination and gait normal.   Skin: Skin is warm and dry. No rash noted. He is not diaphoretic. No erythema.   Psychiatric: He has a normal mood and affect. His behavior is normal. Judgment and thought  content normal.   Nursing note and vitals reviewed.    Consults:   Anesthesia.    Significant Diagnostic Studies: Labs from 12/2/19 through today reviewed.   INR   Lab Results   Component Value Date    INR 2.2 (H) 12/10/2019    INR 1.8 (H) 12/09/2019    INR 2.4 (H) 10/31/2019     Cardiac Graphics: Echocardiogram:   2D echo with color flow doppler:   Results for orders placed or performed during the hospital encounter of 06/06/18   2D echo with color flow doppler   Result Value Ref Range    QEF 25 (A) 55 - 65    Mitral Valve Regurgitation MILD     Diastolic Dysfunction Yes (A)     Aortic Valve Regurgitation TRIVIAL     Est. PA Systolic Pressure 24.16     Tricuspid Valve Regurgitation MILD     Narrative    Date of Procedure: 06/06/2018    TEST DESCRIPTION   Technical Quality: This is a technically adequate study. This study was performed in conjunction with a 3ml intravenous injection of Optison contrast agent.     General: A catheter is present in the right-sided cardiac chambers.     Aorta: The aortic root is mildly enlarged, measuring 3.3 cm at sinotubular junction and 4.0 cm at Sinuses of Valsalva. The proximal ascending aorta is normal in size, measuring 3.8 cm across.     Left Atrium: The left atrial volume index is severely enlarged, measuring 51.92 cc/m2.     Left Ventricle: The left ventricle is normal in size, with an end-diastolic diameter of 6.0 cm, and an end-systolic diameter of 5.1 cm. LV wall thickness is normal, with the septum measuring 0.9 cm and the posterior wall measuring 0.7 cm across. Relative   wall thickness was normal at 0.23, and the LV mass index was 90.6 g/m2 consistent with normal left ventricular mass. The apex is akinetic.   The following segments were akinetic: mid anteroseptum, apical septum, apical anterior wall, mid anterior wall.  There is global hypokinesis. Left ventricular systolic function appears severely depressed. Visually estimated ejection fraction is 25-30%. The LV  Doppler derived stroke volume equals 54.0 ccs.     Diastolic indices: E wave velocity 0.6 m/s, E/A ratio 0.8,  msec., E/e' ratio(avg) 12. There is diastolic dysfunction secondary to relaxation abnormality.     Right Atrium: The right atrium is normal in size, measuring 5.9 cm in length and 4.6 cm in width in the apical view.     Right Ventricle: The right ventricle is normal in size measuring 4.2 cm at the base in the apical right ventricle-focused view. Global right ventricular systolic function appears normal. Tricuspid annular plane systolic excursion (TAPSE) is 2.0 cm.   Tissue Doppler-derived tricuspid annular peak systolic velocity (S prime) is 11.3 cm/s. The estimated PA systolic pressure is 24 mmHg.     Aortic Valve:  The aortic valve is normal in structure. The aortic valve is tri-leaflet in structure. Additionally, there is trivial aortic regurgitation.     Mitral Valve:  The mitral valve is normal in structure. There is mild mitral regurgitation.     Tricuspid Valve:  The tricuspid valve is normal in structure. There is mild tricuspid regurgitation.     Pulmonary Valve:  The pulmonic valve is normal in structure.     IVC: IVC is normal in size and collapses > 50% with a sniff, suggesting normal right atrial pressure of 3 mmHg.     Intracavitary: There is no evidence of pericardial effusion, intracavity mass, thrombi, or vegetation.     CONCLUSIONS     1 - Severely depressed left ventricular systolic function (EF 25-30%).     2 - Impaired LV relaxation, normal LAP (grade 1 diastolic dysfunction).     3 - Mild mitral regurgitation.     4 - Mild tricuspid regurgitation.     5 - The estimated PA systolic pressure is 24 mmHg.     This document has been electronically    SIGNED BY: Sun Merchant MD On: 06/06/2018 12:34      Final Active Diagnoses:    Diagnosis Date Noted POA    PRINCIPAL PROBLEM:  Persistent atrial fibrillation [I48.19] 03/15/2016 Yes      Problems Resolved During this Admission:     No  new Assessment & Plan notes have been filed under this hospital service since the last note was generated.  Service: Arrhythmia    Discharged Condition: stable    Disposition: Home or Self Care    Follow Up:  Follow-up Information     Camron Isbell MD In 3 months.    Specialties:  Electrophysiology, Cardiology  Why:  s/p PVI  Contact information:  [address]  [facility] [address]  [phone]             Patient Instructions:      Diet Cardiac     No driving until:   Order Comments: No driving or operating heavy machinery for 24-48 hours after your procedure because you received sedation.     Other restrictions (specify):   Order Comments: 1. Do not strain or lift anything greater than 5 lb for 1 week. No bending or strenuous activity for 1 week.  2. Do not drive or operate any dangerous machinery for 24-48 hours.   3. After 24 hours, a shower is allowed. No dressings needed to your groin sites.    5. Clean the area with mild soap and water.   6. Once the skin has healed (in 1 week), bathing in a tub or swimming is allowed.   7. Inspect the groin site daily and report to the physician any signs of infection at the site: redness, pain, fever >100.4, unusual pain at the access site or affected extremity, unusual swelling at the access site, or any yellow, white or green drainage.  Call 911 if you have:   Bleeding from the puncture site that you cannot stop by doing the following:   Relax and lie down right away. Keep your leg flat and apply firm pressure to the site using your fingers and a gauze pad. Keep the pressure on for 10 minutes. Continue this until the bleeding stops. This may take awhile. When bleeding stops, cover the site with a sterile bandage and keep your leg still as much as possible.  8. Follow up with Dr. Isbell in 3 months.  9. New medication: Pantoprazole 40 mg once daily for 30 days after your procedure, THEN discontinue.   10. New medication: Amiodarone 400 mg two times daily x 14 days,  then decrease to 400 mg once per day x 14 days, the decrease to 200 mg once daily.   11. Continue all of your other home medications.  12. Per Dr. Isbell, take coumadin 5 mg tonight. Follow up with Dr. Devi tomorrow to have your INR (coumadin level) drawn.     Lifting restrictions   Order Comments: Nothing greater than 5 pounds for 1 week.     Notify your health care provider if you experience any of the following:   Order Comments: For any concerning medical symptoms.     Notify your health care provider if you experience any of the following:  increased confusion or weakness     Notify your health care provider if you experience any of the following:  persistent dizziness, light-headedness, or visual disturbances     Notify your health care provider if you experience any of the following:  worsening rash     Notify your health care provider if you experience any of the following:  severe persistent headache     Notify your health care provider if you experience any of the following:  difficulty breathing or increased cough     Notify your health care provider if you experience any of the following:  redness, tenderness, or signs of infection (pain, swelling, redness, odor or green/yellow discharge around incision site)     Notify your health care provider if you experience any of the following:  severe uncontrolled pain     Notify your health care provider if you experience any of the following:  persistent nausea and vomiting or diarrhea     Notify your health care provider if you experience any of the following:  temperature >100.4     No dressing needed     Medications:  Reconciled Home Medications:      Medication List      START taking these medications    amiodarone 200 MG Tab  Commonly known as:  PACERONE  Take 400mg (two tabs)two times daily x14 days, then decrease to 400mg (two tabs)once daily x14 days, then decrease to 200mg (one tab)daily.     pantoprazole 40 MG tablet  Commonly known as:   PROTONIX  Take 1 tablet (40 mg total) by mouth once daily.        CHANGE how you take these medications    Slo-Niacin 750 mg Tbsr  Generic drug:  niacin  Take 1 tablet (750 mg total) by mouth 2 (two) times daily.  What changed:  how much to take        CONTINUE taking these medications    amLODIPine 5 MG tablet  Commonly known as:  NORVASC  Take 1 tablet (5 mg total) by mouth once daily.     aspirin 81 MG EC tablet  Commonly known as:  ECOTRIN  Take 81 mg by mouth once daily.     atorvastatin 10 MG tablet  Commonly known as:  LIPITOR  Take 1 tablet (10 mg total) by mouth once daily.     carvedilol 12.5 MG tablet  Commonly known as:  COREG  Take 1 tablet (12.5 mg total) by mouth 2 (two) times daily with meals.     fish oil-omega-3 fatty acids 300-1,000 mg capsule  Take 2 g by mouth 2 (two) times daily.     furosemide 40 MG tablet  Commonly known as:  LASIX  Take 40 mg by mouth as needed.     levothyroxine 75 MCG tablet  Commonly known as:  SYNTHROID  Take 75 mcg by mouth before breakfast.     lisinopril 20 MG tablet  Commonly known as:  PRINIVIL,ZESTRIL  TAKE 1 TABLET BY MOUTH ONCE DAILY     multivitamin capsule  Take 1 capsule by mouth nightly.     nitroGLYCERIN 0.4 MG SL tablet  Commonly known as:  Nitrostat  Place 1 tablet (0.4 mg total) under the tongue every 5 (five) minutes as needed for Chest pain.     Vitamin C 100 MG tablet  Generic drug:  ascorbic acid (vitamin C)  Take 1,000 mg by mouth once daily.     * warfarin 5 MG tablet  Commonly known as:  COUMADIN  Take 5 mg by mouth every Tues, Thurs, Sat.     * warfarin 5 MG tablet  Commonly known as:  COUMADIN  Take 5 mg by mouth every Sunday.     * warfarin 5 MG tablet  Commonly known as:  COUMADIN  2.5 mg every Mon, Wed, Fri.         * This list has 3 medication(s) that are the same as other medications prescribed for you. Read the directions carefully, and ask your doctor or other care provider to review them with you.            STOP taking these medications     omeprazole 20 MG capsule  Commonly known as:  PRILOSEC     sulfamethoxazole-trimethoprim 800-160mg 800-160 mg Tab  Commonly known as:  BACTRIM DS          Plan:   -Start amiodarone 400 mg BID x 14 days, then 400 mg daily x 14 days, then 200 mg daily.   -Pantoprazole 40 mg daily x 30 days.   -Continue all other home medications.  -Follow up with Dr. Devi (PCP) tomorrow for blood draw for INR.  -Follow up with Dr. Isbell in 3 months.      Time spent on the discharge of patient: 18 minutes    Brittney Gardner NP  Cardiac Electrophysiology  [facility]    Attending: Camron Isbell MD

## 2019-12-16 ENCOUNTER — TELEPHONE (OUTPATIENT)
Dept: ELECTROPHYSIOLOGY | Facility: CLINIC | Age: 74
End: 2019-12-16

## 2019-12-16 NOTE — TELEPHONE ENCOUNTER
----- Message from Megan Oliva MA sent at 12/16/2019  9:41 AM CST -----  Contact: Self      ----- Message -----  From: Anna Pena  Sent: 12/16/2019   8:56 AM CST  To: Brittney Painter RN, Akilah CORNELL Staff    Pt is asking if Brittney can call Pt regarding a knot & soreness around the groin incision area.  Thanks    [phone]

## 2019-12-17 ENCOUNTER — TELEPHONE (OUTPATIENT)
Dept: ELECTROPHYSIOLOGY | Facility: CLINIC | Age: 74
End: 2019-12-17

## 2019-12-17 NOTE — TELEPHONE ENCOUNTER
----- Message from Anna Pena sent at 12/17/2019  8:27 AM CST -----  Contact: Self  Pt returning your call.  Thanks    [phone]

## 2019-12-17 NOTE — TELEPHONE ENCOUNTER
Spoke with pt and he stated that he had done a great deal of walking this weekend. He described soreness in the groin and two small hematomas at the insertion site. He stated that both areas were nickel size and were not increasing in size. He described no signs or symptoms of infection. Pt encouraged to limit activity over the next week and to report any increase in size of hematoma or s/s of infection at the insertion site.

## 2019-12-31 ENCOUNTER — TELEPHONE (OUTPATIENT)
Dept: ELECTROPHYSIOLOGY | Facility: CLINIC | Age: 74
End: 2019-12-31

## 2019-12-31 NOTE — TELEPHONE ENCOUNTER
Spoke with pt to reschedule PFT before visit with Laila Torres on 1/15/20.    ----- Message from Chula Nguyen sent at 12/31/2019  9:30 AM CST -----  Contact: Patient  The pt is calling to see if he can get his 11/26- PFT rescheduled on the same day as his upcoming appointment with Laila Torres. Please call him back @ [phone]. Thanks, Chula

## 2020-01-14 NOTE — PROGRESS NOTES
"Mr. Grossman is a patient of Dr. Isbell and was last seen in clinic 10/25/2019.      Subjective:   Patient ID:  Amish Grossman is a 74 y.o. male who presents for follow-up of Atrial Fibrillation  .     HPI:    Mr. Grossman is a 74 y.o. 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    Background:    CAD/MI (1993)/PCI (with stent placement in 2000 and 2004), stable  HTN on meds  HL on meds  CKD, stable  hypothyroid on meds  RV thrombus hx; on coumadin  VT (12/2014), resulting in ICD shock  persistent AF    Actively doing forestry work. Hunting. Does get some SHOOK when walking fast.  No CP. Feeling better since "R" added at prior visit. Leg swelling abated.    We did NOE/DCCV on 3/16 in hopes of starting tikosyn, but QTc was too long for that. Instead we started amiodarone. Pt says he felt better for a week, then felt down again. However he thinks that may have been multifactorial and isn't completely sure. Says he feels better now than pre-DCCV, and he's back in AF. ICD shows that NSR lasted 10 days. We repeated DCCV on 4/15. He's maintained NSR since then. Feels very well.  Hx of A noise. P waves ~4. Device shows few AMS episodes, <10 s (no egrams).    We did gen change 6/7/18. This was c/b VT intraop, requiring external rescue to NSR. Also, a breach in the RV lead's insulation was repaired at that time.    He's felt unwell x weeks. ICD shows AF recurred on 10/1/19 and continues. Much lower exertion tolerance. Frequent V pacing.  Recurrent symptomatic AF, despite amio.  d/c amio today  Aim for cryo PVI, anatomy permitting.     Update (01/15/2020):    PET stress 11/2019 showing scar.    12/9/2019: Cryoblative pulmonary vein isolation of all 4 pulmonary veins. Amiodarone was restarted. Plan to continue until 3 month follow up in clinic.    Today he says that his SHOOK has not improved since he has been in SR. He denies CP, palpitations, LH, " syncope.     He is currently taking coumadin for stroke prophylaxis and denies significant bleeding episodes. He is currently being treated with amiodarone 200mg daily for rhythm control and carvedilol 12.5m BID for HR control.  Kidney function is stable, with a creatinine of 1.9 on 12/15/2019. LFTs WNL 6/2018. TSH ok 6/2018. PFTs today show a decline in DLCO ratio from 106 in 2017 to 52.    Device Interrogation (1/15/2019) reveals an intrinsic SB with stable lead and device function. No arrhythmias or treated episodes were noted.  He paces 71% in the RA and 0% in the RV. Estimated battery longevity 6.5-6.8 years.     I have personally reviewed the patient's EKG today, which shows APVS at 60bpm. WI interval is 166. QRS is 100. QT is 418.    Recent Cardiac Tests:    NOE (12/9/2019):  · Severely decreased left ventricular systolic function. The estimated ejection fraction is 20%. Wall motion abnormalities noted in the LAD distribution  · Severe left atrial enlargement.  · Severe right atrial enlargement.  · Normal right ventricular systolic function.  · Mild mitral regurgitation.  · Mild tricuspid regurgitation.  · Abnormal appearing left atrial appendage. Abnormal appendage velocities.  · The ascending aorta is mildly dilated.  · No LA or PRISCA thrombus with appendage velocity of 0.7m/sec    Current Outpatient Medications   Medication Sig    amiodarone (PACERONE) 200 MG Tab Take 400mg (two tabs)two times daily x14 days, then decrease to 400mg (two tabs)once daily x14 days, then decrease to 200mg (one tab)daily.    amLODIPine (NORVASC) 5 MG tablet Take 1 tablet (5 mg total) by mouth once daily.    ascorbic acid (VITAMIN C) 100 MG tablet Take 1,000 mg by mouth once daily.     aspirin (ECOTRIN) 81 MG EC tablet Take 81 mg by mouth once daily.    atorvastatin (LIPITOR) 10 MG tablet Take 1 tablet (10 mg total) by mouth once daily.    carvedilol (COREG) 12.5 MG tablet Take 1 tablet (12.5 mg total) by mouth 2 (two) times  daily with meals.    fish oil-omega-3 fatty acids 300-1,000 mg capsule Take 2 g by mouth 2 (two) times daily.     furosemide (LASIX) 40 MG tablet Take 40 mg by mouth as needed.    levothyroxine (SYNTHROID) 75 MCG tablet Take 75 mcg by mouth before breakfast.     lisinopril (PRINIVIL,ZESTRIL) 20 MG tablet TAKE 1 TABLET BY MOUTH ONCE DAILY    multivitamin capsule Take 1 capsule by mouth nightly.     nitroGLYCERIN (NITROSTAT) 0.4 MG SL tablet Place 1 tablet (0.4 mg total) under the tongue every 5 (five) minutes as needed for Chest pain.    pantoprazole (PROTONIX) 40 MG tablet Take 1 tablet (40 mg total) by mouth once daily.    SLO-NIACIN 750 mg TbSR Take 1 tablet (750 mg total) by mouth 2 (two) times daily. (Patient taking differently: Take 500 mg by mouth 2 (two) times daily. )    warfarin (COUMADIN) 5 MG tablet 2.5 mg every Mon, Wed, Fri.     warfarin (COUMADIN) 5 MG tablet Take 5 mg by mouth every Tues, Thurs, Sat.    warfarin (COUMADIN) 5 MG tablet Take 5 mg by mouth every Sunday.     No current facility-administered medications for this visit.      Facility-Administered Medications Ordered in Other Visits   Medication    0.9%  NaCl infusion    sodium chloride 0.9% flush 5 mL    sodium chloride 0.9% flush 5 mL     Review of Systems   Constitution: Negative for malaise/fatigue.   Cardiovascular: Positive for dyspnea on exertion. Negative for chest pain, irregular heartbeat, leg swelling and palpitations.   Respiratory: Negative for shortness of breath.    Hematologic/Lymphatic: Negative for bleeding problem.   Skin: Negative for rash.   Musculoskeletal: Negative for myalgias.   Gastrointestinal: Negative for hematemesis, hematochezia and nausea.   Genitourinary: Negative for hematuria.   Neurological: Negative for light-headedness.   Psychiatric/Behavioral: Negative for altered mental status.   Allergic/Immunologic: Negative for persistent infections.     Objective:          BP (!) 142/82   Pulse 60   " Ht 6' 2" (1.88 m)   Wt 111 kg (244 lb 11.4 oz)   BMI 31.42 kg/m²     Physical Exam   Constitutional: He is oriented to person, place, and time. He appears well-developed and well-nourished.   HENT:   Head: Normocephalic.   Nose: Nose normal.   Eyes: Pupils are equal, round, and reactive to light.   Cardiovascular: Normal rate, regular rhythm, S1 normal and S2 normal.   No murmur heard.  Pulses:       Radial pulses are 2+ on the right side, and 2+ on the left side.   Pulmonary/Chest: Breath sounds normal. No respiratory distress.   Device to LUCW.   Abdominal: Normal appearance.   Musculoskeletal: Normal range of motion. He exhibits no edema.   Neurological: He is alert and oriented to person, place, and time.   Skin: Skin is warm and dry. No erythema.   Psychiatric: He has a normal mood and affect. His speech is normal and behavior is normal.   Nursing note and vitals reviewed.    Lab Results   Component Value Date     10/25/2019    K 4.8 10/25/2019    MG 1.8 12/10/2014    BUN 24 (H) 10/25/2019    CREATININE 1.9 (H) 10/25/2019    ALT 21 06/06/2018    AST 22 06/06/2018    HGB 13.4 (L) 10/25/2019    HCT 41.0 10/25/2019    TSH 5.190 (H) 06/06/2018    LDLCALC 65.8 06/06/2018       Recent Labs   Lab 10/25/19  1619 10/31/19  1015 12/09/19  0819 12/10/19  0423   INR 2.2 H 2.4 H 1.8 H 2.2 H       Assessment:     1. Ischemic cardiomyopathy    2. HTN (hypertension), benign    3. Longstanding persistent atrial fibrillation    4. PVT (paroxysmal ventricular tachycardia)    5. Anticoagulated on Coumadin    6. Automatic implantable cardioverter-defibrillator in situ      Plan:     In summary, Mr. Grossman is a 74 y.o. 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  He is 6 weeks s/p PVI. He says that his SHOOK which started to worsen back in September has not improved. He has not had any AF since his ablation. He is no longer V pacing. He remains " on amiodarone and his diffusion capacity has decreased - will discuss with Dr. Isbell. Will also check labs, including thyroid, LFTs. His EF is 20% and he could have some fluid overload as well. I instructed him to take one lasix tomorrow and evaluate his symptoms. He remains on coumadin - will likely switch to eliquis at next clinic visit.    TSH, CMP  Amiodarone TBD (UPDATE: Will stop amio now and refer to pulmonary)  Continue routine device checks.  RTC 6 werks.    *A copy of this note has been sent to Dr. Isbell*    Follow up in about 6 weeks (around 2/26/2020).    ------------------------------------------------------------------    JANNY Henriquez, NP-C  Cardiac Electrophysiology

## 2020-01-15 ENCOUNTER — HOSPITAL ENCOUNTER (OUTPATIENT)
Dept: CARDIOLOGY | Facility: CLINIC | Age: 75
Discharge: HOME | End: 2020-01-15
Payer: MEDICARE

## 2020-01-15 ENCOUNTER — HOSPITAL ENCOUNTER (OUTPATIENT)
Dept: PULMONOLOGY | Facility: CLINIC | Age: 75
Discharge: HOME | End: 2020-01-15
Payer: MEDICARE

## 2020-01-15 ENCOUNTER — CLINICAL SUPPORT (OUTPATIENT)
Dept: CARDIOLOGY | Facility: HOSPITAL | Age: 75
End: 2020-01-15
Attending: INTERNAL MEDICINE
Payer: MEDICARE

## 2020-01-15 ENCOUNTER — OFFICE VISIT (OUTPATIENT)
Dept: ELECTROPHYSIOLOGY | Facility: CLINIC | Age: 75
End: 2020-01-15
Payer: MEDICARE

## 2020-01-15 VITALS
DIASTOLIC BLOOD PRESSURE: 82 MMHG | WEIGHT: 244.69 LBS | HEART RATE: 60 BPM | HEIGHT: 74 IN | SYSTOLIC BLOOD PRESSURE: 142 MMHG | BODY MASS INDEX: 31.4 KG/M2

## 2020-01-15 DIAGNOSIS — Z79.01 ANTICOAGULATED ON COUMADIN: ICD-10-CM

## 2020-01-15 DIAGNOSIS — I10 HTN (HYPERTENSION), BENIGN: ICD-10-CM

## 2020-01-15 DIAGNOSIS — Z95.810 AUTOMATIC IMPLANTABLE CARDIOVERTER-DEFIBRILLATOR IN SITU: ICD-10-CM

## 2020-01-15 DIAGNOSIS — I49.8 OTHER SPECIFIED CARDIAC ARRHYTHMIAS: ICD-10-CM

## 2020-01-15 DIAGNOSIS — R06.02 SOB (SHORTNESS OF BREATH): Primary | ICD-10-CM

## 2020-01-15 DIAGNOSIS — I48.11 LONGSTANDING PERSISTENT ATRIAL FIBRILLATION: ICD-10-CM

## 2020-01-15 DIAGNOSIS — I47.29 PVT (PAROXYSMAL VENTRICULAR TACHYCARDIA): ICD-10-CM

## 2020-01-15 DIAGNOSIS — I25.5 ISCHEMIC CARDIOMYOPATHY: Primary | ICD-10-CM

## 2020-01-15 DIAGNOSIS — R94.2 ABNORMAL PFTS: ICD-10-CM

## 2020-01-15 LAB
DLCO ADJ PRE: 15.11 ML/(MIN*MMHG) (ref 22.05–35.91)
DLCO SINGLE BREATH LLN: 22.05
DLCO SINGLE BREATH PRE REF: 52.1 %
DLCO SINGLE BREATH REF: 28.98
DLCOC SBVA LLN: 2.7
DLCOC SBVA PRE REF: 83 %
DLCOC SBVA REF: 3.76
DLCOC SINGLE BREATH LLN: 22.05
DLCOC SINGLE BREATH PRE REF: 52.1 %
DLCOC SINGLE BREATH REF: 28.98
DLCOCSBVAULN: 4.83
DLCOCSINGLEBREATHULN: 35.91
DLCOSINGLEBREATHULN: 35.91
DLCOVA LLN: 2.7
DLCOVA PRE REF: 83 %
DLCOVA PRE: 3.12 ML/(MIN*MMHG*L) (ref 2.7–4.83)
DLCOVA REF: 3.76
DLCOVAULN: 4.83
DLVAADJ PRE: 3.12 ML/(MIN*MMHG*L) (ref 2.7–4.83)
FEF 25 75 LLN: 1.03
FEF 25 75 PRE REF: 111.1 %
FEF 25 75 REF: 2.46
FEV05 LLN: 1.72
FEV05 REF: 2.86
FEV1 FVC LLN: 61
FEV1 FVC PRE REF: 104 %
FEV1 FVC REF: 75
FEV1 LLN: 2.42
FEV1 PRE REF: 86.4 %
FEV1 REF: 3.39
FVC LLN: 3.34
FVC PRE REF: 82.6 %
FVC REF: 4.55
IVC PRE: 3.38 L (ref 3.34–5.75)
IVC SINGLE BREATH LLN: 3.34
IVC SINGLE BREATH PRE REF: 74.3 %
IVC SINGLE BREATH REF: 4.55
IVCSINGLEBREATHULN: 5.75
PEF LLN: 6.19
PEF PRE REF: 82.8 %
PEF REF: 8.7
PHYSICIAN COMMENT: ABNORMAL
PRE DLCO: 15.11 ML/(MIN*MMHG) (ref 22.05–35.91)
PRE FEF 25 75: 2.73 L/S (ref 1.03–3.89)
PRE FET 100: 6.81 SEC
PRE FEV05 REF: 82.3 %
PRE FEV1 FVC: 78.02 % (ref 61.31–88.8)
PRE FEV1: 2.93 L (ref 2.42–4.36)
PRE FEV5: 2.35 L (ref 1.72–3.99)
PRE FVC: 3.75 L (ref 3.34–5.75)
PRE PEF: 7.21 L/S (ref 6.19–11.21)
VA PRE: 4.84 L (ref 7.55–7.55)
VA SINGLE BREATH LLN: 7.55
VA SINGLE BREATH PRE REF: 64.1 %
VA SINGLE BREATH REF: 7.55
VASINGLEBREATHULN: 7.55

## 2020-01-15 PROCEDURE — 1126F PR PAIN SEVERITY QUANTIFIED, NO PAIN PRESENT: ICD-10-PCS | Mod: ,,, | Performed by: NURSE PRACTITIONER

## 2020-01-15 PROCEDURE — 94010 BREATHING CAPACITY TEST: CPT | Mod: 26,S$PBB,, | Performed by: INTERNAL MEDICINE

## 2020-01-15 PROCEDURE — 99213 OFFICE O/P EST LOW 20 MIN: CPT | Mod: PBBFAC,25 | Performed by: NURSE PRACTITIONER

## 2020-01-15 PROCEDURE — 93283 PRGRMG EVAL IMPLANTABLE DFB: CPT

## 2020-01-15 PROCEDURE — 93010 RHYTHM STRIP: ICD-10-PCS | Mod: S$PBB,,, | Performed by: INTERNAL MEDICINE

## 2020-01-15 PROCEDURE — 93005 ELECTROCARDIOGRAM TRACING: CPT | Mod: PBBFAC,59 | Performed by: INTERNAL MEDICINE

## 2020-01-15 PROCEDURE — 94010 BREATHING CAPACITY TEST: CPT | Mod: PBBFAC | Performed by: INTERNAL MEDICINE

## 2020-01-15 PROCEDURE — 99214 OFFICE O/P EST MOD 30 MIN: CPT | Mod: S$PBB,,, | Performed by: NURSE PRACTITIONER

## 2020-01-15 PROCEDURE — 94729 PR C02/MEMBANE DIFFUSE CAPACITY: ICD-10-PCS | Mod: 26,S$PBB,, | Performed by: INTERNAL MEDICINE

## 2020-01-15 PROCEDURE — 93010 ELECTROCARDIOGRAM REPORT: CPT | Mod: S$PBB,,, | Performed by: INTERNAL MEDICINE

## 2020-01-15 PROCEDURE — 1159F PR MEDICATION LIST DOCUMENTED IN MEDICAL RECORD: ICD-10-PCS | Mod: ,,, | Performed by: NURSE PRACTITIONER

## 2020-01-15 PROCEDURE — 99999 PR PBB SHADOW E&M-EST. PATIENT-LVL III: ICD-10-PCS | Mod: PBBFAC,,, | Performed by: NURSE PRACTITIONER

## 2020-01-15 PROCEDURE — 1159F MED LIST DOCD IN RCRD: CPT | Mod: ,,, | Performed by: NURSE PRACTITIONER

## 2020-01-15 PROCEDURE — 1126F AMNT PAIN NOTED NONE PRSNT: CPT | Mod: ,,, | Performed by: NURSE PRACTITIONER

## 2020-01-15 PROCEDURE — 94729 DIFFUSING CAPACITY: CPT | Mod: PBBFAC | Performed by: INTERNAL MEDICINE

## 2020-01-15 PROCEDURE — 94010 BREATHING CAPACITY TEST: ICD-10-PCS | Mod: 26,S$PBB,, | Performed by: INTERNAL MEDICINE

## 2020-01-15 PROCEDURE — 99999 PR PBB SHADOW E&M-EST. PATIENT-LVL III: CPT | Mod: PBBFAC,,, | Performed by: NURSE PRACTITIONER

## 2020-01-15 PROCEDURE — 94729 DIFFUSING CAPACITY: CPT | Mod: 26,S$PBB,, | Performed by: INTERNAL MEDICINE

## 2020-01-15 PROCEDURE — 99214 PR OFFICE/OUTPT VISIT, EST, LEVL IV, 30-39 MIN: ICD-10-PCS | Mod: S$PBB,,, | Performed by: NURSE PRACTITIONER

## 2020-01-15 NOTE — Clinical Note
DLCO was 106 in 2017 and is now 52. 6 weeks s/p PVI. Are you ok stopping his amiodarone now? Would you refer to pulmonary or wait until after amio wash out? thx

## 2020-01-21 ENCOUNTER — TELEPHONE (OUTPATIENT)
Dept: ELECTROPHYSIOLOGY | Facility: CLINIC | Age: 75
End: 2020-01-21

## 2020-01-22 ENCOUNTER — TELEPHONE (OUTPATIENT)
Dept: ELECTROPHYSIOLOGY | Facility: CLINIC | Age: 75
End: 2020-01-22

## 2020-01-22 NOTE — TELEPHONE ENCOUNTER
Will be calling pt later to help schedule pulmongist appt ----- Message from Laila Torres NP sent at 1/21/2020  5:01 PM CST -----  Contact: Self  If there is a pulmonologist closer to home he can see them. We can fax over any records they need. We just want him seen by the lung doctor given his shortness of breath and his results on his lung test. He doesn't need any tests from us. If he can coordinate a pulmonology visit the same day he comes to see us that would work too.     ----- Message -----  From: Rafiq Leal MA  Sent: 1/21/2020   4:29 PM CST  To: Laila Torres NP    So  lives five hours away , he can't travel back in forward . He needs to know what he needs done so that he can have everything scheduled on the same day   ----- Message -----  From: Laila Torres NP  Sent: 1/21/2020   3:49 PM CST  To: Rafiq Leal MA    We want to have him evaluated based on the breathing test he already did - to make sure there isn't anything else that needs to be addressed regarding his lungs and shortness of breath.  ----- Message -----  From: Rafiq Leal MA  Sent: 1/21/2020   3:43 PM CST  To: Laila Torres NP    Pt asked if you wanted him to go to pulmonology to have certain test done ?   ----- Message -----  From: Laila Torres NP  Sent: 1/21/2020   3:27 PM CST  To: Rafiq Leal MA    Nope - he can see whoever is available first if he likes. thx  ----- Message -----  From: Rafiq Leal MA  Sent: 1/21/2020   3:19 PM CST  To: Laila Torres NP    Do you have a specific  doc you wanted him to see?   ----- Message -----  From: Anna Pena  Sent: 1/21/2020   3:14 PM CST  To: Brian Laila Staff    Pt wants a call back to get his referral with Pulmonologist schedule, wants to know who he is to see.  Thanks    [phone]

## 2020-01-24 ENCOUNTER — TELEPHONE (OUTPATIENT)
Dept: ELECTROPHYSIOLOGY | Facility: CLINIC | Age: 75
End: 2020-01-24

## 2020-01-24 NOTE — TELEPHONE ENCOUNTER
----- Message from Megan Oliva MA sent at 1/24/2020 10:17 AM CST -----  Contact: Pt called       ----- Message -----  From: Lisandra Manzano  Sent: 1/24/2020  10:03 AM CST  To: Akilah CORNELL Staff    Pt need a referral to see a pulmonogist Dr. Donald Rankin. Please call pt @ [phone]. Thank you.

## 2020-02-13 PROBLEM — J84.9 INTERSTITIAL LUNG DISEASE: Status: ACTIVE | Noted: 2020-02-13

## 2020-02-28 ENCOUNTER — HOSPITAL ENCOUNTER (OUTPATIENT)
Dept: CARDIOLOGY | Facility: CLINIC | Age: 75
Discharge: HOME | End: 2020-02-28
Payer: MEDICARE

## 2020-02-28 ENCOUNTER — OFFICE VISIT (OUTPATIENT)
Dept: ELECTROPHYSIOLOGY | Facility: CLINIC | Age: 75
End: 2020-02-28
Payer: MEDICARE

## 2020-02-28 VITALS — HEART RATE: 60 BPM | DIASTOLIC BLOOD PRESSURE: 90 MMHG | SYSTOLIC BLOOD PRESSURE: 138 MMHG

## 2020-02-28 DIAGNOSIS — I10 HTN (HYPERTENSION), BENIGN: ICD-10-CM

## 2020-02-28 DIAGNOSIS — I49.8 OTHER SPECIFIED CARDIAC ARRHYTHMIAS: ICD-10-CM

## 2020-02-28 DIAGNOSIS — I25.2 OLD MYOCARDIAL INFARCTION: ICD-10-CM

## 2020-02-28 DIAGNOSIS — I47.29 PVT (PAROXYSMAL VENTRICULAR TACHYCARDIA): Primary | ICD-10-CM

## 2020-02-28 DIAGNOSIS — Z95.810 AUTOMATIC IMPLANTABLE CARDIOVERTER-DEFIBRILLATOR IN SITU: Primary | ICD-10-CM

## 2020-02-28 DIAGNOSIS — Z79.01 ANTICOAGULATED ON COUMADIN: ICD-10-CM

## 2020-02-28 DIAGNOSIS — I48.19 PERSISTENT ATRIAL FIBRILLATION: ICD-10-CM

## 2020-02-28 DIAGNOSIS — R06.02 SOB (SHORTNESS OF BREATH): ICD-10-CM

## 2020-02-28 DIAGNOSIS — I47.29 PVT (PAROXYSMAL VENTRICULAR TACHYCARDIA): ICD-10-CM

## 2020-02-28 DIAGNOSIS — I25.5 ISCHEMIC CARDIOMYOPATHY: ICD-10-CM

## 2020-02-28 DIAGNOSIS — N18.1 CKD (CHRONIC KIDNEY DISEASE) STAGE 1, GFR 90 ML/MIN OR GREATER: ICD-10-CM

## 2020-02-28 PROCEDURE — 99214 PR OFFICE/OUTPT VISIT, EST, LEVL IV, 30-39 MIN: ICD-10-PCS | Mod: S$PBB,,, | Performed by: INTERNAL MEDICINE

## 2020-02-28 PROCEDURE — 93010 RHYTHM STRIP: ICD-10-PCS | Mod: S$PBB,,, | Performed by: INTERNAL MEDICINE

## 2020-02-28 PROCEDURE — 93010 ELECTROCARDIOGRAM REPORT: CPT | Mod: S$PBB,,, | Performed by: INTERNAL MEDICINE

## 2020-02-28 PROCEDURE — 93005 ELECTROCARDIOGRAM TRACING: CPT | Mod: PBBFAC | Performed by: INTERNAL MEDICINE

## 2020-02-28 PROCEDURE — 99213 OFFICE O/P EST LOW 20 MIN: CPT | Mod: PBBFAC,25 | Performed by: INTERNAL MEDICINE

## 2020-02-28 PROCEDURE — 99999 PR PBB SHADOW E&M-EST. PATIENT-LVL III: CPT | Mod: PBBFAC,,, | Performed by: INTERNAL MEDICINE

## 2020-02-28 PROCEDURE — 99214 OFFICE O/P EST MOD 30 MIN: CPT | Mod: S$PBB,,, | Performed by: INTERNAL MEDICINE

## 2020-02-28 PROCEDURE — 99999 PR PBB SHADOW E&M-EST. PATIENT-LVL III: ICD-10-PCS | Mod: PBBFAC,,, | Performed by: INTERNAL MEDICINE

## 2020-02-28 RX ORDER — LISINOPRIL 30 MG/1
30 TABLET ORAL DAILY
Qty: 90 TABLET | Refills: 3 | Status: ON HOLD | OUTPATIENT
Start: 2020-02-28 | End: 2020-11-25 | Stop reason: SDUPTHER

## 2020-02-28 NOTE — PROGRESS NOTES
Mr. Grossman is a patient of Dr. Isbell and was last seen in clinic 10/25/2019.      Subjective:   Patient ID:  Amish Grossman is a 74 y.o. male who presents for follow-up of Atrial Fibrillation  .     HPI:    Mr. Grossman is a 74 y.o. 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  CAD/MI (1993)/PCI (with stent placement in 2000 and 2004), stable  HTN on meds  HL on meds  CKD, stable  hypothyroid on meds  RV thrombus hx; on coumadin  VT (12/2014), resulting in ICD shock  persistent AF    Actively doing forestry work. Hunting. Does get some SHOOK when walking fast.   In past, we did NOE/DCCV on 3/16 in hopes of starting tikosyn, but QTc was too long for that. Instead we started amiodarone. Pt says he felt better for a week, then felt down again. However he thinks that may have been multifactorial and isn't completely sure. Says he feels better now than pre-DCCV, and he's back in AF. ICD shows that NSR lasted 10 days.   We did gen change 6/7/18. This was c/b VT intraop, requiring external rescue to NSR. Also, a breach in the RV lead's insulation was repaired at that time.    He's felt unwell x weeks. ICD shows AF recurred on 10/1/19 and continues. Much lower exertion tolerance. Frequent V pacing.  Recurrent symptomatic AF, despite amio.  12/9/2019: Cryoblative pulmonary vein isolation of all 4 pulmonary veins. Amiodarone was restarted. Stopped due to decreased DLCO on PFTs 1/2020. 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  Today he says that his SHOOK has improved since his ablation. He denies CP, palpitations, LH, syncope.     Device Interrogation shows no significant arrhythmia since ablation.    I have personally reviewed the patient's EKG today, which shows APVS at 60bpm.     Recent Cardiac Tests:    NOE (12/9/2019):  · Severely decreased left ventricular systolic function. The estimated ejection fraction is 20%. Wall motion abnormalities noted in the  LAD distribution  · Severe left atrial enlargement.  · Severe right atrial enlargement.  · Normal right ventricular systolic function.  · Mild mitral regurgitation.  · Mild tricuspid regurgitation.  · Abnormal appearing left atrial appendage. Abnormal appendage velocities.  · The ascending aorta is mildly dilated.  · No LA or PRISCA thrombus with appendage velocity of 0.7m/sec    Current Outpatient Medications   Medication Sig    amiodarone (PACERONE) 200 MG Tab Take 400mg (two tabs)two times daily x14 days, then decrease to 400mg (two tabs)once daily x14 days, then decrease to 200mg (one tab)daily.    amLODIPine (NORVASC) 5 MG tablet Take 1 tablet (5 mg total) by mouth once daily.    ascorbic acid (VITAMIN C) 100 MG tablet Take 1,000 mg by mouth once daily.     aspirin (ECOTRIN) 81 MG EC tablet Take 81 mg by mouth once daily.    atorvastatin (LIPITOR) 10 MG tablet Take 1 tablet (10 mg total) by mouth once daily.    carvedilol (COREG) 12.5 MG tablet Take 1 tablet (12.5 mg total) by mouth 2 (two) times daily with meals.    fish oil-omega-3 fatty acids 300-1,000 mg capsule Take 2 g by mouth 2 (two) times daily.     furosemide (LASIX) 40 MG tablet Take 40 mg by mouth as needed.    levothyroxine (SYNTHROID) 75 MCG tablet Take 75 mcg by mouth before breakfast.     lisinopril (PRINIVIL,ZESTRIL) 20 MG tablet TAKE 1 TABLET BY MOUTH ONCE DAILY    multivitamin capsule Take 1 capsule by mouth nightly.     nitroGLYCERIN (NITROSTAT) 0.4 MG SL tablet Place 1 tablet (0.4 mg total) under the tongue every 5 (five) minutes as needed for Chest pain.    pantoprazole (PROTONIX) 40 MG tablet Take 1 tablet (40 mg total) by mouth once daily.    SLO-NIACIN 750 mg TbSR Take 1 tablet (750 mg total) by mouth 2 (two) times daily. (Patient taking differently: Take 500 mg by mouth 2 (two) times daily. )    warfarin (COUMADIN) 5 MG tablet 2.5 mg every Mon, Wed, Fri.     warfarin (COUMADIN) 5 MG tablet Take 5 mg by mouth every Tues,  Thurs, Sat.    warfarin (COUMADIN) 5 MG tablet Take 5 mg by mouth every Sunday.     No current facility-administered medications for this visit.      Facility-Administered Medications Ordered in Other Visits   Medication    0.9%  NaCl infusion    sodium chloride 0.9% flush 5 mL    sodium chloride 0.9% flush 5 mL     Review of Systems   Constitution: Negative for malaise/fatigue.   Cardiovascular: Positive for dyspnea on exertion. Negative for chest pain, irregular heartbeat, leg swelling and palpitations.   Respiratory: Negative for shortness of breath.    Hematologic/Lymphatic: Negative for bleeding problem.   Skin: Negative for rash.   Musculoskeletal: Negative for myalgias.   Gastrointestinal: Negative for hematemesis, hematochezia and nausea.   Genitourinary: Negative for hematuria.   Neurological: Negative for light-headedness.   Psychiatric/Behavioral: Negative for altered mental status.   Allergic/Immunologic: Negative for persistent infections.     Objective:          BP (!) 138/90   Pulse 60     Physical Exam   Constitutional: He is oriented to person, place, and time. He appears well-developed and well-nourished.   HENT:   Head: Normocephalic and atraumatic.   Nose: Nose normal.   Eyes: Pupils are equal, round, and reactive to light. Conjunctivae, EOM and lids are normal. No scleral icterus.   Neck: Normal range of motion. No JVD present. No tracheal deviation present. No thyromegaly present.   Cardiovascular: Normal rate, regular rhythm, S1 normal, S2 normal and normal heart sounds.  No extrasystoles are present. PMI is not displaced. Exam reveals no gallop and no friction rub.   No murmur heard.  Pulses:       Radial pulses are 2+ on the right side, and 2+ on the left side.   Pulmonary/Chest: Effort normal and breath sounds normal. No accessory muscle usage. No tachypnea. No respiratory distress. He has no wheezes. He has no rales.   Device to LUCW.   Abdominal: Soft. Normal appearance and bowel  sounds are normal. He exhibits no distension. There is no hepatosplenomegaly. There is no tenderness.   Musculoskeletal: Normal range of motion. He exhibits no edema.   Neurological: He is alert and oriented to person, place, and time. He has normal reflexes. He exhibits normal muscle tone.   Skin: Skin is warm and dry. No rash noted. No erythema.   Psychiatric: He has a normal mood and affect. His speech is normal and behavior is normal.   Nursing note and vitals reviewed.    Lab Results   Component Value Date     10/25/2019    K 4.8 10/25/2019    MG 1.8 12/10/2014    BUN 24 (H) 10/25/2019    CREATININE 1.9 (H) 10/25/2019    ALT 21 06/06/2018    AST 22 06/06/2018    HGB 13.4 (L) 10/25/2019    HCT 41.0 10/25/2019    TSH 5.190 (H) 06/06/2018    LDLCALC 65.8 06/06/2018       Recent Labs   Lab 10/25/19  1619 10/31/19  1015 12/09/19  0819 12/10/19  0423   INR 2.2 H 2.4 H 1.8 H 2.2 H       Assessment:     1. Automatic implantable cardioverter-defibrillator in situ    2. HTN (hypertension), benign    3. Ischemic cardiomyopathy    4. PVT (paroxysmal ventricular tachycardia)    5. Persistent atrial fibrillation    6. Old myocardial infarction    7. CKD (chronic kidney disease) stage 1, GFR 90 ml/min or greater    8. Anticoagulated on Coumadin    9. SOB (shortness of breath)      Plan:     In summary, Mr. Grossman is a 74 y.o. 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  He is 3 mos s/p PVI. He has not had any AF since his ablation. He is no longer V pacing.     Continue without AAD. In future, could retry tikosyn (note QTc much shorter now than previous) or multaq, as needed... or repeat PVI prn.  f/u with pulm    Change to eliquis.    Return in 1 year with echo, or earlier prn.

## 2020-03-03 ENCOUNTER — CLINICAL SUPPORT (OUTPATIENT)
Dept: CARDIOLOGY | Facility: HOSPITAL | Age: 75
End: 2020-03-03
Attending: INTERNAL MEDICINE
Payer: MEDICARE

## 2020-03-03 DIAGNOSIS — Z95.810 AICD (AUTOMATIC CARDIOVERTER/DEFIBRILLATOR) PRESENT: ICD-10-CM

## 2020-03-03 DIAGNOSIS — I25.5 ISCHEMIC CARDIOMYOPATHY: ICD-10-CM

## 2020-03-03 PROCEDURE — 93295 DEV INTERROG REMOTE 1/2/MLT: CPT | Mod: ,,, | Performed by: INTERNAL MEDICINE

## 2020-03-03 PROCEDURE — 93296 REM INTERROG EVL PM/IDS: CPT | Performed by: INTERNAL MEDICINE

## 2020-03-03 PROCEDURE — 93295 CARDIAC DEVICE CHECK - REMOTE: ICD-10-PCS | Mod: ,,, | Performed by: INTERNAL MEDICINE

## 2020-03-04 ENCOUNTER — TELEPHONE (OUTPATIENT)
Dept: ELECTROPHYSIOLOGY | Facility: CLINIC | Age: 75
End: 2020-03-04

## 2020-03-19 PROBLEM — R91.1 LUNG NODULE: Status: ACTIVE | Noted: 2020-03-19

## 2020-06-01 ENCOUNTER — CLINICAL SUPPORT (OUTPATIENT)
Dept: CARDIOLOGY | Facility: HOSPITAL | Age: 75
End: 2020-06-01
Attending: INTERNAL MEDICINE
Payer: MEDICARE

## 2020-06-01 DIAGNOSIS — Z95.810 AICD (AUTOMATIC CARDIOVERTER/DEFIBRILLATOR) PRESENT: ICD-10-CM

## 2020-06-01 DIAGNOSIS — I25.5 ISCHEMIC CARDIOMYOPATHY: ICD-10-CM

## 2020-06-01 PROCEDURE — 93296 REM INTERROG EVL PM/IDS: CPT | Performed by: INTERNAL MEDICINE

## 2020-06-01 PROCEDURE — 93295 DEV INTERROG REMOTE 1/2/MLT: CPT | Mod: ,,, | Performed by: INTERNAL MEDICINE

## 2020-06-01 PROCEDURE — 93295 CARDIAC DEVICE CHECK - REMOTE: ICD-10-PCS | Mod: ,,, | Performed by: INTERNAL MEDICINE

## 2020-07-29 ENCOUNTER — TELEPHONE (OUTPATIENT)
Dept: CARDIOLOGY | Facility: CLINIC | Age: 75
End: 2020-07-29

## 2020-07-30 NOTE — TELEPHONE ENCOUNTER
Spoke with patient and relayed the message from Dr Coburn ( se below ). He verbalized understanding. He will do echocardiogram in October.      Angel Coburn MD                  Take the Heart Meds but nothing special is needed unless he decompensates. Check CFD Almas Coburn to read in 3 months after he has recovered,CJL    Previous Messages    ----- Message -----   From: Alicia Vaughn MA   Sent: 7/29/2020   3:52 PM CDT   To: Angel Coburn MD   Subject: FW: Question                                     Dr Coburn - Any advice ?   ----- Message -----   From: Anna Pena   Sent: 7/29/2020  10:26 AM CDT   To: Almas CAMERON Staff   Subject: Question                                         Pt test positive for covid & would like recommendation with his cardio problems. Thanks     [phone]

## 2020-08-30 ENCOUNTER — CLINICAL SUPPORT (OUTPATIENT)
Dept: CARDIOLOGY | Facility: HOSPITAL | Age: 75
End: 2020-08-30
Attending: INTERNAL MEDICINE
Payer: MEDICARE

## 2020-08-30 DIAGNOSIS — I48.91 UNSPECIFIED ATRIAL FIBRILLATION: ICD-10-CM

## 2020-08-30 DIAGNOSIS — Z95.810 AICD (AUTOMATIC CARDIOVERTER/DEFIBRILLATOR) PRESENT: ICD-10-CM

## 2020-08-30 DIAGNOSIS — I25.5 ISCHEMIC CARDIOMYOPATHY: ICD-10-CM

## 2020-08-30 PROCEDURE — 93295 DEV INTERROG REMOTE 1/2/MLT: CPT | Mod: ,,, | Performed by: INTERNAL MEDICINE

## 2020-08-30 PROCEDURE — 93296 REM INTERROG EVL PM/IDS: CPT | Performed by: INTERNAL MEDICINE

## 2020-08-30 PROCEDURE — 93295 CARDIAC DEVICE CHECK - REMOTE: ICD-10-PCS | Mod: ,,, | Performed by: INTERNAL MEDICINE

## 2020-09-09 DIAGNOSIS — I10 HTN (HYPERTENSION), BENIGN: ICD-10-CM

## 2020-09-09 DIAGNOSIS — I25.5 ISCHEMIC CARDIOMYOPATHY: Primary | ICD-10-CM

## 2020-09-09 DIAGNOSIS — R06.02 SOB (SHORTNESS OF BREATH): ICD-10-CM

## 2020-09-16 ENCOUNTER — TELEPHONE (OUTPATIENT)
Dept: CARDIOLOGY | Facility: CLINIC | Age: 75
End: 2020-09-16

## 2020-09-16 DIAGNOSIS — E89.0 POSTABLATIVE HYPOTHYROIDISM: ICD-10-CM

## 2020-09-16 DIAGNOSIS — D64.9 ANEMIA, UNSPECIFIED TYPE: ICD-10-CM

## 2020-09-16 DIAGNOSIS — E78.5 DYSLIPIDEMIA: Primary | ICD-10-CM

## 2020-09-16 DIAGNOSIS — Z12.5 SCREENING PSA (PROSTATE SPECIFIC ANTIGEN): ICD-10-CM

## 2020-09-16 DIAGNOSIS — R06.02 SHORTNESS OF BREATH: ICD-10-CM

## 2020-09-16 DIAGNOSIS — R00.2 PALPITATIONS: Primary | ICD-10-CM

## 2020-10-01 ENCOUNTER — PATIENT MESSAGE (OUTPATIENT)
Dept: OTHER | Facility: OTHER | Age: 75
End: 2020-10-01

## 2020-10-01 ENCOUNTER — TELEPHONE (OUTPATIENT)
Dept: CARDIOLOGY | Facility: HOSPITAL | Age: 75
End: 2020-10-01

## 2020-10-01 NOTE — TELEPHONE ENCOUNTER
Spoke with patient and informed him that he is not due to see the doctor or have his device checked in the office until January 2021. Patient stated understanding.    ----- Message from Anna Pena sent at 10/1/2020  1:26 PM CDT -----  Regarding: Device Check  Pt says he usually get his device check twice a year. Pt asking if needs to get it check will be in town for an appt on 10/14, if can call Pt. Thanks     [phone]-can leave VM if no answer

## 2020-10-07 PROBLEM — E66.811 CLASS 1 OBESITY WITH SERIOUS COMORBIDITY IN ADULT: Status: ACTIVE | Noted: 2020-10-07

## 2020-10-07 PROBLEM — E66.9 CLASS 1 OBESITY WITH SERIOUS COMORBIDITY IN ADULT: Status: ACTIVE | Noted: 2020-10-07

## 2020-10-07 NOTE — PROGRESS NOTES
Subjective:   Patient ID:  Amish Grossman is a 74 y.o. male who presents for follow-up of CVD    HPI:The patient is here for CAD/HF.      The patient has no chest pain, SOB, TIA, palpitations, syncope or pre-syncope.Patient does not exercise as much as at times in past-[place].        Review of Systems   Constitution: Negative for chills, decreased appetite, diaphoresis, fever, malaise/fatigue, night sweats, weight gain and weight loss.   HENT: Negative for congestion, hoarse voice, nosebleeds, sore throat and tinnitus.    Eyes: Negative for blurred vision, double vision, vision loss in left eye, vision loss in right eye, visual disturbance and visual halos.   Cardiovascular: Negative for chest pain, claudication, cyanosis, dyspnea on exertion, irregular heartbeat, leg swelling, near-syncope, orthopnea, palpitations, paroxysmal nocturnal dyspnea and syncope.   Respiratory: Negative for cough, hemoptysis, shortness of breath, sleep disturbances due to breathing, snoring, sputum production and wheezing.    Endocrine: Negative for cold intolerance, heat intolerance, polydipsia, polyphagia and polyuria.   Hematologic/Lymphatic: Negative for adenopathy and bleeding problem. Does not bruise/bleed easily.   Skin: Negative for color change, dry skin, flushing, itching, nail changes, poor wound healing, rash, skin cancer, suspicious lesions and unusual hair distribution.   Musculoskeletal: Negative for arthritis, back pain, falls, gout, joint pain, joint swelling, muscle cramps, muscle weakness, myalgias and stiffness.   Gastrointestinal: Negative for abdominal pain, anorexia, change in bowel habit, constipation, diarrhea, dysphagia, heartburn, hematemesis, hematochezia, melena and vomiting.   Genitourinary: Negative for decreased libido, dysuria, hematuria, hesitancy and urgency.   Neurological: Negative for excessive daytime sleepiness, dizziness, focal weakness, headaches, light-headedness, loss of balance, numbness,  paresthesias, seizures, sensory change, tremors, vertigo and weakness.   Psychiatric/Behavioral: Negative for altered mental status, depression, hallucinations, memory loss, substance abuse and suicidal ideas. The patient does not have insomnia and is not nervous/anxious.    Allergic/Immunologic: Negative for environmental allergies and hives.       Objective: There were no vitals taken for this visit.     Virtual    Assessment:     1. Coronary artery disease due to lipid rich plaque    2. Ischemic cardiomyopathy    3. Automatic implantable cardioverter-defibrillator in situ    4. Anticoagulated on Coumadin    5. Longstanding persistent atrial fibrillation    6. Old myocardial infarction    7. PVT (paroxysmal ventricular tachycardia)    8. S/P PTCA (percutaneous transluminal coronary angioplasty)    9. Venous insufficiency of lower extremity, unspecified laterality    10. Ventricular tachyarrhythmia    11. Interstitial lung disease    12. HTN (hypertension), benign    13. Erectile dysfunction due to arterial insufficiency    14. Dyslipidemia    15. Class 1 obesity due to excess calories with serious comorbidity in adult, unspecified BMI    16. Hypothyroidism due to acquired atrophy of thyroid        Plan:   Discussed diet , achieving and maintaining ideal body weight, and exercise.   We reviewed meds in detail.  Reassured-Discussed goals options plan  Lipids not quite as good as priors-maybe we could double statin  Also, need to make sure he is on CoQ 10 200 mg/d and omega 3 > 1 g EPA/DHA   Move amlodipine to nite-if BPs good in am stop the extra 10 of lisinopril  Co Q 10 200 mg /d  Refill NTG at least every 8-9 months  Increase Atorva to 20-2 of the 10s or one 20  Diagnoses and all orders for this visit:    Coronary artery disease due to lipid rich plaque  -     atorvastatin (LIPITOR) 20 MG tablet; Take 1 tablet (20 mg total) by mouth once daily.  -     Lipid Panel; Future; Expected date: 02/14/2021  -      Comprehensive Metabolic Panel; Future; Expected date: 02/14/2021    Ischemic cardiomyopathy  -     Comprehensive Metabolic Panel; Future; Expected date: 02/14/2021  -     BNP; Future; Expected date: 02/14/2021    Automatic implantable cardioverter-defibrillator in situ    Anticoagulated on Coumadin    Longstanding persistent atrial fibrillation    Old myocardial infarction    PVT (paroxysmal ventricular tachycardia)    S/P PTCA (percutaneous transluminal coronary angioplasty)    Venous insufficiency of lower extremity, unspecified laterality    Ventricular tachyarrhythmia    Interstitial lung disease    HTN (hypertension), benign  -     atorvastatin (LIPITOR) 20 MG tablet; Take 1 tablet (20 mg total) by mouth once daily.  -     Lipid Panel; Future; Expected date: 02/14/2021  -     Comprehensive Metabolic Panel; Future; Expected date: 02/14/2021    Erectile dysfunction due to arterial insufficiency    Dyslipidemia  -     atorvastatin (LIPITOR) 20 MG tablet; Take 1 tablet (20 mg total) by mouth once daily.  -     Lipid Panel; Future; Expected date: 02/14/2021  -     Comprehensive Metabolic Panel; Future; Expected date: 02/14/2021    Class 1 obesity due to excess calories with serious comorbidity in adult, unspecified BMI  -     atorvastatin (LIPITOR) 20 MG tablet; Take 1 tablet (20 mg total) by mouth once daily.  -     Lipid Panel; Future; Expected date: 02/14/2021  -     Comprehensive Metabolic Panel; Future; Expected date: 02/14/2021    Hypothyroidism due to acquired atrophy of thyroid  -     TSH; Future; Expected date: 02/14/2021  -     T4, Free; Future; Expected date: 02/14/2021            Follow up in about 4 months (around 2/14/2021) for In Feb at same time as Akilah with labs.

## 2020-10-13 ENCOUNTER — HOSPITAL ENCOUNTER (OUTPATIENT)
Dept: CARDIOLOGY | Facility: CLINIC | Age: 75
Discharge: HOME | End: 2020-10-13
Payer: MEDICARE

## 2020-10-13 ENCOUNTER — HOSPITAL ENCOUNTER (OUTPATIENT)
Dept: CARDIOLOGY | Facility: HOSPITAL | Age: 75
Discharge: HOME | End: 2020-10-13
Attending: INTERNAL MEDICINE
Payer: MEDICARE

## 2020-10-13 VITALS
HEIGHT: 74 IN | DIASTOLIC BLOOD PRESSURE: 90 MMHG | BODY MASS INDEX: 30.67 KG/M2 | WEIGHT: 239 LBS | SYSTOLIC BLOOD PRESSURE: 138 MMHG | HEART RATE: 60 BPM

## 2020-10-13 DIAGNOSIS — R06.02 SOB (SHORTNESS OF BREATH): ICD-10-CM

## 2020-10-13 DIAGNOSIS — I10 HTN (HYPERTENSION), BENIGN: ICD-10-CM

## 2020-10-13 DIAGNOSIS — I25.5 ISCHEMIC CARDIOMYOPATHY: ICD-10-CM

## 2020-10-13 DIAGNOSIS — R00.2 PALPITATIONS: ICD-10-CM

## 2020-10-13 LAB
ASCENDING AORTA: 4.01 CM
AV INDEX (PROSTH): 0.75
AV MEAN GRADIENT: 2 MMHG
AV PEAK GRADIENT: 5 MMHG
AV VALVE AREA: 3 CM2
AV VELOCITY RATIO: 0.66
BSA FOR ECHO PROCEDURE: 2.38 M2
CV ECHO LV RWT: 0.3 CM
DOP CALC AO PEAK VEL: 1.11 M/S
DOP CALC AO VTI: 24.64 CM
DOP CALC LVOT AREA: 4 CM2
DOP CALC LVOT DIAMETER: 2.25 CM
DOP CALC LVOT PEAK VEL: 0.73 M/S
DOP CALC LVOT STROKE VOLUME: 73.8 CM3
DOP CALCLVOT PEAK VEL VTI: 18.57 CM
E WAVE DECELERATION TIME: 319.97 MSEC
E/A RATIO: 0.87
E/E' RATIO: 11.78 M/S
ECHO LV POSTERIOR WALL: 0.87 CM (ref 0.6–1.1)
FRACTIONAL SHORTENING: 24 % (ref 28–44)
INTERVENTRICULAR SEPTUM: 0.82 CM (ref 0.6–1.1)
IVRT: 125.59 MSEC
LA MAJOR: 7.05 CM
LA MINOR: 7.08 CM
LA WIDTH: 5.56 CM
LEFT ATRIUM SIZE: 4.6 CM
LEFT ATRIUM VOLUME INDEX: 65.5 ML/M2
LEFT ATRIUM VOLUME: 153.59 CM3
LEFT INTERNAL DIMENSION IN SYSTOLE: 4.37 CM (ref 2.1–4)
LEFT VENTRICLE DIASTOLIC VOLUME INDEX: 68.89 ML/M2
LEFT VENTRICLE DIASTOLIC VOLUME: 161.48 ML
LEFT VENTRICLE MASS INDEX: 78 G/M2
LEFT VENTRICLE SYSTOLIC VOLUME INDEX: 36.9 ML/M2
LEFT VENTRICLE SYSTOLIC VOLUME: 86.38 ML
LEFT VENTRICULAR INTERNAL DIMENSION IN DIASTOLE: 5.72 CM (ref 3.5–6)
LEFT VENTRICULAR MASS: 183.41 G
LV LATERAL E/E' RATIO: 13.25 M/S
LV SEPTAL E/E' RATIO: 10.6 M/S
MV PEAK A VEL: 0.61 M/S
MV PEAK E VEL: 0.53 M/S
MV STENOSIS PRESSURE HALF TIME: 92.79 MS
MV VALVE AREA P 1/2 METHOD: 2.37 CM2
PISA TR MAX VEL: 2.44 M/S
PULM VEIN S/D RATIO: 1.68
PV PEAK D VEL: 0.34 M/S
PV PEAK S VEL: 0.57 M/S
RA MAJOR: 5.92 CM
RA PRESSURE: 3 MMHG
RA WIDTH: 4.29 CM
RIGHT VENTRICULAR END-DIASTOLIC DIMENSION: 4.1 CM
RV TISSUE DOPPLER FREE WALL SYSTOLIC VELOCITY 1 (APICAL 4 CHAMBER VIEW): 13.19 CM/S
SINUS: 3.85 CM
STJ: 3.1 CM
TDI LATERAL: 0.04 M/S
TDI SEPTAL: 0.05 M/S
TDI: 0.05 M/S
TR MAX PG: 24 MMHG
TRICUSPID ANNULAR PLANE SYSTOLIC EXCURSION: 2.21 CM
TV REST PULMONARY ARTERY PRESSURE: 27 MMHG

## 2020-10-13 PROCEDURE — 93010 ELECTROCARDIOGRAM REPORT: CPT | Mod: S$PBB,,, | Performed by: INTERNAL MEDICINE

## 2020-10-13 PROCEDURE — 93306 ECHO (CUPID ONLY): ICD-10-PCS | Mod: 26,,, | Performed by: INTERNAL MEDICINE

## 2020-10-13 PROCEDURE — 63600175 PHARM REV CODE 636 W HCPCS: Performed by: INTERNAL MEDICINE

## 2020-10-13 PROCEDURE — 93005 ELECTROCARDIOGRAM TRACING: CPT | Mod: PBBFAC | Performed by: INTERNAL MEDICINE

## 2020-10-13 PROCEDURE — 93010 EKG 12-LEAD: ICD-10-PCS | Mod: S$PBB,,, | Performed by: INTERNAL MEDICINE

## 2020-10-13 PROCEDURE — 93306 TTE W/DOPPLER COMPLETE: CPT | Mod: 26,,, | Performed by: INTERNAL MEDICINE

## 2020-10-13 PROCEDURE — C8929 TTE W OR WO FOL WCON,DOPPLER: HCPCS

## 2020-10-13 RX ADMIN — HUMAN ALBUMIN MICROSPHERES AND PERFLUTREN 0.66 MG: 10; .22 INJECTION, SOLUTION INTRAVENOUS at 11:10

## 2020-10-14 ENCOUNTER — OFFICE VISIT (OUTPATIENT)
Dept: CARDIOLOGY | Facility: CLINIC | Age: 75
End: 2020-10-14
Payer: MEDICARE

## 2020-10-14 DIAGNOSIS — I47.20 VENTRICULAR TACHYARRHYTHMIA: ICD-10-CM

## 2020-10-14 DIAGNOSIS — I47.29 PVT (PAROXYSMAL VENTRICULAR TACHYCARDIA): ICD-10-CM

## 2020-10-14 DIAGNOSIS — E03.4 HYPOTHYROIDISM DUE TO ACQUIRED ATROPHY OF THYROID: ICD-10-CM

## 2020-10-14 DIAGNOSIS — I25.5 ISCHEMIC CARDIOMYOPATHY: ICD-10-CM

## 2020-10-14 DIAGNOSIS — I25.2 OLD MYOCARDIAL INFARCTION: ICD-10-CM

## 2020-10-14 DIAGNOSIS — J84.9 INTERSTITIAL LUNG DISEASE: ICD-10-CM

## 2020-10-14 DIAGNOSIS — I10 HTN (HYPERTENSION), BENIGN: ICD-10-CM

## 2020-10-14 DIAGNOSIS — I25.10 CORONARY ARTERY DISEASE DUE TO LIPID RICH PLAQUE: Primary | ICD-10-CM

## 2020-10-14 DIAGNOSIS — I87.2 VENOUS INSUFFICIENCY OF LOWER EXTREMITY, UNSPECIFIED LATERALITY: ICD-10-CM

## 2020-10-14 DIAGNOSIS — I48.11 LONGSTANDING PERSISTENT ATRIAL FIBRILLATION: ICD-10-CM

## 2020-10-14 DIAGNOSIS — Z79.01 ANTICOAGULATED ON COUMADIN: ICD-10-CM

## 2020-10-14 DIAGNOSIS — I25.83 CORONARY ARTERY DISEASE DUE TO LIPID RICH PLAQUE: Primary | ICD-10-CM

## 2020-10-14 DIAGNOSIS — E66.09 CLASS 1 OBESITY DUE TO EXCESS CALORIES WITH SERIOUS COMORBIDITY IN ADULT, UNSPECIFIED BMI: ICD-10-CM

## 2020-10-14 DIAGNOSIS — E78.5 DYSLIPIDEMIA: ICD-10-CM

## 2020-10-14 DIAGNOSIS — N52.01 ERECTILE DYSFUNCTION DUE TO ARTERIAL INSUFFICIENCY: ICD-10-CM

## 2020-10-14 DIAGNOSIS — Z98.61 S/P PTCA (PERCUTANEOUS TRANSLUMINAL CORONARY ANGIOPLASTY): ICD-10-CM

## 2020-10-14 DIAGNOSIS — Z95.810 AUTOMATIC IMPLANTABLE CARDIOVERTER-DEFIBRILLATOR IN SITU: ICD-10-CM

## 2020-10-14 PROCEDURE — 99213 PR OFFICE/OUTPT VISIT, EST, LEVL III, 20-29 MIN: ICD-10-PCS | Mod: 95,,, | Performed by: INTERNAL MEDICINE

## 2020-10-14 PROCEDURE — 99213 OFFICE O/P EST LOW 20 MIN: CPT | Mod: 95,,, | Performed by: INTERNAL MEDICINE

## 2020-10-14 RX ORDER — ATORVASTATIN CALCIUM 20 MG/1
20 TABLET, FILM COATED ORAL DAILY
Qty: 90 TABLET | Refills: 3 | Status: SHIPPED | OUTPATIENT
Start: 2020-10-14 | End: 2021-10-11

## 2020-10-14 NOTE — PATIENT INSTRUCTIONS
Discussed diet , achieving and maintaining ideal body weight, and exercise.   We reviewed meds in detail.  Reassured-Discussed goals options plan  Lipids not quite as good as priors-maybe we could double statin  Also, need to make sure he is on CoQ 10 200 mg/d and omega 3 > 1 g EPA/DHA   Move amlodipine to nite-if BPs good in am stop the extra 10 of lisinopril  Co Q 10 200 mg /d  Refill NTG at least every 8-9 months  Increase Atorva to 20-2 of the 10s or one 20

## 2020-10-23 ENCOUNTER — TELEPHONE (OUTPATIENT)
Dept: CARDIOLOGY | Facility: HOSPITAL | Age: 75
End: 2020-10-23

## 2020-10-23 DIAGNOSIS — I25.5 ISCHEMIC CARDIOMYOPATHY: ICD-10-CM

## 2020-10-23 DIAGNOSIS — Z95.810 AUTOMATIC IMPLANTABLE CARDIOVERTER-DEFIBRILLATOR IN SITU: Primary | ICD-10-CM

## 2020-10-23 NOTE — TELEPHONE ENCOUNTER
Spoke with patient and informed him that the Nurse will review his transmission with Dr. Isbell and give him a call back. Patient stated understanding.   ----- Message from Chula Nguyen sent at 10/23/2020  8:41 AM CDT -----  Regarding: Heidi  Contact: patient  The pt is calling to speak with you regarding his transmission. Please call him back @ [phone]. Thanks, Chula

## 2020-10-27 ENCOUNTER — TELEPHONE (OUTPATIENT)
Dept: ELECTROPHYSIOLOGY | Facility: CLINIC | Age: 75
End: 2020-10-27

## 2020-10-27 NOTE — TELEPHONE ENCOUNTER
Attempted to call pt to advise. No answer. Left voice mail that appt was changed to tomm at 10am and that a MA would be calling once his other appt's were coordinated to give him times.       ----- Message from Bela Cast MA sent at 10/27/2020  9:39 AM CDT -----  Regarding: FW: Afib  I have no availability and I have not personally had any cancellations to my knowledge. Please advise     Thank you,   Bela Cast MA  [facility]      ----- Message -----  From: Anna Pena  Sent: 10/27/2020   8:53 AM CDT  To: [facility] Staff  Subject: Afib                                             Pt is in tejinder afib with headaches asking if any cancellation available to come in earlier than 10/30/20, if can call. Thanks    [phone]

## 2020-10-28 ENCOUNTER — OFFICE VISIT (OUTPATIENT)
Dept: ELECTROPHYSIOLOGY | Facility: CLINIC | Age: 75
End: 2020-10-28
Payer: MEDICARE

## 2020-10-28 ENCOUNTER — HOSPITAL ENCOUNTER (OUTPATIENT)
Dept: CARDIOLOGY | Facility: CLINIC | Age: 75
Discharge: HOME | End: 2020-10-28
Payer: MEDICARE

## 2020-10-28 ENCOUNTER — PATIENT MESSAGE (OUTPATIENT)
Dept: ELECTROPHYSIOLOGY | Facility: CLINIC | Age: 75
End: 2020-10-28

## 2020-10-28 VITALS
BODY MASS INDEX: 29.99 KG/M2 | DIASTOLIC BLOOD PRESSURE: 80 MMHG | WEIGHT: 233.69 LBS | SYSTOLIC BLOOD PRESSURE: 118 MMHG | HEART RATE: 80 BPM | HEIGHT: 74 IN

## 2020-10-28 DIAGNOSIS — I47.20 VENTRICULAR TACHYARRHYTHMIA: ICD-10-CM

## 2020-10-28 DIAGNOSIS — I25.83 CORONARY ARTERY DISEASE DUE TO LIPID RICH PLAQUE: ICD-10-CM

## 2020-10-28 DIAGNOSIS — N18.1 CKD (CHRONIC KIDNEY DISEASE) STAGE 1, GFR 90 ML/MIN OR GREATER: ICD-10-CM

## 2020-10-28 DIAGNOSIS — I25.2 OLD MYOCARDIAL INFARCTION: ICD-10-CM

## 2020-10-28 DIAGNOSIS — Z95.810 AUTOMATIC IMPLANTABLE CARDIOVERTER-DEFIBRILLATOR IN SITU: ICD-10-CM

## 2020-10-28 DIAGNOSIS — I48.19 PERSISTENT ATRIAL FIBRILLATION: Primary | ICD-10-CM

## 2020-10-28 DIAGNOSIS — I25.5 ISCHEMIC CARDIOMYOPATHY: ICD-10-CM

## 2020-10-28 DIAGNOSIS — Z98.61 S/P PTCA (PERCUTANEOUS TRANSLUMINAL CORONARY ANGIOPLASTY): ICD-10-CM

## 2020-10-28 DIAGNOSIS — E78.5 DYSLIPIDEMIA: ICD-10-CM

## 2020-10-28 DIAGNOSIS — I10 HTN (HYPERTENSION), BENIGN: ICD-10-CM

## 2020-10-28 DIAGNOSIS — I25.10 CORONARY ARTERY DISEASE DUE TO LIPID RICH PLAQUE: ICD-10-CM

## 2020-10-28 DIAGNOSIS — I49.8 OTHER SPECIFIED CARDIAC ARRHYTHMIAS: ICD-10-CM

## 2020-10-28 DIAGNOSIS — Z79.01 CHRONIC ANTICOAGULATION: Chronic | ICD-10-CM

## 2020-10-28 PROBLEM — I48.11 LONGSTANDING PERSISTENT ATRIAL FIBRILLATION: Status: RESOLVED | Noted: 2019-10-31 | Resolved: 2020-10-28

## 2020-10-28 PROCEDURE — 93010 ELECTROCARDIOGRAM REPORT: CPT | Mod: S$PBB,,, | Performed by: INTERNAL MEDICINE

## 2020-10-28 PROCEDURE — 93010 RHYTHM STRIP: ICD-10-PCS | Mod: S$PBB,,, | Performed by: INTERNAL MEDICINE

## 2020-10-28 PROCEDURE — 99214 OFFICE O/P EST MOD 30 MIN: CPT | Mod: PBBFAC,25 | Performed by: INTERNAL MEDICINE

## 2020-10-28 PROCEDURE — 99215 OFFICE O/P EST HI 40 MIN: CPT | Mod: S$PBB,,, | Performed by: INTERNAL MEDICINE

## 2020-10-28 PROCEDURE — 99999 PR PBB SHADOW E&M-EST. PATIENT-LVL IV: CPT | Mod: PBBFAC,,, | Performed by: INTERNAL MEDICINE

## 2020-10-28 PROCEDURE — 99215 PR OFFICE/OUTPT VISIT, EST, LEVL V, 40-54 MIN: ICD-10-PCS | Mod: S$PBB,,, | Performed by: INTERNAL MEDICINE

## 2020-10-28 PROCEDURE — 99999 PR PBB SHADOW E&M-EST. PATIENT-LVL IV: ICD-10-PCS | Mod: PBBFAC,,, | Performed by: INTERNAL MEDICINE

## 2020-10-28 PROCEDURE — 93005 ELECTROCARDIOGRAM TRACING: CPT | Mod: PBBFAC | Performed by: INTERNAL MEDICINE

## 2020-10-28 RX ORDER — ACETAMINOPHEN 160 MG/5ML
200 SUSPENSION, ORAL (FINAL DOSE FORM) ORAL DAILY
COMMUNITY

## 2020-10-28 RX ORDER — LISINOPRIL 10 MG/1
10 TABLET ORAL DAILY
Status: ON HOLD | COMMUNITY
End: 2020-11-25 | Stop reason: HOSPADM

## 2020-10-28 NOTE — PROGRESS NOTES
Patient Instructions  NOE (Transesophageal Echocardiogram) with Cardioversion     Pre-Procedure Testing:       Important Medication Information:      · You may take your usual morning medications with a sip of water on the day of your procedure.         Day of Procedure:    10/29/2020 at 8:00 am - NOE / Cardioversion     [facility] - [address] [place] [address]  Please report to Cardiology Waiting Room on the 3rd floor of the Hospital,     · Do not eat or drink anything after 12 midnight on the night before your procedure.  · Please follow important medication instructions as listed above.   · Please do not wear makeup, especially mascara, when arriving for your procedure.  · You will be going home after your procedure.  · You will need someone to drive you home from the hospital due to anesthesia. You are allowed one adult guest to accompany you pre and post procedure.         · All patients/visitors are asked to arrive with a mask and keep the mask on throughout their visit  · All persons entering will be screened for fever and respiratory symptoms  · Patients may have one adult support person pre and post procedure  · Support person may remain with the patient prior to their procedure and then must wait in a socially distant manner until a member of the medical team provides an update at the conclusion of the procedure  · If you are spending the night, your support person must follow the visitation hours (8am-6pm)  · Your support person should provide the staff with a valid cell phone number so that he or she can receive automated updates        Directions to Cardiology Waiting Room  If you park in the Parking Garage:  Take elevators to the 2nd floor  Walk up ramp and turn right by Gold Elevators  Take elevator to the 3rd floor  Upon exiting the elevator, turn away from the clinic areas  Walk long ching around to front of hospital to area with windows overlooking [facility]  Check  in at Reception Desk  OR  If family is dropping you off:  Have them drop you off at the front of the Hospital  (Near the ER, where all the flags are hung).  Take the E elevators to the 3rd floor.  Check in at the Reception Desk in the waiting room.      Any need to reschedule or cancel procedures, or any questions regarding your procedures should be addressed directly with the Arrhythmia Department Nurses at the following phone number: [phone].

## 2020-10-28 NOTE — PROGRESS NOTES
Subjective:    Patient ID:  Amish Grossman is a 74 y.o. male who presents for follow-up of Atrial Fibrillation    HPI  Mr Grossman is a 74 year old man with persistent AF on apixaban, ICMO EF 30%, [employer] Dual Chamber ICD in place, VT, HTN, HLD, and CKD who presents to [facility] for follow up. He notes that last Thursday 10/22/20 in the am he suddenly felt weak, fatigued, and lightheaded. He was suspicious that he had gone back into AF. He sent a remote transmission from his ICD which confirmed his suspicion of AF. He has remained in AF since that time. He has ongoing lightheadedness and weakness with fatigue. His exercise tolerance is diminished in AFib. He has no fever or chills. No chest pain or syncope. He notes that since his PVI he had felt great with no symptomatic recurrences of afib up until last Thursday. He had COVID in July and has basically recovered except for persistent bilateral shoulder arthritis. He has been tried on amiodarone in the past but developed pulmonary toxicity with it. He was tried on dofetilide in the past as well but had AF recurrence on dofetilide per him so it was discontinued. 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 He has been on anticoagulation. His echo performed 10/13/20 does not comment on presence or absence of RV thrombus.     His device interrogation in clinic today shows persistent AF. No ventricular dysrhythmias present on interrogation.     Review of Systems   Constitution: Positive for malaise/fatigue. Negative for chills, decreased appetite, diaphoresis and weight loss.   HENT: Negative for congestion, hearing loss, nosebleeds and sore throat.    Eyes: Negative for pain, redness and visual disturbance.   Cardiovascular: Positive for irregular heartbeat and palpitations. Negative for chest pain, dyspnea on exertion, leg swelling, orthopnea and syncope.   Respiratory: Negative for cough, shortness of breath, sleep disturbances due to breathing and wheezing.     Endocrine: Negative for cold intolerance and heat intolerance.   Hematologic/Lymphatic: Negative for bleeding problem. Does not bruise/bleed easily.   Skin: Negative for color change, dry skin, poor wound healing and rash.   Musculoskeletal: Positive for arthritis. Negative for back pain, falls, joint pain, muscle weakness and myalgias.   Gastrointestinal: Negative for abdominal pain, change in bowel habit, constipation, diarrhea, hematochezia, melena and vomiting.   Genitourinary: Negative for dysuria, frequency, hematuria, nocturia and urgency.   Neurological: Positive for headaches and light-headedness. Negative for difficulty with concentration, disturbances in coordination, dizziness, focal weakness, numbness and weakness.   Psychiatric/Behavioral: Negative for altered mental status. The patient does not have insomnia.         Objective:    Physical Exam   Constitutional: He is oriented to person, place, and time. He appears well-developed and well-nourished. No distress.   HENT:   Head: Normocephalic and atraumatic.   Eyes: Conjunctivae and EOM are normal. Right eye exhibits no discharge. Left eye exhibits no discharge.   Neck: Neck supple. No JVD present.   Cardiovascular: Normal rate, normal heart sounds and intact distal pulses.   Irregular rhythm   Pulmonary/Chest: Effort normal and breath sounds normal. No respiratory distress.   Abdominal: Soft. He exhibits no distension.   Musculoskeletal: Normal range of motion.   Neurological: He is alert and oriented to person, place, and time.   Skin: Skin is warm and dry.   Vitals reviewed.    ECG today: AF, intermittently RV pacing      Assessment:       1. Persistent atrial fibrillation    2. Coronary artery disease due to lipid rich plaque    3. Old myocardial infarction    4. S/P PTCA (percutaneous transluminal coronary angioplasty)    5. Dyslipidemia    6. HTN (hypertension), benign    7. Automatic implantable cardioverter-defibrillator in situ    8.  Ischemic cardiomyopathy    9. Ventricular tachyarrhythmia    10. CKD (chronic kidney disease) stage 1, GFR 90 ml/min or greater    11. Chronic anticoagulation         Plan:       Mr Grossman is a 74 year old man with persistent AF on apixaban, ICMO EF 30%, [employer] Dual Chamber ICD in place, VT, HTN, HLD, and CKD who presents to [facility] for follow up.      1. Persistent AF  - he is significantly symptomatic when in AF (fatigue, weak, lightheaded)  - NOE DCCV as outpatient  - plan to arrange outpatient PVI  - continue apixaban for primary stroke prevention given [id number] of at least 4.   - will plan to start dofetilide post PVI. Will continue for blanking period  - will not re-challenge with amiodarone given prior pulmonary toxicity     2. ICMO, ICD in place  - no ventricular dysrhythmias on interrogation today  - echo on 10/13/20 showed EF 33%    Return to clinic prn for new or worsening symptoms. Otherwise will plan to see back in clinic 4 months after PVI.

## 2020-10-29 ENCOUNTER — ANESTHESIA (OUTPATIENT)
Dept: MEDSURG UNIT | Facility: HOSPITAL | Age: 75
End: 2020-10-29
Payer: MEDICARE

## 2020-10-29 ENCOUNTER — ANESTHESIA EVENT (OUTPATIENT)
Dept: MEDSURG UNIT | Facility: HOSPITAL | Age: 75
End: 2020-10-29
Payer: MEDICARE

## 2020-10-29 ENCOUNTER — HOSPITAL ENCOUNTER (OUTPATIENT)
Facility: HOSPITAL | Age: 75
Discharge: HOME | End: 2020-10-29
Attending: INTERNAL MEDICINE | Admitting: INTERNAL MEDICINE
Payer: MEDICARE

## 2020-10-29 ENCOUNTER — HOSPITAL ENCOUNTER (OUTPATIENT)
Dept: CARDIOLOGY | Facility: HOSPITAL | Age: 75
Discharge: HOME | End: 2020-10-29
Attending: INTERNAL MEDICINE
Payer: MEDICARE

## 2020-10-29 VITALS
DIASTOLIC BLOOD PRESSURE: 78 MMHG | RESPIRATION RATE: 18 BRPM | HEART RATE: 60 BPM | TEMPERATURE: 98 F | BODY MASS INDEX: 29.9 KG/M2 | WEIGHT: 233 LBS | SYSTOLIC BLOOD PRESSURE: 128 MMHG | HEIGHT: 74 IN | OXYGEN SATURATION: 98 %

## 2020-10-29 VITALS
WEIGHT: 233 LBS | SYSTOLIC BLOOD PRESSURE: 104 MMHG | DIASTOLIC BLOOD PRESSURE: 76 MMHG | HEIGHT: 74 IN | BODY MASS INDEX: 29.9 KG/M2

## 2020-10-29 DIAGNOSIS — I48.19 PERSISTENT ATRIAL FIBRILLATION: ICD-10-CM

## 2020-10-29 DIAGNOSIS — I48.91 ATRIAL FIBRILLATION: ICD-10-CM

## 2020-10-29 LAB
ASCENDING AORTA: 4.3 CM
BSA FOR ECHO PROCEDURE: 2.35 M2
SARS-COV-2 RDRP RESP QL NAA+PROBE: NEGATIVE
SINUS: 4.1 CM
STJ: 3.5 CM

## 2020-10-29 PROCEDURE — 93005 ELECTROCARDIOGRAM TRACING: CPT | Mod: 59

## 2020-10-29 PROCEDURE — 92960 PR CARDIOVERSION, ELECTIVE;EXTERN: ICD-10-PCS | Mod: ,,, | Performed by: INTERNAL MEDICINE

## 2020-10-29 PROCEDURE — 93325 DOPPLER ECHO COLOR FLOW MAPG: CPT | Mod: 26,,, | Performed by: INTERNAL MEDICINE

## 2020-10-29 PROCEDURE — 37000009 HC ANESTHESIA EA ADD 15 MINS: Performed by: INTERNAL MEDICINE

## 2020-10-29 PROCEDURE — 93325 TRANSESOPHAGEAL ECHO (TEE) (CUPID ONLY): ICD-10-PCS | Mod: 26,,, | Performed by: INTERNAL MEDICINE

## 2020-10-29 PROCEDURE — D9220A PRA ANESTHESIA: Mod: CRNA,,, | Performed by: STUDENT IN AN ORGANIZED HEALTH CARE EDUCATION/TRAINING PROGRAM

## 2020-10-29 PROCEDURE — 93005 ELECTROCARDIOGRAM TRACING: CPT

## 2020-10-29 PROCEDURE — 93325 DOPPLER ECHO COLOR FLOW MAPG: CPT

## 2020-10-29 PROCEDURE — 93010 ELECTROCARDIOGRAM REPORT: CPT | Mod: 76,,, | Performed by: INTERNAL MEDICINE

## 2020-10-29 PROCEDURE — 93312 TRANSESOPHAGEAL ECHO (TEE) (CUPID ONLY): ICD-10-PCS | Mod: 26,,, | Performed by: INTERNAL MEDICINE

## 2020-10-29 PROCEDURE — 25000003 PHARM REV CODE 250: Performed by: NURSE PRACTITIONER

## 2020-10-29 PROCEDURE — 25000003 PHARM REV CODE 250: Performed by: STUDENT IN AN ORGANIZED HEALTH CARE EDUCATION/TRAINING PROGRAM

## 2020-10-29 PROCEDURE — D9220A PRA ANESTHESIA: ICD-10-PCS | Mod: ANES,,, | Performed by: ANESTHESIOLOGY

## 2020-10-29 PROCEDURE — 37000008 HC ANESTHESIA 1ST 15 MINUTES: Performed by: INTERNAL MEDICINE

## 2020-10-29 PROCEDURE — 63600175 PHARM REV CODE 636 W HCPCS: Performed by: STUDENT IN AN ORGANIZED HEALTH CARE EDUCATION/TRAINING PROGRAM

## 2020-10-29 PROCEDURE — 92960 CARDIOVERSION ELECTRIC EXT: CPT | Mod: ,,, | Performed by: INTERNAL MEDICINE

## 2020-10-29 PROCEDURE — 93320 DOPPLER ECHO COMPLETE: CPT | Mod: 26,,, | Performed by: INTERNAL MEDICINE

## 2020-10-29 PROCEDURE — 93010 EKG 12-LEAD: ICD-10-PCS | Mod: ,,, | Performed by: INTERNAL MEDICINE

## 2020-10-29 PROCEDURE — D9220A PRA ANESTHESIA: Mod: ANES,,, | Performed by: ANESTHESIOLOGY

## 2020-10-29 PROCEDURE — 92960 CARDIOVERSION ELECTRIC EXT: CPT | Performed by: INTERNAL MEDICINE

## 2020-10-29 PROCEDURE — 93312 ECHO TRANSESOPHAGEAL: CPT | Mod: 26,,, | Performed by: INTERNAL MEDICINE

## 2020-10-29 PROCEDURE — 93320 TRANSESOPHAGEAL ECHO (TEE) (CUPID ONLY): ICD-10-PCS | Mod: 26,,, | Performed by: INTERNAL MEDICINE

## 2020-10-29 PROCEDURE — U0002 COVID-19 LAB TEST NON-CDC: HCPCS

## 2020-10-29 PROCEDURE — D9220A PRA ANESTHESIA: ICD-10-PCS | Mod: CRNA,,, | Performed by: STUDENT IN AN ORGANIZED HEALTH CARE EDUCATION/TRAINING PROGRAM

## 2020-10-29 RX ORDER — PROPOFOL 10 MG/ML
VIAL (ML) INTRAVENOUS CONTINUOUS PRN
Status: DISCONTINUED | OUTPATIENT
Start: 2020-10-29 | End: 2020-10-29

## 2020-10-29 RX ORDER — KETAMINE HCL IN 0.9 % NACL 50 MG/5 ML
SYRINGE (ML) INTRAVENOUS
Status: DISCONTINUED | OUTPATIENT
Start: 2020-10-29 | End: 2020-10-29

## 2020-10-29 RX ORDER — SODIUM CHLORIDE 0.9 % (FLUSH) 0.9 %
3 SYRINGE (ML) INJECTION
Status: DISCONTINUED | OUTPATIENT
Start: 2020-10-29 | End: 2020-10-29 | Stop reason: HOSPADM

## 2020-10-29 RX ORDER — ONDANSETRON 2 MG/ML
INJECTION INTRAMUSCULAR; INTRAVENOUS
Status: DISCONTINUED | OUTPATIENT
Start: 2020-10-29 | End: 2020-10-29

## 2020-10-29 RX ORDER — PROPOFOL 10 MG/ML
VIAL (ML) INTRAVENOUS
Status: DISCONTINUED | OUTPATIENT
Start: 2020-10-29 | End: 2020-10-29

## 2020-10-29 RX ORDER — SODIUM CHLORIDE 9 MG/ML
INJECTION, SOLUTION INTRAVENOUS CONTINUOUS PRN
Status: DISCONTINUED | OUTPATIENT
Start: 2020-10-29 | End: 2020-10-29

## 2020-10-29 RX ORDER — LIDOCAINE HYDROCHLORIDE 40 MG/ML
INJECTION, SOLUTION RETROBULBAR
Status: DISCONTINUED | OUTPATIENT
Start: 2020-10-29 | End: 2020-10-29

## 2020-10-29 RX ORDER — ETOMIDATE 2 MG/ML
INJECTION INTRAVENOUS
Status: DISCONTINUED | OUTPATIENT
Start: 2020-10-29 | End: 2020-10-29

## 2020-10-29 RX ORDER — LIDOCAINE HYDROCHLORIDE 20 MG/ML
INJECTION INTRAVENOUS
Status: DISCONTINUED | OUTPATIENT
Start: 2020-10-29 | End: 2020-10-29

## 2020-10-29 RX ADMIN — PROPOFOL 10 MG: 10 INJECTION, EMULSION INTRAVENOUS at 10:10

## 2020-10-29 RX ADMIN — LIDOCAINE HYDROCHLORIDE 80 MG: 20 INJECTION, SOLUTION INTRAVENOUS at 09:10

## 2020-10-29 RX ADMIN — LIDOCAINE HYDROCHLORIDE 3 ML: 40 INJECTION, SOLUTION RETROBULBAR; TOPICAL at 09:10

## 2020-10-29 RX ADMIN — ETOMIDATE 2 MG: 2 INJECTION, SOLUTION INTRAVENOUS at 10:10

## 2020-10-29 RX ADMIN — SODIUM CHLORIDE: 0.9 INJECTION, SOLUTION INTRAVENOUS at 09:10

## 2020-10-29 RX ADMIN — ONDANSETRON 4 MG: 2 INJECTION, SOLUTION INTRAMUSCULAR; INTRAVENOUS at 09:10

## 2020-10-29 RX ADMIN — Medication 20 MG: at 09:10

## 2020-10-29 RX ADMIN — ETOMIDATE 4 MG: 2 INJECTION, SOLUTION INTRAVENOUS at 09:10

## 2020-10-29 RX ADMIN — PROPOFOL 50 MCG/KG/MIN: 10 INJECTION, EMULSION INTRAVENOUS at 09:10

## 2020-10-29 NOTE — ASSESSMENT & PLAN NOTE
s/p NOE guided DCCV with restoration of sinus rhythm     Plan:  - Continue home medications including eliquis for CVA prophylaxis   - Plan to schedule PVI in near future with admission for Tikosyn.   - Discharge plans/instructions discussed with patient who verbalized understanding and agreement of plans of care. No further questions or concerns  voiced at this time.

## 2020-10-29 NOTE — ANESTHESIA POSTPROCEDURE EVALUATION
Anesthesia Post Evaluation    Patient: Amish Grossman    Procedure(s) Performed: Procedure(s) (LRB):  CARDIOVERSION (N/A)  ECHOCARDIOGRAM, TRANSESOPHAGEAL (N/A)    Final Anesthesia Type: general    Patient location during evaluation: PACU  Patient participation: Yes- Able to Participate  Level of consciousness: awake and alert and oriented  Post-procedure vital signs: reviewed and stable  Pain management: adequate  Airway patency: patent    PONV status at discharge: No PONV  Anesthetic complications: no      Cardiovascular status: blood pressure returned to baseline  Respiratory status: unassisted, room air and spontaneous ventilation  Hydration status: euvolemic  Follow-up not needed.          Vitals Value Taken Time   /78 10/29/20 1117   Temp 36.5 °C (97.7 °F) 10/29/20 1100   Pulse 60 10/29/20 1121   Resp 18 10/29/20 1115   SpO2 98 % 10/29/20 1121   Vitals shown include unvalidated device data.      Event Time   Out of Recovery 11:04:00         Pain/Talya Score: No data recorded

## 2020-10-29 NOTE — ASSESSMENT & PLAN NOTE
1. NOE for evaluation of PRISCA thrombus prior to NOE/DCCV   -No absolute contraindications of esophageal stricture, tumor, perforation, laceration,or diverticulum and/or active GI bleed  -The risks, benefits & alternatives of the procedure were explained to the patient.   -The risks of transesophageal echo include but are not limited to:  Dental trauma, esophageal trauma/perforation, bleeding, laryngospasm/brochospasm, aspiration, sore throat/hoarseness, & dislodgement of the endotracheal tube/nasogastric tube (where applicable).    -The risks of moderate sedation include hypotension, respiratory depression, arrhythmias, bronchospasm, & death.    -Informed consent was obtained. The patient is agreeable to proceed with the procedure and all questions and concerns addressed.    Case discussed with an attending in echocardiography lab.     Further recommendations per attending addendum

## 2020-10-29 NOTE — PROGRESS NOTES
Patient given home medication regimen and discharge instructions. Patient informed of follow up appointments and given a copy of discharge instructions with follow up appointments listed. LW peripheral IV discontinued. Patient discharged home per MD orders with all personal belongings.

## 2020-10-29 NOTE — NURSING TRANSFER
Nursing Transfer Note      10/29/2020     Transfer To: REPORTED TO JONATHAN RN AND SHE ASSUMED CARE    Transfer via stretcher    Transfer with cardiac monitoring    Transported by [facility]    Medicines sent: SILVADENE CREAM    Chart send with patient: Yes    Notified: REPORTED TO JONATHAN RN    Patient reassessed at: SEE EPIC (date, time)    Upon arrival to floOR: REPORTED TO JONATHAN RN NO COMPLAINTS NO DISTRESS NOTED.

## 2020-10-29 NOTE — H&P
[facility] Cardiac [facility]  Cardiology  History and Physical     Patient Name: Amish Grossman  MRN: [id number]  Admission Date: 10/29/2020  Code Status: Prior   Attending Provider: Camron Isbell MD   Primary Care Physician: Camron Isbell MD  Principal Problem:<principal problem not specified>    Patient information was obtained from patient and ER records.     Subjective:     Chief Complaint:  Atrial fibrillation     HPI:  Mr Grossman is a 74 year old man with persistent AF on apixaban, ICMO EF 30%, [employer] Dual Chamber ICD in place, VT, HTN, HLD, and CKD who was seen in EP clinic 10/28 and is presenting today for NOE/DCCV for persistent atrial fibrillation.    Per the EP clinic note, he notes that last Thursday 10/22/20 in the am he suddenly felt weak, fatigued, and lightheaded. He was suspicious that he had gone back into AF. He sent a remote transmission from his ICD which confirmed his suspicion of AF. He has remained in AF since that time. He has ongoing lightheadedness and weakness with fatigue. His exercise tolerance is diminished in AFib. He has no fever or chills. No chest pain or syncope. He notes that since his PVI he had felt great with no symptomatic recurrences of afib up until last Thursday. He had COVID in July and has basically recovered except for persistent bilateral shoulder arthritis. He has been tried on amiodarone in the past but developed pulmonary toxicity with it. He was tried on dofetilide in the past as well but had AF recurrence on dofetilide per him so it was discontinued. 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 He has been on anticoagulation, Eliquis. His echo performed 10/13/20 does not comment on presence or absence of RV thrombus.     Dysphagia or odynophagia:  No  Liver Disease, esophageal disease, or known varices:  No  Upper GI Bleeding: No  Snoring:  Yes  Sleep Apnea:  No  Prior neck surgery or radiation:  No  History of anesthetic difficulties:   No  Family history of anesthetic difficulties:  No  Last oral intake:  12 hours ago  Able to move neck in all directions:  Yes    EKG today notable for atrial fibrillation with intermittent V-paced complexes.     NOE: 08/2020  · Severely decreased left ventricular systolic function. The estimated ejection fraction is 20%. Wall motion abnormalities noted in the LAD distribution  · Severe left atrial enlargement.  · Severe right atrial enlargement.  · Normal right ventricular systolic function.  · Mild mitral regurgitation.  · Mild tricuspid regurgitation.  · Abnormal appearing left atrial appendage. Abnormal appendage velocities.  · The ascending aorta is mildly dilated.  · No LA or PRISCA thrombus with appendage velocity of 0.7m/sec       Past Medical History:   Diagnosis Date    Anticoagulant long-term use     Atrial fibrillation     Benign essential tremor 6/6/2018    Cardiomyopathy     CHF (congestive heart failure)     Coronary artery disease     Hypertension     Left ventricular thrombus     Syncope and collapse     Thyroid disease        Past Surgical History:   Procedure Laterality Date    ABLATION OF ARRHYTHMOGENIC FOCUS FOR ATRIAL FIBRILLATION N/A 12/9/2019    Procedure: ABLATION, ARRHYTHMOGENIC FOCUS, FOR ATRIAL FIBRILLATION;  Surgeon: Camron Isbell MD;  Location: [facility] EP LAB;  Service: Cardiology;  Laterality: N/A;  AF, NOE (firm), PVI, CRYO, LORENZA, GEN, DM, 3 PREP * Dual ICD SJM*    CARDIAC CATHETERIZATION      CARDIAC DEFIBRILLATOR PLACEMENT      CORONARY ANGIOPLASTY      HERNIA REPAIR      KNEE ARTHROSCOPY      REPLACEMENT OF IMPLANTABLE CARDIOVERTER-DEFIBRILLATOR (ICD) GENERATOR Left 6/7/2018    Procedure: REPLACEMENT-GENERATOR-ICD;  Surgeon: Camron Isbell MD;  Location: [facility] CATH LAB;  Service: Cardiology;  Laterality: Left;  GENIE, DUAL ICD GEN CHG, SJM, MAC, DM, 3 PREP    STENTS      TRANSESOPHAGEAL ECHOCARDIOGRAPHY N/A 10/31/2019    Procedure: ECHOCARDIOGRAM, TRANSESOPHAGEAL;   Surgeon: Eleuterio Diagnostic Provider;  Location: [facility] EP LAB;  Service: Cardiology;  Laterality: N/A;  AF, NOE, DCCV, MAC, DM, 3 PREP    TRANSESOPHAGEAL ECHOCARDIOGRAPHY N/A 12/9/2019    Procedure: ECHOCARDIOGRAM, TRANSESOPHAGEAL;  Surgeon: Sun Merchant MD;  Location: [facility] EP LAB;  Service: Cardiology;  Laterality: N/A;    TREATMENT OF CARDIAC ARRHYTHMIA N/A 10/31/2019    Procedure: CARDIOVERSION;  Surgeon: Camron Isbell MD;  Location: [facility] EP LAB;  Service: Cardiology;  Laterality: N/A;  AF, NOE, DCCV, MAC, DM, 3 PREP*SJM  Dual ICD in situ*    VASCULAR SURGERY      stents placed       Review of patient's allergies indicates:  No Known Allergies    Current Facility-Administered Medications on File Prior to Encounter   Medication    0.9%  NaCl infusion    sodium chloride 0.9% flush 5 mL    sodium chloride 0.9% flush 5 mL     Current Outpatient Medications on File Prior to Encounter   Medication Sig    amLODIPine (NORVASC) 5 MG tablet Take 1 tablet (5 mg total) by mouth once daily.    apixaban (ELIQUIS) 5 mg Tab Take 1 tablet (5 mg total) by mouth 2 (two) times daily.    ascorbic acid (VITAMIN C) 100 MG tablet Take 1,000 mg by mouth once daily.     aspirin (ECOTRIN) 81 MG EC tablet Take 81 mg by mouth once daily.    atorvastatin (LIPITOR) 20 MG tablet Take 1 tablet (20 mg total) by mouth once daily.    carvedilol (COREG) 12.5 MG tablet Take 1 tablet (12.5 mg total) by mouth 2 (two) times daily with meals.    coenzyme Q10 (CO Q-10) 200 mg capsule Take 200 mg by mouth once daily.    fish oil-omega-3 fatty acids 300-1,000 mg capsule Take 2 g by mouth 2 (two) times daily.     levothyroxine (SYNTHROID) 75 MCG tablet Take 75 mcg by mouth before breakfast.     lisinopril (PRINIVIL,ZESTRIL) 30 MG tablet Take 1 tablet (30 mg total) by mouth once daily. (Patient taking differently: Take 30 mg by mouth once daily. Takes 10mg in AM and 30mg in PM)    lisinopriL 10 MG tablet Take 10 mg by mouth once daily.  Taken in AM    multivitamin capsule Take 1 capsule by mouth nightly.     SLO-NIACIN 750 mg TbSR Take 1 tablet (750 mg total) by mouth 2 (two) times daily. (Patient taking differently: Take 500 mg by mouth 2 (two) times daily. )    zinc 50 mg Tab Take 40 mg by mouth.    furosemide (LASIX) 40 MG tablet Take 40 mg by mouth daily as needed.    nitroGLYCERIN (NITROSTAT) 0.4 MG SL tablet Place 1 tablet (0.4 mg total) under the tongue every 5 (five) minutes as needed for Chest pain.     Family History     Problem Relation (Age of Onset)    Heart disease Mother, Father, Brother        Tobacco Use    Smoking status: Never Smoker    Smokeless tobacco: Former User   Substance and Sexual Activity    Alcohol use: Not Currently     Frequency: Never     Comment: occasionally    Drug use: No    Sexual activity: Not on file     Review of Systems   All other systems reviewed and are negative.    Objective:     Vital Signs (Most Recent):    Vital Signs (24h Range):  Pulse:  [80] 80  BP: (118)/(80) 118/80        There is no height or weight on file to calculate BMI.            No intake or output data in the 24 hours ending 10/29/20 0809    Lines/Drains/Airways     None                 Physical Exam   Constitutional: He is oriented to person, place, and time. He appears well-developed.   HENT:   Head: Normocephalic.   Eyes: Conjunctivae are normal.   Neck: Neck supple.   Cardiovascular:   Irregularly irregular rate and rhythm   Pulmonary/Chest: Effort normal.   Abdominal: Soft.   Musculoskeletal:         General: No edema.   Neurological: He is alert and oriented to person, place, and time.   Skin: Skin is warm and dry.   Psychiatric: He has a normal mood and affect.       Significant Labs: All pertinent lab results from the last 24 hours have been reviewed.    Significant Imaging: Echocardiogram:   2D echo with color flow doppler:   Results for orders placed or performed during the hospital encounter of 06/06/18   2D echo  with color flow doppler   Result Value Ref Range    QEF 25 (A) 55 - 65    Mitral Valve Regurgitation MILD     Diastolic Dysfunction Yes (A)     Aortic Valve Regurgitation TRIVIAL     Est. PA Systolic Pressure 24.16     Tricuspid Valve Regurgitation MILD     Narrative    Date of Procedure: 06/06/2018        TEST DESCRIPTION   Technical Quality: This is a technically adequate study. This study was performed in conjunction with a 3ml intravenous injection of Optison contrast agent.     General: A catheter is present in the right-sided cardiac chambers.     Aorta: The aortic root is mildly enlarged, measuring 3.3 cm at sinotubular junction and 4.0 cm at Sinuses of Valsalva. The proximal ascending aorta is normal in size, measuring 3.8 cm across.     Left Atrium: The left atrial volume index is severely enlarged, measuring 51.92 cc/m2.     Left Ventricle: The left ventricle is normal in size, with an end-diastolic diameter of 6.0 cm, and an end-systolic diameter of 5.1 cm. LV wall thickness is normal, with the septum measuring 0.9 cm and the posterior wall measuring 0.7 cm across. Relative   wall thickness was normal at 0.23, and the LV mass index was 90.6 g/m2 consistent with normal left ventricular mass. The apex is akinetic.   The following segments were akinetic: mid anteroseptum, apical septum, apical anterior wall, mid anterior wall.  There is global hypokinesis. Left ventricular systolic function appears severely depressed. Visually estimated ejection fraction is 25-30%. The LV Doppler derived stroke volume equals 54.0 ccs.     Diastolic indices: E wave velocity 0.6 m/s, E/A ratio 0.8,  msec., E/e' ratio(avg) 12. There is diastolic dysfunction secondary to relaxation abnormality.     Right Atrium: The right atrium is normal in size, measuring 5.9 cm in length and 4.6 cm in width in the apical view.     Right Ventricle: The right ventricle is normal in size measuring 4.2 cm at the base in the apical right  ventricle-focused view. Global right ventricular systolic function appears normal. Tricuspid annular plane systolic excursion (TAPSE) is 2.0 cm.   Tissue Doppler-derived tricuspid annular peak systolic velocity (S prime) is 11.3 cm/s. The estimated PA systolic pressure is 24 mmHg.     Aortic Valve:  The aortic valve is normal in structure. The aortic valve is tri-leaflet in structure. Additionally, there is trivial aortic regurgitation.     Mitral Valve:  The mitral valve is normal in structure. There is mild mitral regurgitation.     Tricuspid Valve:  The tricuspid valve is normal in structure. There is mild tricuspid regurgitation.     Pulmonary Valve:  The pulmonic valve is normal in structure.     IVC: IVC is normal in size and collapses > 50% with a sniff, suggesting normal right atrial pressure of 3 mmHg.     Intracavitary: There is no evidence of pericardial effusion, intracavity mass, thrombi, or vegetation.         CONCLUSIONS     1 - Severely depressed left ventricular systolic function (EF 25-30%).     2 - Impaired LV relaxation, normal LAP (grade 1 diastolic dysfunction).     3 - Mild mitral regurgitation.     4 - Mild tricuspid regurgitation.     5 - The estimated PA systolic pressure is 24 mmHg.             This document has been electronically    SIGNED BY: Sun Merchant MD On: 06/06/2018 12:34    and Transthoracic echo (TTE) complete (Cupid Only):   Results for orders placed or performed during the hospital encounter of 10/13/20   Echo Color Flow Doppler? Yes   Result Value Ref Range    BSA 2.38 m2    TDI SEPTAL 0.05 m/s    LV LATERAL E/E' RATIO 13.25 m/s    LV SEPTAL E/E' RATIO 10.60 m/s    LA WIDTH 5.56 cm    TDI LATERAL 0.04 m/s    LVIDd 5.72 3.5 - 6.0 cm    IVS 0.82 0.6 - 1.1 cm    Posterior Wall 0.87 0.6 - 1.1 cm    LVIDs 4.37 (A) 2.1 - 4.0 cm    FS 24 28 - 44 %    LA volume 153.59 cm3    Sinus 3.85 cm    STJ 3.10 cm    Ascending aorta 4.01 cm    LV mass 183.41 g    LA size 4.60 cm    RVDD 4.10  cm    TAPSE 2.21 cm    RV S' 13.19 cm/s    Left Ventricle Relative Wall Thickness 0.30 cm    AV mean gradient 2 mmHg    AV valve area 3.00 cm2    AV Velocity Ratio 0.66     AV index (prosthetic) 0.75     MV valve area p 1/2 method 2.37 cm2    E/A ratio 0.87     Mean e' 0.05 m/s    E wave decelartion time 319.97 msec    IVRT 125.59 msec    Pulm vein S/D ratio 1.68     LVOT diameter 2.25 cm    LVOT area 4.0 cm2    LVOT peak mikael 0.73 m/s    LVOT peak VTI 18.57 cm    Ao peak mikael 1.11 m/s    Ao VTI 24.64 cm    LVOT stroke volume 73.80 cm3    AV peak gradient 5 mmHg    E/E' ratio 11.78 m/s    MV Peak E Mikael 0.53 m/s    TR Max Mikael 2.44 m/s    MV stenosis pressure 1/2 time 92.79 ms    MV Peak A Mikael 0.61 m/s    PV Peak S Mikael 0.57 m/s    PV Peak D Mikael 0.34 m/s    LV Systolic Volume 86.38 mL    LV Systolic Volume Index 36.9 mL/m2    LV Diastolic Volume 161.48 mL    LV Diastolic Volume Index 68.89 mL/m2    LA Volume Index 65.5 mL/m2    LV Mass Index 78 g/m2    RA Major Axis 5.92 cm    Left Atrium Minor Axis 7.08 cm    Left Atrium Major Axis 7.05 cm    Triscuspid Valve Regurgitation Peak Gradient 24 mmHg    RA Width 4.29 cm    Right Atrial Pressure (from IVC) 3 mmHg    TV rest pulmonary artery pressure 27 mmHg    Narrative    · The left ventricle is mildly enlarged with moderately decreased systolic   function. The estimated ejection fraction is 33%.  · There are segmental left ventricular wall motion abnormalities.  · Severe left atrial enlargement.  · Grade I diastolic dysfunction.  · Normal right ventricular systolic function.  · Moderate right atrial enlargement.  · Mild mitral regurgitation.  · Mild tricuspid regurgitation.  · Mild pulmonic regurgitation.  · Normal central venous pressure (3 mmHg).  · The estimated PA systolic pressure is 27 mmHg.        Assessment and Plan:     Persistent atrial fibrillation  1. NOE for evaluation of PRISCA thrombus prior to NOE/DCCV   -No absolute contraindications of esophageal stricture,  tumor, perforation, laceration,or diverticulum and/or active GI bleed  -The risks, benefits & alternatives of the procedure were explained to the patient.   -The risks of transesophageal echo include but are not limited to:  Dental trauma, esophageal trauma/perforation, bleeding, laryngospasm/brochospasm, aspiration, sore throat/hoarseness, & dislodgement of the endotracheal tube/nasogastric tube (where applicable).    -The risks of moderate sedation include hypotension, respiratory depression, arrhythmias, bronchospasm, & death.    -Informed consent was obtained. The patient is agreeable to proceed with the procedure and all questions and concerns addressed.    Case discussed with an attending in echocardiography lab.     Further recommendations per attending addendum          VTE Risk Mitigation (From admission, onward)    None          Olga Echevarria MD  Cardiology   [facility]

## 2020-10-29 NOTE — SUBJECTIVE & OBJECTIVE
Past Medical History:   Diagnosis Date    Anticoagulant long-term use     Atrial fibrillation     Benign essential tremor 6/6/2018    Cardiomyopathy     CHF (congestive heart failure)     Coronary artery disease     Hypertension     Left ventricular thrombus     Syncope and collapse     Thyroid disease        Past Surgical History:   Procedure Laterality Date    ABLATION OF ARRHYTHMOGENIC FOCUS FOR ATRIAL FIBRILLATION N/A 12/9/2019    Procedure: ABLATION, ARRHYTHMOGENIC FOCUS, FOR ATRIAL FIBRILLATION;  Surgeon: Camron Isbell MD;  Location: [facility] EP LAB;  Service: Cardiology;  Laterality: N/A;  AF, NOE (firm), PVI, CRYO, LORENZA, GEN, DM, 3 PREP * Dual ICD SJM*    CARDIAC CATHETERIZATION      CARDIAC DEFIBRILLATOR PLACEMENT      CORONARY ANGIOPLASTY      HERNIA REPAIR      KNEE ARTHROSCOPY      REPLACEMENT OF IMPLANTABLE CARDIOVERTER-DEFIBRILLATOR (ICD) GENERATOR Left 6/7/2018    Procedure: REPLACEMENT-GENERATOR-ICD;  Surgeon: Camron Isbell MD;  Location: [facility] CATH LAB;  Service: Cardiology;  Laterality: Left;  GENIE, DUAL ICD GEN CHG, SJM, MAC, DM, 3 PREP    STENTS      TRANSESOPHAGEAL ECHOCARDIOGRAPHY N/A 10/31/2019    Procedure: ECHOCARDIOGRAM, TRANSESOPHAGEAL;  Surgeon: Eleuterio Diagnostic Provider;  Location: [facility] EP LAB;  Service: Cardiology;  Laterality: N/A;  AF, NOE, DCCV, MAC, DM, 3 PREP    TRANSESOPHAGEAL ECHOCARDIOGRAPHY N/A 12/9/2019    Procedure: ECHOCARDIOGRAM, TRANSESOPHAGEAL;  Surgeon: Sun Merchant MD;  Location: [facility] EP LAB;  Service: Cardiology;  Laterality: N/A;    TREATMENT OF CARDIAC ARRHYTHMIA N/A 10/31/2019    Procedure: CARDIOVERSION;  Surgeon: Camron Isbell MD;  Location: [facility] EP LAB;  Service: Cardiology;  Laterality: N/A;  AF, NOE, DCCV, MAC, DM, 3 PREP*SJM  Dual ICD in situ*    VASCULAR SURGERY      stents placed       Review of patient's allergies indicates:  No Known Allergies    Current Facility-Administered Medications on File Prior to Encounter   Medication     0.9%  NaCl infusion    sodium chloride 0.9% flush 5 mL    sodium chloride 0.9% flush 5 mL     Current Outpatient Medications on File Prior to Encounter   Medication Sig    amLODIPine (NORVASC) 5 MG tablet Take 1 tablet (5 mg total) by mouth once daily.    apixaban (ELIQUIS) 5 mg Tab Take 1 tablet (5 mg total) by mouth 2 (two) times daily.    ascorbic acid (VITAMIN C) 100 MG tablet Take 1,000 mg by mouth once daily.     aspirin (ECOTRIN) 81 MG EC tablet Take 81 mg by mouth once daily.    atorvastatin (LIPITOR) 20 MG tablet Take 1 tablet (20 mg total) by mouth once daily.    carvedilol (COREG) 12.5 MG tablet Take 1 tablet (12.5 mg total) by mouth 2 (two) times daily with meals.    coenzyme Q10 (CO Q-10) 200 mg capsule Take 200 mg by mouth once daily.    fish oil-omega-3 fatty acids 300-1,000 mg capsule Take 2 g by mouth 2 (two) times daily.     levothyroxine (SYNTHROID) 75 MCG tablet Take 75 mcg by mouth before breakfast.     lisinopril (PRINIVIL,ZESTRIL) 30 MG tablet Take 1 tablet (30 mg total) by mouth once daily. (Patient taking differently: Take 30 mg by mouth once daily. Takes 10mg in AM and 30mg in PM)    lisinopriL 10 MG tablet Take 10 mg by mouth once daily. Taken in AM    multivitamin capsule Take 1 capsule by mouth nightly.     SLO-NIACIN 750 mg TbSR Take 1 tablet (750 mg total) by mouth 2 (two) times daily. (Patient taking differently: Take 500 mg by mouth 2 (two) times daily. )    zinc 50 mg Tab Take 40 mg by mouth.    furosemide (LASIX) 40 MG tablet Take 40 mg by mouth daily as needed.    nitroGLYCERIN (NITROSTAT) 0.4 MG SL tablet Place 1 tablet (0.4 mg total) under the tongue every 5 (five) minutes as needed for Chest pain.     Family History     Problem Relation (Age of Onset)    Heart disease Mother, Father, Brother        Tobacco Use    Smoking status: Never Smoker    Smokeless tobacco: Former User   Substance and Sexual Activity    Alcohol use: Not Currently     Frequency:  Never     Comment: occasionally    Drug use: No    Sexual activity: Not on file     Review of Systems   All other systems reviewed and are negative.    Objective:     Vital Signs (Most Recent):    Vital Signs (24h Range):  Pulse:  [80] 80  BP: (118)/(80) 118/80        There is no height or weight on file to calculate BMI.            No intake or output data in the 24 hours ending 10/29/20 0809    Lines/Drains/Airways     None                 Physical Exam   Constitutional: He is oriented to person, place, and time. He appears well-developed.   HENT:   Head: Normocephalic.   Eyes: Conjunctivae are normal.   Neck: Neck supple.   Cardiovascular:   Irregularly irregular rate and rhythm   Pulmonary/Chest: Effort normal.   Abdominal: Soft.   Musculoskeletal:         General: No edema.   Neurological: He is alert and oriented to person, place, and time.   Skin: Skin is warm and dry.   Psychiatric: He has a normal mood and affect.       Significant Labs: All pertinent lab results from the last 24 hours have been reviewed.    Significant Imaging: Echocardiogram:   2D echo with color flow doppler:   Results for orders placed or performed during the hospital encounter of 06/06/18   2D echo with color flow doppler   Result Value Ref Range    QEF 25 (A) 55 - 65    Mitral Valve Regurgitation MILD     Diastolic Dysfunction Yes (A)     Aortic Valve Regurgitation TRIVIAL     Est. PA Systolic Pressure 24.16     Tricuspid Valve Regurgitation MILD     Narrative    Date of Procedure: 06/06/2018        TEST DESCRIPTION   Technical Quality: This is a technically adequate study. This study was performed in conjunction with a 3ml intravenous injection of Optison contrast agent.     General: A catheter is present in the right-sided cardiac chambers.     Aorta: The aortic root is mildly enlarged, measuring 3.3 cm at sinotubular junction and 4.0 cm at Sinuses of Valsalva. The proximal ascending aorta is normal in size, measuring 3.8 cm  across.     Left Atrium: The left atrial volume index is severely enlarged, measuring 51.92 cc/m2.     Left Ventricle: The left ventricle is normal in size, with an end-diastolic diameter of 6.0 cm, and an end-systolic diameter of 5.1 cm. LV wall thickness is normal, with the septum measuring 0.9 cm and the posterior wall measuring 0.7 cm across. Relative   wall thickness was normal at 0.23, and the LV mass index was 90.6 g/m2 consistent with normal left ventricular mass. The apex is akinetic.   The following segments were akinetic: mid anteroseptum, apical septum, apical anterior wall, mid anterior wall.  There is global hypokinesis. Left ventricular systolic function appears severely depressed. Visually estimated ejection fraction is 25-30%. The LV Doppler derived stroke volume equals 54.0 ccs.     Diastolic indices: E wave velocity 0.6 m/s, E/A ratio 0.8,  msec., E/e' ratio(avg) 12. There is diastolic dysfunction secondary to relaxation abnormality.     Right Atrium: The right atrium is normal in size, measuring 5.9 cm in length and 4.6 cm in width in the apical view.     Right Ventricle: The right ventricle is normal in size measuring 4.2 cm at the base in the apical right ventricle-focused view. Global right ventricular systolic function appears normal. Tricuspid annular plane systolic excursion (TAPSE) is 2.0 cm.   Tissue Doppler-derived tricuspid annular peak systolic velocity (S prime) is 11.3 cm/s. The estimated PA systolic pressure is 24 mmHg.     Aortic Valve:  The aortic valve is normal in structure. The aortic valve is tri-leaflet in structure. Additionally, there is trivial aortic regurgitation.     Mitral Valve:  The mitral valve is normal in structure. There is mild mitral regurgitation.     Tricuspid Valve:  The tricuspid valve is normal in structure. There is mild tricuspid regurgitation.     Pulmonary Valve:  The pulmonic valve is normal in structure.     IVC: IVC is normal in size and  collapses > 50% with a sniff, suggesting normal right atrial pressure of 3 mmHg.     Intracavitary: There is no evidence of pericardial effusion, intracavity mass, thrombi, or vegetation.         CONCLUSIONS     1 - Severely depressed left ventricular systolic function (EF 25-30%).     2 - Impaired LV relaxation, normal LAP (grade 1 diastolic dysfunction).     3 - Mild mitral regurgitation.     4 - Mild tricuspid regurgitation.     5 - The estimated PA systolic pressure is 24 mmHg.             This document has been electronically    SIGNED BY: Sun Merchant MD On: 06/06/2018 12:34    and Transthoracic echo (TTE) complete (Cupid Only):   Results for orders placed or performed during the hospital encounter of 10/13/20   Echo Color Flow Doppler? Yes   Result Value Ref Range    BSA 2.38 m2    TDI SEPTAL 0.05 m/s    LV LATERAL E/E' RATIO 13.25 m/s    LV SEPTAL E/E' RATIO 10.60 m/s    LA WIDTH 5.56 cm    TDI LATERAL 0.04 m/s    LVIDd 5.72 3.5 - 6.0 cm    IVS 0.82 0.6 - 1.1 cm    Posterior Wall 0.87 0.6 - 1.1 cm    LVIDs 4.37 (A) 2.1 - 4.0 cm    FS 24 28 - 44 %    LA volume 153.59 cm3    Sinus 3.85 cm    STJ 3.10 cm    Ascending aorta 4.01 cm    LV mass 183.41 g    LA size 4.60 cm    RVDD 4.10 cm    TAPSE 2.21 cm    RV S' 13.19 cm/s    Left Ventricle Relative Wall Thickness 0.30 cm    AV mean gradient 2 mmHg    AV valve area 3.00 cm2    AV Velocity Ratio 0.66     AV index (prosthetic) 0.75     MV valve area p 1/2 method 2.37 cm2    E/A ratio 0.87     Mean e' 0.05 m/s    E wave decelartion time 319.97 msec    IVRT 125.59 msec    Pulm vein S/D ratio 1.68     LVOT diameter 2.25 cm    LVOT area 4.0 cm2    LVOT peak mikael 0.73 m/s    LVOT peak VTI 18.57 cm    Ao peak mikael 1.11 m/s    Ao VTI 24.64 cm    LVOT stroke volume 73.80 cm3    AV peak gradient 5 mmHg    E/E' ratio 11.78 m/s    MV Peak E Mikael 0.53 m/s    TR Max Mikael 2.44 m/s    MV stenosis pressure 1/2 time 92.79 ms    MV Peak A Mikael 0.61 m/s    PV Peak S Mikael 0.57 m/s    PV  Peak D Mikael 0.34 m/s    LV Systolic Volume 86.38 mL    LV Systolic Volume Index 36.9 mL/m2    LV Diastolic Volume 161.48 mL    LV Diastolic Volume Index 68.89 mL/m2    LA Volume Index 65.5 mL/m2    LV Mass Index 78 g/m2    RA Major Axis 5.92 cm    Left Atrium Minor Axis 7.08 cm    Left Atrium Major Axis 7.05 cm    Triscuspid Valve Regurgitation Peak Gradient 24 mmHg    RA Width 4.29 cm    Right Atrial Pressure (from IVC) 3 mmHg    TV rest pulmonary artery pressure 27 mmHg    Narrative    · The left ventricle is mildly enlarged with moderately decreased systolic   function. The estimated ejection fraction is 33%.  · There are segmental left ventricular wall motion abnormalities.  · Severe left atrial enlargement.  · Grade I diastolic dysfunction.  · Normal right ventricular systolic function.  · Moderate right atrial enlargement.  · Mild mitral regurgitation.  · Mild tricuspid regurgitation.  · Mild pulmonic regurgitation.  · Normal central venous pressure (3 mmHg).  · The estimated PA systolic pressure is 27 mmHg.

## 2020-10-29 NOTE — TRANSFER OF CARE
"Anesthesia Transfer of Care Note    Patient: Amish Grossman    Procedure(s) Performed: Procedure(s) (LRB):  CARDIOVERSION (N/A)  ECHOCARDIOGRAM, TRANSESOPHAGEAL (N/A)    Patient location: Other: EP recovery    Anesthesia Type: general    Transport from OR: Transported from OR on 2-3 L/min O2 by NC with adequate spontaneous ventilation    Post pain: adequate analgesia    Post assessment: no apparent anesthetic complications and tolerated procedure well    Post vital signs: stable    Level of consciousness: awake    Nausea/Vomiting: no nausea/vomiting    Complications: none    Transfer of care protocol was followed      Last vitals:   Visit Vitals  /76 (BP Location: Left arm, Patient Position: Lying)   Pulse 80   Temp 36.2 °C (97.2 °F) (Oral)   Resp 18   Ht 6' 2" (1.88 m)   Wt 105.7 kg (233 lb)   SpO2 97%   BMI 29.92 kg/m²     "

## 2020-10-29 NOTE — DISCHARGE SUMMARY
[facility]  Cardiac Electrophysiology  Discharge Summary      Patient Name: Amish Grossman  MRN: [id number]  Admission Date: 10/29/2020  Hospital Length of Stay: 0 days  Discharge Date and Time:  10/29/2020 10:25 AM  Attending Physician: Camron Isbell MD    Discharging Provider: Rola Cleaning NP  Primary Care Physician: Camron Isbell MD    HPI:   Amish Grossman presents to short stay cardiac unit on 10/29/2020 for planned NOE guided DCCV with Dr. Isbell.     Mr. Grossman is a 74 y.o. male with persistent AF on apixaban, ICMO EF 30%, [employer] Dual Chamber ICD in place, VT, HTN, HLD, and CKD.  He was seen in arrhythmia clinic on 10/28/20 with reports of weakness, fatigue, and lightheadedness. Remote ICD transmission revealing of AF.  Discussed management options and agreed to proceed with NOE/DCCV and schedule outpatient PVI.     Continues to be treated with eliquis for primary stroke prevention given [id number] of at least 4.     Procedure(s) (LRB):  CARDIOVERSION (N/A)  ECHOCARDIOGRAM, TRANSESOPHAGEAL (N/A)     Indwelling Lines/Drains at time of discharge:  Lines/Drains/Airways     None                 Hospital Course:  Mr. Grossman underwent NOE without evidence of PRISCA thrombus.  Proceeded with DCCV 200 J x 1 shock, converting from AF to sinus rhythm. He tolerated the procedure without any acute complications.  Post-DCCV ECG revealing of AP rhythm. Plan for redo-PVI on 11/23/20 with admission for Tikosyn. Plan of care and discharge instructions discussed with Mr. Grossman and his family.  All questions answered and he was discharged home in stable condition.       Consults: Anesthesia     Pending Diagnostic Studies:     Procedure Component Value Units Date/Time    EKG 12-LEAD [[id number]]     Order Status: Sent Lab Status: No result           Final Active Diagnoses:    Diagnosis Date Noted POA    Persistent atrial fibrillation [I48.19] 03/15/2016 Yes      Problems Resolved During this Admission:      Persistent atrial fibrillation  s/p NOE guided DCCV with restoration of sinus rhythm     Plan:  - Continue home medications including eliquis for CVA prophylaxis   - Plan to schedule PVI in near future with admission for Tikosyn.   - Discharge plans/instructions discussed with patient who verbalized understanding and agreement of plans of care. No further questions or concerns  voiced at this time.       Discharged Condition: good    Disposition: Home    Patient Instructions:      Other restrictions (specify):   Order Comments: Because you have received sedation for this procedure:  -Limit activity for the remainder of the day.  -Do not smoke for at least 6 hours and until you are fully awake and alert.  -Do not drink alcoholic beverage for 24 hours.  -Do not drive for 24 hours.  -Defer important decision making until the following day.     Go to the Emergency Department if you develop:   -Bleeding  -Weakness or numbness  -Visual, gait or speech disturbance  -New chest pain, palpitations, shortness of breath, rapid heart beat, or fainting  -Fever     Notify your health care provider if you experience any of the following:  temperature >100.4     Notify your health care provider if you experience any of the following:  difficulty breathing or increased cough     Notify your health care provider if you experience any of the following:  persistent dizziness, light-headedness, or visual disturbances     Activity as tolerated     Medications:  Reconciled Home Medications:      Medication List      CHANGE how you take these medications    * lisinopriL 10 MG tablet  Take 10 mg by mouth once daily. Taken in AM  What changed: Another medication with the same name was changed. Make sure you understand how and when to take each.     * lisinopriL 30 MG tablet  Commonly known as: PRINIVIL,ZESTRIL  Take 1 tablet (30 mg total) by mouth once daily.  What changed: additional instructions     SLO-NIACIN 750 mg Tbsr  Generic drug:  niacin  Take 1 tablet (750 mg total) by mouth 2 (two) times daily.  What changed: how much to take         * This list has 2 medication(s) that are the same as other medications prescribed for you. Read the directions carefully, and ask your doctor or other care provider to review them with you.            CONTINUE taking these medications    amLODIPine 5 MG tablet  Commonly known as: NORVASC  Take 1 tablet (5 mg total) by mouth once daily.     apixaban 5 mg Tab  Commonly known as: ELIQUIS  Take 1 tablet (5 mg total) by mouth 2 (two) times daily.     aspirin 81 MG EC tablet  Commonly known as: ECOTRIN  Take 81 mg by mouth once daily.     atorvastatin 20 MG tablet  Commonly known as: LIPITOR  Take 1 tablet (20 mg total) by mouth once daily.     carvediloL 12.5 MG tablet  Commonly known as: COREG  Take 1 tablet (12.5 mg total) by mouth 2 (two) times daily with meals.     CO Q-10 200 mg capsule  Generic drug: coenzyme Q10  Take 200 mg by mouth once daily.     fish oil-omega-3 fatty acids 300-1,000 mg capsule  Take 2 g by mouth 2 (two) times daily.     furosemide 40 MG tablet  Commonly known as: LASIX  Take 40 mg by mouth daily as needed.     levothyroxine 75 MCG tablet  Commonly known as: SYNTHROID  Take 75 mcg by mouth before breakfast.     multivitamin capsule  Take 1 capsule by mouth nightly.     nitroGLYCERIN 0.4 MG SL tablet  Commonly known as: NITROSTAT  Place 1 tablet (0.4 mg total) under the tongue every 5 (five) minutes as needed for Chest pain.     VITAMIN C 100 MG tablet  Generic drug: ascorbic acid (vitamin C)  Take 1,000 mg by mouth once daily.     zinc 50 mg Tab  Take 40 mg by mouth.            Time spent on the discharge of patient: 20 minutes    Rola Cleaning NP  Cardiac Electrophysiology  [facility]

## 2020-10-29 NOTE — HPI
Mr Grossman is a 74 year old man with persistent AF on apixaban, ICMO EF 30%, [employer] Dual Chamber ICD in place, VT, HTN, HLD, and CKD who was seen in EP clinic 10/28 and is presenting today for NOE/DCCV for persistent atrial fibrillation.    Per the EP clinic note, he notes that last Thursday 10/22/20 in the am he suddenly felt weak, fatigued, and lightheaded. He was suspicious that he had gone back into AF. He sent a remote transmission from his ICD which confirmed his suspicion of AF. He has remained in AF since that time. He has ongoing lightheadedness and weakness with fatigue. His exercise tolerance is diminished in AFib. He has no fever or chills. No chest pain or syncope. He notes that since his PVI he had felt great with no symptomatic recurrences of afib up until last Thursday. He had COVID in July and has basically recovered except for persistent bilateral shoulder arthritis. He has been tried on amiodarone in the past but developed pulmonary toxicity with it. He was tried on dofetilide in the past as well but had AF recurrence on dofetilide per him so it was discontinued. 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 He has been on anticoagulation, Eliquis. His echo performed 10/13/20 does not comment on presence or absence of RV thrombus.     Dysphagia or odynophagia:  No  Liver Disease, esophageal disease, or known varices:  No  Upper GI Bleeding: No  Snoring:  Yes  Sleep Apnea:  No  Prior neck surgery or radiation:  No  History of anesthetic difficulties:  No  Family history of anesthetic difficulties:  No  Last oral intake:  12 hours ago  Able to move neck in all directions:  Yes    EKG today notable for atrial fibrillation with intermittent V-paced complexes.

## 2020-10-29 NOTE — PROGRESS NOTES
Patient admitted to recovery see [facility] for complete assessment pacu bcg's maintained safety measures verified patient instructed on pain scale and patient verbalized understanding. Called for ekg and called patient's  and updated on patient location with the permission of patient.

## 2020-10-29 NOTE — HOSPITAL COURSE
Mr. Grossman underwent NOE without evidence of PRISCA thrombus.  Proceeded with DCCV 200 J x 1 shock, converting from AF to sinus rhythm. He tolerated the procedure without any acute complications.  Post-DCCV ECG revealing of sinus rhythm.  Plan for redo-PVI in near future with admission for Tikosyn. Plan of care and discharge instructions discussed with Mr. Grossman and his family.  All questions answered and he was discharged home in stable condition.        Writer spoke with patient and conveyed message below. Patient states that she has a video visit scheduled with Dr. Linton next week and she will follow up with him. Patient denied further questions or concerns at this time.

## 2020-10-29 NOTE — PLAN OF CARE
Patient in EP PACU s/p NOE/cardioversion. Patient is AAOx3, vital signs stable, denies pain. Paced rhythm noted on telemetry. Wife updated on patient's status and at bedside. GOYO Canela at bedside speaking with patient and wife.

## 2020-10-29 NOTE — HPI
Amish Grossman presents to short stay cardiac unit on 10/29/2020 for planned NOE guided DCCV with Dr. Isbell.     Mr. Grossman is a 74 y.o. male with persistent AF on apixaban, ICMO EF 30%, [employer] Dual Chamber ICD in place, VT, HTN, HLD, and CKD.  He was seen in arrhythmia clinic on 10/28/20 with reports of weakness, fatigue, and lightheadedness. Remote ICD transmission revealing of AF.  Discussed management options and agreed to proceed with NOE/DCCV and schedule outpatient PVI.     Continues to be treated with eliquis for primary stroke prevention given [id number] of at least 4.

## 2020-10-29 NOTE — ANESTHESIA PREPROCEDURE EVALUATION
Mr Grossman is a 74 year old man with persistent AF on apixaban, ICMO EF 30%, [employer] Dual Chamber ICD in place, VT, HTN, HLD, and CKD who was seen in EP clinic 10/28 and is presenting today for NOE/DCCV for persistent atrial fibrillation.     Per the EP clinic note, he notes that last Thursday 10/22/20 in the am he suddenly felt weak, fatigued, and lightheaded. He was suspicious that he had gone back into AF. He sent a remote transmission from his ICD which confirmed his suspicion of AF. He has remained in AF since that time. He has ongoing lightheadedness and weakness with fatigue. His exercise tolerance is diminished in AFib. He has no fever or chills. No chest pain or syncope. He notes that since his PVI he had felt great with no symptomatic recurrences of afib up until last Thursday. He had COVID in July and has basically recovered except for persistent bilateral shoulder arthritis. He has been tried on amiodarone in the past but developed pulmonary toxicity with it. He was tried on dofetilide in the past as well but had AF recurrence on dofetilide per him so it was discontinued. 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 He has been on anticoagulation, Eliquis. His echo performed 10/13/20 does not comment on presence or absence of RV thrombus.     Patient Active Problem List   Diagnosis    Coronary artery disease due to lipid rich plaque    Old myocardial infarction    S/P PTCA (percutaneous transluminal coronary angioplasty)    CKD (chronic kidney disease) stage 1, GFR 90 ml/min or greater    BPH (benign prostatic hyperplasia)    Dyslipidemia    Venous insufficiency of leg    Erectile dysfunction    HTN (hypertension), benign    Hypothyroid    Automatic implantable cardioverter-defibrillator in situ    Ischemic cardiomyopathy    Ventricular tachyarrhythmia    Persistent atrial fibrillation    Benign essential tremor    SOB (shortness of breath)    Interstitial lung disease    Lung  nodule    Class 1 obesity with serious comorbidity in adult    Chronic anticoagulation       Review of patient's allergies indicates:  No Known Allergies    Current Facility-Administered Medications on File Prior to Encounter   Medication Dose Route Frequency Provider Last Rate Last Dose    0.9%  NaCl infusion   Intravenous Continuous Brittney Gardner NP        sodium chloride 0.9% flush 5 mL  5 mL Intravenous PRN Brittney Gardner NP        sodium chloride 0.9% flush 5 mL  5 mL Intravenous PRN Brittney Gardner NP         Current Outpatient Medications on File Prior to Encounter   Medication Sig Dispense Refill    amLODIPine (NORVASC) 5 MG tablet Take 1 tablet (5 mg total) by mouth once daily. 30 tablet 5    apixaban (ELIQUIS) 5 mg Tab Take 1 tablet (5 mg total) by mouth 2 (two) times daily. 180 tablet 3    ascorbic acid (VITAMIN C) 100 MG tablet Take 1,000 mg by mouth once daily.       aspirin (ECOTRIN) 81 MG EC tablet Take 81 mg by mouth once daily.      atorvastatin (LIPITOR) 20 MG tablet Take 1 tablet (20 mg total) by mouth once daily. 90 tablet 3    carvedilol (COREG) 12.5 MG tablet Take 1 tablet (12.5 mg total) by mouth 2 (two) times daily with meals. 60 tablet 11    coenzyme Q10 (CO Q-10) 200 mg capsule Take 200 mg by mouth once daily.      fish oil-omega-3 fatty acids 300-1,000 mg capsule Take 2 g by mouth 2 (two) times daily.       levothyroxine (SYNTHROID) 75 MCG tablet Take 75 mcg by mouth before breakfast.       lisinopril (PRINIVIL,ZESTRIL) 30 MG tablet Take 1 tablet (30 mg total) by mouth once daily. (Patient taking differently: Take 30 mg by mouth once daily. Takes 10mg in AM and 30mg in PM) 90 tablet 3    lisinopriL 10 MG tablet Take 10 mg by mouth once daily. Taken in AM      multivitamin capsule Take 1 capsule by mouth nightly.       SLO-NIACIN 750 mg TbSR Take 1 tablet (750 mg total) by mouth 2 (two) times daily. (Patient taking differently: Take 500 mg by mouth 2  (two) times daily. ) 180 each 3    zinc 50 mg Tab Take 40 mg by mouth.      furosemide (LASIX) 40 MG tablet Take 40 mg by mouth daily as needed.      nitroGLYCERIN (NITROSTAT) 0.4 MG SL tablet Place 1 tablet (0.4 mg total) under the tongue every 5 (five) minutes as needed for Chest pain. 25 tablet 4       Past Surgical History:   Procedure Laterality Date    ABLATION OF ARRHYTHMOGENIC FOCUS FOR ATRIAL FIBRILLATION N/A 12/9/2019    Procedure: ABLATION, ARRHYTHMOGENIC FOCUS, FOR ATRIAL FIBRILLATION;  Surgeon: Camron Isbell MD;  Location: [facility] EP LAB;  Service: Cardiology;  Laterality: N/A;  AF, NOE (firm), PVI, CRYO, LORENZA, GEN, DM, 3 PREP * Dual ICD SJM*    CARDIAC CATHETERIZATION      CARDIAC DEFIBRILLATOR PLACEMENT      CORONARY ANGIOPLASTY      HERNIA REPAIR      KNEE ARTHROSCOPY      REPLACEMENT OF IMPLANTABLE CARDIOVERTER-DEFIBRILLATOR (ICD) GENERATOR Left 6/7/2018    Procedure: REPLACEMENT-GENERATOR-ICD;  Surgeon: Camron Isbell MD;  Location: [facility] CATH LAB;  Service: Cardiology;  Laterality: Left;  GENIE, DUAL ICD GEN CHG, SJM, MAC, DM, 3 PREP    STENTS      TRANSESOPHAGEAL ECHOCARDIOGRAPHY N/A 10/31/2019    Procedure: ECHOCARDIOGRAM, TRANSESOPHAGEAL;  Surgeon: [facility] Provider;  Location: [facility] EP LAB;  Service: Cardiology;  Laterality: N/A;  AF, NOE, DCCV, MAC, DM, 3 PREP    TRANSESOPHAGEAL ECHOCARDIOGRAPHY N/A 12/9/2019    Procedure: ECHOCARDIOGRAM, TRANSESOPHAGEAL;  Surgeon: Sun Merchant MD;  Location: [facility] EP LAB;  Service: Cardiology;  Laterality: N/A;    TREATMENT OF CARDIAC ARRHYTHMIA N/A 10/31/2019    Procedure: CARDIOVERSION;  Surgeon: Camron Isbell MD;  Location: [facility] EP LAB;  Service: Cardiology;  Laterality: N/A;  AF, NOE, DCCV, MAC, DM, 3 PREP*SJM  Dual ICD in situ*    VASCULAR SURGERY      stents placed       Social History     Socioeconomic History    Marital status:      Spouse name: Not on file    Number of children: Not on file    Years of education:  Not on file    Highest education level: Not on file   Occupational History     Employer: Retired   Social Needs    Financial resource strain: Not on file    Food insecurity     Worry: Not on file     Inability: Not on file    Transportation needs     Medical: Not on file     Non-medical: Not on file   Tobacco Use    Smoking status: Never Smoker    Smokeless tobacco: Former User   Substance and Sexual Activity    Alcohol use: Not Currently     Frequency: Never     Comment: occasionally    Drug use: No    Sexual activity: Not on file   Lifestyle    Physical activity     Days per week: Not on file     Minutes per session: Not on file    Stress: Not on file   Relationships    Social connections     Talks on phone: Not on file     Gets together: Not on file     Attends Hoahaoism service: Not on file     Active member of club or organization: Not on file     Attends meetings of clubs or organizations: Not on file     Relationship status: Not on file   Other Topics Concern    Not on file   Social History Narrative    Not on file         CBC: No results for input(s): WBC, RBC, HGB, HCT, PLT, MCV, MCH, MCHC in the last 72 hours.    CMP: No results for input(s): NA, K, CL, CO2, BUN, CREATININE, GLU, MG, PHOS, CALCIUM, ALBUMIN, PROT, ALKPHOS, ALT, AST, BILITOT in the last 72 hours.    INR  No results for input(s): PT, INR, PROTIME, APTT in the last 72 hours.        Diagnostic Studies:      EKG:  Atrial-paced rhythm  Anteroseptal infarct ,age undetermined  ST and T wave abnormality, consider inferolateral ischemia  Abnormal ECG  When compared with ECG of 31-OCT-2019 10:01,  Electronic atrial pacemaker has replaced Electronic ventricular pacemaker  Confirmed by JL MAYA MD ([id number]) on 10/31/2019 3:23:41 PM    2D Echo:  Results for orders placed or performed during the hospital encounter of 06/06/18   2D echo with color flow doppler   Result Value Ref Range    QEF 25 (A) 55 - 65    Mitral Valve Regurgitation MILD      Diastolic Dysfunction Yes (A)     Aortic Valve Regurgitation TRIVIAL     Est. PA Systolic Pressure 24.16     Tricuspid Valve Regurgitation MILD          Pre-op Assessment    I have reviewed the Patient Summary Reports.     I have reviewed the Nursing Notes.    I have reviewed the Medications.     Review of Systems  Anesthesia Hx:  No problems with previous Anesthesia  History of prior surgery of interest to airway management or planning: Denies Family Hx of Anesthesia complications.   Denies Personal Hx of Anesthesia complications.   Hematology/Oncology:         -- Denies Anemia:   Cardiovascular:   Exercise tolerance: poor Pacemaker Hypertension Past MI CAD  CABG/stent Dysrhythmias  CHF SHOOK ECG has been reviewed.    Pulmonary:   Denies COPD.  Denies Asthma.  Denies Sleep Apnea.    Renal/:   Chronic Renal Disease, CRI    Hepatic/GI:   GERD, well controlled Denies Liver Disease.    Neurological:   Denies CVA. Denies Seizures.    Endocrine:   Denies Diabetes. Hypothyroidism        Physical Exam  General:  Well nourished, Obesity    Airway/Jaw/Neck:  Airway Findings: Mouth Opening: Normal Tongue: Normal  General Airway Assessment: Adult  Mallampati: IV  Improves to II with phonation.  TM Distance: Normal, at least 6 cm  Jaw/Neck Findings:  Neck ROM: Normal ROM      Dental:  Dental Findings: In tact   Chest/Lungs:  Chest/Lungs Findings: Clear to auscultation, Normal Respiratory Rate     Heart/Vascular:  Heart Findings: Rate: Normal  Rhythm: Regular Rhythm  Sounds: Normal        Mental Status:  Mental Status Findings:  Alert and Oriented, Cooperative         Anesthesia Plan  Type of Anesthesia, risks & benefits discussed:  Anesthesia Type:  general  Patient's Preference:   Intra-op Monitoring Plan: standard ASA monitors  Intra-op Monitoring Plan Comments:   Post Op Pain Control Plan: per primary service following discharge from PACU  Post Op Pain Control Plan Comments:   Induction:   IV  Beta Blocker:  Patient is on  a Beta-Blocker and has received one dose within the past 24 hours (No further documentation required).       Informed Consent: Patient understands risks and agrees with Anesthesia plan.  Questions answered. Anesthesia consent signed with patient.  ASA Score: 4     Day of Surgery Review of History & Physical:            Ready For Surgery From Anesthesia Perspective.

## 2020-11-06 ENCOUNTER — PATIENT MESSAGE (OUTPATIENT)
Dept: ELECTROPHYSIOLOGY | Facility: CLINIC | Age: 75
End: 2020-11-06

## 2020-11-06 DIAGNOSIS — I48.19 PERSISTENT ATRIAL FIBRILLATION: Primary | ICD-10-CM

## 2020-11-06 DIAGNOSIS — Z01.818 PRE-OP TESTING: ICD-10-CM

## 2020-11-08 ENCOUNTER — PATIENT MESSAGE (OUTPATIENT)
Dept: ELECTROPHYSIOLOGY | Facility: CLINIC | Age: 75
End: 2020-11-08

## 2020-11-08 DIAGNOSIS — I48.19 PERSISTENT ATRIAL FIBRILLATION: ICD-10-CM

## 2020-11-08 DIAGNOSIS — I25.5 ISCHEMIC CARDIOMYOPATHY: Primary | ICD-10-CM

## 2020-11-08 NOTE — PROGRESS NOTES
Patient Instructions  PVI (Pulmonary Vein Isolation) Ablation   with   NOE (Transesophageal Echocardiogram)      Pre-Procedure Testing Needed for Ablation Procedure:      11/20/2020 at 1:10 PM  COVID 19 Nasal Swab has been scheduled for you at: [facility]  Located on the 2nd for of the [facility]  · You do NOT need to fast for this.       11/20/2020 at 1:20 PM   Blood Work has been scheduled for you at: [facility] [address] [place] [address]  Located on the 2nd floor of the [facility]  · You do NOT need to fast for this blood work.         11/20/2020 at 2:30 PM - Cardiac Imaging - CTA Scan   [facility]  [address] [place] [id number]    · The purpose of this CT scan is to visualize and measure the pulmonary veins (area where your doctor will be performing ablation on the day of your procedure).  This will be done at  the [facility] ([address] [place] [address]) located across the street from the [place].   · Please do not eat or drink anything FOUR hours prior to your scheduled CT scan.  · Please arrive 30 minutes prior to your scheduled time listed above.  · If you take the medication Metformin, please do NOT take it the day of your CT scan and two days following it (due to kidney pre-cautions).    **Prior to your procedure please check your skin and groin area thoroughly for any signs or symptoms of infection such as rashes, open sores, yeast infection, or heat rashes.  If you have any concerns with your skin you should be evaluated by your Primary Care Provider or an Urgent Care for treatment prior to your procedure. Your procedure will be cancelled if not treated prior to the day of your procedure.**        Important Medication Information for your ABLATION procedure:    · HOLD (do NOT take) Eliquis  on the day of your procedure.  Your last dose should be taken on the evening of   · You may take your usual morning  medications with a sip of water on the day of your procedure.         Day of Ablation Procedure:    11/23/2020 at 5:15 AM - Cardiac Ablation     [facility]  [address] [place] [address]    Please report to Cardiology Waiting Room on the 3rd floor of the Hospital,    · Do not eat or drink anything after 12 midnight on the night before your procedure.  · Please follow above important medication instructions.  · You will be spending the night in the hospital the night of your procedure.   · You will need someone to drive you home from the hospital due to anesthesia (you will be unable to drive for 24 hours).  · If you wear a CPAP or BIPAP machine for sleep apnea, please be sure to bring your machine with you.    · All patients/visitors are asked to arrive with a mask and keep the mask on throughout their visit  · All persons entering will be screened for fever and respiratory symptoms  · Patients may have one adult support person pre and post procedure  · Support person may remain with the patient prior to their procedure and then must wait in a socially distant manner until a member of the medical team provides an update at the conclusion of the procedure  · If you are spending the night, your support person must follow the visitation hours (8am-6pm)  · Your support person should provide the staff with a valid cell phone number so that he or she can receive automated updates      Directions to Cardiology Waiting Room  If you park in the Parking Garage:  Take elevators to the 2nd floor  Walk up ramp and turn right by Gold Elevators  Take elevator to the 3rd floor  Upon exiting the elevator, turn away from the clinic areas  Walk long ching around to front of hospital to area with windows overlooking [facility]  Check in at Reception Desk  OR  If family is dropping you off:  Have them drop you off at the front of the Hospital  (Near the ER, where all the flags are hung).  Take the 'E'  elevators to the 3rd floor.  Check in at the Reception Desk in the waiting room.      Post Ablation Instructions:     No pushing, pulling, or lifting greater than 5 pounds for ONE week.   No long car rides (greater than 1 hour) for ONE week.  If a long car ride is required, we ask that you stop every hour and walk around for a few minutes.    No driving for 24 hours after procedure due to anesthesia.   Do not soak access site (groin) in water for ONE week (this includes baths, pools, and hot tubs).    Your groin site(s) may be tender and have some bruising.  This is normal.  A small lump less than the size of a quarter or a marble is normal and can last a couple of weeks.   If you notice any swelling, bleeding, or increase in the size of this lump please call us at [phone].    It is OK to go up and down home stairs as needed after ablation procedure.       Any need to reschedule or cancel procedures, or any questions regarding your procedures should be addressed directly with the Arrhythmia Department Nurses at the following phone number: [phone].

## 2020-11-18 ENCOUNTER — DOCUMENTATION ONLY (OUTPATIENT)
Dept: ELECTROPHYSIOLOGY | Facility: CLINIC | Age: 75
End: 2020-11-18

## 2020-11-18 DIAGNOSIS — I48.19 PERSISTENT ATRIAL FIBRILLATION: Primary | ICD-10-CM

## 2020-11-18 NOTE — PROGRESS NOTES
"Thanks. 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 We'll likely try to induce that at the upcoming procedure.    ===View-only below this line===  ----- Message -----  From: Radha Neal  Sent: 11/18/2020   6:26 AM CST  To: Camron Isbell MD, Derick Krueger, RN    Dr. Isbell,  Pt is having a redo PVI 11/23/20, he has multiple events demonstrating a probable SVT with 1:1 conduction. Not sure if this changes the course of your procedure next week.     Derick, can you print the "VT-1" event 11/17/20 @ 3:02pm and a few of the SVT events that demonstrate 1:1 conduction?  Please leave in Dr. Rubio door.     Thanks,  Radha    "

## 2020-11-19 ENCOUNTER — PATIENT MESSAGE (OUTPATIENT)
Dept: CARDIOLOGY | Facility: CLINIC | Age: 75
End: 2020-11-19

## 2020-11-20 ENCOUNTER — PATIENT MESSAGE (OUTPATIENT)
Dept: CARDIOLOGY | Facility: CLINIC | Age: 75
End: 2020-11-20

## 2020-11-20 ENCOUNTER — LAB VISIT (OUTPATIENT)
Dept: SURGERY | Facility: CLINIC | Age: 75
DRG: 273 | End: 2020-11-20
Payer: MEDICARE

## 2020-11-20 ENCOUNTER — HOSPITAL ENCOUNTER (OUTPATIENT)
Dept: RADIOLOGY | Facility: HOSPITAL | Age: 75
Discharge: HOME | DRG: 273 | End: 2020-11-20
Attending: INTERNAL MEDICINE
Payer: MEDICARE

## 2020-11-20 ENCOUNTER — TELEPHONE (OUTPATIENT)
Dept: ELECTROPHYSIOLOGY | Facility: CLINIC | Age: 75
End: 2020-11-20

## 2020-11-20 DIAGNOSIS — I48.19 PERSISTENT ATRIAL FIBRILLATION: ICD-10-CM

## 2020-11-20 DIAGNOSIS — Z01.818 PRE-OP TESTING: ICD-10-CM

## 2020-11-20 PROCEDURE — U0003 INFECTIOUS AGENT DETECTION BY NUCLEIC ACID (DNA OR RNA); SEVERE ACUTE RESPIRATORY SYNDROME CORONAVIRUS 2 (SARS-COV-2) (CORONAVIRUS DISEASE [COVID-19]), AMPLIFIED PROBE TECHNIQUE, MAKING USE OF HIGH THROUGHPUT TECHNOLOGIES AS DESCRIBED BY CMS-2020-01-R: HCPCS

## 2020-11-20 PROCEDURE — 71275 CTA CHEST NON CORONARY: ICD-10-PCS | Mod: 26,,, | Performed by: RADIOLOGY

## 2020-11-20 PROCEDURE — 25500020 PHARM REV CODE 255: Performed by: INTERNAL MEDICINE

## 2020-11-20 PROCEDURE — 71275 CT ANGIOGRAPHY CHEST: CPT | Mod: 26,,, | Performed by: RADIOLOGY

## 2020-11-20 PROCEDURE — 71275 CT ANGIOGRAPHY CHEST: CPT | Mod: TC

## 2020-11-20 RX ORDER — AMLODIPINE BESYLATE 5 MG/1
5 TABLET ORAL 2 TIMES DAILY
Qty: 180 TABLET | Refills: 3 | Status: ON HOLD | OUTPATIENT
Start: 2020-11-20 | End: 2020-11-25 | Stop reason: HOSPADM

## 2020-11-20 RX ADMIN — IOHEXOL 100 ML: 350 INJECTION, SOLUTION INTRAVENOUS at 02:11

## 2020-11-21 LAB — SARS-COV-2 RNA RESP QL NAA+PROBE: NOT DETECTED

## 2020-11-23 ENCOUNTER — ANESTHESIA (OUTPATIENT)
Dept: MEDSURG UNIT | Facility: HOSPITAL | Age: 75
DRG: 273 | End: 2020-11-23
Payer: MEDICARE

## 2020-11-23 ENCOUNTER — HOSPITAL ENCOUNTER (INPATIENT)
Facility: HOSPITAL | Age: 75
LOS: 3 days | Discharge: HOME | DRG: 273 | End: 2020-11-26
Attending: INTERNAL MEDICINE | Admitting: INTERNAL MEDICINE
Payer: MEDICARE

## 2020-11-23 ENCOUNTER — ANESTHESIA EVENT (OUTPATIENT)
Dept: MEDSURG UNIT | Facility: HOSPITAL | Age: 75
DRG: 273 | End: 2020-11-23
Payer: MEDICARE

## 2020-11-23 DIAGNOSIS — Z79.899 VISIT FOR MONITORING TIKOSYN THERAPY: ICD-10-CM

## 2020-11-23 DIAGNOSIS — Z51.81 VISIT FOR MONITORING TIKOSYN THERAPY: ICD-10-CM

## 2020-11-23 DIAGNOSIS — I48.91 ATRIAL FIBRILLATION: Primary | ICD-10-CM

## 2020-11-23 DIAGNOSIS — I48.19 PERSISTENT ATRIAL FIBRILLATION: ICD-10-CM

## 2020-11-23 DIAGNOSIS — I47.10 PSVT (PAROXYSMAL SUPRAVENTRICULAR TACHYCARDIA): ICD-10-CM

## 2020-11-23 DIAGNOSIS — I49.9 ARRHYTHMIA: ICD-10-CM

## 2020-11-23 PROBLEM — I50.43 ACUTE ON CHRONIC COMBINED SYSTOLIC AND DIASTOLIC HEART FAILURE: Status: ACTIVE | Noted: 2020-11-23

## 2020-11-23 PROBLEM — N18.31 STAGE 3A CHRONIC KIDNEY DISEASE: Status: ACTIVE | Noted: 2020-11-23

## 2020-11-23 LAB
ABO + RH BLD: NORMAL
ANION GAP SERPL CALC-SCNC: 8 MMOL/L (ref 8–16)
BLD GP AB SCN CELLS X3 SERPL QL: NORMAL
BUN SERPL-MCNC: 17 MG/DL (ref 8–23)
CALCIUM SERPL-MCNC: 8 MG/DL (ref 8.7–10.5)
CHLORIDE SERPL-SCNC: 111 MMOL/L (ref 95–110)
CO2 SERPL-SCNC: 22 MMOL/L (ref 23–29)
CREAT SERPL-MCNC: 1.5 MG/DL (ref 0.5–1.4)
EST. GFR  (AFRICAN AMERICAN): 52.3 ML/MIN/1.73 M^2
EST. GFR  (NON AFRICAN AMERICAN): 45.2 ML/MIN/1.73 M^2
GLUCOSE SERPL-MCNC: 141 MG/DL (ref 70–110)
MAGNESIUM SERPL-MCNC: 1.9 MG/DL (ref 1.6–2.6)
PHOSPHATE SERPL-MCNC: 3.1 MG/DL (ref 2.7–4.5)
POC ACTIVATED CLOTTING TIME K: 329 SEC (ref 74–137)
POTASSIUM SERPL-SCNC: 4.1 MMOL/L (ref 3.5–5.1)
SAMPLE: ABNORMAL
SODIUM SERPL-SCNC: 141 MMOL/L (ref 136–145)

## 2020-11-23 PROCEDURE — D9220A PRA ANESTHESIA: Mod: CRNA,,, | Performed by: NURSE ANESTHETIST, CERTIFIED REGISTERED

## 2020-11-23 PROCEDURE — 93010 EKG 12-LEAD: ICD-10-PCS | Mod: 76,59,, | Performed by: INTERNAL MEDICINE

## 2020-11-23 PROCEDURE — 86850 RBC ANTIBODY SCREEN: CPT

## 2020-11-23 PROCEDURE — C1753 CATH, INTRAVAS ULTRASOUND: HCPCS | Performed by: INTERNAL MEDICINE

## 2020-11-23 PROCEDURE — 93662 INTRACARDIAC ECG (ICE): CPT | Mod: 26,,, | Performed by: INTERNAL MEDICINE

## 2020-11-23 PROCEDURE — 93656 PR ELECTROPHYS EVAL, COMPREHEN, W/ABLATION OF ATRIAL FIBR: ICD-10-PCS | Mod: ,,, | Performed by: INTERNAL MEDICINE

## 2020-11-23 PROCEDURE — 25000003 PHARM REV CODE 250: Performed by: NURSE PRACTITIONER

## 2020-11-23 PROCEDURE — 93657 TX L/R ATRIAL FIB ADDL: CPT | Performed by: INTERNAL MEDICINE

## 2020-11-23 PROCEDURE — 63600175 PHARM REV CODE 636 W HCPCS: Performed by: HOSPITALIST

## 2020-11-23 PROCEDURE — 99499 NO LOS: ICD-10-PCS | Mod: ,,, | Performed by: INTERNAL MEDICINE

## 2020-11-23 PROCEDURE — 93655 PR ABLATE ARRHYTHMIA ADD ON: ICD-10-PCS | Mod: ,,, | Performed by: INTERNAL MEDICINE

## 2020-11-23 PROCEDURE — 93005 ELECTROCARDIOGRAM TRACING: CPT

## 2020-11-23 PROCEDURE — 93656 COMPRE EP EVAL ABLTJ ATR FIB: CPT | Performed by: INTERNAL MEDICINE

## 2020-11-23 PROCEDURE — 93657 TX L/R ATRIAL FIB ADDL: CPT | Mod: ,,, | Performed by: INTERNAL MEDICINE

## 2020-11-23 PROCEDURE — 25500020 PHARM REV CODE 255: Performed by: INTERNAL MEDICINE

## 2020-11-23 PROCEDURE — 37000009 HC ANESTHESIA EA ADD 15 MINS: Performed by: INTERNAL MEDICINE

## 2020-11-23 PROCEDURE — 93623 PRGRMD STIMJ&PACG IV RX NFS: CPT | Mod: 26,,, | Performed by: INTERNAL MEDICINE

## 2020-11-23 PROCEDURE — 93613 INTRACARDIAC EPHYS 3D MAPG: CPT | Mod: ,,, | Performed by: INTERNAL MEDICINE

## 2020-11-23 PROCEDURE — 83735 ASSAY OF MAGNESIUM: CPT

## 2020-11-23 PROCEDURE — 27201423 OPTIME MED/SURG SUP & DEVICES STERILE SUPPLY: Performed by: INTERNAL MEDICINE

## 2020-11-23 PROCEDURE — 25000003 PHARM REV CODE 250: Performed by: INTERNAL MEDICINE

## 2020-11-23 PROCEDURE — 93656 COMPRE EP EVAL ABLTJ ATR FIB: CPT | Mod: ,,, | Performed by: INTERNAL MEDICINE

## 2020-11-23 PROCEDURE — 27201037 HC PRESSURE MONITORING SET UP

## 2020-11-23 PROCEDURE — 20600001 HC STEP DOWN PRIVATE ROOM

## 2020-11-23 PROCEDURE — D9220A PRA ANESTHESIA: ICD-10-PCS | Mod: ANES,,, | Performed by: ANESTHESIOLOGY

## 2020-11-23 PROCEDURE — 93662 PR INTRACARD ECHO, THER/DX INTERVENT: ICD-10-PCS | Mod: 26,,, | Performed by: INTERNAL MEDICINE

## 2020-11-23 PROCEDURE — 84100 ASSAY OF PHOSPHORUS: CPT

## 2020-11-23 PROCEDURE — 63600175 PHARM REV CODE 636 W HCPCS: Performed by: NURSE ANESTHETIST, CERTIFIED REGISTERED

## 2020-11-23 PROCEDURE — 93655 ICAR CATH ABLTJ DSCRT ARRHYT: CPT | Mod: ,,, | Performed by: INTERNAL MEDICINE

## 2020-11-23 PROCEDURE — 99220 PR INITIAL OBSERVATION CARE,LEVL III: CPT | Mod: 25,,, | Performed by: INTERNAL MEDICINE

## 2020-11-23 PROCEDURE — 99220 PR INITIAL OBSERVATION CARE,LEVL III: ICD-10-PCS | Mod: 25,,, | Performed by: INTERNAL MEDICINE

## 2020-11-23 PROCEDURE — 63600175 PHARM REV CODE 636 W HCPCS: Performed by: INTERNAL MEDICINE

## 2020-11-23 PROCEDURE — 93623 PRGRMD STIMJ&PACG IV RX NFS: CPT | Performed by: INTERNAL MEDICINE

## 2020-11-23 PROCEDURE — C1769 GUIDE WIRE: HCPCS | Performed by: INTERNAL MEDICINE

## 2020-11-23 PROCEDURE — 99223 PR INITIAL HOSPITAL CARE,LEVL III: ICD-10-PCS | Mod: ,,, | Performed by: HOSPITALIST

## 2020-11-23 PROCEDURE — C1730 CATH, EP, 19 OR FEW ELECT: HCPCS | Performed by: INTERNAL MEDICINE

## 2020-11-23 PROCEDURE — C1732 CATH, EP, DIAG/ABL, 3D/VECT: HCPCS | Performed by: INTERNAL MEDICINE

## 2020-11-23 PROCEDURE — 93623 PR STIM/PACING HEART POST IV DRUG INFU: ICD-10-PCS | Mod: 26,,, | Performed by: INTERNAL MEDICINE

## 2020-11-23 PROCEDURE — 37000008 HC ANESTHESIA 1ST 15 MINUTES: Performed by: INTERNAL MEDICINE

## 2020-11-23 PROCEDURE — C1766 INTRO/SHEATH,STRBLE,NON-PEEL: HCPCS | Performed by: INTERNAL MEDICINE

## 2020-11-23 PROCEDURE — C1894 INTRO/SHEATH, NON-LASER: HCPCS | Performed by: INTERNAL MEDICINE

## 2020-11-23 PROCEDURE — 93613 INTRACARDIAC EPHYS 3D MAPG: CPT | Performed by: INTERNAL MEDICINE

## 2020-11-23 PROCEDURE — 25000003 PHARM REV CODE 250: Performed by: HOSPITALIST

## 2020-11-23 PROCEDURE — C1731 CATH, EP, 20 OR MORE ELEC: HCPCS | Performed by: INTERNAL MEDICINE

## 2020-11-23 PROCEDURE — 93010 ELECTROCARDIOGRAM REPORT: CPT | Mod: 76,59,, | Performed by: INTERNAL MEDICINE

## 2020-11-23 PROCEDURE — D9220A PRA ANESTHESIA: ICD-10-PCS | Mod: CRNA,,, | Performed by: NURSE ANESTHETIST, CERTIFIED REGISTERED

## 2020-11-23 PROCEDURE — 36620 INSERTION CATHETER ARTERY: CPT | Mod: 59,,, | Performed by: ANESTHESIOLOGY

## 2020-11-23 PROCEDURE — D9220A PRA ANESTHESIA: Mod: ANES,,, | Performed by: ANESTHESIOLOGY

## 2020-11-23 PROCEDURE — 93662 INTRACARDIAC ECG (ICE): CPT | Performed by: INTERNAL MEDICINE

## 2020-11-23 PROCEDURE — 36620 ARTERIAL: ICD-10-PCS | Mod: 59,,, | Performed by: ANESTHESIOLOGY

## 2020-11-23 PROCEDURE — 99223 1ST HOSP IP/OBS HIGH 75: CPT | Mod: ,,, | Performed by: HOSPITALIST

## 2020-11-23 PROCEDURE — 25000003 PHARM REV CODE 250: Performed by: NURSE ANESTHETIST, CERTIFIED REGISTERED

## 2020-11-23 PROCEDURE — 93010 ELECTROCARDIOGRAM REPORT: CPT | Mod: ,,, | Performed by: INTERNAL MEDICINE

## 2020-11-23 PROCEDURE — 99499 UNLISTED E&M SERVICE: CPT | Mod: ,,, | Performed by: INTERNAL MEDICINE

## 2020-11-23 PROCEDURE — 63600175 PHARM REV CODE 636 W HCPCS: Performed by: NURSE PRACTITIONER

## 2020-11-23 PROCEDURE — 93657 PR TX L/R ATRIAL FIB ADDL: ICD-10-PCS | Mod: ,,, | Performed by: INTERNAL MEDICINE

## 2020-11-23 PROCEDURE — 80048 BASIC METABOLIC PNL TOTAL CA: CPT

## 2020-11-23 PROCEDURE — 93655 ICAR CATH ABLTJ DSCRT ARRHYT: CPT | Performed by: INTERNAL MEDICINE

## 2020-11-23 PROCEDURE — 93613 PR INTRACARD ELECTROPHYS 3-DIMENS MAPPING: ICD-10-PCS | Mod: ,,, | Performed by: INTERNAL MEDICINE

## 2020-11-23 RX ORDER — PROTAMINE SULFATE 10 MG/ML
INJECTION, SOLUTION INTRAVENOUS
Status: DISCONTINUED | OUTPATIENT
Start: 2020-11-23 | End: 2020-11-23

## 2020-11-23 RX ORDER — DOFETILIDE 0.25 MG/1
500 CAPSULE ORAL EVERY 12 HOURS
Status: DISCONTINUED | OUTPATIENT
Start: 2020-11-23 | End: 2020-11-26 | Stop reason: HOSPADM

## 2020-11-23 RX ORDER — PANTOPRAZOLE SODIUM 40 MG/1
40 TABLET, DELAYED RELEASE ORAL DAILY
Status: DISCONTINUED | OUTPATIENT
Start: 2020-11-23 | End: 2020-11-24

## 2020-11-23 RX ORDER — IODIXANOL 320 MG/ML
INJECTION, SOLUTION INTRAVASCULAR
Status: DISCONTINUED | OUTPATIENT
Start: 2020-11-23 | End: 2020-11-26 | Stop reason: HOSPADM

## 2020-11-23 RX ORDER — FUROSEMIDE 10 MG/ML
40 INJECTION INTRAMUSCULAR; INTRAVENOUS ONCE
Status: COMPLETED | OUTPATIENT
Start: 2020-11-23 | End: 2020-11-23

## 2020-11-23 RX ORDER — HEPARIN SODIUM 10000 [USP'U]/100ML
INJECTION, SOLUTION INTRAVENOUS CONTINUOUS PRN
Status: DISCONTINUED | OUTPATIENT
Start: 2020-11-23 | End: 2020-11-23

## 2020-11-23 RX ORDER — ACETAMINOPHEN 325 MG/1
650 TABLET ORAL EVERY 4 HOURS PRN
Status: DISCONTINUED | OUTPATIENT
Start: 2020-11-23 | End: 2020-11-26 | Stop reason: HOSPADM

## 2020-11-23 RX ORDER — DEXAMETHASONE SODIUM PHOSPHATE 4 MG/ML
INJECTION, SOLUTION INTRA-ARTICULAR; INTRALESIONAL; INTRAMUSCULAR; INTRAVENOUS; SOFT TISSUE
Status: DISCONTINUED | OUTPATIENT
Start: 2020-11-23 | End: 2020-11-23

## 2020-11-23 RX ORDER — PROPOFOL 10 MG/ML
VIAL (ML) INTRAVENOUS
Status: DISCONTINUED | OUTPATIENT
Start: 2020-11-23 | End: 2020-11-23

## 2020-11-23 RX ORDER — CARVEDILOL 12.5 MG/1
12.5 TABLET ORAL 2 TIMES DAILY WITH MEALS
Status: DISCONTINUED | OUTPATIENT
Start: 2020-11-23 | End: 2020-11-26 | Stop reason: HOSPADM

## 2020-11-23 RX ORDER — LISINOPRIL 10 MG/1
10 TABLET ORAL DAILY
Status: DISCONTINUED | OUTPATIENT
Start: 2020-11-24 | End: 2020-11-25

## 2020-11-23 RX ORDER — CEFAZOLIN SODIUM 1 G/3ML
2 INJECTION, POWDER, FOR SOLUTION INTRAMUSCULAR; INTRAVENOUS
Status: COMPLETED | OUTPATIENT
Start: 2020-11-23 | End: 2020-11-23

## 2020-11-23 RX ORDER — SUCCINYLCHOLINE CHLORIDE 20 MG/ML
INJECTION INTRAMUSCULAR; INTRAVENOUS
Status: DISCONTINUED | OUTPATIENT
Start: 2020-11-23 | End: 2020-11-23

## 2020-11-23 RX ORDER — ATORVASTATIN CALCIUM 20 MG/1
20 TABLET, FILM COATED ORAL DAILY
Status: DISCONTINUED | OUTPATIENT
Start: 2020-11-24 | End: 2020-11-26 | Stop reason: HOSPADM

## 2020-11-23 RX ORDER — ROCURONIUM BROMIDE 10 MG/ML
INJECTION, SOLUTION INTRAVENOUS
Status: DISCONTINUED | OUTPATIENT
Start: 2020-11-23 | End: 2020-11-23

## 2020-11-23 RX ORDER — SODIUM CHLORIDE 0.9 % (FLUSH) 0.9 %
3 SYRINGE (ML) INJECTION
Status: DISCONTINUED | OUTPATIENT
Start: 2020-11-23 | End: 2020-11-26 | Stop reason: HOSPADM

## 2020-11-23 RX ORDER — LEVOTHYROXINE SODIUM 75 UG/1
75 TABLET ORAL
Status: DISCONTINUED | OUTPATIENT
Start: 2020-11-24 | End: 2020-11-26 | Stop reason: HOSPADM

## 2020-11-23 RX ORDER — FENTANYL CITRATE 50 UG/ML
INJECTION, SOLUTION INTRAMUSCULAR; INTRAVENOUS
Status: DISCONTINUED | OUTPATIENT
Start: 2020-11-23 | End: 2020-11-23

## 2020-11-23 RX ORDER — LIDOCAINE HYDROCHLORIDE 20 MG/ML
INJECTION, SOLUTION EPIDURAL; INFILTRATION; INTRACAUDAL; PERINEURAL
Status: DISCONTINUED | OUTPATIENT
Start: 2020-11-23 | End: 2020-11-26 | Stop reason: HOSPADM

## 2020-11-23 RX ORDER — ONDANSETRON 2 MG/ML
INJECTION INTRAMUSCULAR; INTRAVENOUS
Status: DISCONTINUED | OUTPATIENT
Start: 2020-11-23 | End: 2020-11-23

## 2020-11-23 RX ORDER — LIDOCAINE HYDROCHLORIDE 20 MG/ML
INJECTION INTRAVENOUS
Status: DISCONTINUED | OUTPATIENT
Start: 2020-11-23 | End: 2020-11-23

## 2020-11-23 RX ORDER — FENTANYL CITRATE 50 UG/ML
25 INJECTION, SOLUTION INTRAMUSCULAR; INTRAVENOUS EVERY 5 MIN PRN
Status: DISCONTINUED | OUTPATIENT
Start: 2020-11-23 | End: 2020-11-26 | Stop reason: HOSPADM

## 2020-11-23 RX ORDER — HEPARIN SODIUM 200 [USP'U]/100ML
INJECTION, SOLUTION INTRAVENOUS
Status: DISCONTINUED | OUTPATIENT
Start: 2020-11-23 | End: 2020-11-26 | Stop reason: HOSPADM

## 2020-11-23 RX ORDER — HEPARIN SODIUM 1000 [USP'U]/ML
INJECTION, SOLUTION INTRAVENOUS; SUBCUTANEOUS
Status: DISCONTINUED | OUTPATIENT
Start: 2020-11-23 | End: 2020-11-23

## 2020-11-23 RX ORDER — AMLODIPINE BESYLATE 5 MG/1
5 TABLET ORAL 2 TIMES DAILY
Status: DISCONTINUED | OUTPATIENT
Start: 2020-11-24 | End: 2020-11-24

## 2020-11-23 RX ORDER — HYDROCODONE BITARTRATE AND ACETAMINOPHEN 5; 325 MG/1; MG/1
1 TABLET ORAL EVERY 6 HOURS PRN
Status: DISCONTINUED | OUTPATIENT
Start: 2020-11-23 | End: 2020-11-26 | Stop reason: HOSPADM

## 2020-11-23 RX ADMIN — SODIUM CHLORIDE: 0.9 INJECTION, SOLUTION INTRAVENOUS at 07:11

## 2020-11-23 RX ADMIN — ACETAMINOPHEN 650 MG: 325 TABLET ORAL at 01:11

## 2020-11-23 RX ADMIN — ROCURONIUM BROMIDE 5 MG: 10 INJECTION, SOLUTION INTRAVENOUS at 07:11

## 2020-11-23 RX ADMIN — DEXAMETHASONE SODIUM PHOSPHATE 4 MG: 4 INJECTION, SOLUTION INTRAMUSCULAR; INTRAVENOUS at 07:11

## 2020-11-23 RX ADMIN — HEPARIN SODIUM 3000 UNITS: 1000 INJECTION, SOLUTION INTRAVENOUS; SUBCUTANEOUS at 10:11

## 2020-11-23 RX ADMIN — DOFETILIDE 500 MCG: 0.25 CAPSULE ORAL at 09:11

## 2020-11-23 RX ADMIN — ISOPROTERENOL HYDROCHLORIDE 1 MCG/MIN: 0.2 INJECTION, SOLUTION INTRAMUSCULAR; INTRAVENOUS at 08:11

## 2020-11-23 RX ADMIN — APIXABAN 5 MG: 5 TABLET, FILM COATED ORAL at 09:11

## 2020-11-23 RX ADMIN — SODIUM CHLORIDE 0.75 MCG/KG/MIN: 9 INJECTION, SOLUTION INTRAVENOUS at 08:11

## 2020-11-23 RX ADMIN — PANTOPRAZOLE SODIUM 40 MG: 40 TABLET, DELAYED RELEASE ORAL at 10:11

## 2020-11-23 RX ADMIN — PROPOFOL 150 MG: 10 INJECTION, EMULSION INTRAVENOUS at 07:11

## 2020-11-23 RX ADMIN — SODIUM CHLORIDE, SODIUM GLUCONATE, SODIUM ACETATE, POTASSIUM CHLORIDE, MAGNESIUM CHLORIDE, SODIUM PHOSPHATE, DIBASIC, AND POTASSIUM PHOSPHATE: .53; .5; .37; .037; .03; .012; .00082 INJECTION, SOLUTION INTRAVENOUS at 07:11

## 2020-11-23 RX ADMIN — PROTAMINE SULFATE 100 MG: 10 INJECTION, SOLUTION INTRAVENOUS at 11:11

## 2020-11-23 RX ADMIN — HYDROCODONE BITARTRATE AND ACETAMINOPHEN 1 TABLET: 5; 325 TABLET ORAL at 05:11

## 2020-11-23 RX ADMIN — LIDOCAINE HYDROCHLORIDE 100 MG: 20 INJECTION, SOLUTION INTRAVENOUS at 07:11

## 2020-11-23 RX ADMIN — CEFAZOLIN 2 G: 330 INJECTION, POWDER, FOR SOLUTION INTRAMUSCULAR; INTRAVENOUS at 07:11

## 2020-11-23 RX ADMIN — PROTAMINE SULFATE 5 MG: 10 INJECTION, SOLUTION INTRAVENOUS at 11:11

## 2020-11-23 RX ADMIN — CARVEDILOL 12.5 MG: 12.5 TABLET, FILM COATED ORAL at 09:11

## 2020-11-23 RX ADMIN — FUROSEMIDE 40 MG: 10 INJECTION, SOLUTION INTRAMUSCULAR; INTRAVENOUS at 05:11

## 2020-11-23 RX ADMIN — FENTANYL CITRATE 50 MCG: 50 INJECTION INTRAMUSCULAR; INTRAVENOUS at 07:11

## 2020-11-23 RX ADMIN — HEPARIN SODIUM 3000 UNITS: 1000 INJECTION, SOLUTION INTRAVENOUS; SUBCUTANEOUS at 11:11

## 2020-11-23 RX ADMIN — SUCCINYLCHOLINE CHLORIDE 120 MG: 20 INJECTION, SOLUTION INTRAMUSCULAR; INTRAVENOUS at 07:11

## 2020-11-23 RX ADMIN — ONDANSETRON 4 MG: 2 INJECTION, SOLUTION INTRAMUSCULAR; INTRAVENOUS at 07:11

## 2020-11-23 RX ADMIN — HEPARIN SODIUM AND DEXTROSE 12 UNITS/KG/HR: 10000; 5 INJECTION INTRAVENOUS at 09:11

## 2020-11-23 RX ADMIN — HEPARIN SODIUM 3000 UNITS: 1000 INJECTION, SOLUTION INTRAVENOUS; SUBCUTANEOUS at 09:11

## 2020-11-23 RX ADMIN — HEPARIN SODIUM 21000 UNITS: 1000 INJECTION, SOLUTION INTRAVENOUS; SUBCUTANEOUS at 09:11

## 2020-11-23 NOTE — ANESTHESIA PROCEDURE NOTES
Intubation  Performed by: Ricardo Barton CRNA  Authorized by: Jose Roberto Noble MD     Intubation:     Induction:  Intravenous    Intubated:  Postinduction    Mask Ventilation:  Easy mask    Attempts:  1    Attempted By:  CRNA    Method of Intubation:  Direct    Blade:  Velasco 2    Laryngeal View Grade: Grade I - full view of chords      Difficult Airway Encountered?: No      Complications:  None    Airway Device:  Oral endotracheal tube    Airway Device Size:  7.5    Style/Cuff Inflation:  Cuffed    Inflation Amount (mL):  8    Tube secured:  23    Secured at:  The lips    Placement Verified By:  Capnometry    Complicating Factors:  None    Findings Post-Intubation:  BS equal bilateral

## 2020-11-23 NOTE — ASSESSMENT & PLAN NOTE
s/p redo-PVI and SVT RFA. EP is being consulted for initiation of Tikosyn and ongoing monitoring.     Plan:  - Anticoagulation: resume Eliquis this evening  - Anti-arrythmic: start Tikosyn with first dose this evening-defer to CV pharmacist for dosing. QTc < 500 ms and CrCl > 60. Monitor BMP, Mg, Phos prior to dosing.  - ECG 2 hours following first dose of Tikosyn and following any dose adjustment   -Protonix 40 mg daily x 4 weeks     - Lasix 40 mg IV following completion of bedrest

## 2020-11-23 NOTE — SUBJECTIVE & OBJECTIVE
Past Medical History:   Diagnosis Date    Anticoagulant long-term use     Atrial fibrillation     Benign essential tremor 6/6/2018    Cardiomyopathy     CHF (congestive heart failure)     Coronary artery disease     Hypertension     Left ventricular thrombus     Syncope and collapse     Thyroid disease        Past Surgical History:   Procedure Laterality Date    ABLATION OF ARRHYTHMOGENIC FOCUS FOR ATRIAL FIBRILLATION N/A 12/9/2019    Procedure: ABLATION, ARRHYTHMOGENIC FOCUS, FOR ATRIAL FIBRILLATION;  Surgeon: Camron Isbell MD;  Location: [facility] EP LAB;  Service: Cardiology;  Laterality: N/A;  AF, NOE (firm), PVI, CRYO, LORENZA, GEN, DM, 3 PREP * Dual ICD SJM*    CARDIAC CATHETERIZATION      CARDIAC DEFIBRILLATOR PLACEMENT      CORONARY ANGIOPLASTY      HERNIA REPAIR      KNEE ARTHROSCOPY      REPLACEMENT OF IMPLANTABLE CARDIOVERTER-DEFIBRILLATOR (ICD) GENERATOR Left 6/7/2018    Procedure: REPLACEMENT-GENERATOR-ICD;  Surgeon: Camron Isbell MD;  Location: [facility] CATH LAB;  Service: Cardiology;  Laterality: Left;  GENIE, DUAL ICD GEN CHG, SJM, MAC, DM, 3 PREP    STENTS      TRANSESOPHAGEAL ECHOCARDIOGRAPHY N/A 10/31/2019    Procedure: ECHOCARDIOGRAM, TRANSESOPHAGEAL;  Surgeon: [facility] Diagnostic Provider;  Location: [facility] EP LAB;  Service: Cardiology;  Laterality: N/A;  AF, NOE, DCCV, MAC, DM, 3 PREP    TRANSESOPHAGEAL ECHOCARDIOGRAPHY N/A 12/9/2019    Procedure: ECHOCARDIOGRAM, TRANSESOPHAGEAL;  Surgeon: Sun Merchant MD;  Location: [facility] EP LAB;  Service: Cardiology;  Laterality: N/A;    TRANSESOPHAGEAL ECHOCARDIOGRAPHY N/A 10/29/2020    Procedure: ECHOCARDIOGRAM, TRANSESOPHAGEAL;  Surgeon: Dannie Mittal III, MD;  Location: [facility] EP LAB;  Service: Cardiology;  Laterality: N/A;    TREATMENT OF CARDIAC ARRHYTHMIA N/A 10/31/2019    Procedure: CARDIOVERSION;  Surgeon: Camron Isbell MD;  Location: [facility] EP LAB;  Service: Cardiology;  Laterality: N/A;  AF, NOE, DCCV, MAC, DM, 3 PREP*SJM  Dual ICD in  situ*    TREATMENT OF CARDIAC ARRHYTHMIA N/A 10/29/2020    Procedure: CARDIOVERSION;  Surgeon: Camron Isbell MD;  Location: [facility] LAB;  Service: Cardiology;  Laterality: N/A;  AF, NOE, DCCV, MAC, DM, 3 PREP*SJM dual ICD in situ*    VASCULAR SURGERY      stents placed       Review of patient's allergies indicates:  No Known Allergies    Current Facility-Administered Medications on File Prior to Encounter   Medication    0.9%  NaCl infusion    sodium chloride 0.9% flush 5 mL    sodium chloride 0.9% flush 5 mL     Current Outpatient Medications on File Prior to Encounter   Medication Sig    apixaban (ELIQUIS) 5 mg Tab Take 1 tablet (5 mg total) by mouth 2 (two) times daily.    ascorbic acid (VITAMIN C) 100 MG tablet Take 1,000 mg by mouth once daily.     aspirin (ECOTRIN) 81 MG EC tablet Take 81 mg by mouth once daily.    atorvastatin (LIPITOR) 20 MG tablet Take 1 tablet (20 mg total) by mouth once daily.    carvedilol (COREG) 12.5 MG tablet Take 1 tablet (12.5 mg total) by mouth 2 (two) times daily with meals.    coenzyme Q10 (CO Q-10) 200 mg capsule Take 200 mg by mouth once daily.    fish oil-omega-3 fatty acids 300-1,000 mg capsule Take 2 g by mouth 2 (two) times daily.     furosemide (LASIX) 40 MG tablet Take 40 mg by mouth daily as needed.    levothyroxine (SYNTHROID) 75 MCG tablet Take 75 mcg by mouth before breakfast.     lisinopril (PRINIVIL,ZESTRIL) 30 MG tablet Take 1 tablet (30 mg total) by mouth once daily. (Patient taking differently: Take 30 mg by mouth once daily. Takes 10mg in AM and 30mg in PM)    lisinopriL 10 MG tablet Take 10 mg by mouth once daily. Taken in AM    multivitamin capsule Take 1 capsule by mouth nightly.     SLO-NIACIN 750 mg TbSR Take 1 tablet (750 mg total) by mouth 2 (two) times daily. (Patient taking differently: Take 500 mg by mouth 2 (two) times daily. )    zinc 50 mg Tab Take 40 mg by mouth.    nitroGLYCERIN (NITROSTAT) 0.4 MG SL tablet Place 1 tablet  (0.4 mg total) under the tongue every 5 (five) minutes as needed for Chest pain.     Family History     Problem Relation (Age of Onset)    Heart disease Mother, Father, Brother        Tobacco Use    Smoking status: Never Smoker    Smokeless tobacco: Former User   Substance and Sexual Activity    Alcohol use: Not Currently     Frequency: Never     Comment: occasionally    Drug use: No    Sexual activity: Not on file     Review of Systems   Constitution: Positive for malaise/fatigue. Negative for fever.   HENT: Negative for hearing loss.    Cardiovascular: Negative for chest pain, dyspnea on exertion, irregular heartbeat, leg swelling, palpitations and syncope.   Hematologic/Lymphatic: Negative for bleeding problem.   Gastrointestinal: Negative for heartburn.   Neurological: Negative for light-headedness.   Psychiatric/Behavioral: Negative for altered mental status. The patient is not nervous/anxious.      Objective:     Vital Signs (Most Recent):  Temp: 97.2 °F (36.2 °C) (11/23/20 0600)  Pulse: 62 (11/23/20 0600)  Resp: 20 (11/23/20 0600)  BP: 118/73 (11/23/20 0601)  SpO2: 96 % (11/23/20 0600) Vital Signs (24h Range):  Temp:  [97.2 °F (36.2 °C)] 97.2 °F (36.2 °C)  Pulse:  [62] 62  Resp:  [20] 20  SpO2:  [96 %] 96 %  BP: (118-131)/(73-74) 118/73          There is no height or weight on file to calculate BMI.    SpO2: 96 %  O2 Device (Oxygen Therapy): room air    Physical Exam   Constitutional: He is oriented to person, place, and time. Vital signs are normal. He appears well-developed and well-nourished.   Cardiovascular: Normal rate, regular rhythm, S1 normal, S2 normal, normal heart sounds and intact distal pulses.   Pulmonary/Chest: Effort normal and breath sounds normal.   Musculoskeletal: Normal range of motion.   Neurological: He is alert and oriented to person, place, and time. He has normal strength.   Skin: Skin is warm, dry and intact.   Vitals reviewed.

## 2020-11-23 NOTE — NURSING TRANSFER
Nursing Transfer Note      11/23/2020     Transfer {TRANSFER TO/343    Transfer via stretcher    Transfer with cardiac monitoring    Transported by  pct    Medicines sent: silvadene    Chart send with patient: Yes    Notified: spouse    Patient reassessed at: 1330 11/23/2020    Upon arrival to floor: cardiac monitor applied, patient oriented to room, call bell in reach and bed in lowest position

## 2020-11-23 NOTE — H&P
[facility] - Short Stay Cardiac Unit  Cardiac Electrophysiology  History and Physical     Admission Date: 11/23/2020  Code Status: Prior   Attending Provider: Camron Isbell MD   Principal Problem:<principal problem not specified>    Subjective:     Chief Complaint:  Persistent AF     HPI:  Amish Grossman presents to short stay cardiac unit on 11/23/2020 for planned redo-PVI with Dr. Isbell     Mr. Grossman is a 75 y.o. male with persistent AF on apixaban, ICM EF 30%, [employer] Dual Chamber ICD, VT, HTN, HLD, and CKD.  He was seen in arrhythmia clinic on 10/28/20 with reports of weakness, fatigue, and lightheadedness. Remote ICD transmission revealing of AF.  Discussed management options and underwent NOE/DCCV on 10/29/20 with restoration of sinus rhythm.  Device interrogation on 11/17/20 demonstrates probable SVT events with 1:1 conduction.  Mr. Grossman continues to be treated with eliquis for primary stroke prevention given [id number] of at least 4. Also continues on carvedilol 12.5 mg bid for rate control.     Today, Mr. Grossman reports ongoing fatigue and episodes of palpitations.  He denies acute chest pain, overt shortness of breath, syncope.  Review of recent labs demonstrate stable kidney function with creatinine 1.5.           EKG:   My independent visualization of most recent EKG is AP rhythm at 86 bpm with PACs    Past Medical History:   Diagnosis Date    Anticoagulant long-term use     Atrial fibrillation     Benign essential tremor 6/6/2018    Cardiomyopathy     CHF (congestive heart failure)     Coronary artery disease     Hypertension     Left ventricular thrombus     Syncope and collapse     Thyroid disease        Past Surgical History:   Procedure Laterality Date    ABLATION OF ARRHYTHMOGENIC FOCUS FOR ATRIAL FIBRILLATION N/A 12/9/2019    Procedure: ABLATION, ARRHYTHMOGENIC FOCUS, FOR ATRIAL FIBRILLATION;  Surgeon: Camron Isbell MD;  Location: [facility] EP LAB;  Service:  Cardiology;  Laterality: N/A;  AF, NOE (firm), PVI, CRYO, LORENZA, GEN, DM, 3 PREP * Dual ICD SJM*    CARDIAC CATHETERIZATION      CARDIAC DEFIBRILLATOR PLACEMENT      CORONARY ANGIOPLASTY      HERNIA REPAIR      KNEE ARTHROSCOPY      REPLACEMENT OF IMPLANTABLE CARDIOVERTER-DEFIBRILLATOR (ICD) GENERATOR Left 6/7/2018    Procedure: REPLACEMENT-GENERATOR-ICD;  Surgeon: Camron Isbell MD;  Location: [facility] CATH LAB;  Service: Cardiology;  Laterality: Left;  GENIE, DUAL ICD GEN CHG, SJM, MAC, DM, 3 PREP    STENTS      TRANSESOPHAGEAL ECHOCARDIOGRAPHY N/A 10/31/2019    Procedure: ECHOCARDIOGRAM, TRANSESOPHAGEAL;  Surgeon: [facility] Diagnostic Provider;  Location: [facility] EP LAB;  Service: Cardiology;  Laterality: N/A;  AF, NOE, DCCV, MAC, DM, 3 PREP    TRANSESOPHAGEAL ECHOCARDIOGRAPHY N/A 12/9/2019    Procedure: ECHOCARDIOGRAM, TRANSESOPHAGEAL;  Surgeon: Sun Merchant MD;  Location: [facility] EP LAB;  Service: Cardiology;  Laterality: N/A;    TRANSESOPHAGEAL ECHOCARDIOGRAPHY N/A 10/29/2020    Procedure: ECHOCARDIOGRAM, TRANSESOPHAGEAL;  Surgeon: Dannie Mittal III, MD;  Location: [facility] EP LAB;  Service: Cardiology;  Laterality: N/A;    TREATMENT OF CARDIAC ARRHYTHMIA N/A 10/31/2019    Procedure: CARDIOVERSION;  Surgeon: Camron Isbell MD;  Location: [facility] EP LAB;  Service: Cardiology;  Laterality: N/A;  AF, NOE, DCCV, MAC, DM, 3 PREP*SJM  Dual ICD in situ*    TREATMENT OF CARDIAC ARRHYTHMIA N/A 10/29/2020    Procedure: CARDIOVERSION;  Surgeon: Camron Isbell MD;  Location: [facility] EP LAB;  Service: Cardiology;  Laterality: N/A;  AF, NOE, DCCV, MAC, DM, 3 PREP*SJM dual ICD in situ*    VASCULAR SURGERY      stents placed       Review of patient's allergies indicates:  No Known Allergies    Current Facility-Administered Medications on File Prior to Encounter   Medication    0.9%  NaCl infusion    sodium chloride 0.9% flush 5 mL    sodium chloride 0.9% flush 5 mL     Current Outpatient Medications on File Prior to  Encounter   Medication Sig    apixaban (ELIQUIS) 5 mg Tab Take 1 tablet (5 mg total) by mouth 2 (two) times daily.    ascorbic acid (VITAMIN C) 100 MG tablet Take 1,000 mg by mouth once daily.     aspirin (ECOTRIN) 81 MG EC tablet Take 81 mg by mouth once daily.    atorvastatin (LIPITOR) 20 MG tablet Take 1 tablet (20 mg total) by mouth once daily.    carvedilol (COREG) 12.5 MG tablet Take 1 tablet (12.5 mg total) by mouth 2 (two) times daily with meals.    coenzyme Q10 (CO Q-10) 200 mg capsule Take 200 mg by mouth once daily.    fish oil-omega-3 fatty acids 300-1,000 mg capsule Take 2 g by mouth 2 (two) times daily.     furosemide (LASIX) 40 MG tablet Take 40 mg by mouth daily as needed.    levothyroxine (SYNTHROID) 75 MCG tablet Take 75 mcg by mouth before breakfast.     lisinopril (PRINIVIL,ZESTRIL) 30 MG tablet Take 1 tablet (30 mg total) by mouth once daily. (Patient taking differently: Take 30 mg by mouth once daily. Takes 10mg in AM and 30mg in PM)    lisinopriL 10 MG tablet Take 10 mg by mouth once daily. Taken in AM    multivitamin capsule Take 1 capsule by mouth nightly.     SLO-NIACIN 750 mg TbSR Take 1 tablet (750 mg total) by mouth 2 (two) times daily. (Patient taking differently: Take 500 mg by mouth 2 (two) times daily. )    zinc 50 mg Tab Take 40 mg by mouth.    nitroGLYCERIN (NITROSTAT) 0.4 MG SL tablet Place 1 tablet (0.4 mg total) under the tongue every 5 (five) minutes as needed for Chest pain.     Family History     Problem Relation (Age of Onset)    Heart disease Mother, Father, Brother        Tobacco Use    Smoking status: Never Smoker    Smokeless tobacco: Former User   Substance and Sexual Activity    Alcohol use: Not Currently     Frequency: Never     Comment: occasionally    Drug use: No    Sexual activity: Not on file     Review of Systems   Constitution: Positive for malaise/fatigue. Negative for fever.   HENT: Negative for hearing loss.    Cardiovascular: Negative  for chest pain, dyspnea on exertion, irregular heartbeat, leg swelling, palpitations and syncope.   Hematologic/Lymphatic: Negative for bleeding problem.   Gastrointestinal: Negative for heartburn.   Neurological: Negative for light-headedness.   Psychiatric/Behavioral: Negative for altered mental status. The patient is not nervous/anxious.      Objective:     Vital Signs (Most Recent):  Temp: 97.2 °F (36.2 °C) (11/23/20 0600)  Pulse: 62 (11/23/20 0600)  Resp: 20 (11/23/20 0600)  BP: 118/73 (11/23/20 0601)  SpO2: 96 % (11/23/20 0600) Vital Signs (24h Range):  Temp:  [97.2 °F (36.2 °C)] 97.2 °F (36.2 °C)  Pulse:  [62] 62  Resp:  [20] 20  SpO2:  [96 %] 96 %  BP: (118-131)/(73-74) 118/73          There is no height or weight on file to calculate BMI.    SpO2: 96 %  O2 Device (Oxygen Therapy): room air    Physical Exam   Constitutional: He is oriented to person, place, and time. Vital signs are normal. He appears well-developed and well-nourished.   Cardiovascular: Normal rate, regular rhythm, S1 normal, S2 normal, normal heart sounds and intact distal pulses.   Pulmonary/Chest: Effort normal and breath sounds normal.   Musculoskeletal: Normal range of motion.   Neurological: He is alert and oriented to person, place, and time. He has normal strength.   Skin: Skin is warm, dry and intact.   Vitals reviewed.        Assessment and Plan:     Persistent atrial fibrillation  - Last dose of Eliquis evening of 11/22/20, reports full compliance. Defer NOE.     - Anesthesia for sedation   - Redo-PVI   - CARTO       Informed Consent   -The risks, benefits & alternatives of the procedure were explained to the patient.     -The risks of atrial fibrillation ablation include but are not limited to: bleeding, infection, AE fistula, cardiac tamponade, heart rhythm abnormalities, allergic reactions, kidney injury, stroke and death.     -Informed consent was obtained & the patient is agreeable to proceed with the procedure.          Rola Cleaning NP  Cardiac Electrophysiology  [facility] - Short Stay Cardiac Unit

## 2020-11-23 NOTE — HPI
Mr. Grossman is a 75 y.o. male with persistent AF on apixaban, ICM EF 30%, [employer] Dual Chamber ICD, VT, HTN, HLD, and CKD. He was seen in arrhythmia clinic on 10/28/20 with reports of weakness, fatigue, and lightheadedness. Remote ICD transmission revealing of AF.  Discussed management options and he underwent NOE/DCCV on 10/29/20 with plans for redi-PVI in near future.   Device interrogation on 11/17/20 demonstrated probable SVT events with 1:1 conduction.      Today, Mr. Grossman underwent successful redo-PVI and SVT (slow pathway modification) ablation. Post-procedure he is being admitted for initiation of Tikosyn. Review of ECGs demonstrate QTc ~ 400-470 ms. Calculated CrCl > 60 mL/min.  Bilateral groin sites intact with sutures and without evidence of hematoma.

## 2020-11-23 NOTE — NURSING
Pt sutures removed at 1530. Covered with gauze and Tegaderm to both bilateral groin sites. No bleeding. wctm

## 2020-11-23 NOTE — CONSULTS
[facility]  Cardiac Electrophysiology  Consult Note    Admission Date: 11/23/2020  Code Status: Prior   Attending Provider: Fili Villegas MD  Consulting Provider: Rola Cleaning NP  Principal Problem:Persistent atrial fibrillation    Inpatient consult to Electrophysiology  Consult performed by: Rola Cleaning NP  Consult ordered by: Fili Villegas MD  Reason for consult: s/p redo-PVI/SVT RFA, Tikosyn         Subjective:     Chief Complaint:  PAF     HPI:   Mr. Grossman is a 75 y.o. male with persistent AF on apixaban, ICM EF 30%, [employer] Dual Chamber ICD, VT, HTN, HLD, and CKD. He was seen in arrhythmia clinic on 10/28/20 with reports of weakness, fatigue, and lightheadedness. Remote ICD transmission revealing of AF.  Discussed management options and he underwent NOE/DCCV on 10/29/20 with plans for redo-PVI in near future. Device interrogation on 11/17/20 demonstrated probable SVT events with 1:1 conduction.      Today, Mr. Grossman underwent successful redo-PVI and SVT (slow pathway modification) ablation. Post-procedure he is being admitted for initiation of Tikosyn. Review of ECGs demonstrate QTc ~ 400-470 ms. Calculated CrCl > 60 mL/min.  Bilateral groin sites intact with sutures and without evidence of hematoma.  ECG post-procedure AP at 60 bpm,  ms, QTc 474 ms.    Past Medical History:   Diagnosis Date    Anticoagulant long-term use     Atrial fibrillation     Benign essential tremor 6/6/2018    Cardiomyopathy     CHF (congestive heart failure)     Coronary artery disease     Hypertension     Left ventricular thrombus     Syncope and collapse     Thyroid disease        Past Surgical History:   Procedure Laterality Date    ABLATION OF ARRHYTHMOGENIC FOCUS FOR ATRIAL FIBRILLATION N/A 12/9/2019    Procedure: ABLATION, ARRHYTHMOGENIC FOCUS, FOR ATRIAL FIBRILLATION;  Surgeon: Camron Isbell MD;  Location: [facility] EP LAB;  Service: Cardiology;  Laterality: N/A;  AF, NOE  (firm), PVI, CRYO, LORENZA, GEN, DM, 3 PREP * Dual ICD SJM*    CARDIAC CATHETERIZATION      CARDIAC DEFIBRILLATOR PLACEMENT      CORONARY ANGIOPLASTY      HERNIA REPAIR      KNEE ARTHROSCOPY      REPLACEMENT OF IMPLANTABLE CARDIOVERTER-DEFIBRILLATOR (ICD) GENERATOR Left 6/7/2018    Procedure: REPLACEMENT-GENERATOR-ICD;  Surgeon: Camron Isbell MD;  Location: [facility] CATH LAB;  Service: Cardiology;  Laterality: Left;  GENIE, DUAL ICD GEN CHG, SJM, MAC, DM, 3 PREP    STENTS      TRANSESOPHAGEAL ECHOCARDIOGRAPHY N/A 10/31/2019    Procedure: ECHOCARDIOGRAM, TRANSESOPHAGEAL;  Surgeon: [facility] Diagnostic Provider;  Location: [facility] EP LAB;  Service: Cardiology;  Laterality: N/A;  AF, NOE, DCCV, MAC, DM, 3 PREP    TRANSESOPHAGEAL ECHOCARDIOGRAPHY N/A 12/9/2019    Procedure: ECHOCARDIOGRAM, TRANSESOPHAGEAL;  Surgeon: Sun Merchant MD;  Location: [facility] EP LAB;  Service: Cardiology;  Laterality: N/A;    TRANSESOPHAGEAL ECHOCARDIOGRAPHY N/A 10/29/2020    Procedure: ECHOCARDIOGRAM, TRANSESOPHAGEAL;  Surgeon: Dannie Mittal III, MD;  Location: [facility] EP LAB;  Service: Cardiology;  Laterality: N/A;    TREATMENT OF CARDIAC ARRHYTHMIA N/A 10/31/2019    Procedure: CARDIOVERSION;  Surgeon: Camron Isbell MD;  Location: [facility] EP LAB;  Service: Cardiology;  Laterality: N/A;  AF, NOE, DCCV, MAC, DM, 3 PREP*SJM  Dual ICD in situ*    TREATMENT OF CARDIAC ARRHYTHMIA N/A 10/29/2020    Procedure: CARDIOVERSION;  Surgeon: Camron Isbell MD;  Location: [facility] EP LAB;  Service: Cardiology;  Laterality: N/A;  AF, NOE, DCCV, MAC, DM, 3 PREP*SJM dual ICD in situ*    VASCULAR SURGERY      stents placed       Review of patient's allergies indicates:  No Known Allergies    Current Facility-Administered Medications on File Prior to Encounter   Medication    0.9%  NaCl infusion    sodium chloride 0.9% flush 5 mL    sodium chloride 0.9% flush 5 mL     Current Outpatient Medications on File Prior to Encounter   Medication Sig    apixaban  (ELIQUIS) 5 mg Tab Take 1 tablet (5 mg total) by mouth 2 (two) times daily.    ascorbic acid (VITAMIN C) 100 MG tablet Take 1,000 mg by mouth once daily.     aspirin (ECOTRIN) 81 MG EC tablet Take 81 mg by mouth once daily.    atorvastatin (LIPITOR) 20 MG tablet Take 1 tablet (20 mg total) by mouth once daily.    carvedilol (COREG) 12.5 MG tablet Take 1 tablet (12.5 mg total) by mouth 2 (two) times daily with meals.    coenzyme Q10 (CO Q-10) 200 mg capsule Take 200 mg by mouth once daily.    fish oil-omega-3 fatty acids 300-1,000 mg capsule Take 2 g by mouth 2 (two) times daily.     furosemide (LASIX) 40 MG tablet Take 40 mg by mouth daily as needed.    levothyroxine (SYNTHROID) 75 MCG tablet Take 75 mcg by mouth before breakfast.     lisinopril (PRINIVIL,ZESTRIL) 30 MG tablet Take 1 tablet (30 mg total) by mouth once daily. (Patient taking differently: Take 30 mg by mouth once daily. Takes 10mg in AM and 30mg in PM)    lisinopriL 10 MG tablet Take 10 mg by mouth once daily. Taken in AM    multivitamin capsule Take 1 capsule by mouth nightly.     SLO-NIACIN 750 mg TbSR Take 1 tablet (750 mg total) by mouth 2 (two) times daily. (Patient taking differently: Take 500 mg by mouth 2 (two) times daily. )    zinc 50 mg Tab Take 40 mg by mouth.    nitroGLYCERIN (NITROSTAT) 0.4 MG SL tablet Place 1 tablet (0.4 mg total) under the tongue every 5 (five) minutes as needed for Chest pain.     Family History     Problem Relation (Age of Onset)    Heart disease Mother, Father, Brother        Tobacco Use    Smoking status: Never Smoker    Smokeless tobacco: Former User   Substance and Sexual Activity    Alcohol use: Not Currently     Frequency: Never     Comment: occasionally    Drug use: No    Sexual activity: Not on file     Review of Systems   Constitution: Negative for fever and malaise/fatigue.   HENT: Negative for hearing loss.    Cardiovascular: Negative for chest pain, dyspnea on exertion, irregular  heartbeat, leg swelling, palpitations and syncope.   Hematologic/Lymphatic: Negative for bleeding problem.   Gastrointestinal: Negative for heartburn.   Neurological: Negative for light-headedness.   Psychiatric/Behavioral: Negative for altered mental status. The patient is not nervous/anxious.      Objective:     Vital Signs (Most Recent):  Temp: 97.7 °F (36.5 °C) (11/23/20 1221)  Pulse: 60 (11/23/20 1245)  Resp: 10 (11/23/20 1245)  BP: 114/72 (11/23/20 1245)  SpO2: 100 % (11/23/20 1245) Vital Signs (24h Range):  Temp:  [97.2 °F (36.2 °C)-97.7 °F (36.5 °C)] 97.7 °F (36.5 °C)  Pulse:  [60-62] 60  Resp:  [10-20] 10  SpO2:  [96 %-100 %] 100 %  BP: (107-131)/(64-74) 114/72          There is no height or weight on file to calculate BMI.    SpO2: 100 %  O2 Device (Oxygen Therapy): room air    Physical Exam   Constitutional: He is oriented to person, place, and time. Vital signs are normal. He appears well-developed and well-nourished.   Cardiovascular: Normal rate, regular rhythm, S1 normal, S2 normal, normal heart sounds and intact distal pulses.   Pulmonary/Chest: Effort normal and breath sounds normal.   Musculoskeletal: Normal range of motion.         General: No edema.   Neurological: He is alert and oriented to person, place, and time. He has normal strength.   Skin: Skin is warm, dry and intact.   Vitals reviewed.          Assessment and Plan:     * Persistent atrial fibrillation  s/p redo-PVI and SVT RFA. EP is being consulted for initiation of Tikosyn and ongoing monitoring.     Plan:  - Anticoagulation: resume Eliquis this evening  - Continuous telemetry monitoring   - Anti-arrythmic: start Tikosyn with first dose this evening-defer to CV pharmacist for dosing. QTc < 500 ms and CrCl > 60.   - ECG 2 hours following first dose of Tikosyn and following any dose adjustment  - Protonix 40 mg daily x 4 weeks     - Lasix 40 mg IV following completion of bedrest         Thank you for your consult. I will follow-up with  patient. Please contact us if you have any additional questions.    Rola Cleaning NP  Cardiac Electrophysiology  [facility]

## 2020-11-23 NOTE — ANESTHESIA PROCEDURE NOTES
Arterial    Diagnosis: a fib    Patient location during procedure: done in OR  Procedure start time: 11/23/2020 7:27 AM  Timeout: 11/23/2020 7:27 AM  Procedure end time: 11/23/2020 7:30 AM    Staffing  Authorizing Provider: Jose Roberto Noble MD  Performing Provider: Ricardo Barton CRNA    Anesthesiologist was present at the time of the procedure.    Preanesthetic Checklist  Completed: patient identified, site marked, surgical consent, pre-op evaluation, timeout performed, IV checked, risks and benefits discussed, monitors and equipment checked and anesthesia consent givenArterial  Skin Prep: chlorhexidine gluconate  Local Infiltration: none  Orientation: right  Location: radial  Catheter Size: 20 G  Catheter placement by Anatomical landmarks. Heme positive aspiration all ports.Insertion Attempts: 1  Assessment  Dressing: secured with tape and tegaderm  Patient: Tolerated well

## 2020-11-23 NOTE — SUBJECTIVE & OBJECTIVE
Past Medical History:   Diagnosis Date    Anticoagulant long-term use     Atrial fibrillation     Benign essential tremor 6/6/2018    Cardiomyopathy     CHF (congestive heart failure)     Coronary artery disease     Hypertension     Left ventricular thrombus     Syncope and collapse     Thyroid disease        Past Surgical History:   Procedure Laterality Date    ABLATION OF ARRHYTHMOGENIC FOCUS FOR ATRIAL FIBRILLATION N/A 12/9/2019    Procedure: ABLATION, ARRHYTHMOGENIC FOCUS, FOR ATRIAL FIBRILLATION;  Surgeon: Camron Isbell MD;  Location: [facility] EP LAB;  Service: Cardiology;  Laterality: N/A;  AF, NOE (firm), PVI, CRYO, LORENZA, GEN, DM, 3 PREP * Dual ICD SJM*    CARDIAC CATHETERIZATION      CARDIAC DEFIBRILLATOR PLACEMENT      CORONARY ANGIOPLASTY      HERNIA REPAIR      KNEE ARTHROSCOPY      REPLACEMENT OF IMPLANTABLE CARDIOVERTER-DEFIBRILLATOR (ICD) GENERATOR Left 6/7/2018    Procedure: REPLACEMENT-GENERATOR-ICD;  Surgeon: Camron Isbell MD;  Location: [facility] CATH LAB;  Service: Cardiology;  Laterality: Left;  GENIE, DUAL ICD GEN CHG, SJM, MAC, DM, 3 PREP    STENTS      TRANSESOPHAGEAL ECHOCARDIOGRAPHY N/A 10/31/2019    Procedure: ECHOCARDIOGRAM, TRANSESOPHAGEAL;  Surgeon: [facility] Diagnostic Provider;  Location: [facility] EP LAB;  Service: Cardiology;  Laterality: N/A;  AF, NOE, DCCV, MAC, DM, 3 PREP    TRANSESOPHAGEAL ECHOCARDIOGRAPHY N/A 12/9/2019    Procedure: ECHOCARDIOGRAM, TRANSESOPHAGEAL;  Surgeon: Sun Merchant MD;  Location: [facility] EP LAB;  Service: Cardiology;  Laterality: N/A;    TRANSESOPHAGEAL ECHOCARDIOGRAPHY N/A 10/29/2020    Procedure: ECHOCARDIOGRAM, TRANSESOPHAGEAL;  Surgeon: Dannie Mittal III, MD;  Location: [facility] EP LAB;  Service: Cardiology;  Laterality: N/A;    TREATMENT OF CARDIAC ARRHYTHMIA N/A 10/31/2019    Procedure: CARDIOVERSION;  Surgeon: Camron Isbell MD;  Location: [facility] EP LAB;  Service: Cardiology;  Laterality: N/A;  AF, NOE, DCCV, MAC, DM, 3 PREP*SJM  Dual ICD in  situ*    TREATMENT OF CARDIAC ARRHYTHMIA N/A 10/29/2020    Procedure: CARDIOVERSION;  Surgeon: Camron Isbell MD;  [facility];  Service: Cardiology;  Laterality: N/A;  AF, NOE, DCCV, MAC, DM, 3 PREP*SJM dual ICD in situ*    VASCULAR SURGERY      stents placed       Review of patient's allergies indicates:  No Known Allergies    Current Facility-Administered Medications on File Prior to Encounter   Medication    0.9%  NaCl infusion    sodium chloride 0.9% flush 5 mL    sodium chloride 0.9% flush 5 mL     Current Outpatient Medications on File Prior to Encounter   Medication Sig    apixaban (ELIQUIS) 5 mg Tab Take 1 tablet (5 mg total) by mouth 2 (two) times daily.    ascorbic acid (VITAMIN C) 100 MG tablet Take 1,000 mg by mouth once daily.     aspirin (ECOTRIN) 81 MG EC tablet Take 81 mg by mouth once daily.    atorvastatin (LIPITOR) 20 MG tablet Take 1 tablet (20 mg total) by mouth once daily.    carvedilol (COREG) 12.5 MG tablet Take 1 tablet (12.5 mg total) by mouth 2 (two) times daily with meals.    coenzyme Q10 (CO Q-10) 200 mg capsule Take 200 mg by mouth once daily.    fish oil-omega-3 fatty acids 300-1,000 mg capsule Take 2 g by mouth 2 (two) times daily.     furosemide (LASIX) 40 MG tablet Take 40 mg by mouth daily as needed.    levothyroxine (SYNTHROID) 75 MCG tablet Take 75 mcg by mouth before breakfast.     lisinopril (PRINIVIL,ZESTRIL) 30 MG tablet Take 1 tablet (30 mg total) by mouth once daily. (Patient taking differently: Take 30 mg by mouth once daily. Takes 10mg in AM and 30mg in PM)    lisinopriL 10 MG tablet Take 10 mg by mouth once daily. Taken in AM    multivitamin capsule Take 1 capsule by mouth nightly.     SLO-NIACIN 750 mg TbSR Take 1 tablet (750 mg total) by mouth 2 (two) times daily. (Patient taking differently: Take 500 mg by mouth 2 (two) times daily. )    zinc 50 mg Tab Take 40 mg by mouth.    nitroGLYCERIN (NITROSTAT) 0.4 MG SL tablet Place 1 tablet  (0.4 mg total) under the tongue every 5 (five) minutes as needed for Chest pain.     Family History     Problem Relation (Age of Onset)    Heart disease Mother, Father, Brother        Tobacco Use    Smoking status: Never Smoker    Smokeless tobacco: Former User   Substance and Sexual Activity    Alcohol use: Not Currently     Frequency: Never     Comment: occasionally    Drug use: No    Sexual activity: Not on file     Review of Systems   Constitution: Negative for fever and malaise/fatigue.   HENT: Negative for hearing loss.    Cardiovascular: Negative for chest pain, dyspnea on exertion, irregular heartbeat, leg swelling, palpitations and syncope.   Hematologic/Lymphatic: Negative for bleeding problem.   Gastrointestinal: Negative for heartburn.   Neurological: Negative for light-headedness.   Psychiatric/Behavioral: Negative for altered mental status. The patient is not nervous/anxious.      Objective:     Vital Signs (Most Recent):  Temp: 97.7 °F (36.5 °C) (11/23/20 1221)  Pulse: 60 (11/23/20 1245)  Resp: 10 (11/23/20 1245)  BP: 114/72 (11/23/20 1245)  SpO2: 100 % (11/23/20 1245) Vital Signs (24h Range):  Temp:  [97.2 °F (36.2 °C)-97.7 °F (36.5 °C)] 97.7 °F (36.5 °C)  Pulse:  [60-62] 60  Resp:  [10-20] 10  SpO2:  [96 %-100 %] 100 %  BP: (107-131)/(64-74) 114/72          There is no height or weight on file to calculate BMI.    SpO2: 100 %  O2 Device (Oxygen Therapy): room air    Physical Exam   Constitutional: He is oriented to person, place, and time. Vital signs are normal. He appears well-developed and well-nourished.   Cardiovascular: Normal rate, regular rhythm, S1 normal, S2 normal, normal heart sounds and intact distal pulses.   Pulmonary/Chest: Effort normal and breath sounds normal.   Musculoskeletal: Normal range of motion.         General: No edema.   Neurological: He is alert and oriented to person, place, and time. He has normal strength.   Skin: Skin is warm, dry and intact.   Vitals  reviewed.

## 2020-11-23 NOTE — PROGRESS NOTES
Silvadene applied to chest anterior, wife called updated on condition and sent to room 343. Pt complains of left shoulder pain, medicated with tylenol per request

## 2020-11-23 NOTE — PLAN OF CARE
Plan of care reviewed with patient. Pt bilateral groin sites covered in gauze and tegaderm. Norco given one time for pain. No bleeding from sites. Sites CDI. Pt to be given Tikosyn tonight, will relay to night shift nurse to get EKG 2 hr after admin. WCTM

## 2020-11-23 NOTE — H&P
HISTORY & PHYSICAL  Hospital Medicine    Team: [facility] HOSP MED O    PRESENTING HISTORY     Chief Complaint/Reason for Admission:  Evaluation for monitoring of tikosyn therapy    History of Present Illness:  Patient of concern is a 75M with persistent AF on apixaban, CAD (s/p stents)( with resultant ICM EF 30% s/p [employer] Dual Chamber ICD, VT, essential HTN, mixed HLD, and CKDIII admitted for evaluation of tikosyn monitoring.  Patient had been seen in arrhythmia clinic on 10/28/20 with reports of weakness, fatigue, and lightheadedness. Remote ICD transmission revealing of AF.  Discussed management options and he underwent NOE/DCCV on 10/29/20 with plans for redo-PVI in near future. Device interrogation on 11/17/20 demonstrated probable SVT events with 1:1 conduction.  Today, Mr. Grossman underwent successful redo-PVI and SVT (slow pathway modification) ablation. Post-procedure he is being admitted for initiation of Tikosyn. Review of ECGs demonstrate QTc ~ 400-470 ms. Calculated CrCl > 60 mL/min.  Bilateral groin sites intact with sutures and without evidence of hematoma.  ECG post-procedure AP at 60 bpm,  ms, QTc 474 ms.  Patient is doing well and offers no complaints.    NOE 10/29/2020  · The left ventricle is normal in size with normal systolic function.   · The estimated ejection fraction is 30%.  · Mildly reduced right ventricular systolic function.  · Grade 3 plaque present in the descending aorta.  · The sinuses of Valsalva is mildly dilated. The ascending aorta is moderately dilated.    Review of Systems:    Review of Systems   Constitutional: Negative for chills and fever.   HENT: Negative for congestion and sore throat.    Eyes: Negative for photophobia, pain and discharge.   Respiratory: Negative for cough, hemoptysis, sputum production and shortness of breath.    Cardiovascular: Negative for chest pain, palpitations and leg swelling.   Gastrointestinal: Negative for abdominal pain, diarrhea, nausea and  vomiting.   Genitourinary: Negative for dysuria and urgency.   Musculoskeletal: Negative for myalgias and neck pain.   Skin: Negative for itching and rash.   Neurological: Negative for sensory change, focal weakness and headaches.   Endo/Heme/Allergies: Negative for polydipsia. Does not bruise/bleed easily.   Psychiatric/Behavioral: Negative for depression and suicidal ideas.       PAST HISTORY:     Past Medical History:   Diagnosis Date    Anticoagulant long-term use     Atrial fibrillation     Benign essential tremor 6/6/2018    Cardiomyopathy     CHF (congestive heart failure)     Coronary artery disease     Hypertension     Left ventricular thrombus     Syncope and collapse     Thyroid disease        Past Surgical History:   Procedure Laterality Date    ABLATION OF ARRHYTHMOGENIC FOCUS FOR ATRIAL FIBRILLATION N/A 12/9/2019    Procedure: ABLATION, ARRHYTHMOGENIC FOCUS, FOR ATRIAL FIBRILLATION;  Surgeon: Camron Isbell MD;  Location: [facility] EP LAB;  Service: Cardiology;  Laterality: N/A;  AF, NOE (firm), PVI, CRYO, LORENZA, GEN, DM, 3 PREP * Dual ICD SJM*    CARDIAC CATHETERIZATION      CARDIAC DEFIBRILLATOR PLACEMENT      CORONARY ANGIOPLASTY      HERNIA REPAIR      KNEE ARTHROSCOPY      REPLACEMENT OF IMPLANTABLE CARDIOVERTER-DEFIBRILLATOR (ICD) GENERATOR Left 6/7/2018    Procedure: REPLACEMENT-GENERATOR-ICD;  Surgeon: Camron Isbell MD;  Location: [facility] CATH LAB;  Service: Cardiology;  Laterality: Left;  GENIE, DUAL ICD GEN CHG, SJM, MAC, DM, 3 PREP    STENTS      TRANSESOPHAGEAL ECHOCARDIOGRAPHY N/A 10/31/2019    Procedure: ECHOCARDIOGRAM, TRANSESOPHAGEAL;  Surgeon: [facility] Diagnostic Provider;  Location: [facility] EP LAB;  Service: Cardiology;  Laterality: N/A;  AF, NOE, DCCV, MAC, DM, 3 PREP    TRANSESOPHAGEAL ECHOCARDIOGRAPHY N/A 12/9/2019    Procedure: ECHOCARDIOGRAM, TRANSESOPHAGEAL;  Surgeon: Sun Merchant MD;  Location: [facility] EP LAB;  Service: Cardiology;  Laterality: N/A;    TRANSESOPHAGEAL  ECHOCARDIOGRAPHY N/A 10/29/2020    Procedure: ECHOCARDIOGRAM, TRANSESOPHAGEAL;  Surgeon: Dannie Mittal III, MD;  Location: [facility] EP LAB;  Service: Cardiology;  Laterality: N/A;    TREATMENT OF CARDIAC ARRHYTHMIA N/A 10/31/2019    Procedure: CARDIOVERSION;  Surgeon: Camron Isbell MD;  Location: [facility] EP LAB;  Service: Cardiology;  Laterality: N/A;  AF, NOE, DCCV, MAC, DM, 3 PREP*SJM  Dual ICD in situ*    TREATMENT OF CARDIAC ARRHYTHMIA N/A 10/29/2020    Procedure: CARDIOVERSION;  Surgeon: Camron Isbell MD;  Location: [facility] EP LAB;  Service: Cardiology;  Laterality: N/A;  AF, NOE, DCCV, MAC, DM, 3 PREP*SJM dual ICD in situ*    VASCULAR SURGERY      stents placed       Family History   Problem Relation Age of Onset    Heart disease Mother     Heart disease Father     Heart disease Brother        Social History     Socioeconomic History    Marital status:      Spouse name: Not on file    Number of children: Not on file    Years of education: Not on file    Highest education level: Not on file   Occupational History     Employer: Retired   Social Needs    Financial resource strain: Not on file    Food insecurity     Worry: Not on file     Inability: Not on file    Transportation needs     Medical: Not on file     Non-medical: Not on file   Tobacco Use    Smoking status: Never Smoker    Smokeless tobacco: Former User   Substance and Sexual Activity    Alcohol use: Not Currently     Frequency: Never     Comment: occasionally    Drug use: No    Sexual activity: Not on file   Lifestyle    Physical activity     Days per week: Not on file     Minutes per session: Not on file    Stress: Not on file   Relationships    Social connections     Talks on phone: Not on file     Gets together: Not on file     Attends [employer] witness service: Not on file     Active member of club or organization: Not on file     Attends meetings of clubs or organizations: Not on file     Relationship status: Not on file    Other Topics Concern    Not on file   Social History Narrative    Not on file       MEDICATIONS & ALLERGIES:     Current Facility-Administered Medications on File Prior to Encounter   Medication Dose Route Frequency Provider Last Rate Last Dose    0.9%  NaCl infusion   Intravenous Continuous Brittney Gardner NP   Stopped at 11/23/20 1201    sodium chloride 0.9% flush 5 mL  5 mL Intravenous PRN Brittney Gardner NP        sodium chloride 0.9% flush 5 mL  5 mL Intravenous PRN Brittney Gardner NP         Current Outpatient Medications on File Prior to Encounter   Medication Sig Dispense Refill    apixaban (ELIQUIS) 5 mg Tab Take 1 tablet (5 mg total) by mouth 2 (two) times daily. 180 tablet 3    ascorbic acid (VITAMIN C) 100 MG tablet Take 1,000 mg by mouth once daily.       aspirin (ECOTRIN) 81 MG EC tablet Take 81 mg by mouth once daily.      atorvastatin (LIPITOR) 20 MG tablet Take 1 tablet (20 mg total) by mouth once daily. 90 tablet 3    carvedilol (COREG) 12.5 MG tablet Take 1 tablet (12.5 mg total) by mouth 2 (two) times daily with meals. 60 tablet 11    coenzyme Q10 (CO Q-10) 200 mg capsule Take 200 mg by mouth once daily.      fish oil-omega-3 fatty acids 300-1,000 mg capsule Take 2 g by mouth 2 (two) times daily.       furosemide (LASIX) 40 MG tablet Take 40 mg by mouth daily as needed.      levothyroxine (SYNTHROID) 75 MCG tablet Take 75 mcg by mouth before breakfast.       lisinopril (PRINIVIL,ZESTRIL) 30 MG tablet Take 1 tablet (30 mg total) by mouth once daily. (Patient taking differently: Take 30 mg by mouth once daily. Takes 10mg in AM and 30mg in PM) 90 tablet 3    lisinopriL 10 MG tablet Take 10 mg by mouth once daily. Taken in AM      multivitamin capsule Take 1 capsule by mouth nightly.       SLO-NIACIN 750 mg TbSR Take 1 tablet (750 mg total) by mouth 2 (two) times daily. (Patient taking differently: Take 500 mg by mouth 2 (two) times daily. ) 180 each 3    zinc  50 mg Tab Take 40 mg by mouth.      nitroGLYCERIN (NITROSTAT) 0.4 MG SL tablet Place 1 tablet (0.4 mg total) under the tongue every 5 (five) minutes as needed for Chest pain. 25 tablet 4        Review of patient's allergies indicates:  No Known Allergies    OBJECTIVE:     Vital Signs:  Temp:  [97.2 °F (36.2 °C)-97.7 °F (36.5 °C)] 97.7 °F (36.5 °C)  Pulse:  [60-62] 60  Resp:  [10-20] 20  SpO2:  [95 %-100 %] 96 %  BP: (107-136)/(64-85) 136/85  There is no height or weight on file to calculate BMI.     Physical Exam:    Objective:  General Appearance:  Comfortable, well-appearing, in no acute distress and not in pain.    Vital signs: (most recent): Blood pressure 120/73, pulse 60, temperature 97.7 °F (36.5 °C), temperature source Temporal, resp. rate 12, SpO2 98 %.  No fever.    Output: Producing urine and producing stool.    HEENT: Normal HEENT exam.    Lungs:  Normal effort.  Breath sounds clear to auscultation.  He is not in respiratory distress.  No rales or wheezes.    Heart: Normal rate.  Regular rhythm.  S1 normal and S2 normal.  No murmur.   Chest: Symmetric chest wall expansion.   Abdomen: Abdomen is soft.  Bowel sounds are normal.   There is no abdominal tenderness.     Extremities: Normal range of motion.  There is no deformity, effusion, dependent edema or local swelling.    Pulses: Distal pulses are intact.    Neurological: Patient is alert and oriented to person, place and time.  Normal strength.    Pupils:  Pupils are equal, round, and reactive to light.    Skin:  Warm and dry.      Laboratory  Lab Results   Component Value Date    WBC 5.80 11/20/2020    HGB 12.9 (L) 11/20/2020    HCT 40.2 11/20/2020     (H) 11/20/2020     11/20/2020     Recent Labs   Lab 11/23/20  1214   *      K 4.1   *   CO2 22*   BUN 17   CREATININE 1.5*   CALCIUM 8.0*   MG 1.9     Lab Results   Component Value Date    INR 1.0 11/20/2020    INR 2.2 (H) 12/10/2019    INR 1.8 (H) 12/09/2019     Lab  Results   Component Value Date    HGBA1C 5.9 12/02/2013     No results for input(s): POCTGLUCOSE in the last 72 hours.    Diagnostic Results:  Labs: Reviewed  ECG: Reviewed  X-Ray: Reviewed  Echo: Reviewed    ASSESSMENT & PLAN:     Current Problems List:  Active Hospital Problems    Diagnosis  POA    *Visit for monitoring Tikosyn therapy [Z51.81, Z79.899]  Not Applicable    Stage 3a chronic kidney disease [N18.31]  Unknown    Acute on chronic combined systolic and diastolic heart failure [I50.43]  Unknown    PSVT (paroxysmal supraventricular tachycardia) [I47.1]  Yes    Chronic anticoagulation [Z79.01]  Not Applicable     Chronic    Persistent atrial fibrillation [I48.19]  Yes    Coronary artery disease due to lipid rich plaque [I25.10, I25.83]  Yes    Hypothyroid [E03.9]  Yes    Automatic implantable cardioverter-defibrillator in situ [Z95.810]  Yes    Ischemic cardiomyopathy [I25.5]  Yes    HTN (hypertension), benign [I10]  Yes      Resolved Hospital Problems   No resolved problems to display.       HIGH RISK CONDITION(S):  Patient is currently on drug therapy requiring intensive monitoring for toxicity: Dofetilide    Problem Assessment & Treatment Plan:    *Visit for monitoring Tikosyn therapy  Persistent atrial fibrillation  PSVT  Chronic anticoagulation  - Tikosyn order set in place  - EP consulted  - Start 500 mcg BID   - ECG 2-3 hours after each dose  - C/w BB  - C/w eliquis    Ischemic CM s/p AICD  Acute on chronic combined HF  CAD due to lipid rich plaque  Essential HTN  - Patient noted to be slightly hypervolemic  - Dose IV lasix x 1 and reassess  - C/w GDMT with carvedilol/lisinopril  - C/w antiHTNsives as tolerated  - C/w eliquis, ASA, statin as tolerated    CKDII  - Renally dose medications, cardiac PharmD aware  - Avoid nephrotoxic agents    Hypothyroidism, acquired  - C/w synthroid    DVT PPx: eliquis  Diet: cardiac 1500 cc  GOC: FC    Dispo: SHAUN 11/26/2020    Fili Villegas MD  [facility]  Medicine

## 2020-11-23 NOTE — ASSESSMENT & PLAN NOTE
- Last dose of Eliquis evening of 11/22/20, reports full compliance. Defer NOE.     - Anesthesia for sedation   - Redo-PVI   - CARTO       Informed Consent   -The risks, benefits & alternatives of the procedure were explained to the patient.     -The risks of atrial fibrillation ablation include but are not limited to: bleeding, infection, AE fistula, cardiac tamponade, heart rhythm abnormalities, allergic reactions, kidney injury, stroke and death.     -Informed consent was obtained & the patient is agreeable to proceed with the procedure.

## 2020-11-23 NOTE — ANESTHESIA PREPROCEDURE EVALUATION
11/23/2020  Pre-operative evaluation for Procedure(s) (LRB):  ABLATION, ARRHYTHMOGENIC FOCUS, FOR ATRIAL FIBRILLATION (N/A)    Amish Grossman is a 75 y.o. male CAD s/p PCI, ICM EF 30%, AICD, CKD3, HTN, ILD here for redo PVI.     Patient Active Problem List   Diagnosis    Coronary artery disease due to lipid rich plaque    Old myocardial infarction    S/P PTCA (percutaneous transluminal coronary angioplasty)    CKD (chronic kidney disease) stage 1, GFR 90 ml/min or greater    BPH (benign prostatic hyperplasia)    Dyslipidemia    Venous insufficiency of leg    Erectile dysfunction    HTN (hypertension), benign    Hypothyroid    Automatic implantable cardioverter-defibrillator in situ    Ischemic cardiomyopathy    Ventricular tachyarrhythmia    Persistent atrial fibrillation    Benign essential tremor    SOB (shortness of breath)    Interstitial lung disease    Lung nodule    Class 1 obesity with serious comorbidity in adult    Chronic anticoagulation       Review of patient's allergies indicates:  No Known Allergies    Current Facility-Administered Medications on File Prior to Encounter   Medication Dose Route Frequency Provider Last Rate Last Dose    0.9%  NaCl infusion   Intravenous Continuous Brittney Gardner NP   Stopped at 10/29/20 1016    sodium chloride 0.9% flush 5 mL  5 mL Intravenous PRN Brittney Gardner NP        sodium chloride 0.9% flush 5 mL  5 mL Intravenous PRN Brittney Gardner NP         Current Outpatient Medications on File Prior to Encounter   Medication Sig Dispense Refill    apixaban (ELIQUIS) 5 mg Tab Take 1 tablet (5 mg total) by mouth 2 (two) times daily. 180 tablet 3    ascorbic acid (VITAMIN C) 100 MG tablet Take 1,000 mg by mouth once daily.       aspirin (ECOTRIN) 81 MG EC tablet Take 81 mg by mouth once daily.      atorvastatin (LIPITOR)  20 MG tablet Take 1 tablet (20 mg total) by mouth once daily. 90 tablet 3    carvedilol (COREG) 12.5 MG tablet Take 1 tablet (12.5 mg total) by mouth 2 (two) times daily with meals. 60 tablet 11    coenzyme Q10 (CO Q-10) 200 mg capsule Take 200 mg by mouth once daily.      fish oil-omega-3 fatty acids 300-1,000 mg capsule Take 2 g by mouth 2 (two) times daily.       furosemide (LASIX) 40 MG tablet Take 40 mg by mouth daily as needed.      levothyroxine (SYNTHROID) 75 MCG tablet Take 75 mcg by mouth before breakfast.       lisinopril (PRINIVIL,ZESTRIL) 30 MG tablet Take 1 tablet (30 mg total) by mouth once daily. (Patient taking differently: Take 30 mg by mouth once daily. Takes 10mg in AM and 30mg in PM) 90 tablet 3    lisinopriL 10 MG tablet Take 10 mg by mouth once daily. Taken in AM      multivitamin capsule Take 1 capsule by mouth nightly.       SLO-NIACIN 750 mg TbSR Take 1 tablet (750 mg total) by mouth 2 (two) times daily. (Patient taking differently: Take 500 mg by mouth 2 (two) times daily. ) 180 each 3    zinc 50 mg Tab Take 40 mg by mouth.      nitroGLYCERIN (NITROSTAT) 0.4 MG SL tablet Place 1 tablet (0.4 mg total) under the tongue every 5 (five) minutes as needed for Chest pain. 25 tablet 4       Past Surgical History:   Procedure Laterality Date    ABLATION OF ARRHYTHMOGENIC FOCUS FOR ATRIAL FIBRILLATION N/A 12/9/2019    Procedure: ABLATION, ARRHYTHMOGENIC FOCUS, FOR ATRIAL FIBRILLATION;  Surgeon: Camron Isbell MD;  Location: [facility] EP LAB;  Service: Cardiology;  Laterality: N/A;  AF, NOE (firm), PVI, CRYO, LORENZA, GEN, DM, 3 PREP * Dual ICD SJM*    CARDIAC CATHETERIZATION      CARDIAC DEFIBRILLATOR PLACEMENT      CORONARY ANGIOPLASTY      HERNIA REPAIR      KNEE ARTHROSCOPY      REPLACEMENT OF IMPLANTABLE CARDIOVERTER-DEFIBRILLATOR (ICD) GENERATOR Left 6/7/2018    Procedure: REPLACEMENT-GENERATOR-ICD;  Surgeon: Camron Isbell MD;  Location: [facility] CATH LAB;  Service: Cardiology;   Laterality: Left;  GENIE, DUAL ICD GEN CHG, SJM, MAC, DM, 3 PREP    STENTS      TRANSESOPHAGEAL ECHOCARDIOGRAPHY N/A 10/31/2019    Procedure: ECHOCARDIOGRAM, TRANSESOPHAGEAL;  Surgeon: Eleuterio Diagnostic Provider;  Location: [facility] EP LAB;  Service: Cardiology;  Laterality: N/A;  AF, NOE, DCCV, MAC, DM, 3 PREP    TRANSESOPHAGEAL ECHOCARDIOGRAPHY N/A 12/9/2019    Procedure: ECHOCARDIOGRAM, TRANSESOPHAGEAL;  Surgeon: Sun Merchant MD;  Location: [facility] EP LAB;  Service: Cardiology;  Laterality: N/A;    TRANSESOPHAGEAL ECHOCARDIOGRAPHY N/A 10/29/2020    Procedure: ECHOCARDIOGRAM, TRANSESOPHAGEAL;  Surgeon: Dannie Mittal III, MD;  Location: [facility] EP LAB;  Service: Cardiology;  Laterality: N/A;    TREATMENT OF CARDIAC ARRHYTHMIA N/A 10/31/2019    Procedure: CARDIOVERSION;  Surgeon: Camron Isbell MD;  Location: [facility] EP LAB;  Service: Cardiology;  Laterality: N/A;  AF, NOE, DCCV, MAC, DM, 3 PREP*SJM  Dual ICD in situ*    TREATMENT OF CARDIAC ARRHYTHMIA N/A 10/29/2020    Procedure: CARDIOVERSION;  Surgeon: Camron Isbell MD;  Location: [facility] EP LAB;  Service: Cardiology;  Laterality: N/A;  AF, NOE, DCCV, MAC, DM, 3 PREP*SJM dual ICD in situ*    VASCULAR SURGERY      stents placed       Social History     Socioeconomic History    Marital status:      Spouse name: Not on file    Number of children: Not on file    Years of education: Not on file    Highest education level: Not on file   Occupational History     Employer: Retired   Social Needs    Financial resource strain: Not on file    Food insecurity     Worry: Not on file     Inability: Not on file    Transportation needs     Medical: Not on file     Non-medical: Not on file   Tobacco Use    Smoking status: Never Smoker    Smokeless tobacco: Former User   Substance and Sexual Activity    Alcohol use: Not Currently     Frequency: Never     Comment: occasionally    Drug use: No    Sexual activity: Not on file   Lifestyle    Physical activity      Days per week: Not on file     Minutes per session: Not on file    Stress: Not on file   Relationships    Social connections     Talks on phone: Not on file     Gets together: Not on file     Attends Alevism service: Not on file     Active member of club or organization: Not on file     Attends meetings of clubs or organizations: Not on file     Relationship status: Not on file   Other Topics Concern    Not on file   Social History Narrative    Not on file         CBC:   Recent Labs     20  1159   WBC 5.80   RBC 3.85*   HGB 12.9*   HCT 40.2      *   MCH 33.5*   MCHC 32.1       CMP:   Recent Labs     20  1159      K 4.1      CO2 27   BUN 20   CREATININE 1.5*   GLU 71   CALCIUM 8.8       INR  Recent Labs     20  1159   INR 1.0   APTT 29.1           Diagnostic Studies:      EKD Echo:  Results for orders placed or performed during the hospital encounter of 18   2D echo with color flow doppler   Result Value Ref Range    QEF 25 (A) 55 - 65    Mitral Valve Regurgitation MILD     Diastolic Dysfunction Yes (A)     Aortic Valve Regurgitation TRIVIAL     Est. PA Systolic Pressure 24.16     Tricuspid Valve Regurgitation MILD          Anesthesia Evaluation    I have reviewed the Patient Summary Reports.   I have reviewed the NPO Status.      Review of Systems  Anesthesia Hx:  History of prior surgery of interest to airway management or planning: Denies Family Hx of Anesthesia complications.   Denies Personal Hx of Anesthesia complications.       Physical Exam  General:  Obesity    Airway/Jaw/Neck:  Airway Findings: Mouth Opening: Normal Tongue: Normal  General Airway Assessment: Adult  Mallampati: II  TM Distance: Normal, at least 6 cm      Dental:  Dental Findings: In tact   Chest/Lungs:  Chest/Lungs Findings: Clear to auscultation, Normal Respiratory Rate         Mental Status:  Mental Status Findings:  Cooperative, Alert and Oriented         Anesthesia  Plan  Type of Anesthesia, risks & benefits discussed:  Anesthesia Type:  general  Patient's Preference:   Intra-op Monitoring Plan: arterial line and standard ASA monitors  Intra-op Monitoring Plan Comments:   Post Op Pain Control Plan: multimodal analgesia  Post Op Pain Control Plan Comments:   Induction:   IV  Beta Blocker:  Patient is not currently on a Beta-Blocker (No further documentation required).       Informed Consent: Patient understands risks and agrees with Anesthesia plan.  Questions answered. Anesthesia consent signed with patient.  ASA Score: 3     Day of Surgery Review of History & Physical:    H&P update referred to the provider.         Ready For Surgery From Anesthesia Perspective.

## 2020-11-24 LAB
ALBUMIN SERPL BCP-MCNC: 3.3 G/DL (ref 3.5–5.2)
ALP SERPL-CCNC: 46 U/L (ref 55–135)
ALT SERPL W/O P-5'-P-CCNC: 16 U/L (ref 10–44)
ANION GAP SERPL CALC-SCNC: 8 MMOL/L (ref 8–16)
AST SERPL-CCNC: 25 U/L (ref 10–40)
BASOPHILS # BLD AUTO: 0.02 K/UL (ref 0–0.2)
BASOPHILS NFR BLD: 0.3 % (ref 0–1.9)
BILIRUB SERPL-MCNC: 0.5 MG/DL (ref 0.1–1)
BUN SERPL-MCNC: 21 MG/DL (ref 6–30)
BUN SERPL-MCNC: 23 MG/DL (ref 8–23)
CALCIUM SERPL-MCNC: 8.6 MG/DL (ref 8.7–10.5)
CHLORIDE SERPL-SCNC: 106 MMOL/L (ref 95–110)
CHLORIDE SERPL-SCNC: 108 MMOL/L (ref 95–110)
CO2 SERPL-SCNC: 24 MMOL/L (ref 23–29)
CREAT SERPL-MCNC: 1.2 MG/DL (ref 0.5–1.4)
CREAT SERPL-MCNC: 1.6 MG/DL (ref 0.5–1.4)
DIFFERENTIAL METHOD: ABNORMAL
EOSINOPHIL # BLD AUTO: 0 K/UL (ref 0–0.5)
EOSINOPHIL NFR BLD: 0.5 % (ref 0–8)
ERYTHROCYTE [DISTWIDTH] IN BLOOD BY AUTOMATED COUNT: 13 % (ref 11.5–14.5)
EST. GFR  (AFRICAN AMERICAN): 48.3 ML/MIN/1.73 M^2
EST. GFR  (NON AFRICAN AMERICAN): 41.8 ML/MIN/1.73 M^2
GLUCOSE SERPL-MCNC: 116 MG/DL (ref 70–110)
GLUCOSE SERPL-MCNC: 89 MG/DL (ref 70–110)
HCT VFR BLD AUTO: 38.1 % (ref 40–54)
HCT VFR BLD CALC: 39 %PCV (ref 36–54)
HGB BLD-MCNC: 12.3 G/DL (ref 14–18)
IMM GRANULOCYTES # BLD AUTO: 0.03 K/UL (ref 0–0.04)
IMM GRANULOCYTES NFR BLD AUTO: 0.4 % (ref 0–0.5)
LYMPHOCYTES # BLD AUTO: 2 K/UL (ref 1–4.8)
LYMPHOCYTES NFR BLD: 25.9 % (ref 18–48)
MAGNESIUM SERPL-MCNC: 1.9 MG/DL (ref 1.6–2.6)
MCH RBC QN AUTO: 34.7 PG (ref 27–31)
MCHC RBC AUTO-ENTMCNC: 32.3 G/DL (ref 32–36)
MCV RBC AUTO: 108 FL (ref 82–98)
MONOCYTES # BLD AUTO: 0.8 K/UL (ref 0.3–1)
MONOCYTES NFR BLD: 10.5 % (ref 4–15)
NEUTROPHILS # BLD AUTO: 4.9 K/UL (ref 1.8–7.7)
NEUTROPHILS NFR BLD: 62.4 % (ref 38–73)
NRBC BLD-RTO: 0 /100 WBC
PLATELET # BLD AUTO: 187 K/UL (ref 150–350)
PMV BLD AUTO: 11.4 FL (ref 9.2–12.9)
POC ACTIVATED CLOTTING TIME K: 136 SEC (ref 74–137)
POC ACTIVATED CLOTTING TIME K: 142 SEC (ref 74–137)
POC ACTIVATED CLOTTING TIME K: 301 SEC (ref 74–137)
POC ACTIVATED CLOTTING TIME K: 329 SEC (ref 74–137)
POC ACTIVATED CLOTTING TIME K: 400 SEC (ref 74–137)
POC IONIZED CALCIUM: 1.19 MMOL/L (ref 1.06–1.42)
POC TCO2 (MEASURED): 24 MMOL/L (ref 23–29)
POTASSIUM BLD-SCNC: 3.6 MMOL/L (ref 3.5–5.1)
POTASSIUM SERPL-SCNC: 4.5 MMOL/L (ref 3.5–5.1)
PROT SERPL-MCNC: 6.1 G/DL (ref 6–8.4)
RBC # BLD AUTO: 3.54 M/UL (ref 4.6–6.2)
SAMPLE: ABNORMAL
SAMPLE: NORMAL
SODIUM BLD-SCNC: 141 MMOL/L (ref 136–145)
SODIUM SERPL-SCNC: 140 MMOL/L (ref 136–145)
WBC # BLD AUTO: 7.8 K/UL (ref 3.9–12.7)

## 2020-11-24 PROCEDURE — 25000003 PHARM REV CODE 250: Performed by: HOSPITALIST

## 2020-11-24 PROCEDURE — 85025 COMPLETE CBC W/AUTO DIFF WBC: CPT

## 2020-11-24 PROCEDURE — 25000003 PHARM REV CODE 250: Performed by: PHYSICIAN ASSISTANT

## 2020-11-24 PROCEDURE — 99233 PR SUBSEQUENT HOSPITAL CARE,LEVL III: ICD-10-PCS | Mod: ,,, | Performed by: INTERNAL MEDICINE

## 2020-11-24 PROCEDURE — 20600001 HC STEP DOWN PRIVATE ROOM

## 2020-11-24 PROCEDURE — 36415 COLL VENOUS BLD VENIPUNCTURE: CPT

## 2020-11-24 PROCEDURE — 93005 ELECTROCARDIOGRAM TRACING: CPT

## 2020-11-24 PROCEDURE — 99233 SBSQ HOSP IP/OBS HIGH 50: CPT | Mod: ,,, | Performed by: INTERNAL MEDICINE

## 2020-11-24 PROCEDURE — 93010 ELECTROCARDIOGRAM REPORT: CPT | Mod: 76,,, | Performed by: INTERNAL MEDICINE

## 2020-11-24 PROCEDURE — 99232 SBSQ HOSP IP/OBS MODERATE 35: CPT | Mod: ,,, | Performed by: HOSPITALIST

## 2020-11-24 PROCEDURE — 83735 ASSAY OF MAGNESIUM: CPT

## 2020-11-24 PROCEDURE — 93010 EKG 12-LEAD: ICD-10-PCS | Mod: 76,,, | Performed by: INTERNAL MEDICINE

## 2020-11-24 PROCEDURE — 99232 PR SUBSEQUENT HOSPITAL CARE,LEVL II: ICD-10-PCS | Mod: ,,, | Performed by: HOSPITALIST

## 2020-11-24 PROCEDURE — 80053 COMPREHEN METABOLIC PANEL: CPT

## 2020-11-24 RX ORDER — ASPIRIN 81 MG/1
81 TABLET ORAL DAILY
Status: DISCONTINUED | OUTPATIENT
Start: 2020-11-25 | End: 2020-11-26 | Stop reason: HOSPADM

## 2020-11-24 RX ORDER — LANOLIN ALCOHOL/MO/W.PET/CERES
750 CREAM (GRAM) TOPICAL 2 TIMES DAILY
Status: DISCONTINUED | OUTPATIENT
Start: 2020-11-24 | End: 2020-11-26 | Stop reason: HOSPADM

## 2020-11-24 RX ORDER — AMLODIPINE BESYLATE 5 MG/1
5 TABLET ORAL DAILY
Status: DISCONTINUED | OUTPATIENT
Start: 2020-11-25 | End: 2020-11-26 | Stop reason: HOSPADM

## 2020-11-24 RX ORDER — ZINC SULFATE 50(220)MG
220 CAPSULE ORAL NIGHTLY
Status: DISCONTINUED | OUTPATIENT
Start: 2020-11-24 | End: 2020-11-26 | Stop reason: HOSPADM

## 2020-11-24 RX ORDER — ASCORBIC ACID 500 MG
1000 TABLET ORAL DAILY
Status: DISCONTINUED | OUTPATIENT
Start: 2020-11-25 | End: 2020-11-26 | Stop reason: HOSPADM

## 2020-11-24 RX ORDER — ALUMINUM HYDROXIDE, MAGNESIUM HYDROXIDE, AND SIMETHICONE 2400; 240; 2400 MG/30ML; MG/30ML; MG/30ML
30 SUSPENSION ORAL EVERY 6 HOURS PRN
Status: DISCONTINUED | OUTPATIENT
Start: 2020-11-24 | End: 2020-11-26 | Stop reason: HOSPADM

## 2020-11-24 RX ORDER — PANTOPRAZOLE SODIUM 40 MG/1
40 TABLET, DELAYED RELEASE ORAL DAILY
Status: DISCONTINUED | OUTPATIENT
Start: 2020-11-25 | End: 2020-11-26 | Stop reason: HOSPADM

## 2020-11-24 RX ORDER — DOFETILIDE 0.12 MG/1
250 CAPSULE ORAL EVERY 12 HOURS
Qty: 120 CAPSULE | Refills: 11 | OUTPATIENT
Start: 2020-11-24 | End: 2020-11-25 | Stop reason: DRUGHIGH

## 2020-11-24 RX ORDER — CALCIUM CARBONATE 200(500)MG
500 TABLET,CHEWABLE ORAL DAILY PRN
Status: DISCONTINUED | OUTPATIENT
Start: 2020-11-24 | End: 2020-11-26 | Stop reason: HOSPADM

## 2020-11-24 RX ORDER — EPINEPHRINE 0.22MG
200 AEROSOL WITH ADAPTER (ML) INHALATION DAILY
Status: DISCONTINUED | OUTPATIENT
Start: 2020-11-25 | End: 2020-11-26 | Stop reason: HOSPADM

## 2020-11-24 RX ADMIN — ATORVASTATIN CALCIUM 20 MG: 20 TABLET, FILM COATED ORAL at 09:11

## 2020-11-24 RX ADMIN — APIXABAN 5 MG: 5 TABLET, FILM COATED ORAL at 09:11

## 2020-11-24 RX ADMIN — LISINOPRIL 10 MG: 10 TABLET ORAL at 09:11

## 2020-11-24 RX ADMIN — ACETAMINOPHEN 650 MG: 325 TABLET ORAL at 06:11

## 2020-11-24 RX ADMIN — LEVOTHYROXINE SODIUM 75 MCG: 75 TABLET ORAL at 05:11

## 2020-11-24 RX ADMIN — AMLODIPINE BESYLATE 5 MG: 5 TABLET ORAL at 09:11

## 2020-11-24 RX ADMIN — APIXABAN 5 MG: 5 TABLET, FILM COATED ORAL at 08:11

## 2020-11-24 RX ADMIN — CARVEDILOL 12.5 MG: 12.5 TABLET, FILM COATED ORAL at 09:11

## 2020-11-24 RX ADMIN — PANTOPRAZOLE SODIUM 40 MG: 40 TABLET, DELAYED RELEASE ORAL at 09:11

## 2020-11-24 RX ADMIN — ACETAMINOPHEN 650 MG: 325 TABLET ORAL at 01:11

## 2020-11-24 RX ADMIN — DOFETILIDE 500 MCG: 0.25 CAPSULE ORAL at 08:11

## 2020-11-24 RX ADMIN — THERA TABS 1 TABLET: TAB at 08:11

## 2020-11-24 RX ADMIN — DIPHENHYDRAMINE HYDROCHLORIDE 5 ML: 25 SOLUTION ORAL at 03:11

## 2020-11-24 RX ADMIN — ZINC SULFATE 220 MG (50 MG) CAPSULE 220 MG: CAPSULE at 08:11

## 2020-11-24 RX ADMIN — DOFETILIDE 500 MCG: 0.25 CAPSULE ORAL at 09:11

## 2020-11-24 RX ADMIN — CALCIUM CARBONATE (ANTACID) CHEW TAB 500 MG 500 MG: 500 CHEW TAB at 03:11

## 2020-11-24 RX ADMIN — CARVEDILOL 12.5 MG: 12.5 TABLET, FILM COATED ORAL at 05:11

## 2020-11-24 RX ADMIN — Medication 750 MG: at 08:11

## 2020-11-24 NOTE — PLAN OF CARE
11/24/20 0936   Discharge Assessment   Assessment Type Discharge Planning Assessment   Confirmed/corrected address and phone number on facesheet? Yes   Assessment information obtained from? Patient;Medical Record   Expected Length of Stay (days) 3   Communicated expected length of stay with patient/caregiver yes   Prior to hospitilization cognitive status: Alert/Oriented   Prior to hospitalization functional status: Independent   Current cognitive status: Alert/Oriented   Current Functional Status: Independent   Lives With spouse   Able to Return to Prior Arrangements yes   Is patient able to care for self after discharge? Yes   Patient's perception of discharge disposition home or selfcare   Readmission Within the Last 30 Days no previous admission in last 30 days   Patient currently being followed by outpatient case management? No   Patient currently receives any other outside agency services? No   Equipment Currently Used at Home none   Do you have any problems affording any of your prescribed medications? TBD   Is the patient taking medications as prescribed? yes   Does the patient have transportation home? Yes   Transportation Anticipated family or friend will provide   Does the patient receive services at the Coumadin Clinic? No   Discharge Plan A Home with family   DME Needed Upon Discharge  none   Admitted with A-Fib for Tikosyn initiation. Lives with wife and is independent in his ADLs. Plan is to DC home. No DC needs identified.

## 2020-11-24 NOTE — ANESTHESIA POSTPROCEDURE EVALUATION
Anesthesia Post Evaluation    Patient: Amish Grossman    Procedure(s) Performed: Procedure(s) (LRB):  ABLATION, ARRHYTHMOGENIC FOCUS, FOR ATRIAL FIBRILLATION (N/A)  Ablation, SVT, Slow Pathway Modification For AVNRT  Cardioversion or Defibrillation    Final Anesthesia Type: general    Patient location during evaluation: PACU  Patient participation: Yes- Able to Participate  Level of consciousness: awake and alert and oriented  Post-procedure vital signs: reviewed and stable  Pain management: adequate  Airway patency: patent  JABIER mitigation strategies: Intraoperative administration of CPAP, nasopharyngeal airway, or oral appliance during sedation, Multimodal analgesia, Extubation while patient is awake, Verification of full reversal of neuromuscular block and Extubation and recovery carried out in lateral, semiupright, or other nonsupine position  PONV status at discharge: No PONV  Anesthetic complications: no      Cardiovascular status: hemodynamically stable  Respiratory status: unassisted  Hydration status: euvolemic  Follow-up not needed.          Vitals Value Taken Time   /76 11/24/20 1529   Temp 36.1 °C (96.9 °F) 11/24/20 0826   Pulse 60 11/24/20 1529   Resp 18 11/24/20 1529   SpO2 96 % 11/24/20 1529   Vitals shown include unvalidated device data.      No case tracking events are documented in the log.      Pain/Talya Score: Pain Rating Prior to Med Admin: 5 (11/24/2020  1:44 PM)  Talya Score: 10 (11/23/2020  1:38 PM)

## 2020-11-24 NOTE — PROGRESS NOTES
Progress Note  Hospital Medicine    Provider team:    [facility]  [facility]  Admit Date: 11/23/2020  Encounter Date: 11/24/2020     SUBJECTIVE:     Follow-up Visit for: Visit for monitoring Tikosyn therapy    HPI (See H&P for complete P,F,SHx):  75M with persistent AF on apixaban, CAD (s/p stents)( with resultant ICM EF 30% s/p [employer] Dual Chamber ICD, VT, essential HTN, mixed HLD, and CKDIII admitted for evaluation of tikosyn monitoring.  Patient had been seen in arrhythmia clinic on 10/28/20 with reports of weakness, fatigue, and lightheadedness. Remote ICD transmission revealing of AF.  Discussed management options and he underwent NOE/DCCV on 10/29/20 with plans for redo-PVI in near future. Device interrogation on 11/17/20 demonstrated probable SVT events with 1:1 conduction.  Today, Mr. Grossman underwent successful redo-PVI and SVT (slow pathway modification) ablation. Post-procedure he is being admitted for initiation of Tikosyn. Review of ECGs demonstrate QTc ~ 400-470 ms. Calculated CrCl > 60 mL/min.  Bilateral groin sites intact with sutures and without evidence of hematoma.  ECG post-procedure AP at 60 bpm,  ms, QTc 474 ms.  Patient is doing well and offers no complaints.     NOE 10/29/2020  · The left ventricle is normal in size with normal systolic function.   · The estimated ejection fraction is 30%.  · Mildly reduced right ventricular systolic function.  · Grade 3 plaque present in the descending aorta.  · The sinuses of Valsalva is mildly dilated. The ascending aorta is moderately dilated.    Interval history:  Patient is tolerating the tikosyn and otherwise in good spirits. He had some complaints about the night nursing/PCT staff that were addressed but no medical complaints.    Review of Systems:  Review of Systems   Constitutional: Negative for chills and fever.   HENT: Negative for congestion and sore throat.    Eyes: Negative for photophobia, pain and discharge.  "  Respiratory: Negative for cough, hemoptysis, sputum production and shortness of breath.    Cardiovascular: Negative for chest pain, palpitations and leg swelling.   Gastrointestinal: Negative for abdominal pain, diarrhea, nausea and vomiting.   Genitourinary: Negative for dysuria and urgency.   Musculoskeletal: Negative for myalgias and neck pain.   Skin: Negative for itching and rash.   Neurological: Negative for sensory change, focal weakness and headaches.   Endo/Heme/Allergies: Negative for polydipsia. Does not bruise/bleed easily.   Psychiatric/Behavioral: Negative for depression and suicidal ideas.     OBJECTIVE:       Intake/Output Summary (Last 24 hours) at 11/24/2020 0959  Last data filed at 11/24/2020 0900  Gross per 24 hour   Intake 860 ml   Output 1725 ml   Net -865 ml     Vital Signs Range (Last 24H):  Temp:  [96.9 °F (36.1 °C)-98.1 °F (36.7 °C)]   Pulse:  [60-63]   Resp:  [10-20]   BP: (107-136)/(64-85)   SpO2:  [94 %-100 %]   Body mass index is 30.17 kg/m².    Objective:  General Appearance:  Comfortable, well-appearing, in no acute distress and not in pain.    Vital signs: (most recent): Blood pressure 112/78, pulse 60, temperature 96.9 °F (36.1 °C), temperature source Oral, resp. rate 14, height 6' 2" (1.88 m), weight 106.6 kg (235 lb 0.2 oz), SpO2 96 %.  No fever.    Output: Producing urine and producing stool.    HEENT: Normal HEENT exam.    Lungs:  Normal effort.  Breath sounds clear to auscultation.  He is not in respiratory distress.  No rales or wheezes.    Heart: Normal rate.  Regular rhythm.  S1 normal and S2 normal.  No murmur.   Chest: Symmetric chest wall expansion.   Abdomen: Abdomen is soft.  Bowel sounds are normal.   There is no abdominal tenderness.     Extremities: Normal range of motion.  There is no deformity, effusion, dependent edema or local swelling.    Pulses: Distal pulses are intact.    Neurological: Patient is alert and oriented to person, place and time.  Normal " strength.    Pupils:  Pupils are equal, round, and reactive to light.    Skin:  Warm and dry.          Medications:  Medication list was reviewed in EPIC and changes noted under Assessment/Plan and MAR.    Laboratory:  Recent Labs     11/24/20  0520   WBC 7.80   RBC 3.54*   HGB 12.3*   HCT 38.1*      *   MCH 34.7*   MCHC 32.3   GRAN 62.4  4.9   LYMPH 25.9  2.0   MONO 10.5  0.8   EOS 0.0      Recent Labs     11/23/20  1214 11/24/20  0520   * 89    140   K 4.1 4.5   * 108   CO2 22* 24   BUN 17 23   CREATININE 1.5* 1.6*   CALCIUM 8.0* 8.6*   ANIONGAP 8 8   MG 1.9 1.9   PHOS 3.1  --        ASSESSMENT/PLAN:     Active Hospital Problems    Diagnosis  POA    *Visit for monitoring Tikosyn therapy [Z51.81, Z79.899]  Not Applicable    Stage 3a chronic kidney disease [N18.31]  Yes    Acute on chronic combined systolic and diastolic heart failure [I50.43]  Yes    PSVT (paroxysmal supraventricular tachycardia) [I47.1]  Yes    Chronic anticoagulation [Z79.01]  Not Applicable     Chronic    Persistent atrial fibrillation [I48.19]  Yes    Coronary artery disease due to lipid rich plaque [I25.10, I25.83]  Yes    Hypothyroid [E03.9]  Yes    Automatic implantable cardioverter-defibrillator in situ [Z95.810]  Yes    Ischemic cardiomyopathy [I25.5]  Yes    HTN (hypertension), benign [I10]  Yes      Resolved Hospital Problems   No resolved problems to display.      *Visit for monitoring Tikosyn therapy  Persistent atrial fibrillation  PSVT  Chronic anticoagulation  - Tikosyn order set in place  - EP consulted  - Started 500 mcg BID   - ECG 2-3 hours after each dose  - C/w BB  - C/w eliquis     Ischemic CM s/p AICD  Acute on chronic combined HF  CAD due to lipid rich plaque  Essential HTN  - Patient noted to be slightly hypervolemic  - Dose IV lasix x 1 and reassess  - Now euvolemic  - C/w GDMT with carvedilol/lisinopril  - C/w antiHTNsives as tolerated  - C/w eliquis, ASA, statin as  tolerated     CKDII  - Renally dose medications, cardiac PharmD aware  - Avoid nephrotoxic agents     Hypothyroidism, acquired  - C/w synthroid     DVT PPx: eliquis  Diet: cardiac 1500 cc  GOC: FC     Anticipated discharge date and disposition:   SHAUN: 11/26, home without services.  Fili Villegas MD  [facility]

## 2020-11-24 NOTE — PLAN OF CARE
Plan of care reviewed with patient. Pt to take 3rd dose of Tikosyn tonight at 2100. Last QTC: 370. OK to give 2100 dose of Tikosyn, night nurse to then order EKG two hours after admin of drug.

## 2020-11-24 NOTE — PROGRESS NOTES
Tikosyn administration discussed with pt. MD notified and stated EKG from 11/23 at 1452 was good to use as baseline. Charge nurse, Jai, informed before administration at 2151. VISI placed on pt.

## 2020-11-24 NOTE — ASSESSMENT & PLAN NOTE
s/p redo-PVI and SVT RFA. Admitted for tikosyn load.    Plan:  - Anticoagulation: eliquis   - Anti-arrythmic: tikosyn. Goal of QTc < 500 ms and CrCl > 60. Monitor BMP, Mg, Phos   - ECG 2 hours following first dose of Tikosyn and following any dose adjustment   -Protonix 40 mg daily x 4 weeks

## 2020-11-24 NOTE — PROGRESS NOTES
[facility]  Cardiac Electrophysiology  Progress Note    Admission Date: 11/23/2020  Code Status: Prior   Attending Physician: Fili Villegas MD   Expected Discharge Date:   Principal Problem:Visit for monitoring Tikosyn therapy    Subjective:     Interval History: No acute events overnight. Tolerating tikosyn well. QTc has not been significantly prolonged and is currently 430 and pending from this morning dose.     Review of Systems   Constitution: Negative for fever and malaise/fatigue.   HENT: Negative for hearing loss.    Cardiovascular: Negative for chest pain, dyspnea on exertion, irregular heartbeat, leg swelling, palpitations and syncope.   Hematologic/Lymphatic: Negative for bleeding problem.   Gastrointestinal: Negative for heartburn.   Neurological: Negative for light-headedness.   Psychiatric/Behavioral: Negative for altered mental status. The patient is not nervous/anxious.      Objective:     Vital Signs (Most Recent):  Temp: 96.9 °F (36.1 °C) (11/24/20 0826)  Pulse: 63 (11/24/20 0826)  Resp: 19 (11/24/20 0826)  BP: 115/81 (11/24/20 0826)  SpO2: 97 % (11/24/20 0826) Vital Signs (24h Range):  Temp:  [96.9 °F (36.1 °C)-98.1 °F (36.7 °C)] 96.9 °F (36.1 °C)  Pulse:  [60-63] 63  Resp:  [10-20] 19  SpO2:  [94 %-100 %] 97 %  BP: (107-136)/(64-85) 115/81     Weight: 106.6 kg (235 lb 0.2 oz)  Body mass index is 30.17 kg/m².     SpO2: 97 %  O2 Device (Oxygen Therapy): room air    Physical Exam   Constitutional: He is oriented to person, place, and time. He appears well-developed and well-nourished.   HENT:   Head: Normocephalic and atraumatic.   Eyes: Pupils are equal, round, and reactive to light. Conjunctivae are normal.   Neck: Normal range of motion. Neck supple.   Cardiovascular: Normal rate and regular rhythm.   Pulmonary/Chest: Effort normal and breath sounds normal.   Abdominal: Soft. Bowel sounds are normal.   Musculoskeletal: Normal range of motion.         General: No edema.    Neurological: He is alert and oriented to person, place, and time.   Skin: Skin is warm and dry.       Significant Labs: All pertinent lab results from the last 24 hours have been reviewed.    Significant Imaging: reviewed    Assessment and Plan:     Persistent atrial fibrillation  s/p redo-PVI and SVT RFA. Admitted for tikosyn load.    Plan:  - Anticoagulation: eliquis   - Anti-arrythmic: tikosyn. Goal of QTc < 500 ms and CrCl > 60. Monitor BMP, Mg, Phos   - ECG 2 hours following first dose of Tikosyn and following any dose adjustment   -Protonix 40 mg daily x 4 weeks                   Richie Diamond MD  Cardiac Electrophysiology  [facility]

## 2020-11-24 NOTE — PLAN OF CARE
Plan of care discussed with pt. VSS. Denies c/o of CP or SOB. Telemetry monitor showing NSR. Placed on VISI. First dose of Tikosyn given. EKG performed 2 hr after. MD notified (see other note). Groin sites are soft and nontender; no evidence of hematoma. Pt remains free of injury or falls. All questions addressed. Uneventful night shift.

## 2020-11-24 NOTE — SUBJECTIVE & OBJECTIVE
Interval History: No acute events overnight. Tolerating tikosyn well. QTc has not been significantly prolonged and is currently 430 and pending from this morning dose.     Review of Systems   Constitution: Negative for fever and malaise/fatigue.   HENT: Negative for hearing loss.    Cardiovascular: Negative for chest pain, dyspnea on exertion, irregular heartbeat, leg swelling, palpitations and syncope.   Hematologic/Lymphatic: Negative for bleeding problem.   Gastrointestinal: Negative for heartburn.   Neurological: Negative for light-headedness.   Psychiatric/Behavioral: Negative for altered mental status. The patient is not nervous/anxious.      Objective:     Vital Signs (Most Recent):  Temp: 96.9 °F (36.1 °C) (11/24/20 0826)  Pulse: 63 (11/24/20 0826)  Resp: 19 (11/24/20 0826)  BP: 115/81 (11/24/20 0826)  SpO2: 97 % (11/24/20 0826) Vital Signs (24h Range):  Temp:  [96.9 °F (36.1 °C)-98.1 °F (36.7 °C)] 96.9 °F (36.1 °C)  Pulse:  [60-63] 63  Resp:  [10-20] 19  SpO2:  [94 %-100 %] 97 %  BP: (107-136)/(64-85) 115/81     Weight: 106.6 kg (235 lb 0.2 oz)  Body mass index is 30.17 kg/m².     SpO2: 97 %  O2 Device (Oxygen Therapy): room air    Physical Exam   Constitutional: He is oriented to person, place, and time. He appears well-developed and well-nourished.   HENT:   Head: Normocephalic and atraumatic.   Eyes: Pupils are equal, round, and reactive to light. Conjunctivae are normal.   Neck: Normal range of motion. Neck supple.   Cardiovascular: Normal rate and regular rhythm.   Pulmonary/Chest: Effort normal and breath sounds normal.   Abdominal: Soft. Bowel sounds are normal.   Musculoskeletal: Normal range of motion.         General: No edema.   Neurological: He is alert and oriented to person, place, and time.   Skin: Skin is warm and dry.       Significant Labs: All pertinent lab results from the last 24 hours have been reviewed.    Significant Imaging: reviewed

## 2020-11-25 ENCOUNTER — SPECIALTY PHARMACY (OUTPATIENT)
Dept: PHARMACY | Facility: CLINIC | Age: 75
End: 2020-11-25

## 2020-11-25 DIAGNOSIS — I48.19 PERSISTENT ATRIAL FIBRILLATION: ICD-10-CM

## 2020-11-25 DIAGNOSIS — I47.10 PSVT (PAROXYSMAL SUPRAVENTRICULAR TACHYCARDIA): ICD-10-CM

## 2020-11-25 DIAGNOSIS — I47.20 VENTRICULAR TACHYARRHYTHMIA: Primary | ICD-10-CM

## 2020-11-25 LAB
ALBUMIN SERPL BCP-MCNC: 3.7 G/DL (ref 3.5–5.2)
ALP SERPL-CCNC: 46 U/L (ref 55–135)
ALT SERPL W/O P-5'-P-CCNC: 17 U/L (ref 10–44)
ANION GAP SERPL CALC-SCNC: 8 MMOL/L (ref 8–16)
AST SERPL-CCNC: 24 U/L (ref 10–40)
BASOPHILS # BLD AUTO: 0.05 K/UL (ref 0–0.2)
BASOPHILS NFR BLD: 0.9 % (ref 0–1.9)
BILIRUB SERPL-MCNC: 0.6 MG/DL (ref 0.1–1)
BUN SERPL-MCNC: 19 MG/DL (ref 8–23)
CALCIUM SERPL-MCNC: 8.8 MG/DL (ref 8.7–10.5)
CHLORIDE SERPL-SCNC: 108 MMOL/L (ref 95–110)
CO2 SERPL-SCNC: 24 MMOL/L (ref 23–29)
CREAT SERPL-MCNC: 1.4 MG/DL (ref 0.5–1.4)
DIFFERENTIAL METHOD: ABNORMAL
EOSINOPHIL # BLD AUTO: 0.3 K/UL (ref 0–0.5)
EOSINOPHIL NFR BLD: 5.8 % (ref 0–8)
ERYTHROCYTE [DISTWIDTH] IN BLOOD BY AUTOMATED COUNT: 13.6 % (ref 11.5–14.5)
EST. GFR  (AFRICAN AMERICAN): 56.8 ML/MIN/1.73 M^2
EST. GFR  (NON AFRICAN AMERICAN): 49.1 ML/MIN/1.73 M^2
GLUCOSE SERPL-MCNC: 102 MG/DL (ref 70–110)
HCT VFR BLD AUTO: 45.9 % (ref 40–54)
HGB BLD-MCNC: 14.1 G/DL (ref 14–18)
IMM GRANULOCYTES # BLD AUTO: 0.07 K/UL (ref 0–0.04)
IMM GRANULOCYTES NFR BLD AUTO: 1.3 % (ref 0–0.5)
LYMPHOCYTES # BLD AUTO: 2.1 K/UL (ref 1–4.8)
LYMPHOCYTES NFR BLD: 39.9 % (ref 18–48)
MAGNESIUM SERPL-MCNC: 1.9 MG/DL (ref 1.6–2.6)
MCH RBC QN AUTO: 34.1 PG (ref 27–31)
MCHC RBC AUTO-ENTMCNC: 30.7 G/DL (ref 32–36)
MCV RBC AUTO: 111 FL (ref 82–98)
MONOCYTES # BLD AUTO: 0.6 K/UL (ref 0.3–1)
MONOCYTES NFR BLD: 11.4 % (ref 4–15)
NEUTROPHILS # BLD AUTO: 2.2 K/UL (ref 1.8–7.7)
NEUTROPHILS NFR BLD: 40.7 % (ref 38–73)
NRBC BLD-RTO: 0 /100 WBC
PLATELET # BLD AUTO: 180 K/UL (ref 150–350)
PMV BLD AUTO: 10.4 FL (ref 9.2–12.9)
POTASSIUM SERPL-SCNC: 4.2 MMOL/L (ref 3.5–5.1)
PROT SERPL-MCNC: 6.8 G/DL (ref 6–8.4)
RBC # BLD AUTO: 4.13 M/UL (ref 4.6–6.2)
SODIUM SERPL-SCNC: 140 MMOL/L (ref 136–145)
WBC # BLD AUTO: 5.36 K/UL (ref 3.9–12.7)

## 2020-11-25 PROCEDURE — 85025 COMPLETE CBC W/AUTO DIFF WBC: CPT

## 2020-11-25 PROCEDURE — 93005 ELECTROCARDIOGRAM TRACING: CPT

## 2020-11-25 PROCEDURE — 20600001 HC STEP DOWN PRIVATE ROOM

## 2020-11-25 PROCEDURE — 83735 ASSAY OF MAGNESIUM: CPT

## 2020-11-25 PROCEDURE — 93010 ELECTROCARDIOGRAM REPORT: CPT | Mod: ,,, | Performed by: INTERNAL MEDICINE

## 2020-11-25 PROCEDURE — 99233 PR SUBSEQUENT HOSPITAL CARE,LEVL III: ICD-10-PCS | Mod: ,,, | Performed by: INTERNAL MEDICINE

## 2020-11-25 PROCEDURE — 99233 SBSQ HOSP IP/OBS HIGH 50: CPT | Mod: ,,, | Performed by: INTERNAL MEDICINE

## 2020-11-25 PROCEDURE — 99232 SBSQ HOSP IP/OBS MODERATE 35: CPT | Mod: ,,, | Performed by: HOSPITALIST

## 2020-11-25 PROCEDURE — 93010 EKG 12-LEAD: ICD-10-PCS | Mod: ,,, | Performed by: INTERNAL MEDICINE

## 2020-11-25 PROCEDURE — 80053 COMPREHEN METABOLIC PANEL: CPT

## 2020-11-25 PROCEDURE — 36415 COLL VENOUS BLD VENIPUNCTURE: CPT

## 2020-11-25 PROCEDURE — 99232 PR SUBSEQUENT HOSPITAL CARE,LEVL II: ICD-10-PCS | Mod: ,,, | Performed by: HOSPITALIST

## 2020-11-25 PROCEDURE — 94761 N-INVAS EAR/PLS OXIMETRY MLT: CPT

## 2020-11-25 PROCEDURE — 25000003 PHARM REV CODE 250: Performed by: HOSPITALIST

## 2020-11-25 RX ORDER — DOFETILIDE 0.5 MG/1
500 CAPSULE ORAL EVERY 12 HOURS
Qty: 60 CAPSULE | Refills: 2 | Status: SHIPPED | OUTPATIENT
Start: 2020-11-25 | End: 2021-02-16 | Stop reason: SDUPTHER

## 2020-11-25 RX ORDER — LISINOPRIL 30 MG/1
30 TABLET ORAL NIGHTLY
Qty: 90 TABLET | Refills: 3
Start: 2020-11-25 | End: 2020-12-16 | Stop reason: SDUPTHER

## 2020-11-25 RX ORDER — AMLODIPINE BESYLATE 5 MG/1
5 TABLET ORAL DAILY
Qty: 30 TABLET | Refills: 11
Start: 2020-11-26 | End: 2020-11-26

## 2020-11-25 RX ORDER — PANTOPRAZOLE SODIUM 40 MG/1
40 TABLET, DELAYED RELEASE ORAL DAILY
Qty: 30 TABLET | Refills: 0 | Status: SHIPPED | OUTPATIENT
Start: 2020-11-26 | End: 2021-01-28

## 2020-11-25 RX ADMIN — Medication 750 MG: at 08:11

## 2020-11-25 RX ADMIN — CARVEDILOL 12.5 MG: 12.5 TABLET, FILM COATED ORAL at 04:11

## 2020-11-25 RX ADMIN — PANTOPRAZOLE SODIUM 40 MG: 40 TABLET, DELAYED RELEASE ORAL at 08:11

## 2020-11-25 RX ADMIN — AMLODIPINE BESYLATE 5 MG: 5 TABLET ORAL at 08:11

## 2020-11-25 RX ADMIN — LISINOPRIL 30 MG: 10 TABLET ORAL at 08:11

## 2020-11-25 RX ADMIN — ZINC SULFATE 220 MG (50 MG) CAPSULE 220 MG: CAPSULE at 08:11

## 2020-11-25 RX ADMIN — OXYCODONE HYDROCHLORIDE AND ACETAMINOPHEN 1000 MG: 500 TABLET ORAL at 08:11

## 2020-11-25 RX ADMIN — APIXABAN 5 MG: 5 TABLET, FILM COATED ORAL at 08:11

## 2020-11-25 RX ADMIN — THERA TABS 1 TABLET: TAB at 08:11

## 2020-11-25 RX ADMIN — LISINOPRIL 10 MG: 10 TABLET ORAL at 08:11

## 2020-11-25 RX ADMIN — Medication 200 MG: at 08:11

## 2020-11-25 RX ADMIN — ATORVASTATIN CALCIUM 20 MG: 20 TABLET, FILM COATED ORAL at 08:11

## 2020-11-25 RX ADMIN — CARVEDILOL 12.5 MG: 12.5 TABLET, FILM COATED ORAL at 08:11

## 2020-11-25 RX ADMIN — ASPIRIN 81 MG: 81 TABLET, COATED ORAL at 08:11

## 2020-11-25 RX ADMIN — DOFETILIDE 500 MCG: 0.25 CAPSULE ORAL at 08:11

## 2020-11-25 RX ADMIN — LEVOTHYROXINE SODIUM 75 MCG: 75 TABLET ORAL at 05:11

## 2020-11-25 NOTE — PROGRESS NOTES
[facility]  Cardiac Electrophysiology  Progress Note    Admission Date: 11/23/2020  Code Status: Prior   Attending Physician: Fili Villegas MD   Expected Discharge Date:   Principal Problem:Visit for monitoring Tikosyn therapy    Subjective:     Interval History: no acute events overnight. Is concerned his bp is a little high this am. No other complaints.     Review of Systems   Constitution: Negative for fever and malaise/fatigue.   HENT: Negative for hearing loss.    Cardiovascular: Negative for chest pain, dyspnea on exertion, irregular heartbeat, leg swelling, palpitations and syncope.   Hematologic/Lymphatic: Negative for bleeding problem.   Gastrointestinal: Negative for heartburn.   Neurological: Negative for light-headedness.   Psychiatric/Behavioral: Negative for altered mental status. The patient is not nervous/anxious.      Objective:     Vital Signs (Most Recent):  Temp: 98.5 °F (36.9 °C) (11/24/20 2000)  Pulse: 60 (11/25/20 0717)  Resp: 13 (11/25/20 0717)  BP: (!) 154/100 (11/25/20 0717)  SpO2: 97 % (11/25/20 0717) Vital Signs (24h Range):  Temp:  [98.5 °F (36.9 °C)] 98.5 °F (36.9 °C)  Pulse:  [60-63] 60  Resp:  [11-18] 13  SpO2:  [96 %-97 %] 97 %  BP: (103-154)/() 154/100     Weight: 106.6 kg (235 lb 0.2 oz)  Body mass index is 30.17 kg/m².     SpO2: 97 %  O2 Device (Oxygen Therapy): room air    Physical Exam   Constitutional: He is oriented to person, place, and time. He appears well-developed and well-nourished.   HENT:   Head: Normocephalic and atraumatic.   Eyes: Pupils are equal, round, and reactive to light. Conjunctivae are normal.   Neck: Normal range of motion. Neck supple.   Cardiovascular: Normal rate and regular rhythm.   Pulmonary/Chest: Effort normal and breath sounds normal.   Abdominal: Soft. Bowel sounds are normal.   Musculoskeletal: Normal range of motion.         General: No edema.   Neurological: He is alert and oriented to person, place, and time.    Skin: Skin is warm and dry.       Significant Labs: All pertinent lab results from the last 24 hours have been reviewed.    Significant Imaging: reviewed    Assessment and Plan:     Persistent atrial fibrillation  s/p redo-PVI and SVT RFA. Admitted for tikosyn load.    Plan:  - Anticoagulation: eliquis   - Anti-arrythmic: tikosyn. Goal of QTc < 500 ms and CrCl > 60. Monitor BMP, Mg, Phos   - ECG 2 hours following first dose of Tikosyn and following any dose adjustment   -Protonix 40 mg daily x 4 weeks                   Richie Diamond MD  Cardiac Electrophysiology  [facility]

## 2020-11-25 NOTE — TELEPHONE ENCOUNTER
New RX initiated inpatient on 11/23/20. No PA required, test claim processing for $29.48. Forwarding to BI as urgent. MD requests bedside delivery tonight for discharge.

## 2020-11-25 NOTE — PROGRESS NOTES
Progress Note  Hospital Medicine    Provider team:    [facility]  [facility] ELECTROP[facility]OLOGY  Admit Date: 11/23/2020  Encounter Date: 11/25/2020     SUBJECTIVE:     Follow-up Visit for: Visit for monitoring Tikosyn therapy    HPI (See H&P for complete P,F,SHx):  75M with persistent AF on apixaban, CAD (s/p stents)( with resultant ICM EF 30% s/p [employer] [place] Dual Chamber ICD, VT, essential HTN, mixed HLD, and CKDIII admitted for evaluation of tikosyn monitoring.  Patient had been seen in arrhythmia clinic on 10/28/20 with reports of weakness, fatigue, and lightheadedness. Remote ICD transmission revealing of AF.  Discussed management options and he underwent NOE/DCCV on 10/29/20 with plans for redo-PVI in near future. Device interrogation on 11/17/20 demonstrated probable SVT events with 1:1 conduction.  Today, Mr. Grossman underwent successful redo-PVI and SVT (slow pathway modification) ablation. Post-procedure he is being admitted for initiation of Tikosyn. Review of ECGs demonstrate QTc ~ 400-470 ms. Calculated CrCl > 60 mL/min.  Bilateral groin sites intact with sutures and without evidence of hematoma.  ECG post-procedure AP at 60 bpm,  ms, QTc 474 ms.  Patient is doing well and offers no complaints.     NOE 10/29/2020  · The left ventricle is normal in size with normal systolic function.   · The estimated ejection fraction is 30%.  · Mildly reduced right ventricular systolic function.  · Grade 3 plaque present in the descending aorta.  · The sinuses of Valsalva is mildly dilated. The ascending aorta is moderately dilated.    Interval history:  Patient is tolerating the tikosyn and otherwise in good spirits.    Review of Systems:  Review of Systems   Constitutional: Negative for chills and fever.   HENT: Negative for congestion and sore throat.    Eyes: Negative for photophobia, pain and discharge.   Respiratory: Negative for cough, hemoptysis, sputum production and shortness of breath.    Cardiovascular:  "Negative for chest pain, palpitations and leg swelling.   Gastrointestinal: Negative for abdominal pain, diarrhea, nausea and vomiting.   Genitourinary: Negative for dysuria and urgency.   Musculoskeletal: Negative for myalgias and neck pain.   Skin: Negative for itching and rash.   Neurological: Negative for sensory change, focal weakness and headaches.   Endo/Heme/Allergies: Negative for polydipsia. Does not bruise/bleed easily.   Psychiatric/Behavioral: Negative for depression and suicidal ideas.     OBJECTIVE:       Intake/Output Summary (Last 24 hours) at 11/25/2020 0959  Last data filed at 11/25/2020 0529  Gross per 24 hour   Intake 1662 ml   Output 2940 ml   Net -1278 ml     Vital Signs Range (Last 24H):  Temp:  [98.5 °F (36.9 °C)]   Pulse:  [60-63]   Resp:  [11-18]   BP: (103-154)/()   SpO2:  [96 %-97 %]   Body mass index is 30.17 kg/m².    Objective:  General Appearance:  Comfortable, well-appearing, in no acute distress and not in pain.    Vital signs: (most recent): Blood pressure (!) 154/100, pulse 60, temperature 98.5 °F (36.9 °C), temperature source Oral, resp. rate 13, height 6' 2" (1.88 m), weight 106.6 kg (235 lb 0.2 oz), SpO2 97 %.  No fever.    Output: Producing urine and producing stool.    HEENT: Normal HEENT exam.    Lungs:  Normal effort.  Breath sounds clear to auscultation.  He is not in respiratory distress.  No rales or wheezes.    Heart: Normal rate.  Regular rhythm.  S1 normal and S2 normal.  No murmur.   Chest: Symmetric chest wall expansion.   Abdomen: Abdomen is soft.  Bowel sounds are normal.   There is no abdominal tenderness.     Extremities: Normal range of motion.  There is no deformity, effusion, dependent edema or local swelling.    Pulses: Distal pulses are intact.    Neurological: Patient is alert and oriented to person, place and time.  Normal strength.    Pupils:  Pupils are equal, round, and reactive to light.    Skin:  Warm and dry.      Medications:  Medication " list was reviewed in EPIC and changes noted under Assessment/Plan and MAR.    Laboratory:  Recent Labs     11/25/20  0559   WBC 5.36   RBC 4.13*   HGB 14.1   HCT 45.9      *   MCH 34.1*   MCHC 30.7*   GRAN 40.7  2.2   LYMPH 39.9  2.1   MONO 11.4  0.6   EOS 0.3      Recent Labs     11/23/20  1214  11/25/20  0559   *   < > 102      < > 140   K 4.1   < > 4.2   *   < > 108   CO2 22*   < > 24   BUN 17   < > 19   CREATININE 1.5*   < > 1.4   CALCIUM 8.0*   < > 8.8   ANIONGAP 8   < > 8   MG 1.9   < > 1.9   PHOS 3.1  --   --     < > = values in this interval not displayed.       ASSESSMENT/PLAN:     Active Hospital Problems    Diagnosis  POA    *Visit for monitoring Tikosyn therapy [Z51.81, Z79.899]  Not Applicable    Stage 3a chronic kidney disease [N18.31]  Yes    Acute on chronic combined systolic and diastolic heart failure [I50.43]  Yes    PSVT (paroxysmal supraventricular tachycardia) [I47.1]  Yes    Chronic anticoagulation [Z79.01]  Not Applicable     Chronic    Persistent atrial fibrillation [I48.19]  Yes    Coronary artery disease due to lipid rich plaque [I25.10, I25.83]  Yes    Hypothyroid [E03.9]  Yes    Automatic implantable cardioverter-defibrillator in situ [Z95.810]  Yes    Ischemic cardiomyopathy [I25.5]  Yes    HTN (hypertension), benign [I10]  Yes      Resolved Hospital Problems   No resolved problems to display.      *Visit for monitoring Tikosyn therapy  Persistent atrial fibrillation  PSVT  Chronic anticoagulation  - Tikosyn order set in place  - EP consulted  - Started 500 mcg BID   - ECG 2-3 hours after each dose  - C/w BB  - C/w eliquis     Ischemic CM s/p AICD  Acute on chronic combined HF  CAD due to lipid rich plaque  Essential HTN  - Patient noted to be slightly hypervolemic  - Dose IV lasix x 1 and reassess  - Now euvolemic  - C/w GDMT with carvedilol/lisinopril  - C/w antiHTNsives as tolerated  - C/w eliquis, ASA, statin as tolerated     CKDII  -  Renally dose medications, cardiac PharmD aware  - Avoid nephrotoxic agents     Hypothyroidism, acquired  - C/w synthroid     DVT PPx: eliquis  Diet: cardiac 1500 cc  GOC: FC     Anticipated discharge date and disposition:   SHAUN: 11/26, home without services.  Fili Villegas MD  [facility] Medicine

## 2020-11-25 NOTE — SUBJECTIVE & OBJECTIVE
Interval History: no acute events overnight. Is concerned his bp is a little high this am. No other complaints.     Review of Systems   Constitution: Negative for fever and malaise/fatigue.   HENT: Negative for hearing loss.    Cardiovascular: Negative for chest pain, dyspnea on exertion, irregular heartbeat, leg swelling, palpitations and syncope.   Hematologic/Lymphatic: Negative for bleeding problem.   Gastrointestinal: Negative for heartburn.   Neurological: Negative for light-headedness.   Psychiatric/Behavioral: Negative for altered mental status. The patient is not nervous/anxious.      Objective:     Vital Signs (Most Recent):  Temp: 98.5 °F (36.9 °C) (11/24/20 2000)  Pulse: 60 (11/25/20 0717)  Resp: 13 (11/25/20 0717)  BP: (!) 154/100 (11/25/20 0717)  SpO2: 97 % (11/25/20 0717) Vital Signs (24h Range):  Temp:  [98.5 °F (36.9 °C)] 98.5 °F (36.9 °C)  Pulse:  [60-63] 60  Resp:  [11-18] 13  SpO2:  [96 %-97 %] 97 %  BP: (103-154)/() 154/100     Weight: 106.6 kg (235 lb 0.2 oz)  Body mass index is 30.17 kg/m².     SpO2: 97 %  O2 Device (Oxygen Therapy): room air    Physical Exam   Constitutional: He is oriented to person, place, and time. He appears well-developed and well-nourished.   HENT:   Head: Normocephalic and atraumatic.   Eyes: Pupils are equal, round, and reactive to light. Conjunctivae are normal.   Neck: Normal range of motion. Neck supple.   Cardiovascular: Normal rate and regular rhythm.   Pulmonary/Chest: Effort normal and breath sounds normal.   Abdominal: Soft. Bowel sounds are normal.   Musculoskeletal: Normal range of motion.         General: No edema.   Neurological: He is alert and oriented to person, place, and time.   Skin: Skin is warm and dry.       Significant Labs: All pertinent lab results from the last 24 hours have been reviewed.    Significant Imaging: reviewed

## 2020-11-25 NOTE — PLAN OF CARE
Problem: Adult Inpatient Plan of Care  Goal: Plan of Care Review  Outcome: Ongoing, Progressing  Goal: Patient-Specific Goal (Individualization)  Outcome: Ongoing, Progressing  Goal: Absence of Hospital-Acquired Illness or Injury  Outcome: Ongoing, Progressing  Goal: Optimal Comfort and Wellbeing  Outcome: Ongoing, Progressing  Goal: Readiness for Transition of Care  Outcome: Ongoing, Progressing  Goal: Rounds/Family Conference  Outcome: Ongoing, Progressing     poke with the patient regarding the plan of care. Continued eduction on Tikosyn, uses, signs and symptoms of side effects. Patient is in no distress at this time. Post med EKGs performed for patient safety and monitoring.

## 2020-11-25 NOTE — PLAN OF CARE
Plan of care reviewed with patient. Pt last QTC for Tikosyn: 452. OK to give 2100 dose of tikosyn. No complaints of pain from patient at this time. Pt to start 30mg lisinopril tonight. BP between 140s-170s/80s-110s. Team is aware. VISI in place. WCTM

## 2020-11-26 VITALS
HEIGHT: 74 IN | TEMPERATURE: 98 F | DIASTOLIC BLOOD PRESSURE: 90 MMHG | OXYGEN SATURATION: 96 % | HEART RATE: 72 BPM | RESPIRATION RATE: 16 BRPM | SYSTOLIC BLOOD PRESSURE: 142 MMHG | WEIGHT: 235 LBS | BODY MASS INDEX: 30.16 KG/M2

## 2020-11-26 LAB
ALBUMIN SERPL BCP-MCNC: 3.4 G/DL (ref 3.5–5.2)
ALP SERPL-CCNC: 43 U/L (ref 55–135)
ALT SERPL W/O P-5'-P-CCNC: 26 U/L (ref 10–44)
ANION GAP SERPL CALC-SCNC: 7 MMOL/L (ref 8–16)
AST SERPL-CCNC: 33 U/L (ref 10–40)
BASOPHILS # BLD AUTO: 0.03 K/UL (ref 0–0.2)
BASOPHILS NFR BLD: 0.5 % (ref 0–1.9)
BILIRUB SERPL-MCNC: 0.5 MG/DL (ref 0.1–1)
BUN SERPL-MCNC: 20 MG/DL (ref 8–23)
CALCIUM SERPL-MCNC: 9.1 MG/DL (ref 8.7–10.5)
CHLORIDE SERPL-SCNC: 108 MMOL/L (ref 95–110)
CO2 SERPL-SCNC: 27 MMOL/L (ref 23–29)
CREAT SERPL-MCNC: 1.5 MG/DL (ref 0.5–1.4)
DIFFERENTIAL METHOD: ABNORMAL
EOSINOPHIL # BLD AUTO: 0.5 K/UL (ref 0–0.5)
EOSINOPHIL NFR BLD: 8.6 % (ref 0–8)
ERYTHROCYTE [DISTWIDTH] IN BLOOD BY AUTOMATED COUNT: 12.9 % (ref 11.5–14.5)
EST. GFR  (AFRICAN AMERICAN): 52.3 ML/MIN/1.73 M^2
EST. GFR  (NON AFRICAN AMERICAN): 45.2 ML/MIN/1.73 M^2
GLUCOSE SERPL-MCNC: 103 MG/DL (ref 70–110)
HCT VFR BLD AUTO: 42.2 % (ref 40–54)
HGB BLD-MCNC: 13.5 G/DL (ref 14–18)
IMM GRANULOCYTES # BLD AUTO: 0.01 K/UL (ref 0–0.04)
IMM GRANULOCYTES NFR BLD AUTO: 0.2 % (ref 0–0.5)
LYMPHOCYTES # BLD AUTO: 2 K/UL (ref 1–4.8)
LYMPHOCYTES NFR BLD: 35.3 % (ref 18–48)
MAGNESIUM SERPL-MCNC: 2 MG/DL (ref 1.6–2.6)
MCH RBC QN AUTO: 33.3 PG (ref 27–31)
MCHC RBC AUTO-ENTMCNC: 32 G/DL (ref 32–36)
MCV RBC AUTO: 104 FL (ref 82–98)
MONOCYTES # BLD AUTO: 0.6 K/UL (ref 0.3–1)
MONOCYTES NFR BLD: 10.9 % (ref 4–15)
NEUTROPHILS # BLD AUTO: 2.5 K/UL (ref 1.8–7.7)
NEUTROPHILS NFR BLD: 44.5 % (ref 38–73)
NRBC BLD-RTO: 0 /100 WBC
PLATELET # BLD AUTO: 172 K/UL (ref 150–350)
PMV BLD AUTO: 10.7 FL (ref 9.2–12.9)
POTASSIUM SERPL-SCNC: 4.3 MMOL/L (ref 3.5–5.1)
PROT SERPL-MCNC: 6.4 G/DL (ref 6–8.4)
RBC # BLD AUTO: 4.05 M/UL (ref 4.6–6.2)
SODIUM SERPL-SCNC: 142 MMOL/L (ref 136–145)
WBC # BLD AUTO: 5.69 K/UL (ref 3.9–12.7)

## 2020-11-26 PROCEDURE — 25000003 PHARM REV CODE 250: Performed by: HOSPITALIST

## 2020-11-26 PROCEDURE — 93005 ELECTROCARDIOGRAM TRACING: CPT

## 2020-11-26 PROCEDURE — 80053 COMPREHEN METABOLIC PANEL: CPT

## 2020-11-26 PROCEDURE — 83735 ASSAY OF MAGNESIUM: CPT

## 2020-11-26 PROCEDURE — 85025 COMPLETE CBC W/AUTO DIFF WBC: CPT

## 2020-11-26 PROCEDURE — 36415 COLL VENOUS BLD VENIPUNCTURE: CPT

## 2020-11-26 PROCEDURE — 93010 ELECTROCARDIOGRAM REPORT: CPT | Mod: ,,, | Performed by: STUDENT IN AN ORGANIZED HEALTH CARE EDUCATION/TRAINING PROGRAM

## 2020-11-26 PROCEDURE — 99239 PR HOSPITAL DISCHARGE DAY,>30 MIN: ICD-10-PCS | Mod: ,,, | Performed by: INTERNAL MEDICINE

## 2020-11-26 PROCEDURE — 94761 N-INVAS EAR/PLS OXIMETRY MLT: CPT

## 2020-11-26 PROCEDURE — 93010 EKG 12-LEAD: ICD-10-PCS | Mod: ,,, | Performed by: STUDENT IN AN ORGANIZED HEALTH CARE EDUCATION/TRAINING PROGRAM

## 2020-11-26 PROCEDURE — 99239 HOSP IP/OBS DSCHRG MGMT >30: CPT | Mod: ,,, | Performed by: INTERNAL MEDICINE

## 2020-11-26 RX ORDER — AMLODIPINE BESYLATE 5 MG/1
5 TABLET ORAL 2 TIMES DAILY
Qty: 30 TABLET | Refills: 11
Start: 2020-11-26 | End: 2021-02-09 | Stop reason: SDUPTHER

## 2020-11-26 RX ADMIN — AMLODIPINE BESYLATE 5 MG: 5 TABLET ORAL at 09:11

## 2020-11-26 RX ADMIN — PANTOPRAZOLE SODIUM 40 MG: 40 TABLET, DELAYED RELEASE ORAL at 09:11

## 2020-11-26 RX ADMIN — LEVOTHYROXINE SODIUM 75 MCG: 75 TABLET ORAL at 05:11

## 2020-11-26 RX ADMIN — OXYCODONE HYDROCHLORIDE AND ACETAMINOPHEN 1000 MG: 500 TABLET ORAL at 09:11

## 2020-11-26 RX ADMIN — APIXABAN 5 MG: 5 TABLET, FILM COATED ORAL at 09:11

## 2020-11-26 RX ADMIN — CARVEDILOL 12.5 MG: 12.5 TABLET, FILM COATED ORAL at 09:11

## 2020-11-26 RX ADMIN — Medication 200 MG: at 09:11

## 2020-11-26 RX ADMIN — DOFETILIDE 500 MCG: 0.25 CAPSULE ORAL at 09:11

## 2020-11-26 RX ADMIN — ATORVASTATIN CALCIUM 20 MG: 20 TABLET, FILM COATED ORAL at 09:11

## 2020-11-26 RX ADMIN — ASPIRIN 81 MG: 81 TABLET, COATED ORAL at 09:11

## 2020-11-26 RX ADMIN — Medication 750 MG: at 09:11

## 2020-11-26 NOTE — NURSING
Spoke to Dr. Villegas in reference to pt's pending d/c in the am, but [facility] is closed tomorrow.      Medicine delivered to bedside at 1900

## 2020-11-26 NOTE — PLAN OF CARE
Continuing to get tikosyn load and no events noted on telemetry. No acute events. QTc on ECG overnight read by computer as 500, however, manual measuring with calipers was done and QTc noted to be 475. Suspect QTc automatically read by computer was overcalled 2/2 U wave.    Plan:   - Anticoagulation: eliquis   - Anti-arrythmic: tikosyn. Goal of QTc < 500 ms and CrCl > 60. Monitor BMP, Mg, Phos   - ECG 2 hours following first dose of Tikosyn and following any dose adjustment  - Protonix 40 mg daily x 4 weeks     - if follow up qtc is <500, he can be dc-ed with follow up with Dr. Isbell in 4-6 weeks

## 2020-11-26 NOTE — TELEPHONE ENCOUNTER
Specialty Pharmacy - Initial Clinical Assessment  Specialty Pharmacy - Medication/Referral Authorization    Specialty Medication Orders Linked to Encounter      Most Recent Value   Medication #1  dofetilide (TIKOSYN) 500 MCG Cap (Order#[id number], Rx#)        Subjective    Amish Grossman is a 75 y.o. male, who is followed by the specialty pharmacy service for management and education.    Recent Encounters     Date Type Provider Description    11/25/2020 Specialty Pharmacy Brandee Davies PharmD Initial Clinical Assessment; Referral Authorization        Clinical call attempts since last clinical assessment   No call attempts found.     Today he received education before his first fill with [facility].    Current Outpatient Medications   Medication Sig Note    [START ON 11/26/2020] amLODIPine (NORVASC) 5 MG tablet Take 1 tablet (5 mg total) by mouth once daily.     dofetilide (TIKOSYN) 500 MCG Cap Take 1 capsule (500 mcg total) by mouth every 12 (twelve) hours.     lisinopriL (PRINIVIL,ZESTRIL) 30 MG tablet Take 1 tablet (30 mg total) by mouth every evening.     [START ON 11/26/2020] pantoprazole (PROTONIX) 40 MG tablet Take 1 tablet (40 mg total) by mouth once daily. 11/25/2020: Patient reports not needing   Last reviewed on 11/25/2020  6:42 PM by Brandee Davies PharmD    Review of patient's allergies indicates:  No Known AllergiesLast reviewed on  11/25/2020 6:41 PM by Brandee Davies          Assessment Questions - Documented Responses      Most Recent Value   Assessment   Medication Reconciliation completed for patient  Yes   During the past 4 weeks, has patient missed any activities due to condition or medication?  No   During the past 4 weeks, did patient have any of the following urgent care visits?  Hospital Admission   Goals of Therapy Status  Discussed (new start)   Welcome packet contents reviewed and discussed with patient?  Yes   Assesment completed?  Yes   Plan  Therapy being initiated   Do  you need to open a clinical intervention (i-vent)?  No   Do you want to schedule first shipment?  Yes   Medication #1 Assessment Info   Patient status  New medication, New to [facility]   Is this medication appropriate for the patient?  Yes   Is this medication effective?  Yes        Refill Questions - Documented Responses      Most Recent Value   Relationship to patient of person spoken to?  Self   HIPAA/medical authority confirmed?  Yes   Can the patient store medication/sharps container properly (at the correct temperature, away from children/pets, etc.)?  Yes   Can the patient call emergency services (911) in the event of an emergency?  Yes   Does the patient have any concerns or questions about taking or administering this medication as prescribed?  No   How many doses does the patient have on hand?  0   How many days does the patient report on hand quantity will last?  0   During the past 4 weeks, has patient missed any activities due to condition or medication?  No   During the past 4 weeks, did patient have any of the following urgent care visits?  Hospital Admission   How will the patient receive the medication?     When does the patient need to receive the medication?  11/26/20   Expected Copay ($)  29.48   Is the patient able to afford the medication copay?  Yes   Payment Method  at pickup   Days supply of Refill  30   Refill activity completed?  Yes   Refill activity plan  Refill scheduled   Shipment/Pickup Date:  11/25/20          Objective    He has a past medical history of Anticoagulant long-term use, Atrial fibrillation, Benign essential tremor (6/6/2018), Cardiomyopathy, CHF (congestive heart failure), Coronary artery disease, Hypertension, Left ventricular thrombus, Syncope and collapse, and Thyroid disease.    Tried/failed medications: amiodarone, ablation    BP Readings from Last 4 Encounters:   11/25/20 (!) 160/110   10/29/20 104/76   10/29/20 128/78   10/28/20 118/80     Ht Readings from Last  "4 Encounters:   20 6' 2" (1.88 m)   10/29/20 6' 2" (1.88 m)   10/29/20 6' 2" (1.88 m)   10/28/20 6' 2" (1.88 m)     Wt Readings from Last 4 Encounters:   20 106.6 kg (235 lb 0.2 oz)   10/29/20 105.7 kg (233 lb)   10/29/20 105.7 kg (233 lb)   10/28/20 106 kg (233 lb 11 oz)       The goals of prescribed drug therapy management include:  · Supporting patient to meet the prescriber's medical treatment objectives  · Improving or maintaining quality of life  · Maintaining optimal therapy adherence  · Minimizing and managing side effects      Goals of Therapy Status: Discussed (new start)    Assessment/Plan  Patient plans to continue therapy without changes (initiated inpatient on 2020)      Indication, dosage, appropriateness, effectiveness, safety and convenience of his specialty medication(s) were reviewed today.     Patient Counseling    Counseled the patient on the following: doses and administration discussed, safe handling, storage, and disposal discussed, possible adverse effects and management discussed, possible drug and prescription drug interactions discussed, possible drug and OTC drug and food interactions discussed, lab monitoring and follow-up discussed, therapeutic rationale discussed, cost of medications and cost implications discussed, adherence and missed doses discussed, pharmacy contact information discussed       Tikosyn initial completed. Will  to bedside on  for  discharge. Patient reports he would prefer to fill with his usual [facility] pharmacy if they have access to fill Tikosyn - will follow up with pharmacy next week after holiday. BI confirms patient not locked in to any particular pharmacy.   $29.48 copay, patient will confirm payment method at drop off (cash vs. card on file). Address confirmed. Two forms of identificaton confirmed - name and . Therapy appropriate.    Dx: persistent atrial fibrillation   CrCL: 60.2 mL/min  QTc: 452 ms   K+: 4.2 " mEq/mL  Mg+: 1.9 mg/dL    Patient was just initiated inpatient and provider confirms patient has stabilized on this dose. Denies any side effects such as headache or dizziness since starting the medicine - no chest pain. Patient knows to contact provider if any symptoms of TdP such as chest pain, heart fluttering, SOB, or fainting.    Medication list and allergies reviewed (reviewed and documented in [facility]). No DDIs found during this encounter - patient reports he only takes Lasix PRN long car rides, but will instead use compression stockings and cease use of PRN Lasix to avoid risk for QT prolongation; patient aware to contact [facility] or provider if planning to start any new medications.     Patient counseled on the need to take the medication on a strict Q 12 hour schedule, and he will have his wife help him keep to this schedule, as he admits his medication schedule is usually variable based on his hobbies (hunting). Patient also alerted that he should take a missed dose as soon as he remembers it, but not if it is too close to time for his next dose to avoid double doses. Patient will keep at room temperature with his other medications. Patient reports he has an implanted defibrillator and pacemaker, and his provider has 24/7 access to his heart rhythm in the event of arrhythmias. He is very closely followed and adherent to his medications and follow up appointments.     Disease education provided. Addressed and answered all questions to patient's satisfaction. Patient aware to contact [facility] if questions or concerns arise.     Tasks added this encounter   12/18/2020 - Refill Call (Auto Added)  11/25/2020 -  Setup Confirmation  11/11/2021 - Clinical - Follow Up Assesement (365 day)   Tasks due within next 3 months   No tasks due.     Brandee Davies, PharmD  [facility] Special[facility] Pharm[facility]  [address] [place] [id number]  Phone: [phone]  Fax:  [phone]

## 2020-11-26 NOTE — NURSING
Pt informed me that he found a yellow pill in his bed sheets between 1:30-2:00 this pm. It resembles the Tikosyn pill.  Pt is pending dose no 4 or dose no 5 tonight.  Informed Dr. Villegas and Dr. Jara of situation. it was ok to give dose tonight.  Updated oncoming nurse.

## 2020-11-26 NOTE — PLAN OF CARE
Plan of care reviewed with patient. Remains free from fall and injury. Does not complain of any pain or discomfort at this time. VSS on room air. NSR on tele; no S/S of cardiac distress noted at this time. Tikosyn therapy maintained; EKG obtained. Bed in lowest locked position. Call light within reach. Will continue to monitor.

## 2020-11-27 NOTE — TELEPHONE ENCOUNTER
Tikosyn Rx transferred to Located within [facility] [place] [place] - [address] [facility] [address]   [address] [facility] [address] [facility] [address]  Per patient perference. [facility] Araceli Carrillo confirmed that [facility] has access to Tikosyn.

## 2020-11-28 ENCOUNTER — CLINICAL SUPPORT (OUTPATIENT)
Dept: CARDIOLOGY | Facility: HOSPITAL | Age: 75
End: 2020-11-28
Payer: MEDICARE

## 2020-11-28 DIAGNOSIS — Z95.810 PRESENCE OF AUTOMATIC (IMPLANTABLE) CARDIAC DEFIBRILLATOR: ICD-10-CM

## 2020-11-28 PROCEDURE — 93295 DEV INTERROG REMOTE 1/2/MLT: CPT | Mod: ,,, | Performed by: INTERNAL MEDICINE

## 2020-11-28 PROCEDURE — 93295 CARDIAC DEVICE CHECK - REMOTE: ICD-10-PCS | Mod: ,,, | Performed by: INTERNAL MEDICINE

## 2020-11-28 PROCEDURE — 93296 REM INTERROG EVL PM/IDS: CPT | Performed by: INTERNAL MEDICINE

## 2020-11-29 ENCOUNTER — PATIENT OUTREACH (OUTPATIENT)
Dept: ADMINISTRATIVE | Facility: CLINIC | Age: 75
End: 2020-11-29

## 2020-11-29 NOTE — PATIENT INSTRUCTIONS
Dofetilide capsules  What is this medicine?  DOFETILIDE (puente FET il carmelita) is an antiarrhythmic drug. It helps make your heart beat regularly. This medicine also helps to slow rapid heartbeats.  How should I use this medicine?  Take this medicine by mouth with a glass of water. Follow the directions on the prescription label. You can take this medicine with or without food. Do not drink grapefruit juice with this medicine. Take your doses at regular intervals. Do not take your medicine more often than directed. Do not stop taking this medicine suddenly. This may cause serious, heart-related side effects. Your doctor will tell you how much medicine to take. If your doctor wants you to stop the medicine, the dose will be slowly lowered over time to avoid any side effects.  Talk to your pediatrician regarding the use of this medicine in children. Special care may be needed.  What side effects may I notice from receiving this medicine?  Side effects that you should report to your doctor or health care professional as soon as possible:  · allergic reactions like skin rash, itching or hives, swelling of the face, lips, or tongue  · breathing problems  · dizziness  · fast or rapid beating of the heart  · feeling faint or lightheaded  · swelling of the ankles  · unusually weak or tired  · vomiting  Side effects that usually do not require medical attention (report to your doctor or health care professional if they continue or are bothersome):  · cough  · diarrhea  · difficulty sleeping  · headache  · nausea  · stomach pain  What may interact with this medicine?  Do not take this medicine with any of the following medications:  · cimetidine  · dolutegravir  · hydrochlorothiazide alone or in combination with other medicines  · ketoconazole  · megestrol  · other medicines that prolong the QT interval (cause an abnormal heart rhythm)  · prochlorperazine  · trimethoprim alone or in combination with  sulfamethoxazole  · verapamil  This medicine may also interact with the following medications:  · amiloride  · certain antidepressants like fluvoxamine or paroxetine  · certain antiviral medicines for HIV or AIDS like atazanavir or darunavir  · certain medicines for fungal infections like clotrimazole or miconazole  · digoxin  · diltiazem  · dronabinol, THC  · grapefruit juice  · metformin  · nefazodone  · triamterene  · zafirlukast  What if I miss a dose?  If you miss a dose, take it as soon as you can. If it is almost time for your next dose, take only that dose. Do not take double or extra doses.  Where should I keep my medicine?  Keep out of the reach of children.  Store at room temperature between 15 and 30 degrees C (59 and 86 degrees F). Protect the medicine from moisture or humidity. Keep container tightly closed. Throw away any unused medicine after the expiration date.  What should I tell my health care provider before I take this medicine?  They need to know if you have any of these conditions:  · heart disease  · history of low levels of potassium or magnesium  · kidney disease  · liver disease  · an unusual or allergic reaction to dofetilide, other medicines, foods, dyes, or preservatives  · pregnant or trying to get pregnant  · breast-feeding  What should I watch for while using this medicine?  Visit your doctor or health care professional for regular checks on your progress. Wear a medical ID bracelet or chain, and carry a card that describes your disease and details of your medicine and dosage times.  Check your heart rate and blood pressure regularly while you are taking this medicine. Ask your doctor or health care professional what your heart rate and blood pressure should be, and when you should contact him or her. Your doctor or health care professional also may schedule regular tests to check your progress.  You will be started on this medicine in a specialized facility for at least three days.  You will be monitored to find the right dose of medicine for you. It is very important that you take your medicine exactly as prescribed when you leave the hospital. The correct dosing of this medicine is very important to treat your condition and prevent possible serious side effects.  NOTE:This sheet is a summary. It may not cover all possible information. If you have questions about this medicine, talk to your doctor, pharmacist, or health care provider. Copyright© 2020 [employer]

## 2020-12-01 ENCOUNTER — TELEPHONE (OUTPATIENT)
Dept: ELECTROPHYSIOLOGY | Facility: CLINIC | Age: 75
End: 2020-12-01

## 2020-12-01 NOTE — TELEPHONE ENCOUNTER
Returned call to pt, no answer. Left voice mail with number for pt to return call .    ----- Message from Anna Pena sent at 12/1/2020  1:55 PM CST -----  Regarding: Question  Pt has question regarding his EKG, if can do local & send results & test results, if can call Pt. Thanks     [phone]

## 2020-12-03 NOTE — PHYSICIAN QUERY
"PT Name: Amish Grossman  MR #: [id number]     Documentation Clarification      CDS/: Ree Mcintyre RN             Contact information: [email or web address]    This form is a permanent document in the medical record.     Query Date: December 3, 2020    By submitting this query, we are merely seeking further clarification of documentation. Please utilize your independent clinical judgment when addressing the question(s) below.    The Medical Record reflects the following:     Indicators     Supporting Clinical Findings Location in Medical Record    BNP      EF      Radiology findings      Echo Results      "Ascites" documented     x "SOB" or "SHOOK" documented Lungs: Normal effort. Breath sounds clear to auscultation. He is not in respiratory distress. No rales or wheezes.       Respiratory: Negative for cough, hemoptysis, sputum production and shortness of breath.    H&P 11/23       Hospital Medicine      H&P 11/23       Hospital Medicine    "Hypoxia" documented     x Heart Failure documented Ischemic CM s/p AICD   Acute on chronic combined HF   CAD due to lipid rich plaque   Essential HTN   - Patient noted to be slightly hypervolemic   - Dose IV lasix x 1 and reassess         Ischemic CM s/p AICD   Acute on chronic combined HF   CAD due to lipid rich plaque   Essential HTN   - Patient noted to be slightly hypervolemic   - Dose IV lasix x 1 and reassess   - Now euvolemic      H&P 11/23       Hospital Medicine                Progress Note 11/24       Hospital Medicine   x "Edema" documented There is no deformity, effusion, dependent edema or local swelling.   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 Start dose 500 mg BID. Anticipate admission until Thursday AM.      resume Eliquis this evening   - Continuous telemetry monitoring   - Anti-arrythmic: start Tikosyn with first dose this " "evening-defer to CV pharmacist for dosing. QTc < 500 ms and CrCl > 60.   - ECG 2 hours following first dose of Tikosyn and following any dose adjustment  - Protonix 40 mg daily x 4 weeks   - Lasix 40 mg IV following completion of bedrest    Consult 11/23       Arrhythmia                        Other:          Doctor, please clarify the "Acute" HF for the Acute on Chronic combined HF with additional clinical indicators   to support the Acute diagnosis on the line below.      [ x ]  Acute on chronic combined HF is confirmed and additional clinical support/decision-making indicators for the Acute HF diagnosis include (please specify):________________     [   ]  Acute combined HF is not confirmed and/or it has been ruled out,   Chronic combined HF ruled in.     [   ] Other clarification (please specify): ___________________       [  ] Clinically undetermined           "

## 2020-12-04 NOTE — PHYSICIAN QUERY
"PT Name: Amish Grossman  MR #: [id number]     Documentation Clarification      CDS/: Ree Mcintyre RN             Contact information: [email or web address]    This form is a permanent document in the medical record.     Query Date: December 4, 2020    By submitting this query, we are merely seeking further clarification of documentation. Please utilize your independent clinical judgment when addressing the question(s) below.    The Medical Record reflects the following:     Indicators     Supporting Clinical Findings Location in Medical Record    BNP      EF      Radiology findings      Echo Results      "Ascites" documented     x "SOB" or "SHOOK" documented Lungs: Normal effort. Breath sounds clear to auscultation. He is not in respiratory distress. No rales or wheezes.       Respiratory: Negative for cough, hemoptysis, sputum production and shortness of breath.    H&P 11/23       Hospital Medicine      H&P 11/23       Hospital Medicine    "Hypoxia" documented     x Heart Failure documented Ischemic CM s/p AICD   Acute on chronic combined HF   CAD due to lipid rich plaque   Essential HTN   - Patient noted to be slightly hypervolemic   - Dose IV lasix x 1 and reassess         Ischemic CM s/p AICD   Acute on chronic combined HF   CAD due to lipid rich plaque   Essential HTN   - Patient noted to be slightly hypervolemic   - Dose IV lasix x 1 and reassess   - Now euvolemic      H&P 11/23       Hospital Medicine                Progress Note 11/24       Hospital Medicine   x "Edema" documented There is no deformity, effusion, dependent edema or local swelling.   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 Start dose 500 mg BID. Anticipate admission until Thursday AM.      resume Eliquis this evening   - Continuous telemetry monitoring   - Anti-arrythmic: start Tikosyn with first dose this " "evening-defer to CV pharmacist for dosing. QTc < 500 ms and CrCl > 60.   - ECG 2 hours following first dose of Tikosyn and following any dose adjustment  - Protonix 40 mg daily x 4 weeks   - Lasix 40 mg IV following completion of bedrest    Consult 11/23       Arrhythmia                       x Other:   Acute on chronic combined HF is confirmed and additional clinical support/decision-making indicators for the Acute HF diagnosis include (please specify):________________ Physician Query response       12/3  Dr. Villegas       Doctor,  Acute on chronic combined HF was confirmed, please add additional clinical indicators on the line below to support the   "Acute" combined Heart Failure.      [ x ]  Acute on chronic combined HF is confirmed and additional clinical support/decision-making indicators for the Acute HF diagnosis include   (please specify):__hypervolemia on exam changed from baseline     [   ]  Acute combined HF is not confirmed and/or it has been ruled out,   Chronic combined HF ruled in.     [   ] Other clarification (please specify): ___________________       [  ] Clinically undetermined           "

## 2020-12-10 NOTE — HPI
75M with persistent AF on apixaban, CAD (s/p stents)( with resultant ICM EF 30% s/p [employer] Dual Chamber ICD, VT, essential HTN, mixed HLD, and CKDIII admitted for evaluation of tikosyn monitoring.  Patient had been seen in arrhythmia clinic on 10/28/20 with reports of weakness, fatigue, and lightheadedness. Remote ICD transmission revealing of AF.  Discussed management options and he underwent NOE/DCCV on 10/29/20 with plans for redo-PVI in near future. Device interrogation on 11/17/20 demonstrated probable SVT events with 1:1 conduction.  Today, Mr. Grossman underwent successful redo-PVI and SVT (slow pathway modification) ablation. Post-procedure he is being admitted for initiation of Tikosyn. Review of ECGs demonstrate QTc ~ 400-470 ms. Calculated CrCl > 60 mL/min.  Bilateral groin sites intact with sutures and without evidence of hematoma.  ECG post-procedure AP at 60 bpm,  ms, QTc 474 ms.  Patient is doing well and offers no complaints.

## 2020-12-10 NOTE — DISCHARGE SUMMARY
[facility] Hospital Medicine  Discharge Summary      Patient Name: Amish Grossman  MRN: [id number]  Admission Date: 11/23/2020  Hospital Length of Stay: 3 days  Discharge Date and Time: 11/26/2020  4:51 PM  Attending Physician: No att. providers found   Discharging Provider: Hernan Clayton MD  Primary Care Provider: Camron Isbell MD  [facility] Medicine Team: [facility] MED O Hernan Clayton MD    HPI:   75M with persistent AF on apixaban, CAD (s/p stents)( with resultant ICM EF 30% s/p [employer] Dual Chamber ICD, VT, essential HTN, mixed HLD, and CKDIII admitted for evaluation of tikosyn monitoring.  Patient had been seen in arrhythmia clinic on 10/28/20 with reports of weakness, fatigue, and lightheadedness. Remote ICD transmission revealing of AF.  Discussed management options and he underwent NOE/DCCV on 10/29/20 with plans for redo-PVI in near future. Device interrogation on 11/17/20 demonstrated probable SVT events with 1:1 conduction.  Today, Mr. Grossman underwent successful redo-PVI and SVT (slow pathway modification) ablation. Post-procedure he is being admitted for initiation of Tikosyn. Review of ECGs demonstrate QTc ~ 400-470 ms. Calculated CrCl > 60 mL/min.  Bilateral groin sites intact with sutures and without evidence of hematoma.  ECG post-procedure AP at 60 bpm,  ms, QTc 474 ms.  Patient is doing well and offers no complaints.    Procedure(s) (LRB):  ABLATION, ARRHYTHMOGENIC FOCUS, FOR ATRIAL FIBRILLATION (N/A)  Ablation, SVT, Slow Pathway Modification For AVNRT  Cardioversion or Defibrillation      Hospital Course:   Admitted to  and administered Tikosyn with good tolerance.  Discharged in stable condition.    Visit for monitoring Tikosyn therapy  Persistent atrial fibrillation  PSVT  Chronic anticoagulation  - Tikosyn order set in place  - EP consulted  - Started 500 mcg BID   - ECG 2-3 hours after each dose  - C/w BB  - C/w eliquis     Ischemic CM s/p AICD  Acute on  chronic combined HF  CAD due to lipid rich plaque  Essential HTN  - Patient noted to be slightly hypervolemic  - Dose IV lasix x 1 and reassess  - Now euvolemic  - C/w GDMT with carvedilol/lisinopril  - C/w antiHTNsives as tolerated  - C/w eliquis, ASA, statin as tolerated     CKDII  - Renally dose medications, cardiac PharmD aware  - Avoid nephrotoxic agents     Hypothyroidism, acquired  - C/w synthroid     Consults:   Consults (From admission, onward)        Status Ordering Provider     Inpatient consult to Electrophysiology  Once     Provider:  (Not yet assigned)    KELBY Russell          No new Assessment & Plan notes have been filed under this hospital service since the last note was generated.  Service: Hospital Medicine    Final Active Diagnoses:    Diagnosis Date Noted POA    PRINCIPAL PROBLEM:  Visit for monitoring Tikosyn therapy [Z51.81, Z79.899] 12/03/2013 Not Applicable    Stage 3a chronic kidney disease [N18.31] 11/23/2020 Yes    Acute on chronic combined systolic and diastolic heart failure [I50.43] 11/23/2020 Yes    PSVT (paroxysmal supraventricular tachycardia) [I47.1]  Yes    Chronic anticoagulation [Z79.01] 10/28/2020 Not Applicable     Chronic    Persistent atrial fibrillation [I48.19] 03/15/2016 Yes    Coronary artery disease due to lipid rich plaque [I25.10, I25.83] 10/11/2012 Yes    Hypothyroid [E03.9] 10/11/2012 Yes    Automatic implantable cardioverter-defibrillator in situ [Z95.810] 10/11/2012 Yes    Ischemic cardiomyopathy [I25.5] 10/11/2012 Yes    HTN (hypertension), benign [I10] 10/11/2012 Yes      Problems Resolved During this Admission:       Discharged Condition: stable    Disposition: Home or Self Care    Follow Up:  Follow-up Information     Camron Isbell MD In 4 weeks.    Specialties: Electrophysiology, Cardiology  Why: Check in for Tikosyn effectiveness.    Contact information:  [facility] [address] [phone]                  Patient Instructions:      Ambulatory referral/consult to Cardiac Electrophysiology   Standing Status: Future   Referral Priority: Routine Referral Type: Consultation   Referral Reason: Specialty Services Required   Requested Specialty: Cardiology   Number of Visits Requested: 1     Ambulatory referral/consult to Cardiac Electrophysiology   Standing Status: Future   Referral Priority: Routine Referral Type: Consultation   Referral Reason: Specialty Services Required   Requested Specialty: Cardiology   Number of Visits Requested: 1     Diet Cardiac     Notify your health care provider if you experience any of the following:  temperature >100.4     Notify your health care provider if you experience any of the following:  persistent nausea and vomiting or diarrhea     Notify your health care provider if you experience any of the following:  severe uncontrolled pain     Notify your health care provider if you experience any of the following:  redness, tenderness, or signs of infection (pain, swelling, redness, odor or green/yellow discharge around incision site)     Notify your health care provider if you experience any of the following:  difficulty breathing or increased cough     Notify your health care provider if you experience any of the following:  severe persistent headache     Notify your health care provider if you experience any of the following:  worsening rash     Notify your health care provider if you experience any of the following:  persistent dizziness, light-headedness, or visual disturbances     Notify your health care provider if you experience any of the following:  increased confusion or weakness     Activity as tolerated     Medications:  Reconciled Home Medications:      Medication List      START taking these medications    dofetilide 500 MCG Cap  Commonly known as: TIKOSYN  Take 1 capsule (500 mcg total) by mouth every 12 (twelve) hours.     pantoprazole 40 MG tablet  Commonly known as:  PROTONIX  Take 1 tablet (40 mg total) by mouth once daily.        CHANGE how you take these medications    lisinopriL 30 MG tablet  Commonly known as: PRINIVIL,ZESTRIL  Take 1 tablet (30 mg total) by mouth every evening.  What changed:   · when to take this  · Another medication with the same name was removed. Continue taking this medication, and follow the directions you see here.     SLO-NIACIN 750 mg Tbsr  Generic drug: niacin  Take 1 tablet (750 mg total) by mouth 2 (two) times daily.  What changed: how much to take        CONTINUE taking these medications    amLODIPine 5 MG tablet  Commonly known as: NORVASC  Take 1 tablet (5 mg total) by mouth 2 (two) times daily.     apixaban 5 mg Tab  Commonly known as: ELIQUIS  Take 1 tablet (5 mg total) by mouth 2 (two) times daily.     aspirin 81 MG EC tablet  Commonly known as: ECOTRIN  Take 81 mg by mouth once daily.     atorvastatin 20 MG tablet  Commonly known as: LIPITOR  Take 1 tablet (20 mg total) by mouth once daily.     carvediloL 12.5 MG tablet  Commonly known as: COREG  Take 1 tablet (12.5 mg total) by mouth 2 (two) times daily with meals.     CO Q-10 200 mg capsule  Generic drug: coenzyme Q10  Take 200 mg by mouth once daily.     fish oil-omega-3 fatty acids 300-1,000 mg capsule  Take 2 g by mouth 2 (two) times daily.     furosemide 40 MG tablet  Commonly known as: LASIX  Take 40 mg by mouth daily as needed.     levothyroxine 75 MCG tablet  Commonly known as: SYNTHROID  Take 75 mcg by mouth before breakfast.     multivitamin capsule  Take 1 capsule by mouth nightly.     nitroGLYCERIN 0.4 MG SL tablet  Commonly known as: NITROSTAT  Place 1 tablet (0.4 mg total) under the tongue every 5 (five) minutes as needed for Chest pain.     VITAMIN C 100 MG tablet  Generic drug: ascorbic acid (vitamin C)  Take 1,000 mg by mouth once daily.     zinc 50 mg Tab  Take 40 mg by mouth.            Indwelling Lines/Drains at time of discharge:   Lines/Drains/Airways     None                  Time spent on the discharge of patient: 35 minutes  Patient was seen and examined on the date of discharge and determined to be suitable for discharge.         Hernan Clayton MD  Department of Hospital Medicine  [facility]

## 2020-12-10 NOTE — HOSPITAL COURSE
Admitted to  and administered Tikosyn with good tolerance.  Discharged in stable condition.    Visit for monitoring Tikosyn therapy  Persistent atrial fibrillation  PSVT  Chronic anticoagulation  - Tikosyn order set in place  - EP consulted  - Started 500 mcg BID   - ECG 2-3 hours after each dose  - C/w BB  - C/w eliquis     Ischemic CM s/p AICD  Acute on chronic combined HF  CAD due to lipid rich plaque  Essential HTN  - Patient noted to be slightly hypervolemic  - Dose IV lasix x 1 and reassess  - Now euvolemic  - C/w GDMT with carvedilol/lisinopril  - C/w antiHTNsives as tolerated  - C/w eliquis, ASA, statin as tolerated     CKDII  - Renally dose medications, cardiac PharmD aware  - Avoid nephrotoxic agents     Hypothyroidism, acquired  - C/w synthroid

## 2020-12-11 ENCOUNTER — TELEPHONE (OUTPATIENT)
Dept: ELECTROPHYSIOLOGY | Facility: CLINIC | Age: 75
End: 2020-12-11

## 2020-12-11 ENCOUNTER — PATIENT MESSAGE (OUTPATIENT)
Dept: OTHER | Facility: OTHER | Age: 75
End: 2020-12-11

## 2020-12-11 NOTE — TELEPHONE ENCOUNTER
Spoke with patient concerning no communication with his device. [place]. He is returning home on Sunday and will send a remote manual transmission at that time.

## 2020-12-14 RX ORDER — APIXABAN 5 MG/1
TABLET, FILM COATED ORAL
Qty: 180 TABLET | Refills: 3 | Status: SHIPPED | OUTPATIENT
Start: 2020-12-14 | End: 2021-12-27

## 2020-12-14 NOTE — TELEPHONE ENCOUNTER
Returned call to pt to advise. Pt voice understanding.  Advised this can be normal after an ablation. Explained the 3 month blanking period where scarring takes place after an ablation. This is a very common occurence. Pt voiced understanding.        ----- Message from Shannon Solano sent at 12/14/2020  9:52 AM CST -----  Regarding: FW: Transmission  Patient called to see if he had any Afib episodes since his Ablation on 11/23/2020.  He had only 3 Afib episodes max duration 8 seconds.  11/25 for 8 seconds, 11/30 for 8 seconds, 11/23 for 6 seconds.  ----- Message -----  From: Anna Pena  Sent: 12/14/2020   9:13 AM CST  To: [facility] Arrhythmia Device Staff  Subject: Transmission                                     Pt send manual transmission this morning, Pt asking if received. Thanks    [phone]

## 2020-12-16 ENCOUNTER — TELEPHONE (OUTPATIENT)
Dept: ELECTROPHYSIOLOGY | Facility: CLINIC | Age: 75
End: 2020-12-16

## 2020-12-16 RX ORDER — LISINOPRIL 30 MG/1
30 TABLET ORAL NIGHTLY
Qty: 90 TABLET | Refills: 3 | Status: SHIPPED | OUTPATIENT
Start: 2020-12-16 | End: 2021-10-19 | Stop reason: ALTCHOICE

## 2020-12-16 NOTE — TELEPHONE ENCOUNTER
Called pt about refilling lisinopril. He said Dr. Villegas just refilled it when he saw him in the hospital, and he hasn't been able to get in touch with him so he would like DM to refill at [facility].    ----- Message from Anna Pena sent at 12/16/2020 11:06 AM CST -----  Regarding: Refill  .Rx Refill/Request     Is this a Refill or New Rx:  Refill  Rx Name and Strength:  lisinopriL (PRINIVIL,ZESTRIL) 30 MG tablet  Preferred Pharmacy with phone number: [facility], [phone] Fax: [phone]  Communication Preference:  Additional Information:  Thanks

## 2021-02-09 ENCOUNTER — PATIENT MESSAGE (OUTPATIENT)
Dept: CARDIOLOGY | Facility: CLINIC | Age: 76
End: 2021-02-09

## 2021-02-09 RX ORDER — AMLODIPINE BESYLATE 5 MG/1
5 TABLET ORAL 2 TIMES DAILY
Qty: 180 TABLET | Refills: 3
Start: 2021-02-09 | End: 2021-02-11 | Stop reason: SDUPTHER

## 2021-02-11 ENCOUNTER — PATIENT MESSAGE (OUTPATIENT)
Dept: CARDIOLOGY | Facility: CLINIC | Age: 76
End: 2021-02-11

## 2021-02-11 DIAGNOSIS — I48.19 PERSISTENT ATRIAL FIBRILLATION: ICD-10-CM

## 2021-02-11 DIAGNOSIS — I10 HTN (HYPERTENSION), BENIGN: ICD-10-CM

## 2021-02-11 RX ORDER — CARVEDILOL 12.5 MG/1
12.5 TABLET ORAL 2 TIMES DAILY WITH MEALS
Qty: 180 TABLET | Refills: 3 | Status: SHIPPED | OUTPATIENT
Start: 2021-02-11 | End: 2022-02-11

## 2021-02-11 RX ORDER — AMLODIPINE BESYLATE 5 MG/1
5 TABLET ORAL 2 TIMES DAILY
Qty: 180 TABLET | Refills: 3 | Status: SHIPPED | OUTPATIENT
Start: 2021-02-11 | End: 2022-06-13

## 2021-02-16 ENCOUNTER — PATIENT MESSAGE (OUTPATIENT)
Dept: ELECTROPHYSIOLOGY | Facility: CLINIC | Age: 76
End: 2021-02-16

## 2021-02-16 DIAGNOSIS — I48.0 PAROXYSMAL ATRIAL FIBRILLATION: Primary | ICD-10-CM

## 2021-02-17 RX ORDER — DOFETILIDE 0.5 MG/1
500 CAPSULE ORAL EVERY 12 HOURS
Qty: 180 CAPSULE | Refills: 3 | Status: SHIPPED | OUTPATIENT
Start: 2021-02-17 | End: 2022-02-14

## 2021-02-26 ENCOUNTER — CLINICAL SUPPORT (OUTPATIENT)
Dept: CARDIOLOGY | Facility: HOSPITAL | Age: 76
End: 2021-02-26
Payer: MEDICARE

## 2021-02-26 DIAGNOSIS — Z95.810 PRESENCE OF AUTOMATIC (IMPLANTABLE) CARDIAC DEFIBRILLATOR: ICD-10-CM

## 2021-02-26 DIAGNOSIS — I48.91 UNSPECIFIED ATRIAL FIBRILLATION: ICD-10-CM

## 2021-02-26 DIAGNOSIS — I25.5 ISCHEMIC CARDIOMYOPATHY: ICD-10-CM

## 2021-02-26 PROCEDURE — 93295 CARDIAC DEVICE CHECK - REMOTE: ICD-10-PCS | Mod: ,,, | Performed by: INTERNAL MEDICINE

## 2021-02-26 PROCEDURE — 93295 DEV INTERROG REMOTE 1/2/MLT: CPT | Mod: ,,, | Performed by: INTERNAL MEDICINE

## 2021-03-15 ENCOUNTER — TELEPHONE (OUTPATIENT)
Dept: CARDIOLOGY | Facility: CLINIC | Age: 76
End: 2021-03-15

## 2021-03-15 ENCOUNTER — PATIENT MESSAGE (OUTPATIENT)
Dept: CARDIOLOGY | Facility: CLINIC | Age: 76
End: 2021-03-15

## 2021-03-15 DIAGNOSIS — E03.4 HYPOTHYROIDISM DUE TO ACQUIRED ATROPHY OF THYROID: ICD-10-CM

## 2021-03-15 DIAGNOSIS — E78.5 DYSLIPIDEMIA: ICD-10-CM

## 2021-03-15 DIAGNOSIS — I25.5 ISCHEMIC CARDIOMYOPATHY: Primary | ICD-10-CM

## 2021-03-24 ENCOUNTER — OFFICE VISIT (OUTPATIENT)
Dept: ELECTROPHYSIOLOGY | Facility: CLINIC | Age: 76
End: 2021-03-24
Payer: MEDICARE

## 2021-03-24 ENCOUNTER — HOSPITAL ENCOUNTER (OUTPATIENT)
Dept: CARDIOLOGY | Facility: CLINIC | Age: 76
Discharge: HOME | End: 2021-03-24
Payer: MEDICARE

## 2021-03-24 ENCOUNTER — CLINICAL SUPPORT (OUTPATIENT)
Dept: CARDIOLOGY | Facility: HOSPITAL | Age: 76
End: 2021-03-24
Attending: INTERNAL MEDICINE
Payer: MEDICARE

## 2021-03-24 ENCOUNTER — PATIENT MESSAGE (OUTPATIENT)
Dept: CARDIOLOGY | Facility: CLINIC | Age: 76
End: 2021-03-24

## 2021-03-24 VITALS
DIASTOLIC BLOOD PRESSURE: 90 MMHG | HEIGHT: 74 IN | SYSTOLIC BLOOD PRESSURE: 136 MMHG | WEIGHT: 240.31 LBS | HEART RATE: 66 BPM | BODY MASS INDEX: 30.84 KG/M2

## 2021-03-24 DIAGNOSIS — I48.19 PERSISTENT ATRIAL FIBRILLATION: Primary | ICD-10-CM

## 2021-03-24 DIAGNOSIS — Z79.01 CHRONIC ANTICOAGULATION: Chronic | ICD-10-CM

## 2021-03-24 DIAGNOSIS — I10 HTN (HYPERTENSION), BENIGN: ICD-10-CM

## 2021-03-24 DIAGNOSIS — I47.10 PSVT (PAROXYSMAL SUPRAVENTRICULAR TACHYCARDIA): ICD-10-CM

## 2021-03-24 DIAGNOSIS — I25.5 ISCHEMIC CARDIOMYOPATHY: ICD-10-CM

## 2021-03-24 DIAGNOSIS — Z95.810 AUTOMATIC IMPLANTABLE CARDIOVERTER-DEFIBRILLATOR IN SITU: ICD-10-CM

## 2021-03-24 DIAGNOSIS — I25.83 CORONARY ARTERY DISEASE DUE TO LIPID RICH PLAQUE: ICD-10-CM

## 2021-03-24 DIAGNOSIS — I25.10 CORONARY ARTERY DISEASE DUE TO LIPID RICH PLAQUE: ICD-10-CM

## 2021-03-24 DIAGNOSIS — I48.19 PERSISTENT ATRIAL FIBRILLATION: ICD-10-CM

## 2021-03-24 PROCEDURE — 99214 PR OFFICE/OUTPT VISIT, EST, LEVL IV, 30-39 MIN: ICD-10-PCS | Mod: S$PBB,,, | Performed by: INTERNAL MEDICINE

## 2021-03-24 PROCEDURE — 93005 ELECTROCARDIOGRAM TRACING: CPT | Mod: PBBFAC,59 | Performed by: INTERNAL MEDICINE

## 2021-03-24 PROCEDURE — 93010 ELECTROCARDIOGRAM REPORT: CPT | Mod: S$PBB,,, | Performed by: INTERNAL MEDICINE

## 2021-03-24 PROCEDURE — 99999 PR PBB SHADOW E&M-EST. PATIENT-LVL IV: ICD-10-PCS | Mod: PBBFAC,,, | Performed by: INTERNAL MEDICINE

## 2021-03-24 PROCEDURE — 93010 RHYTHM STRIP: ICD-10-PCS | Mod: S$PBB,,, | Performed by: INTERNAL MEDICINE

## 2021-03-24 PROCEDURE — 99214 OFFICE O/P EST MOD 30 MIN: CPT | Mod: S$PBB,,, | Performed by: INTERNAL MEDICINE

## 2021-03-24 PROCEDURE — 93283 PRGRMG EVAL IMPLANTABLE DFB: CPT | Mod: 26,,, | Performed by: INTERNAL MEDICINE

## 2021-03-24 PROCEDURE — 93283 CARDIAC DEVICE CHECK - IN CLINIC & HOSPITAL: ICD-10-PCS | Mod: 26,,, | Performed by: INTERNAL MEDICINE

## 2021-03-24 PROCEDURE — 99214 OFFICE O/P EST MOD 30 MIN: CPT | Mod: PBBFAC,25 | Performed by: INTERNAL MEDICINE

## 2021-03-24 PROCEDURE — 93283 PRGRMG EVAL IMPLANTABLE DFB: CPT

## 2021-03-24 PROCEDURE — 99999 PR PBB SHADOW E&M-EST. PATIENT-LVL IV: CPT | Mod: PBBFAC,,, | Performed by: INTERNAL MEDICINE

## 2021-03-24 RX ORDER — TRAMADOL HYDROCHLORIDE 50 MG/1
50 TABLET ORAL EVERY 8 HOURS PRN
COMMUNITY
Start: 2021-03-09 | End: 2021-07-08

## 2021-03-24 RX ORDER — NAPROXEN 375 MG/1
375 TABLET ORAL 3 TIMES DAILY
COMMUNITY
Start: 2021-03-08 | End: 2021-07-08

## 2021-03-24 RX ORDER — METHOCARBAMOL 750 MG/1
750 TABLET, FILM COATED ORAL EVERY 8 HOURS
COMMUNITY
Start: 2021-02-23 | End: 2021-07-08

## 2021-03-25 ENCOUNTER — PATIENT MESSAGE (OUTPATIENT)
Dept: CARDIOLOGY | Facility: CLINIC | Age: 76
End: 2021-03-25

## 2021-04-28 ENCOUNTER — TELEPHONE (OUTPATIENT)
Dept: ELECTROPHYSIOLOGY | Facility: CLINIC | Age: 76
End: 2021-04-28

## 2021-05-10 ENCOUNTER — TELEPHONE (OUTPATIENT)
Dept: CARDIOLOGY | Facility: HOSPITAL | Age: 76
End: 2021-05-10

## 2021-05-27 ENCOUNTER — CLINICAL SUPPORT (OUTPATIENT)
Dept: CARDIOLOGY | Facility: HOSPITAL | Age: 76
End: 2021-05-27
Payer: MEDICARE

## 2021-05-27 DIAGNOSIS — I25.5 ISCHEMIC CARDIOMYOPATHY: ICD-10-CM

## 2021-05-27 DIAGNOSIS — Z95.810 PRESENCE OF AUTOMATIC (IMPLANTABLE) CARDIAC DEFIBRILLATOR: ICD-10-CM

## 2021-05-27 DIAGNOSIS — I48.91 UNSPECIFIED ATRIAL FIBRILLATION: ICD-10-CM

## 2021-05-27 PROCEDURE — 93295 DEV INTERROG REMOTE 1/2/MLT: CPT | Mod: ,,, | Performed by: INTERNAL MEDICINE

## 2021-05-27 PROCEDURE — 93295 CARDIAC DEVICE CHECK - REMOTE: ICD-10-PCS | Mod: ,,, | Performed by: INTERNAL MEDICINE

## 2021-08-25 ENCOUNTER — CLINICAL SUPPORT (OUTPATIENT)
Dept: CARDIOLOGY | Facility: HOSPITAL | Age: 76
End: 2021-08-25
Payer: MEDICARE

## 2021-08-25 DIAGNOSIS — I48.91 UNSPECIFIED ATRIAL FIBRILLATION: ICD-10-CM

## 2021-08-25 DIAGNOSIS — I25.5 ISCHEMIC CARDIOMYOPATHY: ICD-10-CM

## 2021-08-25 DIAGNOSIS — Z95.810 PRESENCE OF AUTOMATIC (IMPLANTABLE) CARDIAC DEFIBRILLATOR: ICD-10-CM

## 2021-08-25 DIAGNOSIS — I47.20 VENTRICULAR TACHYCARDIA: ICD-10-CM

## 2021-08-25 PROCEDURE — 93295 DEV INTERROG REMOTE 1/2/MLT: CPT | Mod: ,,, | Performed by: INTERNAL MEDICINE

## 2021-08-25 PROCEDURE — 93295 CARDIAC DEVICE CHECK - REMOTE: ICD-10-PCS | Mod: ,,, | Performed by: INTERNAL MEDICINE

## 2021-10-01 ENCOUNTER — PATIENT MESSAGE (OUTPATIENT)
Dept: CARDIOLOGY | Facility: CLINIC | Age: 76
End: 2021-10-01

## 2021-10-14 ENCOUNTER — TELEPHONE (OUTPATIENT)
Dept: CARDIOLOGY | Facility: CLINIC | Age: 76
End: 2021-10-14

## 2021-10-14 ENCOUNTER — PATIENT MESSAGE (OUTPATIENT)
Dept: CARDIOLOGY | Facility: CLINIC | Age: 76
End: 2021-10-14
Payer: MEDICARE

## 2021-10-14 DIAGNOSIS — I10 HYPERTENSION, UNSPECIFIED TYPE: ICD-10-CM

## 2021-10-14 DIAGNOSIS — R00.2 PALPITATIONS: Primary | ICD-10-CM

## 2021-10-14 DIAGNOSIS — E78.5 DYSLIPIDEMIA: Primary | ICD-10-CM

## 2021-10-19 ENCOUNTER — HOSPITAL ENCOUNTER (OUTPATIENT)
Dept: CARDIOLOGY | Facility: CLINIC | Age: 76
Discharge: HOME | End: 2021-10-19
Payer: MEDICARE

## 2021-10-19 ENCOUNTER — OFFICE VISIT (OUTPATIENT)
Dept: CARDIOLOGY | Facility: CLINIC | Age: 76
End: 2021-10-19
Payer: MEDICARE

## 2021-10-19 VITALS
HEART RATE: 60 BPM | SYSTOLIC BLOOD PRESSURE: 115 MMHG | DIASTOLIC BLOOD PRESSURE: 78 MMHG | WEIGHT: 241.19 LBS | BODY MASS INDEX: 30.95 KG/M2 | HEIGHT: 74 IN

## 2021-10-19 DIAGNOSIS — I48.19 PERSISTENT ATRIAL FIBRILLATION: ICD-10-CM

## 2021-10-19 DIAGNOSIS — I47.10 PSVT (PAROXYSMAL SUPRAVENTRICULAR TACHYCARDIA): ICD-10-CM

## 2021-10-19 DIAGNOSIS — I47.20 VENTRICULAR TACHYARRHYTHMIA: ICD-10-CM

## 2021-10-19 DIAGNOSIS — I25.2 OLD MYOCARDIAL INFARCTION: ICD-10-CM

## 2021-10-19 DIAGNOSIS — Z98.61 S/P PTCA (PERCUTANEOUS TRANSLUMINAL CORONARY ANGIOPLASTY): ICD-10-CM

## 2021-10-19 DIAGNOSIS — E78.5 DYSLIPIDEMIA: ICD-10-CM

## 2021-10-19 DIAGNOSIS — I10 HTN (HYPERTENSION), BENIGN: ICD-10-CM

## 2021-10-19 DIAGNOSIS — I25.83 CORONARY ARTERY DISEASE DUE TO LIPID RICH PLAQUE: Primary | ICD-10-CM

## 2021-10-19 DIAGNOSIS — I87.2 VENOUS INSUFFICIENCY OF LOWER EXTREMITY, UNSPECIFIED LATERALITY: ICD-10-CM

## 2021-10-19 DIAGNOSIS — E03.4 HYPOTHYROIDISM DUE TO ACQUIRED ATROPHY OF THYROID: ICD-10-CM

## 2021-10-19 DIAGNOSIS — I25.5 ISCHEMIC CARDIOMYOPATHY: ICD-10-CM

## 2021-10-19 DIAGNOSIS — I10 HYPERTENSION, UNSPECIFIED TYPE: ICD-10-CM

## 2021-10-19 DIAGNOSIS — E66.09 CLASS 1 OBESITY DUE TO EXCESS CALORIES WITH SERIOUS COMORBIDITY IN ADULT, UNSPECIFIED BMI: ICD-10-CM

## 2021-10-19 DIAGNOSIS — R00.2 PALPITATIONS: ICD-10-CM

## 2021-10-19 DIAGNOSIS — I25.10 CORONARY ARTERY DISEASE DUE TO LIPID RICH PLAQUE: Primary | ICD-10-CM

## 2021-10-19 DIAGNOSIS — I50.43 ACUTE ON CHRONIC COMBINED SYSTOLIC AND DIASTOLIC HEART FAILURE: ICD-10-CM

## 2021-10-19 DIAGNOSIS — Z79.01 CHRONIC ANTICOAGULATION: Chronic | ICD-10-CM

## 2021-10-19 DIAGNOSIS — N52.01 ERECTILE DYSFUNCTION DUE TO ARTERIAL INSUFFICIENCY: ICD-10-CM

## 2021-10-19 PROCEDURE — 93005 ELECTROCARDIOGRAM TRACING: CPT | Mod: PBBFAC | Performed by: INTERNAL MEDICINE

## 2021-10-19 PROCEDURE — 93010 ELECTROCARDIOGRAM REPORT: CPT | Mod: S$PBB,,, | Performed by: INTERNAL MEDICINE

## 2021-10-19 PROCEDURE — 99215 PR OFFICE/OUTPT VISIT, EST, LEVL V, 40-54 MIN: ICD-10-PCS | Mod: S$PBB,,, | Performed by: INTERNAL MEDICINE

## 2021-10-19 PROCEDURE — 99999 PR PBB SHADOW E&M-EST. PATIENT-LVL IV: CPT | Mod: PBBFAC,,, | Performed by: INTERNAL MEDICINE

## 2021-10-19 PROCEDURE — 99215 OFFICE O/P EST HI 40 MIN: CPT | Mod: S$PBB,,, | Performed by: INTERNAL MEDICINE

## 2021-10-19 PROCEDURE — 99214 OFFICE O/P EST MOD 30 MIN: CPT | Mod: PBBFAC | Performed by: INTERNAL MEDICINE

## 2021-10-19 PROCEDURE — 93010 EKG 12-LEAD: ICD-10-PCS | Mod: S$PBB,,, | Performed by: INTERNAL MEDICINE

## 2021-10-19 PROCEDURE — 99999 PR PBB SHADOW E&M-EST. PATIENT-LVL IV: ICD-10-PCS | Mod: PBBFAC,,, | Performed by: INTERNAL MEDICINE

## 2021-10-19 RX ORDER — SACUBITRIL AND VALSARTAN 97; 103 MG/1; MG/1
1 TABLET, FILM COATED ORAL 2 TIMES DAILY
Qty: 180 TABLET | Refills: 3 | Status: SHIPPED | OUTPATIENT
Start: 2021-10-19 | End: 2022-10-10

## 2021-10-19 RX ORDER — SACUBITRIL AND VALSARTAN 97; 103 MG/1; MG/1
1 TABLET, FILM COATED ORAL 2 TIMES DAILY
Qty: 180 TABLET | Refills: 3 | Status: SHIPPED | OUTPATIENT
Start: 2021-10-19 | End: 2021-10-19 | Stop reason: SDUPTHER

## 2021-10-20 ENCOUNTER — PATIENT MESSAGE (OUTPATIENT)
Dept: CARDIOLOGY | Facility: CLINIC | Age: 76
End: 2021-10-20
Payer: MEDICARE

## 2021-11-23 ENCOUNTER — CLINICAL SUPPORT (OUTPATIENT)
Dept: CARDIOLOGY | Facility: HOSPITAL | Age: 76
End: 2021-11-23
Payer: MEDICARE

## 2021-11-23 DIAGNOSIS — I25.5 ISCHEMIC CARDIOMYOPATHY: ICD-10-CM

## 2021-11-23 DIAGNOSIS — Z95.810 PRESENCE OF AUTOMATIC (IMPLANTABLE) CARDIAC DEFIBRILLATOR: ICD-10-CM

## 2021-11-23 DIAGNOSIS — I48.91 UNSPECIFIED ATRIAL FIBRILLATION: ICD-10-CM

## 2021-11-23 DIAGNOSIS — I47.20 VENTRICULAR TACHYCARDIA: ICD-10-CM

## 2021-11-23 PROCEDURE — 93295 DEV INTERROG REMOTE 1/2/MLT: CPT | Mod: ,,, | Performed by: INTERNAL MEDICINE

## 2021-11-23 PROCEDURE — 93295 CARDIAC DEVICE CHECK - REMOTE: ICD-10-PCS | Mod: ,,, | Performed by: INTERNAL MEDICINE

## 2021-12-27 RX ORDER — APIXABAN 5 MG/1
TABLET, FILM COATED ORAL
Qty: 180 TABLET | Refills: 3 | Status: SHIPPED | OUTPATIENT
Start: 2021-12-27 | End: 2022-12-27

## 2022-01-06 ENCOUNTER — TELEPHONE (OUTPATIENT)
Dept: ELECTROPHYSIOLOGY | Facility: CLINIC | Age: 77
End: 2022-01-06
Payer: MEDICARE

## 2022-01-06 DIAGNOSIS — I25.5 ISCHEMIC CARDIOMYOPATHY: Primary | ICD-10-CM

## 2022-01-06 DIAGNOSIS — I48.19 PERSISTENT ATRIAL FIBRILLATION: Primary | ICD-10-CM

## 2022-01-08 ENCOUNTER — PATIENT MESSAGE (OUTPATIENT)
Dept: CARDIOLOGY | Facility: CLINIC | Age: 77
End: 2022-01-08
Payer: MEDICARE

## 2022-02-21 ENCOUNTER — CLINICAL SUPPORT (OUTPATIENT)
Dept: CARDIOLOGY | Facility: HOSPITAL | Age: 77
End: 2022-02-21
Payer: MEDICARE

## 2022-02-21 DIAGNOSIS — I48.91 UNSPECIFIED ATRIAL FIBRILLATION: ICD-10-CM

## 2022-02-21 DIAGNOSIS — I25.5 ISCHEMIC CARDIOMYOPATHY: ICD-10-CM

## 2022-02-21 DIAGNOSIS — Z95.810 PRESENCE OF AUTOMATIC (IMPLANTABLE) CARDIAC DEFIBRILLATOR: ICD-10-CM

## 2022-03-15 NOTE — PROGRESS NOTES
Subjective:   Patient ID:  Amish Grossman is a 76 y.o. male who presents for follow-up of CVD    HPI:The patient is here for CAD/HF-I just saw him in October ( soon after his middle aged son  of COVID) and was planning on just reviewing his labs and CFD when he saw Dr Isbell but he requested to see me again too.      The patient has no chest pain, SOB, TIA, palpitations, syncope or pre-syncope.Patient does not exercise much.        Review of Systems   Constitutional: Negative for chills, decreased appetite, diaphoresis, fever, malaise/fatigue, night sweats, weight gain and weight loss.   HENT: Negative for congestion, hoarse voice, nosebleeds, sore throat and tinnitus.    Eyes: Negative for blurred vision, double vision, vision loss in left eye, vision loss in right eye, visual disturbance and visual halos.   Cardiovascular: Negative for chest pain, claudication, cyanosis, dyspnea on exertion, irregular heartbeat, leg swelling, near-syncope, orthopnea, palpitations, paroxysmal nocturnal dyspnea and syncope.   Respiratory: Negative for cough, hemoptysis, shortness of breath, sleep disturbances due to breathing, snoring, sputum production and wheezing.    Endocrine: Negative for cold intolerance, heat intolerance, polydipsia, polyphagia and polyuria.   Hematologic/Lymphatic: Negative for adenopathy and bleeding problem. Does not bruise/bleed easily.   Skin: Negative for color change, dry skin, flushing, itching, nail changes, poor wound healing, rash, skin cancer, suspicious lesions and unusual hair distribution.   Musculoskeletal: Negative for arthritis, back pain, falls, gout, joint pain, joint swelling, muscle cramps, muscle weakness, myalgias and stiffness.   Gastrointestinal: Negative for abdominal pain, anorexia, change in bowel habit, constipation, diarrhea, dysphagia, heartburn, hematemesis, hematochezia, melena and vomiting.   Genitourinary: Negative for decreased libido, dysuria, hematuria, hesitancy and  "urgency.   Neurological: Negative for excessive daytime sleepiness, dizziness, focal weakness, headaches, light-headedness, loss of balance, numbness, paresthesias, seizures, sensory change, tremors, vertigo and weakness.   Psychiatric/Behavioral: Negative for altered mental status, depression, hallucinations, memory loss, substance abuse and suicidal ideas. The patient does not have insomnia and is not nervous/anxious.    Allergic/Immunologic: Negative for environmental allergies and hives.       Objective: /83 (BP Location: Left arm, Patient Position: Sitting, BP Method: Medium (Automatic))   Pulse 67   Ht 6' 2" (1.88 m)   Wt 114.1 kg (251 lb 8.7 oz)   BMI 32.30 kg/m²      Physical Exam  Constitutional:       General: He is not in acute distress.     Appearance: He is well-developed. He is not diaphoretic.   HENT:      Head: Normocephalic.   Eyes:      Pupils: Pupils are equal, round, and reactive to light.   Neck:      Thyroid: No thyromegaly.   Cardiovascular:      Rate and Rhythm: Normal rate and regular rhythm.      Pulses: Intact distal pulses.           Carotid pulses are 3+ on the right side and 3+ on the left side.       Radial pulses are 3+ on the right side and 3+ on the left side.        Femoral pulses are 3+ on the right side and 3+ on the left side.       Popliteal pulses are 3+ on the right side and 3+ on the left side.        Dorsalis pedis pulses are 3+ on the right side and 3+ on the left side.        Posterior tibial pulses are 3+ on the right side and 3+ on the left side.      Heart sounds: Normal heart sounds. No murmur heard.    No friction rub. No gallop.   Pulmonary:      Effort: Pulmonary effort is normal. No respiratory distress.      Breath sounds: Normal breath sounds. No wheezing or rales.   Chest:      Chest wall: No tenderness.   Abdominal:      General: There is no distension.      Palpations: Abdomen is soft. There is no mass.      Tenderness: There is no abdominal " tenderness.   Musculoskeletal:         General: Normal range of motion.      Cervical back: Normal range of motion.   Lymphadenopathy:      Cervical: No cervical adenopathy.   Skin:     General: Skin is warm.      Nails: There is no clubbing.   Neurological:      Mental Status: He is alert and oriented to person, place, and time.   Psychiatric:         Speech: Speech normal.         Behavior: Behavior normal.         Thought Content: Thought content normal.         Judgment: Judgment normal.         Assessment:     1. Ischemic cardiomyopathy    2. Coronary artery disease due to lipid rich plaque    3. Old myocardial infarction    4. Chronic anticoagulation    5. CKD (chronic kidney disease) stage 1, GFR 90 ml/min or greater    6. S/P PTCA (percutaneous transluminal coronary angioplasty)    7. Dyslipidemia    8. HTN (hypertension), benign    9. Erectile dysfunction due to arterial insufficiency    10. Venous insufficiency of lower extremity, unspecified laterality    11. Other specified hypothyroidism    12. Automatic implantable cardioverter-defibrillator in situ    13. Persistent atrial fibrillation    14. Interstitial lung disease    15. PSVT (paroxysmal supraventricular tachycardia)    16. Stage 3a chronic kidney disease    17. Acute on chronic combined systolic and diastolic heart failure        Plan:   Discussed diet , achieving and maintaining ideal body weight, and exercise.   We reviewed meds in detail.  Reassured-Discussed goals, options, plan.  Omega-3 > 800 mg/d combined EPA/DHA.  Co Q 10 200 mg/d  Goal BP< 130/80.  Goal LDL < 70.  NTG should be refilled every 8-9 months.  Increase L thyroxine to 88  Reduce the Furosimide to just twice per week but increase water bu 2 more glasses per day  If BP is too low can reduce Amlodipine to just half rodney    Amish was seen today for coronary artery disease due to lipid rich plaque .    Diagnoses and all orders for this visit:    Ischemic cardiomyopathy  -     Lipid  Panel; Future; Expected date: 05/16/2023  -     Comprehensive Metabolic Panel; Future; Expected date: 05/16/2023  -     TSH; Standing  -     EKG 12-lead; Future; Expected date: 05/16/2023  -     BNP; Future; Expected date: 05/16/2023    Coronary artery disease due to lipid rich plaque  -     EKG 12-lead; Future; Expected date: 05/16/2023    Old myocardial infarction    Chronic anticoagulation    CKD (chronic kidney disease) stage 1, GFR 90 ml/min or greater    S/P PTCA (percutaneous transluminal coronary angioplasty)    Dyslipidemia  -     Lipid Panel; Future; Expected date: 05/16/2023  -     Comprehensive Metabolic Panel; Future; Expected date: 05/16/2023  -     TSH; Standing  -     T4, Free; Standing    HTN (hypertension), benign  -     Comprehensive Metabolic Panel; Future; Expected date: 05/16/2023  -     TSH; Standing  -     T4, Free; Standing    Erectile dysfunction due to arterial insufficiency    Venous insufficiency of lower extremity, unspecified laterality    Other specified hypothyroidism    Automatic implantable cardioverter-defibrillator in situ    Persistent atrial fibrillation    Interstitial lung disease    PSVT (paroxysmal supraventricular tachycardia)    Stage 3a chronic kidney disease  -     Basic Metabolic Panel; Future; Expected date: 07/16/2022    Acute on chronic combined systolic and diastolic heart failure    Other orders  -     levothyroxine (SYNTHROID) 88 MCG tablet; Take 1 tablet (88 mcg total) by mouth before breakfast.            Follow up in about 15 months (around 6/16/2023) for with ECG and labs l labs 4 months at [facility].

## 2022-03-16 ENCOUNTER — CLINICAL SUPPORT (OUTPATIENT)
Dept: CARDIOLOGY | Facility: HOSPITAL | Age: 77
End: 2022-03-16
Attending: INTERNAL MEDICINE
Payer: MEDICARE

## 2022-03-16 ENCOUNTER — OFFICE VISIT (OUTPATIENT)
Dept: ELECTROPHYSIOLOGY | Facility: CLINIC | Age: 77
End: 2022-03-16
Payer: MEDICARE

## 2022-03-16 ENCOUNTER — HOSPITAL ENCOUNTER (OUTPATIENT)
Dept: CARDIOLOGY | Facility: CLINIC | Age: 77
Discharge: HOME | End: 2022-03-16
Attending: INTERNAL MEDICINE
Payer: MEDICARE

## 2022-03-16 ENCOUNTER — OFFICE VISIT (OUTPATIENT)
Dept: CARDIOLOGY | Facility: CLINIC | Age: 77
End: 2022-03-16
Payer: MEDICARE

## 2022-03-16 ENCOUNTER — HOSPITAL ENCOUNTER (OUTPATIENT)
Dept: CARDIOLOGY | Facility: HOSPITAL | Age: 77
Discharge: HOME | End: 2022-03-16
Attending: INTERNAL MEDICINE
Payer: MEDICARE

## 2022-03-16 VITALS
BODY MASS INDEX: 32.64 KG/M2 | WEIGHT: 241 LBS | DIASTOLIC BLOOD PRESSURE: 76 MMHG | HEART RATE: 65 BPM | SYSTOLIC BLOOD PRESSURE: 118 MMHG | HEIGHT: 72 IN

## 2022-03-16 VITALS
HEIGHT: 74 IN | HEART RATE: 67 BPM | BODY MASS INDEX: 32.29 KG/M2 | WEIGHT: 251.56 LBS | SYSTOLIC BLOOD PRESSURE: 122 MMHG | DIASTOLIC BLOOD PRESSURE: 83 MMHG

## 2022-03-16 VITALS
HEART RATE: 63 BPM | SYSTOLIC BLOOD PRESSURE: 122 MMHG | HEIGHT: 72 IN | WEIGHT: 251.31 LBS | DIASTOLIC BLOOD PRESSURE: 78 MMHG | BODY MASS INDEX: 34.04 KG/M2

## 2022-03-16 DIAGNOSIS — I25.5 ISCHEMIC CARDIOMYOPATHY: Primary | ICD-10-CM

## 2022-03-16 DIAGNOSIS — I10 HTN (HYPERTENSION), BENIGN: ICD-10-CM

## 2022-03-16 DIAGNOSIS — I25.2 OLD MYOCARDIAL INFARCTION: ICD-10-CM

## 2022-03-16 DIAGNOSIS — I25.5 ISCHEMIC CARDIOMYOPATHY: ICD-10-CM

## 2022-03-16 DIAGNOSIS — E03.8 OTHER SPECIFIED HYPOTHYROIDISM: ICD-10-CM

## 2022-03-16 DIAGNOSIS — I25.10 CORONARY ARTERY DISEASE DUE TO LIPID RICH PLAQUE: ICD-10-CM

## 2022-03-16 DIAGNOSIS — I25.83 CORONARY ARTERY DISEASE DUE TO LIPID RICH PLAQUE: ICD-10-CM

## 2022-03-16 DIAGNOSIS — Z98.61 S/P PTCA (PERCUTANEOUS TRANSLUMINAL CORONARY ANGIOPLASTY): ICD-10-CM

## 2022-03-16 DIAGNOSIS — I47.10 PSVT (PAROXYSMAL SUPRAVENTRICULAR TACHYCARDIA): ICD-10-CM

## 2022-03-16 DIAGNOSIS — Z79.01 CHRONIC ANTICOAGULATION: Chronic | ICD-10-CM

## 2022-03-16 DIAGNOSIS — I87.2 VENOUS INSUFFICIENCY OF LOWER EXTREMITY, UNSPECIFIED LATERALITY: ICD-10-CM

## 2022-03-16 DIAGNOSIS — I48.19 PERSISTENT ATRIAL FIBRILLATION: Primary | ICD-10-CM

## 2022-03-16 DIAGNOSIS — Z79.899 VISIT FOR MONITORING TIKOSYN THERAPY: ICD-10-CM

## 2022-03-16 DIAGNOSIS — I50.43 ACUTE ON CHRONIC COMBINED SYSTOLIC AND DIASTOLIC HEART FAILURE: ICD-10-CM

## 2022-03-16 DIAGNOSIS — I48.19 PERSISTENT ATRIAL FIBRILLATION: ICD-10-CM

## 2022-03-16 DIAGNOSIS — N18.1 CKD (CHRONIC KIDNEY DISEASE) STAGE 1, GFR 90 ML/MIN OR GREATER: ICD-10-CM

## 2022-03-16 DIAGNOSIS — Z51.81 VISIT FOR MONITORING TIKOSYN THERAPY: ICD-10-CM

## 2022-03-16 DIAGNOSIS — J84.9 INTERSTITIAL LUNG DISEASE: ICD-10-CM

## 2022-03-16 DIAGNOSIS — N18.31 STAGE 3A CHRONIC KIDNEY DISEASE: ICD-10-CM

## 2022-03-16 DIAGNOSIS — N52.01 ERECTILE DYSFUNCTION DUE TO ARTERIAL INSUFFICIENCY: ICD-10-CM

## 2022-03-16 DIAGNOSIS — E78.5 DYSLIPIDEMIA: ICD-10-CM

## 2022-03-16 DIAGNOSIS — Z95.810 AUTOMATIC IMPLANTABLE CARDIOVERTER-DEFIBRILLATOR IN SITU: ICD-10-CM

## 2022-03-16 LAB
ASCENDING AORTA: 3.98 CM
AV INDEX (PROSTH): 0.73
AV MEAN GRADIENT: 3 MMHG
AV PEAK GRADIENT: 6 MMHG
AV VALVE AREA: 2.93 CM2
AV VELOCITY RATIO: 0.63
BSA FOR ECHO PROCEDURE: 2.36 M2
CV ECHO LV RWT: 0.35 CM
DOP CALC AO PEAK VEL: 1.2 M/S
DOP CALC AO VTI: 23.42 CM
DOP CALC LVOT AREA: 4 CM2
DOP CALC LVOT DIAMETER: 2.27 CM
DOP CALC LVOT PEAK VEL: 0.75 M/S
DOP CALC LVOT STROKE VOLUME: 68.73 CM3
DOP CALCLVOT PEAK VEL VTI: 16.99 CM
E WAVE DECELERATION TIME: 192.54 MSEC
E/A RATIO: 0.98
E/E' RATIO: 11.4 M/S
ECHO LV POSTERIOR WALL: 0.94 CM (ref 0.6–1.1)
EJECTION FRACTION: 40 %
FRACTIONAL SHORTENING: 34 % (ref 28–44)
INTERVENTRICULAR SEPTUM: 0.86 CM (ref 0.6–1.1)
IVRT: 117.03 MSEC
LA MAJOR: 6.5 CM
LA MINOR: 6.71 CM
LA WIDTH: 4.65 CM
LEFT ATRIUM SIZE: 4.65 CM
LEFT ATRIUM VOLUME INDEX MOD: 46 ML/M2
LEFT ATRIUM VOLUME INDEX: 52.5 ML/M2
LEFT ATRIUM VOLUME MOD: 106.25 CM3
LEFT ATRIUM VOLUME: 121.36 CM3
LEFT INTERNAL DIMENSION IN SYSTOLE: 3.54 CM (ref 2.1–4)
LEFT VENTRICLE DIASTOLIC VOLUME INDEX: 59.32 ML/M2
LEFT VENTRICLE DIASTOLIC VOLUME: 137.02 ML
LEFT VENTRICLE MASS INDEX: 76 G/M2
LEFT VENTRICLE SYSTOLIC VOLUME INDEX: 22.7 ML/M2
LEFT VENTRICLE SYSTOLIC VOLUME: 52.44 ML
LEFT VENTRICULAR INTERNAL DIMENSION IN DIASTOLE: 5.33 CM (ref 3.5–6)
LEFT VENTRICULAR MASS: 176.19 G
LV LATERAL E/E' RATIO: 11.4 M/S
LV SEPTAL E/E' RATIO: 11.4 M/S
MV A" WAVE DURATION": 10.56 MSEC
MV PEAK A VEL: 0.58 M/S
MV PEAK E VEL: 0.57 M/S
MV STENOSIS PRESSURE HALF TIME: 55.84 MS
MV VALVE AREA P 1/2 METHOD: 3.94 CM2
PISA TR MAX VEL: 2.1 M/S
PULM VEIN S/D RATIO: 1.41
PV PEAK D VEL: 0.29 M/S
PV PEAK S VEL: 0.41 M/S
QEF: 39 %
RA MAJOR: 6.03 CM
RA PRESSURE: 3 MMHG
RA WIDTH: 4.59 CM
RIGHT VENTRICULAR END-DIASTOLIC DIMENSION: 4.79 CM
RV TISSUE DOPPLER FREE WALL SYSTOLIC VELOCITY 1 (APICAL 4 CHAMBER VIEW): 12.65 CM/S
SINUS: 3.8 CM
STJ: 3.3 CM
TDI LATERAL: 0.05 M/S
TDI SEPTAL: 0.05 M/S
TDI: 0.05 M/S
TR MAX PG: 18 MMHG
TV REST PULMONARY ARTERY PRESSURE: 21 MMHG

## 2022-03-16 PROCEDURE — 99999 PR PBB SHADOW E&M-EST. PATIENT-LVL III: ICD-10-PCS | Mod: PBBFAC,,, | Performed by: INTERNAL MEDICINE

## 2022-03-16 PROCEDURE — 93010 RHYTHM STRIP: ICD-10-PCS | Mod: S$PBB,,, | Performed by: INTERNAL MEDICINE

## 2022-03-16 PROCEDURE — 99214 PR OFFICE/OUTPT VISIT, EST, LEVL IV, 30-39 MIN: ICD-10-PCS | Mod: S$PBB,,, | Performed by: INTERNAL MEDICINE

## 2022-03-16 PROCEDURE — 93005 ELECTROCARDIOGRAM TRACING: CPT | Mod: PBBFAC | Performed by: INTERNAL MEDICINE

## 2022-03-16 PROCEDURE — 99215 OFFICE O/P EST HI 40 MIN: CPT | Mod: S$PBB,,, | Performed by: INTERNAL MEDICINE

## 2022-03-16 PROCEDURE — 99999 PR PBB SHADOW E&M-EST. PATIENT-LVL III: CPT | Mod: PBBFAC,,, | Performed by: INTERNAL MEDICINE

## 2022-03-16 PROCEDURE — 93010 ELECTROCARDIOGRAM REPORT: CPT | Mod: S$PBB,,, | Performed by: INTERNAL MEDICINE

## 2022-03-16 PROCEDURE — 93283 CARDIAC DEVICE CHECK - IN CLINIC & HOSPITAL: ICD-10-PCS | Mod: 26,,, | Performed by: INTERNAL MEDICINE

## 2022-03-16 PROCEDURE — 99215 PR OFFICE/OUTPT VISIT, EST, LEVL V, 40-54 MIN: ICD-10-PCS | Mod: S$PBB,,, | Performed by: INTERNAL MEDICINE

## 2022-03-16 PROCEDURE — 93283 PRGRMG EVAL IMPLANTABLE DFB: CPT

## 2022-03-16 PROCEDURE — 93283 PRGRMG EVAL IMPLANTABLE DFB: CPT | Mod: 26,,, | Performed by: INTERNAL MEDICINE

## 2022-03-16 PROCEDURE — 93306 ECHO (CUPID ONLY): ICD-10-PCS | Mod: 26,,, | Performed by: INTERNAL MEDICINE

## 2022-03-16 PROCEDURE — 99213 OFFICE O/P EST LOW 20 MIN: CPT | Mod: PBBFAC,25 | Performed by: INTERNAL MEDICINE

## 2022-03-16 PROCEDURE — 93306 TTE W/DOPPLER COMPLETE: CPT | Mod: 26,,, | Performed by: INTERNAL MEDICINE

## 2022-03-16 PROCEDURE — 99213 OFFICE O/P EST LOW 20 MIN: CPT | Mod: PBBFAC,25,27 | Performed by: INTERNAL MEDICINE

## 2022-03-16 PROCEDURE — 99214 OFFICE O/P EST MOD 30 MIN: CPT | Mod: S$PBB,,, | Performed by: INTERNAL MEDICINE

## 2022-03-16 PROCEDURE — 93306 TTE W/DOPPLER COMPLETE: CPT

## 2022-03-16 RX ORDER — LEVOTHYROXINE SODIUM 88 UG/1
88 TABLET ORAL
Qty: 90 TABLET | Refills: 3 | Status: SHIPPED | OUTPATIENT
Start: 2022-03-16 | End: 2023-03-13

## 2022-03-16 NOTE — PROGRESS NOTES
Subjective:   Patient ID:  Amish Grossman is a 76 y.o. male who presents for follow-up of AF and SVT.       Mr. Grossman is a 76 y.o. 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  CAD/MI (1993)/PCI (with stent placement in 2000 and 2004), stable  HTN on meds  HL on meds  CKD, stable  hypothyroid on meds  RV thrombus hx; on coumadin  VT (12/2014), resulting in ICD shock  persistent AF  PSVT (AVNRT, s/p RFA)    We did ICD gen change 6/7/18. This was c/b VT intraop, requiring external rescue to NSR. Also, a breach in the RV lead's insulation was repaired at that time.    12/9/2019: Cryoblative pulmonary vein isolation of all 4 pulmonary veins. Amiodarone was restarted. Stopped due to decreased DLCO on PFTs 1/2020. 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  he says that his SHOOK has improved since his ablation. He denies CP, palpitations, LH, syncope.     Had AF recurrence, as well as PSVT found on ICD. In 11/2020, underwent redo PVI (posterior box, CFAE) ablation as well as AVNRT ablation. Since 11/2020 ablation, he feels great!     Had one 5-hour AF episode in 12/2021 (asymptomatic).     echo 40%  QTc 446    I have personally reviewed the patient's EKG today, which shows APVS at 66 bpm.         Current Outpatient Medications   Medication Sig    amiodarone (PACERONE) 200 MG Tab Take 400mg (two tabs)two times daily x14 days, then decrease to 400mg (two tabs)once daily x14 days, then decrease to 200mg (one tab)daily.    amLODIPine (NORVASC) 5 MG tablet Take 1 tablet (5 mg total) by mouth once daily.    ascorbic acid (VITAMIN C) 100 MG tablet Take 1,000 mg by mouth once daily.     aspirin (ECOTRIN) 81 MG EC tablet Take 81 mg by mouth once daily.    atorvastatin (LIPITOR) 10 MG tablet Take 1 tablet (10 mg total) by mouth once daily.    carvedilol (COREG) 12.5 MG tablet Take 1 tablet (12.5 mg total) by mouth 2 (two) times daily with meals.     fish oil-omega-3 fatty acids 300-1,000 mg capsule Take 2 g by mouth 2 (two) times daily.     furosemide (LASIX) 40 MG tablet Take 40 mg by mouth as needed.    levothyroxine (SYNTHROID) 75 MCG tablet Take 75 mcg by mouth before breakfast.     lisinopril (PRINIVIL,ZESTRIL) 20 MG tablet TAKE 1 TABLET BY MOUTH ONCE DAILY    multivitamin capsule Take 1 capsule by mouth nightly.     nitroGLYCERIN (NITROSTAT) 0.4 MG SL tablet Place 1 tablet (0.4 mg total) under the tongue every 5 (five) minutes as needed for Chest pain.    pantoprazole (PROTONIX) 40 MG tablet Take 1 tablet (40 mg total) by mouth once daily.    SLO-NIACIN 750 mg TbSR Take 1 tablet (750 mg total) by mouth 2 (two) times daily. (Patient taking differently: Take 500 mg by mouth 2 (two) times daily. )    warfarin (COUMADIN) 5 MG tablet 2.5 mg every Mon, Wed, Fri.     warfarin (COUMADIN) 5 MG tablet Take 5 mg by mouth every Tues, Thurs, Sat.    warfarin (COUMADIN) 5 MG tablet Take 5 mg by mouth every Sunday.     No current facility-administered medications for this visit.      Facility-Administered Medications Ordered in Other Visits   Medication    0.9%  NaCl infusion    sodium chloride 0.9% flush 5 mL    sodium chloride 0.9% flush 5 mL     Review of Systems   Constitutional: Negative. Negative for malaise/fatigue.   HENT: Negative.  Negative for ear pain and tinnitus.    Eyes: Negative for blurred vision.   Cardiovascular: Negative.  Negative for chest pain, dyspnea on exertion, irregular heartbeat, leg swelling, near-syncope, palpitations and syncope.   Respiratory: Negative.  Negative for shortness of breath.    Endocrine: Negative.  Negative for polyuria.   Hematologic/Lymphatic: Negative for bleeding problem. Does not bruise/bleed easily.   Skin: Negative.  Negative for rash.   Musculoskeletal: Negative.  Negative for joint pain, muscle weakness and myalgias.   Gastrointestinal: Negative.  Negative for abdominal pain, change in bowel habit,  hematemesis, hematochezia and nausea.   Genitourinary: Negative for frequency and hematuria.   Neurological: Negative.  Negative for dizziness, light-headedness and weakness.   Psychiatric/Behavioral: Negative.  Negative for altered mental status and depression. The patient is not nervous/anxious.    Allergic/Immunologic: Negative for environmental allergies and persistent infections.     Objective:          /78   Pulse 63   Ht 6' (1.829 m)   Wt 114 kg (251 lb 5.2 oz)   BMI 34.09 kg/m²     Physical Exam  Vitals and nursing note reviewed.   Constitutional:       Appearance: Normal appearance. He is well-developed.   HENT:      Head: Normocephalic and atraumatic.      Nose: Nose normal.   Eyes:      General: Lids are normal. No scleral icterus.     Conjunctiva/sclera: Conjunctivae normal.      Pupils: Pupils are equal, round, and reactive to light.   Neck:      Thyroid: No thyromegaly.      Vascular: No JVD.      Trachea: No tracheal deviation.   Cardiovascular:      Rate and Rhythm: Normal rate and regular rhythm.      Pulses:           Radial pulses are 2+ on the right side and 2+ on the left side.      Heart sounds: S1 normal and S2 normal.   Pulmonary:      Effort: Pulmonary effort is normal. No tachypnea, accessory muscle usage or respiratory distress.      Breath sounds: Normal breath sounds. No wheezing.   Abdominal:      General: There is no distension.   Musculoskeletal:         General: Normal range of motion.      Cervical back: Normal range of motion.   Skin:     General: Skin is warm and dry.      Findings: No erythema or rash.   Neurological:      Mental Status: He is alert and oriented to person, place, and time.      Motor: No abnormal muscle tone.      Deep Tendon Reflexes: Reflexes are normal and symmetric.   Psychiatric:         Speech: Speech normal.         Behavior: Behavior normal.       Lab Results   Component Value Date     10/25/2019    K 4.8 10/25/2019    MG 1.8 12/10/2014     BUN 24 (H) 10/25/2019    CREATININE 1.9 (H) 10/25/2019    ALT 21 06/06/2018    AST 22 06/06/2018    HGB 13.4 (L) 10/25/2019    HCT 41.0 10/25/2019    TSH 5.190 (H) 06/06/2018    LDLCALC 65.8 06/06/2018       Recent Labs   Lab 12/09/19  0819 12/10/19  0423 11/20/20  1159 03/24/21  0755   INR 1.8 H 2.2 H 1.0 1.0       Assessment:     1. Persistent atrial fibrillation    2. PSVT (paroxysmal supraventricular tachycardia)    3. S/P PTCA (percutaneous transluminal coronary angioplasty)    4. Ischemic cardiomyopathy    5. HTN (hypertension), benign    6. Coronary artery disease due to lipid rich plaque    7. Visit for monitoring Tikosyn therapy      Plan:     In summary, Mr. Grossman is a 76 y.o. male with AF, CAD (MI, PCI), HTN, HLD, CKD, hypothyroid, RV thrombus (on eliquis), VT, here for follow up after ablation.  Doing well. Only one episode of AF, and it was asx.    No problems with tikosyn; continue.    Return in 1 year, or earlier prn.

## 2022-03-16 NOTE — PATIENT INSTRUCTIONS
Discussed diet , achieving and maintaining ideal body weight, and exercise.   We reviewed meds in detail.  Reassured-Discussed goals, options, plan.  Omega-3 > 800 mg/d combined EPA/DHA.  Co Q 10 200 mg/d  Goal BP< 130/80.  Goal LDL < 70.  NTG should be refilled every 8-9 months.  Increase L thyroxine to 88  Reduce the Furosimide to just twice per week but increase water bu 2 more glasses per day  If BP is too low can reduce Amlodipine to just half pill

## 2022-04-03 PROBLEM — G47.33 OBSTRUCTIVE SLEEP APNEA: Status: ACTIVE | Noted: 2022-04-03

## 2022-05-22 ENCOUNTER — CLINICAL SUPPORT (OUTPATIENT)
Dept: CARDIOLOGY | Facility: HOSPITAL | Age: 77
End: 2022-05-22
Payer: MEDICARE

## 2022-05-22 DIAGNOSIS — Z95.810 PRESENCE OF AUTOMATIC (IMPLANTABLE) CARDIAC DEFIBRILLATOR: ICD-10-CM

## 2022-05-22 PROCEDURE — 93295 CARDIAC DEVICE CHECK - REMOTE: ICD-10-PCS | Mod: ,,, | Performed by: INTERNAL MEDICINE

## 2022-05-22 PROCEDURE — 93295 DEV INTERROG REMOTE 1/2/MLT: CPT | Mod: ,,, | Performed by: INTERNAL MEDICINE

## 2022-07-11 ENCOUNTER — PATIENT MESSAGE (OUTPATIENT)
Dept: CARDIOLOGY | Facility: CLINIC | Age: 77
End: 2022-07-11
Payer: MEDICARE

## 2022-07-15 ENCOUNTER — PATIENT MESSAGE (OUTPATIENT)
Dept: CARDIOLOGY | Facility: CLINIC | Age: 77
End: 2022-07-15
Payer: MEDICARE

## 2022-07-31 ENCOUNTER — PATIENT MESSAGE (OUTPATIENT)
Dept: CARDIOLOGY | Facility: CLINIC | Age: 77
End: 2022-07-31
Payer: MEDICARE

## 2022-08-12 ENCOUNTER — TELEPHONE (OUTPATIENT)
Dept: CARDIOLOGY | Facility: CLINIC | Age: 77
End: 2022-08-12
Payer: MEDICARE

## 2022-08-12 NOTE — TELEPHONE ENCOUNTER
Returned call from Sweetie in Dr Abdelrahman Arrington's office - she said they received our clearance.      JOHN Rivero Staff  Caller: Unspecified (Today,  1:27 PM)  Ruby please call Sweetie from Dr. Abdelrahman Arrington Office she would like to know if the patient is clear for surgery please call [phone] fax number is [phone]. Thank you.

## 2022-08-12 NOTE — TELEPHONE ENCOUNTER
----- Message from Bernadette Masterson MA sent at 8/12/2022  1:27 PM CDT -----  Ruby please call Sweetie from Dr. Abdelrahman Arrington Office she would like to know if the patient is clear for surgery please call [phone] fax number is [phone]. Thank you.

## 2022-08-20 ENCOUNTER — CLINICAL SUPPORT (OUTPATIENT)
Dept: CARDIOLOGY | Facility: HOSPITAL | Age: 77
End: 2022-08-20
Payer: MEDICARE

## 2022-08-20 DIAGNOSIS — I50.9 HEART FAILURE, UNSPECIFIED: ICD-10-CM

## 2022-08-20 DIAGNOSIS — I48.91 UNSPECIFIED ATRIAL FIBRILLATION: ICD-10-CM

## 2022-08-20 DIAGNOSIS — I47.20 VENTRICULAR TACHYCARDIA: ICD-10-CM

## 2022-08-20 DIAGNOSIS — I25.5 ISCHEMIC CARDIOMYOPATHY: ICD-10-CM

## 2022-08-20 DIAGNOSIS — Z95.810 PRESENCE OF AUTOMATIC (IMPLANTABLE) CARDIAC DEFIBRILLATOR: ICD-10-CM

## 2022-09-21 ENCOUNTER — PATIENT MESSAGE (OUTPATIENT)
Dept: ELECTROPHYSIOLOGY | Facility: CLINIC | Age: 77
End: 2022-09-21
Payer: MEDICARE

## 2022-09-21 ENCOUNTER — TELEPHONE (OUTPATIENT)
Dept: ELECTROPHYSIOLOGY | Facility: CLINIC | Age: 77
End: 2022-09-21
Payer: MEDICARE

## 2022-09-21 NOTE — TELEPHONE ENCOUNTER
Our records indicate you have an implanted cardiac device for which your doctor has prescribed remote cardiac monitoring, however, your monitor does not appear to be communicating at this time.  This could happen for several reasons, such as a disconnected wire, an unplugged monitor, or you may not be sleeping near the communicator.    We encourage you to examine your home monitor and it's location as soon as possible.    If you are unable to reconnect your home monitor or have questions or concerns about connectivity, please contact our clinic so that we may assist.    The [facility] staff is available Monday-Friday 8am-5:00pm CST at [phone], Option 4.    We look forward to caring for you,    [facility]  [facility]  [address] [place] [address]

## 2022-10-04 ENCOUNTER — PATIENT MESSAGE (OUTPATIENT)
Dept: CARDIOLOGY | Facility: CLINIC | Age: 77
End: 2022-10-04
Payer: MEDICARE

## 2022-10-04 NOTE — TELEPHONE ENCOUNTER
CHICO Philip also called us for same issue.  Basically he was at the chiropractor and had a high reading while sitting down.  He says that he normally runs 117-126/70s. He has in the past run higher in the Am and occ 90s-100s in the pm.  He did have some dehydration last week following several days on a boat and he increased his fluid until he now feels fine.  His repeat BP after eating a sandwich was 132/82.  Takes:  amlodipine 5 q PM  carvedilol 12.5 bid  Entresto  bid  Tikosyn 500 q 12.    I told him to check bp /hr bid (at rest) and send data in a few days to evaluate if BP actually runs high on a regular basis.    He verbalized understanding.

## 2022-10-07 NOTE — TELEPHONE ENCOUNTER
Pt states that his BP has been running good, around 120s/70s. He stated that he will upload readings on Monday 10/10.

## 2022-10-12 ENCOUNTER — PATIENT MESSAGE (OUTPATIENT)
Dept: CARDIOLOGY | Facility: CLINIC | Age: 77
End: 2022-10-12
Payer: MEDICARE

## 2022-11-15 ENCOUNTER — PATIENT MESSAGE (OUTPATIENT)
Dept: CARDIOLOGY | Facility: HOSPITAL | Age: 77
End: 2022-11-15
Payer: MEDICARE

## 2022-11-18 ENCOUNTER — CLINICAL SUPPORT (OUTPATIENT)
Dept: CARDIOLOGY | Facility: HOSPITAL | Age: 77
End: 2022-11-18
Payer: MEDICARE

## 2022-11-18 DIAGNOSIS — I48.91 UNSPECIFIED ATRIAL FIBRILLATION: ICD-10-CM

## 2022-11-18 DIAGNOSIS — I42.9 CARDIOMYOPATHY, UNSPECIFIED: ICD-10-CM

## 2022-11-18 DIAGNOSIS — Z95.810 PRESENCE OF AUTOMATIC (IMPLANTABLE) CARDIAC DEFIBRILLATOR: ICD-10-CM

## 2022-11-18 PROCEDURE — 93295 CARDIAC DEVICE CHECK - REMOTE: ICD-10-PCS | Mod: ,,, | Performed by: INTERNAL MEDICINE

## 2022-11-18 PROCEDURE — 93295 DEV INTERROG REMOTE 1/2/MLT: CPT | Mod: ,,, | Performed by: INTERNAL MEDICINE

## 2023-02-13 DIAGNOSIS — I48.19 PERSISTENT ATRIAL FIBRILLATION: Primary | ICD-10-CM

## 2023-02-13 DIAGNOSIS — I25.5 ISCHEMIC CARDIOMYOPATHY: ICD-10-CM

## 2023-02-16 ENCOUNTER — CLINICAL SUPPORT (OUTPATIENT)
Dept: CARDIOLOGY | Facility: HOSPITAL | Age: 78
End: 2023-02-16
Payer: MEDICARE

## 2023-02-16 DIAGNOSIS — I25.5 ISCHEMIC CARDIOMYOPATHY: ICD-10-CM

## 2023-02-16 DIAGNOSIS — Z95.810 PRESENCE OF AUTOMATIC (IMPLANTABLE) CARDIAC DEFIBRILLATOR: ICD-10-CM

## 2023-02-16 DIAGNOSIS — I47.29 OTHER VENTRICULAR TACHYCARDIA: ICD-10-CM

## 2023-02-16 DIAGNOSIS — I50.42 CHRONIC COMBINED SYSTOLIC (CONGESTIVE) AND DIASTOLIC (CONGESTIVE) HEART FAILURE: ICD-10-CM

## 2023-02-16 DIAGNOSIS — I48.91 UNSPECIFIED ATRIAL FIBRILLATION: ICD-10-CM

## 2023-03-02 ENCOUNTER — PATIENT MESSAGE (OUTPATIENT)
Dept: CARDIOLOGY | Facility: CLINIC | Age: 78
End: 2023-03-02
Payer: MEDICARE

## 2023-04-04 ENCOUNTER — CLINICAL SUPPORT (OUTPATIENT)
Dept: CARDIOLOGY | Facility: HOSPITAL | Age: 78
End: 2023-04-04
Attending: INTERNAL MEDICINE
Payer: MEDICARE

## 2023-04-04 ENCOUNTER — HOSPITAL ENCOUNTER (OUTPATIENT)
Dept: CARDIOLOGY | Facility: CLINIC | Age: 78
Discharge: HOME | End: 2023-04-04
Payer: MEDICARE

## 2023-04-04 ENCOUNTER — HOSPITAL ENCOUNTER (OUTPATIENT)
Dept: CARDIOLOGY | Facility: HOSPITAL | Age: 78
Discharge: HOME | End: 2023-04-04
Attending: INTERNAL MEDICINE
Payer: MEDICARE

## 2023-04-04 VITALS
DIASTOLIC BLOOD PRESSURE: 70 MMHG | HEART RATE: 65 BPM | BODY MASS INDEX: 31.95 KG/M2 | SYSTOLIC BLOOD PRESSURE: 110 MMHG | HEIGHT: 74 IN | WEIGHT: 249 LBS

## 2023-04-04 DIAGNOSIS — I48.19 PERSISTENT ATRIAL FIBRILLATION: ICD-10-CM

## 2023-04-04 LAB
ASCENDING AORTA: 4.07 CM
AV INDEX (PROSTH): 0.78
AV MEAN GRADIENT: 4 MMHG
AV PEAK GRADIENT: 8 MMHG
AV VALVE AREA: 3.5 CM2
AV VELOCITY RATIO: 0.69
BSA FOR ECHO PROCEDURE: 2.43 M2
CV ECHO LV RWT: 0.3 CM
DOP CALC AO PEAK VEL: 1.38 M/S
DOP CALC AO VTI: 28.68 CM
DOP CALC LVOT AREA: 4.5 CM2
DOP CALC LVOT DIAMETER: 2.4 CM
DOP CALC LVOT PEAK VEL: 0.95 M/S
DOP CALC LVOT STROKE VOLUME: 100.52 CM3
DOP CALCLVOT PEAK VEL VTI: 22.23 CM
E WAVE DECELERATION TIME: 186.3 MSEC
E/A RATIO: 1.5
E/E' RATIO: 8 M/S
ECHO LV POSTERIOR WALL: 0.88 CM (ref 0.6–1.1)
EJECTION FRACTION: 40 %
FRACTIONAL SHORTENING: 24 % (ref 28–44)
INTERVENTRICULAR SEPTUM: 1.04 CM (ref 0.6–1.1)
IVRT: 111.32 MSEC
LA MAJOR: 6.35 CM
LA MINOR: 6.44 CM
LA WIDTH: 5.71 CM
LEFT ATRIUM SIZE: 4.88 CM
LEFT ATRIUM VOLUME INDEX MOD: 66.6 ML/M2
LEFT ATRIUM VOLUME INDEX: 63.4 ML/M2
LEFT ATRIUM VOLUME MOD: 159.1 CM3
LEFT ATRIUM VOLUME: 151.46 CM3
LEFT INTERNAL DIMENSION IN SYSTOLE: 4.43 CM (ref 2.1–4)
LEFT VENTRICLE DIASTOLIC VOLUME INDEX: 71.14 ML/M2
LEFT VENTRICLE DIASTOLIC VOLUME: 170.02 ML
LEFT VENTRICLE MASS INDEX: 94 G/M2
LEFT VENTRICLE SYSTOLIC VOLUME INDEX: 37.3 ML/M2
LEFT VENTRICLE SYSTOLIC VOLUME: 89.25 ML
LEFT VENTRICULAR INTERNAL DIMENSION IN DIASTOLE: 5.85 CM (ref 3.5–6)
LEFT VENTRICULAR MASS: 224.32 G
LV LATERAL E/E' RATIO: 6.67 M/S
LV SEPTAL E/E' RATIO: 10 M/S
MV A" WAVE DURATION": 20.84 MSEC
MV PEAK A VEL: 0.4 M/S
MV PEAK E VEL: 0.6 M/S
MV STENOSIS PRESSURE HALF TIME: 54.03 MS
MV VALVE AREA P 1/2 METHOD: 4.07 CM2
PISA TR MAX VEL: 2.55 M/S
PULM VEIN S/D RATIO: 0.53
PV PEAK D VEL: 0.34 M/S
PV PEAK S VEL: 0.18 M/S
QEF: 44 %
RA MAJOR: 5.83 CM
RA PRESSURE: 3 MMHG
RA WIDTH: 4.11 CM
RIGHT VENTRICULAR END-DIASTOLIC DIMENSION: 4.04 CM
RV TISSUE DOPPLER FREE WALL SYSTOLIC VELOCITY 1 (APICAL 4 CHAMBER VIEW): 13.51 CM/S
SINUS: 4.45 CM
STJ: 3.88 CM
TDI LATERAL: 0.09 M/S
TDI SEPTAL: 0.06 M/S
TDI: 0.08 M/S
TR MAX PG: 26 MMHG
TRICUSPID ANNULAR PLANE SYSTOLIC EXCURSION: 2.56 CM
TV REST PULMONARY ARTERY PRESSURE: 29 MMHG

## 2023-04-04 PROCEDURE — 93283 CARDIAC DEVICE CHECK - IN CLINIC & HOSPITAL: ICD-10-PCS | Mod: 26,,, | Performed by: INTERNAL MEDICINE

## 2023-04-04 PROCEDURE — 93283 PRGRMG EVAL IMPLANTABLE DFB: CPT | Mod: 26,,, | Performed by: INTERNAL MEDICINE

## 2023-04-04 PROCEDURE — C8929 TTE W OR WO FOL WCON,DOPPLER: HCPCS

## 2023-04-04 PROCEDURE — 93283 PRGRMG EVAL IMPLANTABLE DFB: CPT

## 2023-04-04 PROCEDURE — 93306 ECHO (CUPID ONLY): ICD-10-PCS | Mod: 26,,, | Performed by: INTERNAL MEDICINE

## 2023-04-04 PROCEDURE — 93010 ELECTROCARDIOGRAM REPORT: CPT | Mod: S$PBB,,, | Performed by: INTERNAL MEDICINE

## 2023-04-04 PROCEDURE — 93010 RHYTHM STRIP: ICD-10-PCS | Mod: S$PBB,,, | Performed by: INTERNAL MEDICINE

## 2023-04-04 PROCEDURE — 93005 ELECTROCARDIOGRAM TRACING: CPT | Mod: PBBFAC | Performed by: INTERNAL MEDICINE

## 2023-04-04 PROCEDURE — 93306 TTE W/DOPPLER COMPLETE: CPT | Mod: 26,,, | Performed by: INTERNAL MEDICINE

## 2023-04-04 PROCEDURE — 25500020 PHARM REV CODE 255: Performed by: INTERNAL MEDICINE

## 2023-04-04 RX ADMIN — HUMAN ALBUMIN MICROSPHERES AND PERFLUTREN 0.66 MG: 10; .22 INJECTION, SOLUTION INTRAVENOUS at 04:04

## 2023-04-04 NOTE — NURSING
Patient identified by 2 identifiers. Denies previous reactions to blood transfusions & allergies reviewed. Procedure explained and pt verbalized understanding.  22 g IV placed to Lt AC, flushed w/ 10cc NS pre & post contrast administration.  3cc Optison administered by sonographer, echo images obtained.  Pt tolerated procedure well.  IV D/C'ed, preasure dsg applied.  Pt D/C'ed to home.

## 2023-04-05 ENCOUNTER — OFFICE VISIT (OUTPATIENT)
Dept: ELECTROPHYSIOLOGY | Facility: CLINIC | Age: 78
End: 2023-04-05
Payer: MEDICARE

## 2023-04-05 VITALS
HEART RATE: 65 BPM | WEIGHT: 253.5 LBS | SYSTOLIC BLOOD PRESSURE: 128 MMHG | BODY MASS INDEX: 32.53 KG/M2 | DIASTOLIC BLOOD PRESSURE: 86 MMHG | HEIGHT: 74 IN

## 2023-04-05 DIAGNOSIS — N52.01 ERECTILE DYSFUNCTION DUE TO ARTERIAL INSUFFICIENCY: ICD-10-CM

## 2023-04-05 DIAGNOSIS — I10 HTN (HYPERTENSION), BENIGN: ICD-10-CM

## 2023-04-05 DIAGNOSIS — I25.83 CORONARY ARTERY DISEASE DUE TO LIPID RICH PLAQUE: ICD-10-CM

## 2023-04-05 DIAGNOSIS — I48.19 PERSISTENT ATRIAL FIBRILLATION: ICD-10-CM

## 2023-04-05 DIAGNOSIS — I47.20 VENTRICULAR TACHYARRHYTHMIA: Primary | ICD-10-CM

## 2023-04-05 DIAGNOSIS — Z79.899 VISIT FOR MONITORING TIKOSYN THERAPY: ICD-10-CM

## 2023-04-05 DIAGNOSIS — I50.43 ACUTE ON CHRONIC COMBINED SYSTOLIC AND DIASTOLIC HEART FAILURE: ICD-10-CM

## 2023-04-05 DIAGNOSIS — E78.5 DYSLIPIDEMIA: ICD-10-CM

## 2023-04-05 DIAGNOSIS — E66.09 CLASS 1 OBESITY DUE TO EXCESS CALORIES WITH SERIOUS COMORBIDITY IN ADULT, UNSPECIFIED BMI: ICD-10-CM

## 2023-04-05 DIAGNOSIS — I25.10 CORONARY ARTERY DISEASE DUE TO LIPID RICH PLAQUE: ICD-10-CM

## 2023-04-05 DIAGNOSIS — I47.10 PSVT (PAROXYSMAL SUPRAVENTRICULAR TACHYCARDIA): ICD-10-CM

## 2023-04-05 DIAGNOSIS — Z79.01 CHRONIC ANTICOAGULATION: Chronic | ICD-10-CM

## 2023-04-05 DIAGNOSIS — Z51.81 VISIT FOR MONITORING TIKOSYN THERAPY: ICD-10-CM

## 2023-04-05 DIAGNOSIS — N18.31 STAGE 3A CHRONIC KIDNEY DISEASE: ICD-10-CM

## 2023-04-05 DIAGNOSIS — I25.5 ISCHEMIC CARDIOMYOPATHY: ICD-10-CM

## 2023-04-05 PROCEDURE — 99214 PR OFFICE/OUTPT VISIT, EST, LEVL IV, 30-39 MIN: ICD-10-PCS | Mod: S$PBB,,, | Performed by: INTERNAL MEDICINE

## 2023-04-05 PROCEDURE — 99999 PR PBB SHADOW E&M-EST. PATIENT-LVL III: CPT | Mod: PBBFAC,,, | Performed by: INTERNAL MEDICINE

## 2023-04-05 PROCEDURE — 99999 PR PBB SHADOW E&M-EST. PATIENT-LVL III: ICD-10-PCS | Mod: PBBFAC,,, | Performed by: INTERNAL MEDICINE

## 2023-04-05 PROCEDURE — 99213 OFFICE O/P EST LOW 20 MIN: CPT | Mod: PBBFAC | Performed by: INTERNAL MEDICINE

## 2023-04-05 PROCEDURE — 99214 OFFICE O/P EST MOD 30 MIN: CPT | Mod: S$PBB,,, | Performed by: INTERNAL MEDICINE

## 2023-04-05 NOTE — PROGRESS NOTES
Subjective:   Patient ID:  Amish Grossman is a 77 y.o. male who presents for follow-up of AF and SVT.     Mr. Grossman is a 77 y.o. 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  CAD/MI (1993)/PCI (with stent placement in 2000 and 2004), stable  HTN on meds  HL on meds  CKD, stable  hypothyroid on meds  RV thrombus hx; on coumadin  VT (12/2014), resulting in ICD shock  persistent AF  PSVT (AVNRT, s/p RFA)    We did ICD gen change 6/7/18. This was c/b VT intraop, requiring external rescue to NSR. Also, a breach in the RV lead's insulation was repaired at that time.    12/9/2019: Cryoblative pulmonary vein isolation of all 4 pulmonary veins. Amiodarone was restarted. Stopped due to decreased DLCO on PFTs 1/2020. 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  he says that his SHOOK has improved since his ablation. He denies CP, palpitations, LH, syncope.     Had AF recurrence, as well as PSVT found on ICD. In 11/2020, underwent redo PVI (posterior box, CFAE) ablation as well as AVNRT ablation. Since 11/2020 ablation, he feels great!     Had one 5-hour AF episode in 12/2021 (asymptomatic) and one 36-hour episode in 2022 (also asx).     echo 40%  QTc 446    I have personally reviewed the patient's EKG today, which shows APVS         Current Outpatient Medications   Medication Sig    amiodarone (PACERONE) 200 MG Tab Take 400mg (two tabs)two times daily x14 days, then decrease to 400mg (two tabs)once daily x14 days, then decrease to 200mg (one tab)daily.    amLODIPine (NORVASC) 5 MG tablet Take 1 tablet (5 mg total) by mouth once daily.    ascorbic acid (VITAMIN C) 100 MG tablet Take 1,000 mg by mouth once daily.     aspirin (ECOTRIN) 81 MG EC tablet Take 81 mg by mouth once daily.    atorvastatin (LIPITOR) 10 MG tablet Take 1 tablet (10 mg total) by mouth once daily.    carvedilol (COREG) 12.5 MG tablet Take 1 tablet (12.5 mg total) by mouth 2 (two) times  daily with meals.    fish oil-omega-3 fatty acids 300-1,000 mg capsule Take 2 g by mouth 2 (two) times daily.     furosemide (LASIX) 40 MG tablet Take 40 mg by mouth as needed.    levothyroxine (SYNTHROID) 75 MCG tablet Take 75 mcg by mouth before breakfast.     lisinopril (PRINIVIL,ZESTRIL) 20 MG tablet TAKE 1 TABLET BY MOUTH ONCE DAILY    multivitamin capsule Take 1 capsule by mouth nightly.     nitroGLYCERIN (NITROSTAT) 0.4 MG SL tablet Place 1 tablet (0.4 mg total) under the tongue every 5 (five) minutes as needed for Chest pain.    pantoprazole (PROTONIX) 40 MG tablet Take 1 tablet (40 mg total) by mouth once daily.    SLO-NIACIN 750 mg TbSR Take 1 tablet (750 mg total) by mouth 2 (two) times daily. (Patient taking differently: Take 500 mg by mouth 2 (two) times daily. )    warfarin (COUMADIN) 5 MG tablet 2.5 mg every Mon, Wed, Fri.     warfarin (COUMADIN) 5 MG tablet Take 5 mg by mouth every Tues, Thurs, Sat.    warfarin (COUMADIN) 5 MG tablet Take 5 mg by mouth every Sunday.     No current facility-administered medications for this visit.      Facility-Administered Medications Ordered in Other Visits   Medication    0.9%  NaCl infusion    sodium chloride 0.9% flush 5 mL    sodium chloride 0.9% flush 5 mL     Review of Systems   Constitutional: Negative. Negative for malaise/fatigue.   HENT: Negative.  Negative for ear pain and tinnitus.    Eyes:  Negative for blurred vision.   Cardiovascular: Negative.  Negative for chest pain, dyspnea on exertion, irregular heartbeat, leg swelling, near-syncope, palpitations and syncope.   Respiratory: Negative.  Negative for shortness of breath.    Endocrine: Negative.  Negative for polyuria.   Hematologic/Lymphatic: Negative for bleeding problem. Does not bruise/bleed easily.   Skin: Negative.  Negative for rash.   Musculoskeletal: Negative.  Negative for joint pain, muscle weakness and myalgias.   Gastrointestinal: Negative.  Negative for abdominal pain, change in bowel  "habit, hematemesis, hematochezia and nausea.   Genitourinary:  Negative for frequency and hematuria.   Neurological: Negative.  Negative for dizziness, light-headedness and weakness.   Psychiatric/Behavioral: Negative.  Negative for altered mental status and depression. The patient is not nervous/anxious.    Allergic/Immunologic: Negative for environmental allergies and persistent infections.   Objective:          /86   Pulse 65   Ht 6' 2" (1.88 m)   Wt 115 kg (253 lb 8.5 oz)   BMI 32.55 kg/m²     Physical Exam  Vitals and nursing note reviewed.   Constitutional:       Appearance: Normal appearance. He is well-developed.   HENT:      Head: Normocephalic and atraumatic.      Nose: Nose normal.   Eyes:      General: Lids are normal. No scleral icterus.     Conjunctiva/sclera: Conjunctivae normal.      Pupils: Pupils are equal, round, and reactive to light.   Neck:      Thyroid: No thyromegaly.      Vascular: No JVD.      Trachea: No tracheal deviation.   Cardiovascular:      Rate and Rhythm: Normal rate and regular rhythm.      Pulses:           Radial pulses are 2+ on the right side and 2+ on the left side.      Heart sounds: S1 normal and S2 normal.   Pulmonary:      Effort: Pulmonary effort is normal. No tachypnea, accessory muscle usage or respiratory distress.      Breath sounds: No wheezing.   Abdominal:      General: There is no distension.   Musculoskeletal:         General: Normal range of motion.      Cervical back: Normal range of motion.   Skin:     General: Skin is warm and dry.      Findings: No erythema or rash.   Neurological:      Mental Status: He is alert and oriented to person, place, and time.      Motor: No abnormal muscle tone.      Deep Tendon Reflexes: Reflexes are normal and symmetric.   Psychiatric:         Speech: Speech normal.         Behavior: Behavior normal.     Lab Results   Component Value Date     10/25/2019    K 4.8 10/25/2019    MG 1.8 12/10/2014    BUN 24 (H) " 10/25/2019    CREATININE 1.9 (H) 10/25/2019    ALT 21 06/06/2018    AST 22 06/06/2018    HGB 13.4 (L) 10/25/2019    HCT 41.0 10/25/2019    TSH 5.190 (H) 06/06/2018    LDLCALC 65.8 06/06/2018       Recent Labs   Lab 11/20/20  1159 03/24/21  0755   INR 1.0 1.0         Assessment:     1. Ventricular tachyarrhythmia    2. Acute on chronic combined systolic and diastolic heart failure    3. PSVT (paroxysmal supraventricular tachycardia)    4. Coronary artery disease due to lipid rich plaque    5. Dyslipidemia    6. HTN (hypertension), benign    7. Persistent atrial fibrillation    8. Ischemic cardiomyopathy    9. Stage 3a chronic kidney disease    10. Erectile dysfunction due to arterial insufficiency    11. Chronic anticoagulation    12. Class 1 obesity due to excess calories with serious comorbidity in adult, unspecified BMI    13. Visit for monitoring Tikosyn therapy      Plan:     In summary, Mr. Grossman is a 77 y.o. male with AF, CAD (MI, PCI), HTN, HLD, CKD, hypothyroid, RV thrombus (on eliquis), VT, here for follow up after ablation.  Doing well.     No problems with tikosyn; continue.  Return in 1 year, or earlier prn.

## 2023-05-01 DIAGNOSIS — I48.19 PERSISTENT ATRIAL FIBRILLATION: ICD-10-CM

## 2023-05-01 DIAGNOSIS — I10 HTN (HYPERTENSION), BENIGN: ICD-10-CM

## 2023-05-01 RX ORDER — CARVEDILOL 12.5 MG/1
12.5 TABLET ORAL 2 TIMES DAILY WITH MEALS
Qty: 180 TABLET | Refills: 3 | Status: SHIPPED | OUTPATIENT
Start: 2023-05-01

## 2023-05-17 ENCOUNTER — CLINICAL SUPPORT (OUTPATIENT)
Dept: CARDIOLOGY | Facility: HOSPITAL | Age: 78
End: 2023-05-17
Payer: MEDICARE

## 2023-05-17 DIAGNOSIS — Z95.810 PRESENCE OF AUTOMATIC (IMPLANTABLE) CARDIAC DEFIBRILLATOR: ICD-10-CM

## 2023-05-17 PROCEDURE — 93295 DEV INTERROG REMOTE 1/2/MLT: CPT | Mod: ,,, | Performed by: INTERNAL MEDICINE

## 2023-05-17 PROCEDURE — 93295 CARDIAC DEVICE CHECK - REMOTE: ICD-10-PCS | Mod: ,,, | Performed by: INTERNAL MEDICINE

## 2023-06-18 RX ORDER — LEVOTHYROXINE SODIUM 88 UG/1
TABLET ORAL
Qty: 90 TABLET | Refills: 3 | Status: SHIPPED | OUTPATIENT
Start: 2023-06-18

## 2023-08-01 ENCOUNTER — NURSE TRIAGE (OUTPATIENT)
Dept: ADMINISTRATIVE | Facility: CLINIC | Age: 78
End: 2023-08-01
Payer: MEDICARE

## 2023-08-01 NOTE — TELEPHONE ENCOUNTER
Spoke with Leanne who states she is calling from [place].  The patient is currently inpatient at [place].   Leanne states Johann Cervantes, NP wants to speak with cardiologist on call.  On call provider Dr. Llanes was connected with Biju (nurse) to discuss.  No further assistance needed from nurse on call line.   Reason for Disposition   Doctor (or NP/PA) call to PCP    Protocols used: PCP Call - No Triage-A-AH

## 2023-08-02 ENCOUNTER — DOCUMENTATION ONLY (OUTPATIENT)
Dept: ELECTROPHYSIOLOGY | Facility: CLINIC | Age: 78
End: 2023-08-02
Payer: MEDICARE

## 2023-08-02 ENCOUNTER — HOSPITAL ENCOUNTER (INPATIENT)
Facility: HOSPITAL | Age: 78
LOS: 1 days | Discharge: HOME | DRG: 193 | End: 2023-08-03
Attending: INTERNAL MEDICINE | Admitting: INTERNAL MEDICINE
Payer: MEDICARE

## 2023-08-02 ENCOUNTER — ANESTHESIA (OUTPATIENT)
Dept: MEDSURG UNIT | Facility: HOSPITAL | Age: 78
DRG: 193 | End: 2023-08-02
Payer: MEDICARE

## 2023-08-02 ENCOUNTER — ANESTHESIA EVENT (OUTPATIENT)
Dept: MEDSURG UNIT | Facility: HOSPITAL | Age: 78
DRG: 193 | End: 2023-08-02
Payer: MEDICARE

## 2023-08-02 DIAGNOSIS — R50.9 FEVER, UNSPECIFIED FEVER CAUSE: ICD-10-CM

## 2023-08-02 DIAGNOSIS — R50.9 FEVER: Primary | ICD-10-CM

## 2023-08-02 DIAGNOSIS — I50.9 HEART FAILURE: ICD-10-CM

## 2023-08-02 DIAGNOSIS — A41.9 SEPSIS, DUE TO UNSPECIFIED ORGANISM, UNSPECIFIED WHETHER ACUTE ORGAN DYSFUNCTION PRESENT: ICD-10-CM

## 2023-08-02 DIAGNOSIS — I38 ENDOCARDITIS, UNSPECIFIED CHRONICITY, UNSPECIFIED ENDOCARDITIS TYPE: ICD-10-CM

## 2023-08-02 DIAGNOSIS — I87.2 VENOUS INSUFFICIENCY OF LEFT LOWER EXTREMITY: ICD-10-CM

## 2023-08-02 DIAGNOSIS — I50.9 CHF (CONGESTIVE HEART FAILURE): ICD-10-CM

## 2023-08-02 DIAGNOSIS — I25.5 ISCHEMIC CARDIOMYOPATHY: ICD-10-CM

## 2023-08-02 PROBLEM — N18.31 STAGE 3A CHRONIC KIDNEY DISEASE: Status: RESOLVED | Noted: 2020-11-23 | Resolved: 2023-08-02

## 2023-08-02 PROBLEM — Z79.01 CHRONIC ANTICOAGULATION: Status: ACTIVE | Noted: 2020-10-28

## 2023-08-02 LAB
ABO + RH BLD: NORMAL
ADENOVIRUS: NOT DETECTED
ALBUMIN SERPL BCP-MCNC: 2.8 G/DL (ref 3.5–5.2)
ALP SERPL-CCNC: 42 U/L (ref 55–135)
ALT SERPL W/O P-5'-P-CCNC: 17 U/L (ref 10–44)
ANION GAP SERPL CALC-SCNC: 10 MMOL/L (ref 8–16)
APTT PPP: 31.6 SEC (ref 21–32)
AST SERPL-CCNC: 24 U/L (ref 10–40)
BASOPHILS # BLD AUTO: 0.01 K/UL (ref 0–0.2)
BASOPHILS NFR BLD: 0.1 % (ref 0–1.9)
BILIRUB DIRECT SERPL-MCNC: 0.2 MG/DL (ref 0.1–0.3)
BILIRUB SERPL-MCNC: 0.4 MG/DL (ref 0.1–1)
BLD GP AB SCN CELLS X3 SERPL QL: NORMAL
BNP SERPL-MCNC: 982 PG/ML (ref 0–99)
BORDETELLA PARAPERTUSSIS (IS1001): NOT DETECTED
BORDETELLA PERTUSSIS (PTXP): NOT DETECTED
BSA FOR ECHO PROCEDURE: 2.56 M2
BUN SERPL-MCNC: 18 MG/DL (ref 8–23)
CALCIUM SERPL-MCNC: 8.1 MG/DL (ref 8.7–10.5)
CHLAMYDIA PNEUMONIAE: NOT DETECTED
CHLORIDE SERPL-SCNC: 109 MMOL/L (ref 95–110)
CO2 SERPL-SCNC: 19 MMOL/L (ref 23–29)
CORONAVIRUS 229E, COMMON COLD VIRUS: NOT DETECTED
CORONAVIRUS HKU1, COMMON COLD VIRUS: NOT DETECTED
CORONAVIRUS NL63, COMMON COLD VIRUS: NOT DETECTED
CORONAVIRUS OC43, COMMON COLD VIRUS: NOT DETECTED
CREAT SERPL-MCNC: 1.5 MG/DL (ref 0.5–1.4)
DIFFERENTIAL METHOD: ABNORMAL
EOSINOPHIL # BLD AUTO: 0.1 K/UL (ref 0–0.5)
EOSINOPHIL NFR BLD: 0.9 % (ref 0–8)
ERYTHROCYTE [DISTWIDTH] IN BLOOD BY AUTOMATED COUNT: 14.6 % (ref 11.5–14.5)
EST. GFR  (NO RACE VARIABLE): 48 ML/MIN/1.73 M^2
FLUBV RNA NPH QL NAA+NON-PROBE: NOT DETECTED
GLUCOSE SERPL-MCNC: 111 MG/DL (ref 70–110)
HCT VFR BLD AUTO: 32.3 % (ref 40–54)
HGB BLD-MCNC: 10.9 G/DL (ref 14–18)
HPIV1 RNA NPH QL NAA+NON-PROBE: NOT DETECTED
HPIV2 RNA NPH QL NAA+NON-PROBE: NOT DETECTED
HPIV3 RNA NPH QL NAA+NON-PROBE: NOT DETECTED
HPIV4 RNA NPH QL NAA+NON-PROBE: NOT DETECTED
HUMAN METAPNEUMOVIRUS: NOT DETECTED
IMM GRANULOCYTES # BLD AUTO: 0.01 K/UL (ref 0–0.04)
IMM GRANULOCYTES NFR BLD AUTO: 0.1 % (ref 0–0.5)
INFLUENZA A (SUBTYPES H1,H1-2009,H3): NOT DETECTED
INR PPP: 1 (ref 0.8–1.2)
LACTATE SERPL-SCNC: 0.7 MMOL/L (ref 0.5–2.2)
LYMPHOCYTES # BLD AUTO: 1.6 K/UL (ref 1–4.8)
LYMPHOCYTES NFR BLD: 20.4 % (ref 18–48)
MAGNESIUM SERPL-MCNC: 1.9 MG/DL (ref 1.6–2.6)
MCH RBC QN AUTO: 32.7 PG (ref 27–31)
MCHC RBC AUTO-ENTMCNC: 33.7 G/DL (ref 32–36)
MCV RBC AUTO: 97 FL (ref 82–98)
MONOCYTES # BLD AUTO: 0.6 K/UL (ref 0.3–1)
MONOCYTES NFR BLD: 8.1 % (ref 4–15)
MYCOPLASMA PNEUMONIAE: NOT DETECTED
NEUTROPHILS # BLD AUTO: 5.6 K/UL (ref 1.8–7.7)
NEUTROPHILS NFR BLD: 70.4 % (ref 38–73)
NRBC BLD-RTO: 0 /100 WBC
PHOSPHATE SERPL-MCNC: 2.8 MG/DL (ref 2.7–4.5)
PLATELET # BLD AUTO: 142 K/UL (ref 150–450)
PMV BLD AUTO: 10.4 FL (ref 9.2–12.9)
POTASSIUM SERPL-SCNC: 4.3 MMOL/L (ref 3.5–5.1)
PROCALCITONIN SERPL IA-MCNC: 1.23 NG/ML
PROT SERPL-MCNC: 6 G/DL (ref 6–8.4)
PROTHROMBIN TIME: 10.9 SEC (ref 9–12.5)
RBC # BLD AUTO: 3.33 M/UL (ref 4.6–6.2)
RESPIRATORY INFECTION PANEL SOURCE: NORMAL
RHEUMATOID FACT SERPL-ACNC: 13 IU/ML (ref 0–15)
RSV RNA NPH QL NAA+NON-PROBE: NOT DETECTED
RV+EV RNA NPH QL NAA+NON-PROBE: NOT DETECTED
SARS-COV-2 RNA RESP QL NAA+PROBE: NOT DETECTED
SODIUM SERPL-SCNC: 138 MMOL/L (ref 136–145)
SPECIMEN OUTDATE: NORMAL
TROPONIN I SERPL DL<=0.01 NG/ML-MCNC: 0.03 NG/ML (ref 0–0.03)
TSH SERPL DL<=0.005 MIU/L-ACNC: 3.38 UIU/ML (ref 0.4–4)
VANCOMYCIN SERPL-MCNC: 8.1 UG/ML
WBC # BLD AUTO: 7.93 K/UL (ref 3.9–12.7)

## 2023-08-02 PROCEDURE — 25000003 PHARM REV CODE 250: Performed by: INTERNAL MEDICINE

## 2023-08-02 PROCEDURE — 21400001 HC TELEMETRY ROOM

## 2023-08-02 PROCEDURE — 80048 BASIC METABOLIC PNL TOTAL CA: CPT | Performed by: INTERNAL MEDICINE

## 2023-08-02 PROCEDURE — 93010 ELECTROCARDIOGRAM REPORT: CPT | Mod: ,,, | Performed by: INTERNAL MEDICINE

## 2023-08-02 PROCEDURE — D9220A PRA ANESTHESIA: Mod: ANES,,, | Performed by: ANESTHESIOLOGY

## 2023-08-02 PROCEDURE — 99223 PR INITIAL HOSPITAL CARE,LEVL III: ICD-10-PCS | Mod: ,,, | Performed by: STUDENT IN AN ORGANIZED HEALTH CARE EDUCATION/TRAINING PROGRAM

## 2023-08-02 PROCEDURE — 85025 COMPLETE CBC W/AUTO DIFF WBC: CPT | Performed by: INTERNAL MEDICINE

## 2023-08-02 PROCEDURE — 25500020 PHARM REV CODE 255: Performed by: NEUROLOGICAL SURGERY

## 2023-08-02 PROCEDURE — 63600175 PHARM REV CODE 636 W HCPCS: Performed by: NURSE ANESTHETIST, CERTIFIED REGISTERED

## 2023-08-02 PROCEDURE — 36415 COLL VENOUS BLD VENIPUNCTURE: CPT | Performed by: STUDENT IN AN ORGANIZED HEALTH CARE EDUCATION/TRAINING PROGRAM

## 2023-08-02 PROCEDURE — 97535 SELF CARE MNGMENT TRAINING: CPT

## 2023-08-02 PROCEDURE — 99223 1ST HOSP IP/OBS HIGH 75: CPT | Mod: ,,, | Performed by: STUDENT IN AN ORGANIZED HEALTH CARE EDUCATION/TRAINING PROGRAM

## 2023-08-02 PROCEDURE — 25000003 PHARM REV CODE 250: Performed by: NURSE ANESTHETIST, CERTIFIED REGISTERED

## 2023-08-02 PROCEDURE — 86900 BLOOD TYPING SEROLOGIC ABO: CPT | Performed by: INTERNAL MEDICINE

## 2023-08-02 PROCEDURE — 86431 RHEUMATOID FACTOR QUANT: CPT | Performed by: STUDENT IN AN ORGANIZED HEALTH CARE EDUCATION/TRAINING PROGRAM

## 2023-08-02 PROCEDURE — D9220A PRA ANESTHESIA: Mod: CRNA,,, | Performed by: NURSE ANESTHETIST, CERTIFIED REGISTERED

## 2023-08-02 PROCEDURE — 97116 GAIT TRAINING THERAPY: CPT

## 2023-08-02 PROCEDURE — 25000003 PHARM REV CODE 250

## 2023-08-02 PROCEDURE — 94761 N-INVAS EAR/PLS OXIMETRY MLT: CPT

## 2023-08-02 PROCEDURE — D9220A PRA ANESTHESIA: ICD-10-PCS | Mod: CRNA,,, | Performed by: NURSE ANESTHETIST, CERTIFIED REGISTERED

## 2023-08-02 PROCEDURE — 93005 ELECTROCARDIOGRAM TRACING: CPT

## 2023-08-02 PROCEDURE — 84484 ASSAY OF TROPONIN QUANT: CPT | Performed by: INTERNAL MEDICINE

## 2023-08-02 PROCEDURE — 63600175 PHARM REV CODE 636 W HCPCS: Performed by: INTERNAL MEDICINE

## 2023-08-02 PROCEDURE — 25000003 PHARM REV CODE 250: Performed by: STUDENT IN AN ORGANIZED HEALTH CARE EDUCATION/TRAINING PROGRAM

## 2023-08-02 PROCEDURE — 83880 ASSAY OF NATRIURETIC PEPTIDE: CPT | Performed by: INTERNAL MEDICINE

## 2023-08-02 PROCEDURE — 27000190 HC CPAP FULL FACE MASK W/VALVE

## 2023-08-02 PROCEDURE — 94660 CPAP INITIATION&MGMT: CPT

## 2023-08-02 PROCEDURE — 25500020 PHARM REV CODE 255: Performed by: INTERNAL MEDICINE

## 2023-08-02 PROCEDURE — 99900035 HC TECH TIME PER 15 MIN (STAT)

## 2023-08-02 PROCEDURE — 80076 HEPATIC FUNCTION PANEL: CPT | Performed by: INTERNAL MEDICINE

## 2023-08-02 PROCEDURE — 85730 THROMBOPLASTIN TIME PARTIAL: CPT | Performed by: INTERNAL MEDICINE

## 2023-08-02 PROCEDURE — 99233 SBSQ HOSP IP/OBS HIGH 50: CPT | Mod: ,,, | Performed by: INTERNAL MEDICINE

## 2023-08-02 PROCEDURE — 83735 ASSAY OF MAGNESIUM: CPT | Performed by: INTERNAL MEDICINE

## 2023-08-02 PROCEDURE — 27000221 HC OXYGEN, UP TO 24 HOURS

## 2023-08-02 PROCEDURE — 63600175 PHARM REV CODE 636 W HCPCS: Performed by: STUDENT IN AN ORGANIZED HEALTH CARE EDUCATION/TRAINING PROGRAM

## 2023-08-02 PROCEDURE — 63600175 PHARM REV CODE 636 W HCPCS

## 2023-08-02 PROCEDURE — 84145 PROCALCITONIN (PCT): CPT | Performed by: STUDENT IN AN ORGANIZED HEALTH CARE EDUCATION/TRAINING PROGRAM

## 2023-08-02 PROCEDURE — 87040 BLOOD CULTURE FOR BACTERIA: CPT | Mod: 59 | Performed by: INTERNAL MEDICINE

## 2023-08-02 PROCEDURE — 83605 ASSAY OF LACTIC ACID: CPT | Performed by: INTERNAL MEDICINE

## 2023-08-02 PROCEDURE — 93010 EKG 12-LEAD: ICD-10-PCS | Mod: ,,, | Performed by: INTERNAL MEDICINE

## 2023-08-02 PROCEDURE — 87798 DETECT AGENT NOS DNA AMP: CPT | Performed by: STUDENT IN AN ORGANIZED HEALTH CARE EDUCATION/TRAINING PROGRAM

## 2023-08-02 PROCEDURE — 84443 ASSAY THYROID STIM HORMONE: CPT | Performed by: INTERNAL MEDICINE

## 2023-08-02 PROCEDURE — 37000008 HC ANESTHESIA 1ST 15 MINUTES

## 2023-08-02 PROCEDURE — D9220A PRA ANESTHESIA: ICD-10-PCS | Mod: ANES,,, | Performed by: ANESTHESIOLOGY

## 2023-08-02 PROCEDURE — 84100 ASSAY OF PHOSPHORUS: CPT | Performed by: INTERNAL MEDICINE

## 2023-08-02 PROCEDURE — 80202 ASSAY OF VANCOMYCIN: CPT | Performed by: INTERNAL MEDICINE

## 2023-08-02 PROCEDURE — 37000009 HC ANESTHESIA EA ADD 15 MINS

## 2023-08-02 PROCEDURE — 99233 PR SUBSEQUENT HOSPITAL CARE,LEVL III: ICD-10-PCS | Mod: ,,, | Performed by: INTERNAL MEDICINE

## 2023-08-02 PROCEDURE — 97166 OT EVAL MOD COMPLEX 45 MIN: CPT

## 2023-08-02 PROCEDURE — 97161 PT EVAL LOW COMPLEX 20 MIN: CPT

## 2023-08-02 PROCEDURE — 85610 PROTHROMBIN TIME: CPT | Performed by: INTERNAL MEDICINE

## 2023-08-02 RX ORDER — SPIRONOLACTONE 25 MG/1
25 TABLET ORAL DAILY
Status: DISCONTINUED | OUTPATIENT
Start: 2023-08-02 | End: 2023-08-03 | Stop reason: HOSPADM

## 2023-08-02 RX ORDER — PANTOPRAZOLE SODIUM 20 MG/1
20 TABLET, DELAYED RELEASE ORAL DAILY
Status: DISCONTINUED | OUTPATIENT
Start: 2023-08-02 | End: 2023-08-02

## 2023-08-02 RX ORDER — DIPHENHYDRAMINE HYDROCHLORIDE 50 MG/ML
25 INJECTION INTRAMUSCULAR; INTRAVENOUS EVERY 6 HOURS PRN
Status: CANCELLED | OUTPATIENT
Start: 2023-08-02

## 2023-08-02 RX ORDER — LEVOTHYROXINE SODIUM 88 UG/1
88 TABLET ORAL DAILY
Status: DISCONTINUED | OUTPATIENT
Start: 2023-08-02 | End: 2023-08-02

## 2023-08-02 RX ORDER — ETOMIDATE 2 MG/ML
INJECTION INTRAVENOUS
Status: DISCONTINUED | OUTPATIENT
Start: 2023-08-02 | End: 2023-08-02

## 2023-08-02 RX ORDER — SODIUM CHLORIDE 0.9 % (FLUSH) 0.9 %
3 SYRINGE (ML) INJECTION
Status: CANCELLED | OUTPATIENT
Start: 2023-08-02

## 2023-08-02 RX ORDER — LEVOTHYROXINE SODIUM 88 UG/1
88 TABLET ORAL
Status: DISCONTINUED | OUTPATIENT
Start: 2023-08-02 | End: 2023-08-03 | Stop reason: HOSPADM

## 2023-08-02 RX ORDER — PROPOFOL 10 MG/ML
VIAL (ML) INTRAVENOUS CONTINUOUS PRN
Status: DISCONTINUED | OUTPATIENT
Start: 2023-08-02 | End: 2023-08-02

## 2023-08-02 RX ORDER — ASPIRIN 81 MG/1
81 TABLET ORAL DAILY
Status: DISCONTINUED | OUTPATIENT
Start: 2023-08-02 | End: 2023-08-03 | Stop reason: HOSPADM

## 2023-08-02 RX ORDER — CARVEDILOL 12.5 MG/1
12.5 TABLET ORAL 2 TIMES DAILY WITH MEALS
Status: DISCONTINUED | OUTPATIENT
Start: 2023-08-02 | End: 2023-08-03 | Stop reason: HOSPADM

## 2023-08-02 RX ORDER — PANTOPRAZOLE SODIUM 40 MG/1
40 TABLET, DELAYED RELEASE ORAL
Status: DISCONTINUED | OUTPATIENT
Start: 2023-08-02 | End: 2023-08-03 | Stop reason: HOSPADM

## 2023-08-02 RX ORDER — DOFETILIDE 0.25 MG/1
500 CAPSULE ORAL EVERY 12 HOURS
Status: DISCONTINUED | OUTPATIENT
Start: 2023-08-02 | End: 2023-08-02

## 2023-08-02 RX ORDER — SODIUM CHLORIDE, SODIUM LACTATE, POTASSIUM CHLORIDE, CALCIUM CHLORIDE 600; 310; 30; 20 MG/100ML; MG/100ML; MG/100ML; MG/100ML
INJECTION, SOLUTION INTRAVENOUS CONTINUOUS
Status: ACTIVE | OUTPATIENT
Start: 2023-08-02 | End: 2023-08-03

## 2023-08-02 RX ORDER — FUROSEMIDE 10 MG/ML
40 INJECTION INTRAMUSCULAR; INTRAVENOUS ONCE
Status: COMPLETED | OUTPATIENT
Start: 2023-08-02 | End: 2023-08-02

## 2023-08-02 RX ORDER — FENTANYL CITRATE 50 UG/ML
25 INJECTION, SOLUTION INTRAMUSCULAR; INTRAVENOUS EVERY 5 MIN PRN
Status: CANCELLED | OUTPATIENT
Start: 2023-08-02

## 2023-08-02 RX ORDER — HYDROMORPHONE HYDROCHLORIDE 1 MG/ML
0.2 INJECTION, SOLUTION INTRAMUSCULAR; INTRAVENOUS; SUBCUTANEOUS EVERY 5 MIN PRN
Status: CANCELLED | OUTPATIENT
Start: 2023-08-02

## 2023-08-02 RX ORDER — ATORVASTATIN CALCIUM 20 MG/1
20 TABLET, FILM COATED ORAL DAILY
Status: DISCONTINUED | OUTPATIENT
Start: 2023-08-02 | End: 2023-08-03 | Stop reason: HOSPADM

## 2023-08-02 RX ORDER — HALOPERIDOL 5 MG/ML
0.5 INJECTION INTRAMUSCULAR EVERY 10 MIN PRN
Status: CANCELLED | OUTPATIENT
Start: 2023-08-02

## 2023-08-02 RX ORDER — MUPIROCIN 20 MG/G
OINTMENT TOPICAL 2 TIMES DAILY
Status: DISCONTINUED | OUTPATIENT
Start: 2023-08-02 | End: 2023-08-03 | Stop reason: HOSPADM

## 2023-08-02 RX ORDER — DOFETILIDE 0.25 MG/1
500 CAPSULE ORAL EVERY 12 HOURS
Status: DISCONTINUED | OUTPATIENT
Start: 2023-08-02 | End: 2023-08-03 | Stop reason: HOSPADM

## 2023-08-02 RX ORDER — ACETAMINOPHEN 325 MG/1
650 TABLET ORAL EVERY 6 HOURS PRN
Status: DISCONTINUED | OUTPATIENT
Start: 2023-08-02 | End: 2023-08-03 | Stop reason: HOSPADM

## 2023-08-02 RX ORDER — LIDOCAINE HYDROCHLORIDE 20 MG/ML
INJECTION INTRAVENOUS
Status: DISCONTINUED | OUTPATIENT
Start: 2023-08-02 | End: 2023-08-02

## 2023-08-02 RX ORDER — PROCHLORPERAZINE EDISYLATE 5 MG/ML
5 INJECTION INTRAMUSCULAR; INTRAVENOUS EVERY 30 MIN PRN
Status: CANCELLED | OUTPATIENT
Start: 2023-08-02

## 2023-08-02 RX ADMIN — IOHEXOL 15 ML: 350 INJECTION, SOLUTION INTRAVENOUS at 04:08

## 2023-08-02 RX ADMIN — CARVEDILOL 12.5 MG: 12.5 TABLET, FILM COATED ORAL at 04:08

## 2023-08-02 RX ADMIN — IOHEXOL 100 ML: 350 INJECTION, SOLUTION INTRAVENOUS at 09:08

## 2023-08-02 RX ADMIN — ATORVASTATIN CALCIUM 20 MG: 20 TABLET, FILM COATED ORAL at 08:08

## 2023-08-02 RX ADMIN — ACETAMINOPHEN 650 MG: 325 TABLET ORAL at 08:08

## 2023-08-02 RX ADMIN — LEVOTHYROXINE SODIUM 88 MCG: 88 TABLET ORAL at 08:08

## 2023-08-02 RX ADMIN — MUPIROCIN: 20 OINTMENT TOPICAL at 09:08

## 2023-08-02 RX ADMIN — PANTOPRAZOLE SODIUM 40 MG: 40 TABLET, DELAYED RELEASE ORAL at 08:08

## 2023-08-02 RX ADMIN — DOFETILIDE 500 MCG: 250 CAPSULE ORAL at 08:08

## 2023-08-02 RX ADMIN — APIXABAN 5 MG: 5 TABLET, FILM COATED ORAL at 08:08

## 2023-08-02 RX ADMIN — ASPIRIN 81 MG: 81 TABLET, COATED ORAL at 08:08

## 2023-08-02 RX ADMIN — SPIRONOLACTONE 25 MG: 25 TABLET, FILM COATED ORAL at 11:08

## 2023-08-02 RX ADMIN — CEFEPIME 2 G: 2 INJECTION, POWDER, FOR SOLUTION INTRAVENOUS at 04:08

## 2023-08-02 RX ADMIN — CARVEDILOL 12.5 MG: 12.5 TABLET, FILM COATED ORAL at 08:08

## 2023-08-02 RX ADMIN — SACUBITRIL AND VALSARTAN 1 TABLET: 97; 103 TABLET, FILM COATED ORAL at 08:08

## 2023-08-02 RX ADMIN — PROPOFOL 150 MCG/KG/MIN: 10 INJECTION, EMULSION INTRAVENOUS at 10:08

## 2023-08-02 RX ADMIN — SODIUM CHLORIDE: 0.9 INJECTION, SOLUTION INTRAVENOUS at 10:08

## 2023-08-02 RX ADMIN — CEFEPIME 1 G: 1 INJECTION, POWDER, FOR SOLUTION INTRAMUSCULAR; INTRAVENOUS at 02:08

## 2023-08-02 RX ADMIN — ACETAMINOPHEN 650 MG: 325 TABLET ORAL at 04:08

## 2023-08-02 RX ADMIN — SODIUM CHLORIDE, POTASSIUM CHLORIDE, SODIUM LACTATE AND CALCIUM CHLORIDE: 600; 310; 30; 20 INJECTION, SOLUTION INTRAVENOUS at 02:08

## 2023-08-02 RX ADMIN — LIDOCAINE HYDROCHLORIDE 100 MG: 20 INJECTION INTRAVENOUS at 10:08

## 2023-08-02 RX ADMIN — FUROSEMIDE 40 MG: 10 INJECTION, SOLUTION INTRAMUSCULAR; INTRAVENOUS at 11:08

## 2023-08-02 RX ADMIN — DOFETILIDE 500 MCG: 250 CAPSULE ORAL at 06:08

## 2023-08-02 RX ADMIN — IOHEXOL 15 ML: 350 INJECTION, SOLUTION INTRAVENOUS at 03:08

## 2023-08-02 RX ADMIN — VANCOMYCIN HYDROCHLORIDE 2000 MG: 1.5 INJECTION, POWDER, LYOPHILIZED, FOR SOLUTION INTRAVENOUS at 03:08

## 2023-08-02 RX ADMIN — ETOMIDATE 4 MG: 2 INJECTION, SOLUTION INTRAVENOUS at 10:08

## 2023-08-02 RX ADMIN — ETOMIDATE 6 MG: 2 INJECTION, SOLUTION INTRAVENOUS at 10:08

## 2023-08-02 RX ADMIN — CEFEPIME 2 G: 2 INJECTION, POWDER, FOR SOLUTION INTRAVENOUS at 08:08

## 2023-08-02 NOTE — PLAN OF CARE
"Cardiac ICU Care Plan    POC reviewed with Amish Grossman and family. Questions and concerns addressed. No acute events today. Pt progressing toward goals. Will continue to monitor. See below and flowsheets for full assessment and VS info.       Neuro:  Darcy Coma Scale  Best Eye Response: 4-->(E4) spontaneous  Best Motor Response: 6-->(M6) obeys commands  Best Verbal Response: 5-->(V5) oriented  Rienzi Coma Scale Score: 15  Assessment Qualifiers: patient not sedated/intubated, no eye obstruction present       24 hr Temp:  [98.5 °F (36.9 °C)]      CV:  Rhythm: normal sinus rhythm   DVT prophylaxis: VTE Required Core Measure: Pharmacological prophylaxis initiated/maintained                            Pulses  Right Radial Pulse: 2+ (normal)  Left Radial Pulse: 2+ (normal)  Right Dorsalis Pedis Pulse: 2+ (normal)  Left Dorsalis Pedis Pulse: 2+ (normal)  Right Posterior Tibial Pulse: 1+ (weak)  Left Posterior Tibial Pulse: 1+ (weak)    Resp:          GI/:  GI prophylaxis: yes  Diet/Nutrition Received: NPO  Last Bowel Movement: 08/01/23      Intake/Output Summary (Last 24 hours) at 8/2/2023 0746  Last data filed at 8/2/2023 0650  Gross per 24 hour   Intake 596.73 ml   Output 550 ml   Net 46.73 ml        Nutritional Supplement Intake: Quantity 0, Type:  none    Labs/Accuchecks:  Recent Labs   Lab 08/02/23 0111   WBC 7.93   RBC 3.33*   HGB 10.9*   HCT 32.3*   *      Recent Labs   Lab 08/02/23 0111   INR 1.0   APTT 31.6      Recent Labs     08/02/23 0111      K 4.3   CO2 19*      BUN 18   CREATININE 1.5*   ALKPHOS 42*   ALT 17   AST 24   BILITOT 0.4       Recent Labs   Lab 08/02/23 0111   TROPONINI 0.032*    No results for input(s): "PH", "PCO2", "PO2", "HCO3", "POCSATURATED", "BE" in the last 72 hours.    Electrolytes: No replacement orders  Accuchecks: none    Gtts/LDAs:      Lines/Drains/Airways       Peripheral Intravenous Line  Duration                  Peripheral IV - Single Lumen 08/01/23 " 18 G Posterior;Right Wrist 1 day         Peripheral IV - Single Lumen 08/01/23 20 G Posterior;Right Hand 1 day                    Skin/Wounds  Bathing/Skin Care: bath, complete;dressed/undressed;electrode patches/site rotation;linen changed (08/02/23 0030)  Wounds: No  Wound care consulted: No     Problem: Adult Inpatient Plan of Care  Goal: Plan of Care Review  Outcome: Ongoing, Progressing

## 2023-08-02 NOTE — ASSESSMENT & PLAN NOTE
Transferred from OSH with fever 101, myalgias, headaches and fatigue x 4 days  Upon arrival, symptom free  Continue antibiotics, vanc and cefepime (was on vanc and vic in OSH)  Blood cultures ordered  De escalate antibiotics per culture data  TTE in AM

## 2023-08-02 NOTE — ASSESSMENT & PLAN NOTE
EF 40%  Symptom free from cardiac standpoint  Takes lasix 2-3x/week only  CVP 3 on echo on arrival  Will watch w/o diuretics  Continue home entresto and coreg

## 2023-08-02 NOTE — NURSING
1400 Patient arrived to unit from ICU. He is alert and oriented x4 and is ambulatory. He appears in no acute distress and has no complaints at this time. Wife is at bedside. Vital signs are stable. Safety measures in place and call bell within reach.

## 2023-08-02 NOTE — CONSULTS
[facility] - Cardiac Intensive Care  Cardiac Electrophysiology  Consult Note    Admission Date: 8/2/2023  Code Status: Full Code   Attending Provider: Abdelrahman Vital MD  Consulting Provider: Diya Nayak MD  Principal Problem:Fever    Inpatient consult to Electrophysiology  Consult performed by: Diya Nayak MD  Consult ordered by: Nayan Alves MD        Subjective:     Chief Complaint: fevers     HPI:   Mr. Grossman is a 77 y.o. male with AF, CAD (MI, PCI with with stent placement in 2000 and 2004), ischemic cardiomyopathy with EF 40%, HTN, HLD, CKD, hypothyroid, RV thrombus, VT (12/2014), resulting in ICD shock, HTN, HLD, CKD, hypothyroidism, AF, PSVT (AVNRT, s/p RFA) that presented from [facility] [place] with high grade fevers and concerns for endocarditis and now under the care of the CICU team.     Patient presented in local hospital 4 days ago with fevers, chills, headache and generalized muscle aches. His EKG shows a paced V sensed rhythm.  His blood pressure is 113/69 and heart rate 68.  Patient was started on antibiotics and is undergoing infectious evaluation of unknown source     E history: Patient follow up with Dr. Isbell     06/2018 ICD gen change  This was c/b VT intraop, requiring external rescue to NSR. Also, a breach in the RV lead's insulation was repaired at that time.     12/9/2019: Cryoblative pulmonary vein isolation of all 4 pulmonary veins. Amiodarone was restarted. Stopped due to decreased DLCO on PFTs 1/2020. 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     11/2020 Had AF recurrence, as well as PSVT found on ICD. Underwent redo PVI (posterior box, CFAE) ablation as well as AVNRT ablation      Past Medical History:   Diagnosis Date    Anticoagulant long-term use     Atrial fibrillation     Benign essential tremor 6/6/2018    Cardiomyopathy     Chest congestion     CHF (congestive heart failure)     Coronary artery disease     Hypertension      Left ventricular thrombus     Obstructive sleep apnea 4/3/2022    Syncope and collapse     Thyroid disease        Past Surgical History:   Procedure Laterality Date    ABLATION OF ARRHYTHMOGENIC FOCUS FOR ATRIAL FIBRILLATION N/A 12/9/2019    Procedure: ABLATION, ARRHYTHMOGENIC FOCUS, FOR ATRIAL FIBRILLATION;  Surgeon: Camron Isbell MD;  Location: [facility] EP LAB;  Service: Cardiology;  Laterality: N/A;  AF, NOE (firm), PVI, CRYO, LORENZA, GEN, DM, 3 PREP * Dual ICD SJM*    ABLATION OF ARRHYTHMOGENIC FOCUS FOR ATRIAL FIBRILLATION N/A 11/23/2020    Procedure: ABLATION, ARRHYTHMOGENIC FOCUS, FOR ATRIAL FIBRILLATION;  Surgeon: Camron Isbell MD;  Location: [facility] EP LAB;  Service: Cardiology;  Laterality: N/A;  AF, Redo PVI, RFA, CARTO, GEN, DM, 3 PREP* Dual ICD SJM in situ* NOE deferred pt compliant*    CARDIAC CATHETERIZATION      CARDIAC DEFIBRILLATOR PLACEMENT      CORONARY ANGIOPLASTY      HERNIA REPAIR      KNEE ARTHROSCOPY      REPLACEMENT OF IMPLANTABLE CARDIOVERTER-DEFIBRILLATOR (ICD) GENERATOR Left 6/7/2018    Procedure: REPLACEMENT-GENERATOR-ICD;  Surgeon: Camron Isbell MD;  Location: [facility] CATH LAB;  Service: Cardiology;  Laterality: Left;  GENIE, DUAL ICD GEN CHG, SJM, MAC, DM, 3 PREP    STENTS      TRANSESOPHAGEAL ECHOCARDIOGRAPHY N/A 10/31/2019    Procedure: ECHOCARDIOGRAM, TRANSESOPHAGEAL;  Surgeon: Eleuterio Diagnostic Provider;  Location: [facility] EP LAB;  Service: Cardiology;  Laterality: N/A;  AF, NOE, DCCV, MAC, DM, 3 PREP    TRANSESOPHAGEAL ECHOCARDIOGRAPHY N/A 12/9/2019    Procedure: ECHOCARDIOGRAM, TRANSESOPHAGEAL;  Surgeon: Sun Merchant MD;  Location: [facility] EP LAB;  Service: Cardiology;  Laterality: N/A;    TRANSESOPHAGEAL ECHOCARDIOGRAPHY N/A 10/29/2020    Procedure: ECHOCARDIOGRAM, TRANSESOPHAGEAL;  Surgeon: Dannie Mittal III, MD;  Location: [facility] EP LAB;  Service: Cardiology;  Laterality: N/A;    TREATMENT OF CARDIAC ARRHYTHMIA N/A 10/31/2019    Procedure: CARDIOVERSION;  Surgeon: Camron Isbell,  MD;  Location: [facility] EP LAB;  Service: Cardiology;  Laterality: N/A;  AF, NOE, DCCV, MAC, DM, 3 PREP*SJM  Dual ICD in situ*    TREATMENT OF CARDIAC ARRHYTHMIA N/A 10/29/2020    Procedure: CARDIOVERSION;  Surgeon: Camron Isbell MD;  Location: [facility] EP LAB;  Service: Cardiology;  Laterality: N/A;  AF, NOE, DCCV, MAC, DM, 3 PREP*SJM dual ICD in situ*    TREATMENT OF CARDIAC ARRHYTHMIA  11/23/2020    Procedure: Cardioversion or Defibrillation;  Surgeon: Camron Isbell MD;  Location: [facility] EP LAB;  Service: Cardiology;;    VASCULAR SURGERY      stents placed       Review of patient's allergies indicates:  No Known Allergies    Current Facility-Administered Medications on File Prior to Encounter   Medication    0.9%  NaCl infusion    sodium chloride 0.9% flush 5 mL    sodium chloride 0.9% flush 5 mL     Current Outpatient Medications on File Prior to Encounter   Medication Sig    ascorbic acid, vitamin C, (VITAMIN C) 100 MG tablet Take 1,000 mg by mouth once daily.    aspirin (ECOTRIN) 81 MG EC tablet Take 81 mg by mouth once daily.    atorvastatin (LIPITOR) 20 MG tablet Take 1 tablet by mouth once daily    carvediloL (COREG) 12.5 MG tablet Take 1 tablet (12.5 mg total) by mouth 2 (two) times daily with meals.    coenzyme Q10 200 mg capsule Take 200 mg by mouth once daily.    dofetilide (TIKOSYN) 500 MCG Cap TAKE 1 CAPSULE BY MOUTH EVERY 12 HOURS    ELIQUIS 5 mg Tab Take 1 tablet by mouth twice daily    fish oil-omega-3 fatty acids 300-1,000 mg capsule Take 2 g by mouth 2 (two) times daily.    furosemide (LASIX) 40 MG tablet Take 20 mg by mouth daily as needed.    levothyroxine (SYNTHROID) 88 MCG tablet TAKE 1 TABLET BY MOUTH ONCE DAILY BEFORE BREAKFAST    multivitamin capsule Take 1 capsule by mouth nightly.    nitroGLYCERIN (NITROSTAT) 0.4 MG SL tablet Place 1 tablet (0.4 mg total) under the tongue every 5 (five) minutes as needed for Chest pain.    pantoprazole (PROTONIX) 40 MG tablet Take 40 mg by mouth once daily.     sacubitriL-valsartan (ENTRESTO)  mg per tablet Take 1 tablet by mouth 2 (two) times daily.    SLO-NIACIN 750 mg TbSR Take 1 tablet (750 mg total) by mouth 2 (two) times daily. (Patient taking differently: Take 500 mg by mouth 2 (two) times daily.)    zinc 50 mg Tab Take 40 mg by mouth.     Family History       Problem Relation (Age of Onset)    Heart disease Mother, Father, Brother          Tobacco Use    Smoking status: Never    Smokeless tobacco: Former     Quit date: 1980   Substance and Sexual Activity    Alcohol use: Yes     Comment: occasionally    Drug use: No    Sexual activity: Not on file     Review of Systems   Constitutional: Positive for chills and fever.   HENT: Negative.     Eyes: Negative.    Cardiovascular:  Negative for chest pain and dyspnea on exertion.   Respiratory: Negative.     Endocrine: Negative.    Hematologic/Lymphatic: Negative.    Gastrointestinal: Negative.    Genitourinary: Negative.    Neurological: Negative.      Objective:     Vital Signs (Most Recent):  Temp: (!) 101.8 °F (38.8 °C) (08/02/23 0824)  Pulse: 73 (08/02/23 0900)  Resp: (!) 27 (08/02/23 0900)  BP: 122/69 (08/02/23 0958)  SpO2: (!) 91 % (08/02/23 0900) Vital Signs (24h Range):  Temp:  [98.5 °F (36.9 °C)-101.8 °F (38.8 °C)] 101.8 °F (38.8 °C)  Pulse:  [64-82] 73  Resp:  [21-31] 27  SpO2:  [90 %-96 %] 91 %  BP: (104-135)/(60-71) 122/69       Weight: 125.2 kg (276 lb)  Body mass index is 35.44 kg/m².    SpO2: (!) 91 %        Physical Exam  Constitutional:       Appearance: Normal appearance.   Cardiovascular:      Rate and Rhythm: Normal rate and regular rhythm.      Pulses: Normal pulses.      Heart sounds: Normal heart sounds.   Abdominal:      Palpations: Abdomen is soft.   Musculoskeletal:      Right lower leg: No edema.      Left lower leg: No edema.   Skin:     General: Skin is warm.   Neurological:      General: No focal deficit present.      Mental Status: He is alert.   Psychiatric:         Mood and  Affect: Mood normal.            Significant Labs: All pertinent lab results from the last 24 hours have been reviewed.                  Assessment and Plan:   Atrial Fibrillation and History of VTach   Automatic implantable cardioverter-defibrillator in situ  Patient admitted for sepsis and currently undergoing evaluation for infectious cause.   Device interrogated and no AF or treated events since last interrogation. No changes made to .   -Patient undergoing NOE for evaluation of endocarditis/lead infection.  -Blood cultures negative growth to date        Thank you for your consult. I will follow-up with patient. Please contact us if you have any additional questions.    Diya Nayak MD  Cardiac Electrophysiology  [facility] - Cardiac Intensive Care

## 2023-08-02 NOTE — CONSULTS
[facility] - Cardiology Stepdown  Infectious Disease  Consult Note    Patient Name: Amish Grossman  MRN: [id number]  Admission Date: 8/2/2023  Hospital Length of Stay: 0 days  Attending Physician: Abdelrahman Vital MD  Primary Care Provider: Marco Antonio Devi MD     Isolation Status: No active isolations    Patient information was obtained from patient, past medical records, ER records and primary team.      Inpatient consult to Infectious Diseases  Consult performed by: Farzana Sylvester MD  Consult ordered by: Darwin Degroot MD        Assessment/Plan:     Other  * Fever  I independently reviewed patient's lab work, OSH records and images as documented. 78 yo male with ICM s/p ICD admitted to OSH for fevers. Work up at [facility] (blcx, UA) unrevealing. Unclear source of fevers at this time, blood cx ngtd, NOE with noted small echodensity on RV pacing wire (?fibrinous material vs veg). Doppler pending.     Recommendations:  -continue vancomycin pharm to dose and cefepime pending work up/cx data   -monitor renal fx closely while on abx therapy  -CT c/a/p to evaluate for source  -RIP and RF (ordered)  -recommend updated blcx results from OSH        Thank you for your consult. I will follow-up with patient. Please contact us if you have any additional questions. Above d/w primary team.         75 minutes of total time spent on the encounter, which includes face to face time and non-face to face time preparing to see the patient (eg, review of tests), obtaining and/or reviewing separately obtained history, documenting clinical information in the electronic or other health record, independently interpreting results (not separately reported) and communicating results to the patient/family/caregiver, or care coordination (not separately reported).       Farzana Sylvester MD  Infectious Disease  [facility] - Cardiology Stepdown    Subjective:     Principal Problem: Fever    HPI: 78 yo male with ICM s/p ICD admitted to OSH for  fevers. ID consulted for abx recs. Pt reported acute onset of fevers prior to admission with associated chills. He was briefly admitted at [facility] and transferred to [facility] [place] higher level of care. Per chart review, infectious work up at OSH (blcx, flu/covid) that were unrevealing. Pt denies allergies to abx, sick contacts, abdominal complaints/dysuria, cough/sob, new rash or diarrhea. Denies recent travel, TB exposure, bug bites. His current admission course notable for CXR without focal consolidation. Blcx on admit ngtd and UA unrevealing for infection. NOE noted to have small echodensity on RV pacing wire.  He is currently on vancomycin and cefepime.         Past Medical History:   Diagnosis Date    Anticoagulant long-term use     Atrial fibrillation     Benign essential tremor 6/6/2018    Cardiomyopathy     Chest congestion     CHF (congestive heart failure)     Coronary artery disease     Hypertension     Left ventricular thrombus     Obstructive sleep apnea 4/3/2022    Syncope and collapse     Thyroid disease        Past Surgical History:   Procedure Laterality Date    ABLATION OF ARRHYTHMOGENIC FOCUS FOR ATRIAL FIBRILLATION N/A 12/9/2019    Procedure: ABLATION, ARRHYTHMOGENIC FOCUS, FOR ATRIAL FIBRILLATION;  Surgeon: Camron Isbell MD;  Location: [facility] EP LAB;  Service: Cardiology;  Laterality: N/A;  AF, NOE (firm), PVI, CRYO, LORENZA, GEN, DM, 3 PREP * Dual ICD SJM*    ABLATION OF ARRHYTHMOGENIC FOCUS FOR ATRIAL FIBRILLATION N/A 11/23/2020    Procedure: ABLATION, ARRHYTHMOGENIC FOCUS, FOR ATRIAL FIBRILLATION;  Surgeon: Camron Isbell MD;  Location: [facility] EP LAB;  Service: Cardiology;  Laterality: N/A;  AF, Redo PVI, RFA, CARTO, GEN, DM, 3 PREP* Dual ICD SJM in situ* NOE deferred pt compliant*    CARDIAC CATHETERIZATION      CARDIAC DEFIBRILLATOR PLACEMENT      CORONARY ANGIOPLASTY      HERNIA REPAIR      KNEE ARTHROSCOPY      REPLACEMENT OF IMPLANTABLE CARDIOVERTER-DEFIBRILLATOR (ICD)  GENERATOR Left 6/7/2018    Procedure: REPLACEMENT-GENERATOR-ICD;  Surgeon: Camron Isbell MD;  Location: [facility] CATH LAB;  Service: Cardiology;  Laterality: Left;  GENIE, DUAL ICD GEN CHG, SJM, MAC, DM, 3 PREP    STENTS      TRANSESOPHAGEAL ECHOCARDIOGRAPHY N/A 10/31/2019    Procedure: ECHOCARDIOGRAM, TRANSESOPHAGEAL;  Surgeon: Eleuterio Diagnostic Provider;  Location: [facility] EP LAB;  Service: Cardiology;  Laterality: N/A;  AF, NOE, DCCV, MAC, DM, 3 PREP    TRANSESOPHAGEAL ECHOCARDIOGRAPHY N/A 12/9/2019    Procedure: ECHOCARDIOGRAM, TRANSESOPHAGEAL;  Surgeon: Sun Merchant MD;  Location: [facility] EP LAB;  Service: Cardiology;  Laterality: N/A;    TRANSESOPHAGEAL ECHOCARDIOGRAPHY N/A 10/29/2020    Procedure: ECHOCARDIOGRAM, TRANSESOPHAGEAL;  Surgeon: Dannie Mittal III, MD;  Location: [facility] EP LAB;  Service: Cardiology;  Laterality: N/A;    TREATMENT OF CARDIAC ARRHYTHMIA N/A 10/31/2019    Procedure: CARDIOVERSION;  Surgeon: Camron Isbell MD;  Location: [facility] EP LAB;  Service: Cardiology;  Laterality: N/A;  AF, NOE, DCCV, MAC, DM, 3 PREP*SJM  Dual ICD in situ*    TREATMENT OF CARDIAC ARRHYTHMIA N/A 10/29/2020    Procedure: CARDIOVERSION;  Surgeon: Camron Isbell MD;  Location: [facility] EP LAB;  Service: Cardiology;  Laterality: N/A;  AF, NOE, DCCV, MAC, DM, 3 PREP*SJM dual ICD in situ*    TREATMENT OF CARDIAC ARRHYTHMIA  11/23/2020    Procedure: Cardioversion or Defibrillation;  Surgeon: Camron Isbell MD;  Location: [facility] EP LAB;  Service: Cardiology;;    VASCULAR SURGERY      stents placed       Review of patient's allergies indicates:  No Known Allergies    Medications:  Medications Prior to Admission   Medication Sig    ascorbic acid, vitamin C, (VITAMIN C) 100 MG tablet Take 1,000 mg by mouth once daily.    aspirin (ECOTRIN) 81 MG EC tablet Take 81 mg by mouth once daily.    atorvastatin (LIPITOR) 20 MG tablet Take 1 tablet by mouth once daily    carvediloL (COREG) 12.5 MG tablet Take 1 tablet (12.5 mg  total) by mouth 2 (two) times daily with meals.    coenzyme Q10 200 mg capsule Take 200 mg by mouth once daily.    dofetilide (TIKOSYN) 500 MCG Cap TAKE 1 CAPSULE BY MOUTH EVERY 12 HOURS    ELIQUIS 5 mg Tab Take 1 tablet by mouth twice daily    fish oil-omega-3 fatty acids 300-1,000 mg capsule Take 2 g by mouth 2 (two) times daily.    furosemide (LASIX) 40 MG tablet Take 20 mg by mouth daily as needed.    levothyroxine (SYNTHROID) 88 MCG tablet TAKE 1 TABLET BY MOUTH ONCE DAILY BEFORE BREAKFAST    multivitamin capsule Take 1 capsule by mouth nightly.    nitroGLYCERIN (NITROSTAT) 0.4 MG SL tablet Place 1 tablet (0.4 mg total) under the tongue every 5 (five) minutes as needed for Chest pain.    pantoprazole (PROTONIX) 40 MG tablet Take 40 mg by mouth once daily.    sacubitriL-valsartan (ENTRESTO)  mg per tablet Take 1 tablet by mouth 2 (two) times daily.    SLO-NIACIN 750 mg TbSR Take 1 tablet (750 mg total) by mouth 2 (two) times daily. (Patient taking differently: Take 500 mg by mouth 2 (two) times daily.)    zinc 50 mg Tab Take 40 mg by mouth.     Antibiotics (From admission, onward)      Start     Stop Route Frequency Ordered    08/02/23 0900  mupirocin 2 % ointment         08/07/23 0859 Nasl 2 times daily 08/02/23 0254    08/02/23 0845  ceFEPIme (MAXIPIME) 2 g in dextrose 5 % in water (D5W) 100 mL IVPB (MB+)         -- IV Every 8 hours (non-standard times) 08/02/23 0735    08/02/23 0330  vancomycin 2 g in dextrose 5 % 500 mL IVPB         -- IV Every 24 hours (non-standard times) 08/02/23 0232    08/02/23 0159  vancomycin - pharmacy to dose  (vancomycin IVPB (PEDS and ADULTS))        See Hyperspace for full Linked Orders Report.    -- IV pharmacy to manage frequency 08/02/23 0059          Antifungals (From admission, onward)      None          Antivirals (From admission, onward)      None             Immunization History   Administered Date(s) Administered    COVID-19, MRNA, LN-S, PF (Pfizer)  (Purple Cap) 01/08/2021, 01/30/2021       Family History       Problem Relation (Age of Onset)    Heart disease Mother, Father, Brother          Social History     Socioeconomic History    Marital status:    Occupational History     Employer: Retired   Tobacco Use    Smoking status: Never    Smokeless tobacco: Former     Quit date: 1980   Substance and Sexual Activity    Alcohol use: Yes     Comment: occasionally    Drug use: No   Social History Narrative    Environmental History:     How many years in current home: 23    House/Apt/Mobile Home: house    Pets in the home: none.    Bedroom Evens: [id number] carpeting    Main Area Evens: [id number]-jq-flzy carpeting    Climate Control: central or room air conditioning    Dust Mite Controls: Dust mite controls are not in place.    Tobacco Smoke in Home: no    Outdoor Smoker in Home: no    Other notes:         Social Determinants of Health     Financial Resource Strain: Low Risk  (8/2/2023)    Overall Financial Resource Strain (CARDIA)     Difficulty of Paying Living Expenses: Not hard at all   Food Insecurity: No Food Insecurity (8/2/2023)    Hunger Vital Sign     Worried About Running Out of Food in the Last Year: Never true     Ran Out of Food in the Last Year: Never true   Transportation Needs: No Transportation Needs (8/2/2023)    PRAPARE - Transportation     Lack of Transportation (Medical): No     Lack of Transportation (Non-Medical): No   Physical Activity: Unknown (8/2/2023)    Exercise Vital Sign     Days of Exercise per Week: 5 days   Stress: No Stress Concern Present (8/2/2023)    [employer] - Occupational Stress Questionnaire     Feeling of Stress : Not at all   Social Connections: Socially Integrated (8/2/2023)    Social Connection and Isolation Panel [NHANES]     Frequency of Communication with Friends and Family: More than three times a week     Attends Sabianist Services: More than 4 times per year      Active Member of Clubs or Organizations: Yes     Attends Club or Organization Meetings: More than 4 times per year     Marital Status:    Housing Stability: Low Risk  (8/2/2023)    Housing Stability Vital Sign     Unable to Pay for Housing in the Last Year: No     Number of Places Lived in the Last Year: 1     Unstable Housing in the Last Year: No     Review of Systems  Objective:     Vital Signs (Most Recent):  Temp: 96.9 °F (36.1 °C) (08/02/23 1415)  Pulse: 67 (08/02/23 1415)  Resp: 18 (08/02/23 1415)  BP: 105/65 (08/02/23 1415)  SpO2: (!) 92 % (08/02/23 1415) Vital Signs (24h Range):  Temp:  [96.9 °F (36.1 °C)-101.8 °F (38.8 °C)] 96.9 °F (36.1 °C)  Pulse:  [60-82] 67  Resp:  [18-31] 18  SpO2:  [90 %-98 %] 92 %  BP: ()/(50-75) 105/65     Weight: 125.2 kg (276 lb)  Body mass index is 35.44 kg/m².    Estimated Creatinine Clearance: 58.9 mL/min (A) (based on SCr of 1.5 mg/dL (H)).     Physical Exam  Constitutional:       General: He is not in acute distress.     Appearance: He is not ill-appearing or toxic-appearing.   HENT:      Head: Normocephalic and atraumatic.      Nose:      Comments: nc     Mouth/Throat:      Mouth: Mucous membranes are moist.   Eyes:      General:         Right eye: No discharge.         Left eye: No discharge.   Cardiovascular:      Rate and Rhythm: Normal rate and regular rhythm.      Comments: AICD  Pulmonary:      Effort: Pulmonary effort is normal. No respiratory distress.      Breath sounds: No wheezing or rhonchi.   Abdominal:      General: There is no distension.      Palpations: Abdomen is soft.      Tenderness: There is no abdominal tenderness. There is no right CVA tenderness, left CVA tenderness or guarding.   Musculoskeletal:      Right lower leg: No edema.      Left lower leg: No edema.   Skin:     General: Skin is warm and dry.      Findings: No rash.   Neurological:      Mental Status: He is alert and oriented to person, place, and time.           Significant Labs:   Microbiology Results (last 7 days)       Procedure Component Value Units Date/Time    Blood culture [[id number]] Collected: 08/02/23 0124    Order Status: Completed Specimen: Blood from Peripheral, Antecubital, Right Updated: 08/02/23 0915     Blood Culture, Routine No Growth to date    Blood culture [[id number]] Collected: 08/02/23 0141    Order Status: Completed Specimen: Blood from Peripheral, Antecubital, Left Updated: 08/02/23 0915     Blood Culture, Routine No Growth to date            Significant Imaging: I have reviewed all pertinent imaging results/findings within the past 24 hours.

## 2023-08-02 NOTE — SUBJECTIVE & OBJECTIVE
Past Medical History:   Diagnosis Date    Anticoagulant long-term use     Atrial fibrillation     Benign essential tremor 6/6/2018    Cardiomyopathy     Chest congestion     CHF (congestive heart failure)     Coronary artery disease     Hypertension     Left ventricular thrombus     Obstructive sleep apnea 4/3/2022    Syncope and collapse     Thyroid disease        Past Surgical History:   Procedure Laterality Date    ABLATION OF ARRHYTHMOGENIC FOCUS FOR ATRIAL FIBRILLATION N/A 12/9/2019    Procedure: ABLATION, ARRHYTHMOGENIC FOCUS, FOR ATRIAL FIBRILLATION;  Surgeon: Camron Isbell MD;  Location: [facility] EP LAB;  Service: Cardiology;  Laterality: N/A;  AF, NOE (firm), PVI, CRYO, LORENZA, GEN, DM, 3 PREP * Dual ICD SJM*    ABLATION OF ARRHYTHMOGENIC FOCUS FOR ATRIAL FIBRILLATION N/A 11/23/2020    Procedure: ABLATION, ARRHYTHMOGENIC FOCUS, FOR ATRIAL FIBRILLATION;  Surgeon: Camron Isbell MD;  Location: [facility] EP LAB;  Service: Cardiology;  Laterality: N/A;  AF, Redo PVI, RFA, CARTO, GEN, DM, 3 PREP* Dual ICD SJM in situ* NOE deferred pt compliant*    CARDIAC CATHETERIZATION      CARDIAC DEFIBRILLATOR PLACEMENT      CORONARY ANGIOPLASTY      HERNIA REPAIR      KNEE ARTHROSCOPY      REPLACEMENT OF IMPLANTABLE CARDIOVERTER-DEFIBRILLATOR (ICD) GENERATOR Left 6/7/2018    Procedure: REPLACEMENT-GENERATOR-ICD;  Surgeon: Camron Isbell MD;  Location: [facility] CATH LAB;  Service: Cardiology;  Laterality: Left;  GEINE, DUAL ICD GEN CHG, SJM, MAC, DM, 3 PREP    STENTS      TRANSESOPHAGEAL ECHOCARDIOGRAPHY N/A 10/31/2019    Procedure: ECHOCARDIOGRAM, TRANSESOPHAGEAL;  Surgeon: Eleuterio Diagnostic Provider;  Location: [facility] EP LAB;  Service: Cardiology;  Laterality: N/A;  AF, NOE, DCCV, MAC, DM, 3 PREP    TRANSESOPHAGEAL ECHOCARDIOGRAPHY N/A 12/9/2019    Procedure: ECHOCARDIOGRAM, TRANSESOPHAGEAL;  Surgeon: Sun Merchant MD;  Location: [facility] EP LAB;  Service: Cardiology;  Laterality: N/A;    TRANSESOPHAGEAL ECHOCARDIOGRAPHY N/A 10/29/2020     Procedure: ECHOCARDIOGRAM, TRANSESOPHAGEAL;  Surgeon: Dannie Mittal III, MD;  Location: [facility] EP LAB;  Service: Cardiology;  Laterality: N/A;    TREATMENT OF CARDIAC ARRHYTHMIA N/A 10/31/2019    Procedure: CARDIOVERSION;  Surgeon: Camron Isbell MD;  Location: [facility] EP LAB;  Service: Cardiology;  Laterality: N/A;  AF, NOE, DCCV, MAC, DM, 3 PREP*SJM  Dual ICD in situ*    TREATMENT OF CARDIAC ARRHYTHMIA N/A 10/29/2020    Procedure: CARDIOVERSION;  Surgeon: Camron Isbell MD;  Location: [facility] EP LAB;  Service: Cardiology;  Laterality: N/A;  AF, NOE, DCCV, MAC, DM, 3 PREP*SJM dual ICD in situ*    TREATMENT OF CARDIAC ARRHYTHMIA  11/23/2020    Procedure: Cardioversion or Defibrillation;  Surgeon: Camron Isbell MD;  Location: [facility] EP LAB;  Service: Cardiology;;    VASCULAR SURGERY      stents placed       Review of patient's allergies indicates:  No Known Allergies    Medications:  Medications Prior to Admission   Medication Sig    ascorbic acid, vitamin C, (VITAMIN C) 100 MG tablet Take 1,000 mg by mouth once daily.    aspirin (ECOTRIN) 81 MG EC tablet Take 81 mg by mouth once daily.    atorvastatin (LIPITOR) 20 MG tablet Take 1 tablet by mouth once daily    carvediloL (COREG) 12.5 MG tablet Take 1 tablet (12.5 mg total) by mouth 2 (two) times daily with meals.    coenzyme Q10 200 mg capsule Take 200 mg by mouth once daily.    dofetilide (TIKOSYN) 500 MCG Cap TAKE 1 CAPSULE BY MOUTH EVERY 12 HOURS    ELIQUIS 5 mg Tab Take 1 tablet by mouth twice daily    fish oil-omega-3 fatty acids 300-1,000 mg capsule Take 2 g by mouth 2 (two) times daily.    furosemide (LASIX) 40 MG tablet Take 20 mg by mouth daily as needed.    levothyroxine (SYNTHROID) 88 MCG tablet TAKE 1 TABLET BY MOUTH ONCE DAILY BEFORE BREAKFAST    multivitamin capsule Take 1 capsule by mouth nightly.    nitroGLYCERIN (NITROSTAT) 0.4 MG SL tablet Place 1 tablet (0.4 mg total) under the tongue every 5 (five) minutes as needed for Chest pain.     pantoprazole (PROTONIX) 40 MG tablet Take 40 mg by mouth once daily.    sacubitriL-valsartan (ENTRESTO)  mg per tablet Take 1 tablet by mouth 2 (two) times daily.    SLO-NIACIN 750 mg TbSR Take 1 tablet (750 mg total) by mouth 2 (two) times daily. (Patient taking differently: Take 500 mg by mouth 2 (two) times daily.)    zinc 50 mg Tab Take 40 mg by mouth.     Antibiotics (From admission, onward)      Start     Stop Route Frequency Ordered    08/02/23 0900  mupirocin 2 % ointment         08/07/23 0859 Nasl 2 times daily 08/02/23 0254    08/02/23 0845  ceFEPIme (MAXIPIME) 2 g in dextrose 5 % in water (D5W) 100 mL IVPB (MB+)         -- IV Every 8 hours (non-standard times) 08/02/23 0735    08/02/23 0330  vancomycin 2 g in dextrose 5 % 500 mL IVPB         -- IV Every 24 hours (non-standard times) 08/02/23 0232    08/02/23 0159  vancomycin - pharmacy to dose  (vancomycin IVPB (PEDS and ADULTS))        See Hyperspace for full Linked Orders Report.    -- IV pharmacy to manage frequency 08/02/23 0059          Antifungals (From admission, onward)      None          Antivirals (From admission, onward)      None             Immunization History   Administered Date(s) Administered    COVID-19, MRNA, LN-S, PF (Pfizer) (Purple Cap) 01/08/2021, 01/30/2021       Family History       Problem Relation (Age of Onset)    Heart disease Mother, Father, Brother          Social History     Socioeconomic History    Marital status:    Occupational History     Employer: Retired   Tobacco Use    Smoking status: Never    Smokeless tobacco: Former     Quit date: 1980   Substance and Sexual Activity    Alcohol use: Yes     Comment: occasionally    Drug use: No   Social History Narrative    Environmental History:     How many years in current home: 23    House/Apt/Mobile Home: house    Pets in the home: none.    Bedroom Evens: [id number]-js-xndo carpeting    Main Area Evens: momy-jj-innw carpeting    Climate Control: central or room  air conditioning    Dust Mite Controls: Dust mite controls are not in place.    Tobacco Smoke in Home: no    Outdoor Smoker in Home: no    Other notes:         Social Determinants of Health     Financial Resource Strain: Low Risk  (8/2/2023)    Overall Financial Resource Strain (CARDIA)     Difficulty of Paying Living Expenses: Not hard at all   Food Insecurity: No Food Insecurity (8/2/2023)    Hunger Vital Sign     Worried About Running Out of Food in the Last Year: Never true     Ran Out of Food in the Last Year: Never true   Transportation Needs: No Transportation Needs (8/2/2023)    PRAPARE - Transportation     Lack of Transportation (Medical): No     Lack of Transportation (Non-Medical): No   Physical Activity: Unknown (8/2/2023)    Exercise Vital Sign     Days of Exercise per Week: 5 days   Stress: No Stress Concern Present (8/2/2023)    [employer] [place] [employer] - Occupational Stress Questionnaire     Feeling of Stress : Not at all   Social Connections: Socially Integrated (8/2/2023)    Social Connection and Isolation Panel [NHANES]     Frequency of Communication with Friends and Family: More than three times a week     Attends Yazidism Services: More than 4 times per year     Active Member of Clubs or Organizations: Yes     Attends Club or Organization Meetings: More than 4 times per year     Marital Status:    Housing Stability: Low Risk  (8/2/2023)    Housing Stability Vital Sign     Unable to Pay for Housing in the Last Year: No     Number of Places Lived in the Last Year: 1     Unstable Housing in the Last Year: No     Review of Systems  Objective:     Vital Signs (Most Recent):  Temp: 96.9 °F (36.1 °C) (08/02/23 1415)  Pulse: 67 (08/02/23 1415)  Resp: 18 (08/02/23 1415)  BP: 105/65 (08/02/23 1415)  SpO2: (!) 92 % (08/02/23 1415) Vital Signs (24h Range):  Temp:  [96.9 °F (36.1 °C)-101.8 °F (38.8 °C)] 96.9 °F (36.1 °C)  Pulse:  [60-82] 67  Resp:  [18-31] 18  SpO2:  [90 %-98 %] 92  %  BP: ()/(50-75) 105/65     Weight: 125.2 kg (276 lb)  Body mass index is 35.44 kg/m².    Estimated Creatinine Clearance: 58.9 mL/min (A) (based on SCr of 1.5 mg/dL (H)).     Physical Exam  Constitutional:       General: He is not in acute distress.     Appearance: He is not ill-appearing or toxic-appearing.   HENT:      Head: Normocephalic and atraumatic.      Nose:      Comments: nc     Mouth/Throat:      Mouth: Mucous membranes are moist.   Eyes:      General:         Right eye: No discharge.         Left eye: No discharge.   Cardiovascular:      Rate and Rhythm: Normal rate and regular rhythm.      Comments: AICD  Pulmonary:      Effort: Pulmonary effort is normal. No respiratory distress.      Breath sounds: No wheezing or rhonchi.   Abdominal:      General: There is no distension.      Palpations: Abdomen is soft.      Tenderness: There is no abdominal tenderness. There is no right CVA tenderness, left CVA tenderness or guarding.   Musculoskeletal:      Right lower leg: No edema.      Left lower leg: No edema.   Skin:     General: Skin is warm and dry.      Findings: No rash.   Neurological:      Mental Status: He is alert and oriented to person, place, and time.          Significant Labs:   Microbiology Results (last 7 days)       Procedure Component Value Units Date/Time    Blood culture [[id number]] Collected: 08/02/23 0124    Order Status: Completed Specimen: Blood from Peripheral, Antecubital, Right Updated: 08/02/23 0915     Blood Culture, Routine No Growth to date    Blood culture [[id number]] Collected: 08/02/23 0141    Order Status: Completed Specimen: Blood from Peripheral, Antecubital, Left Updated: 08/02/23 0915     Blood Culture, Routine No Growth to date            Significant Imaging: I have reviewed all pertinent imaging results/findings within the past 24 hours.

## 2023-08-02 NOTE — HOSPITAL COURSE
[place] [facility] [place] due to persistent high-grade fevers concerning for infective endocarditis. Patient afebrile on arrival and in no acute distress. Obtained NOE on 8/2 which did not reveal vegetations. Bilateral DVT US of lower extremities did not reveal deep vein thrombosis. CT C/A/P showed GGOs in apical and posterior lung segments c/w atypical PNA given patient's associated sxs of dry cough, HA, myalgias, and fever/chills. Infectious disease consulted for assistance of source control and antibiotic selection. Optimized GDMT during hospital stay, though patient noted Jardiance will be too difficult to afford. Deemed medically stable for discharge on 8/3 with doxycycline 100mg bid and cepodoxime 200mg po bid x 7 days with plan for PCP follow-up for labwork, resolution of symptoms, and f/u for known pulmonary nodules.

## 2023-08-02 NOTE — ANESTHESIA PREPROCEDURE EVALUATION
08/02/2023  Pre-operative evaluation for Procedure(s) (LRB):  Transesophageal echo (NOE) intra-procedure log documentation (N/A)    Amish Grossman is a 77 y.o. male here for NOE to evaluate for endocarditis. Hx of ischemic cardiomyopathy    Patient Active Problem List   Diagnosis    Coronary artery disease due to lipid rich plaque    Old myocardial infarction    S/P PTCA (percutaneous transluminal coronary angioplasty)    CKD (chronic kidney disease) stage 1, GFR 90 ml/min or greater    BPH (benign prostatic hyperplasia)    Dyslipidemia    Venous insufficiency of leg    Erectile dysfunction    HTN (hypertension), benign    Hypothyroid    Automatic implantable cardioverter-defibrillator in situ    Ischemic cardiomyopathy    Visit for monitoring Tikosyn therapy    Ventricular tachyarrhythmia    Persistent atrial fibrillation    Benign essential tremor    SOB (shortness of breath)    Interstitial lung disease    Solitary pulmonary nodule    Class 1 obesity with serious comorbidity in adult    Chronic anticoagulation    Acute on chronic combined systolic and diastolic heart failure    Obstructive sleep apnea    Fever       Review of patient's allergies indicates:  No Known Allergies    Current Facility-Administered Medications on File Prior to Encounter   Medication Dose Route Frequency Provider Last Rate Last Admin    0.9%  NaCl infusion   Intravenous Continuous Brittney Gardner NP   Stopped at 11/23/20 1201    sodium chloride 0.9% flush 5 mL  5 mL Intravenous PRN Brittney Gardner NP        sodium chloride 0.9% flush 5 mL  5 mL Intravenous PRN Brittney Gardner NP         Current Outpatient Medications on File Prior to Encounter   Medication Sig Dispense Refill    ascorbic acid, vitamin C, (VITAMIN C) 100 MG tablet Take 1,000 mg by mouth once daily.      aspirin  (ECOTRIN) 81 MG EC tablet Take 81 mg by mouth once daily.      atorvastatin (LIPITOR) 20 MG tablet Take 1 tablet by mouth once daily 90 tablet 3    carvediloL (COREG) 12.5 MG tablet Take 1 tablet (12.5 mg total) by mouth 2 (two) times daily with meals. 180 tablet 3    coenzyme Q10 200 mg capsule Take 200 mg by mouth once daily.      dofetilide (TIKOSYN) 500 MCG Cap TAKE 1 CAPSULE BY MOUTH EVERY 12 HOURS 180 capsule 3    ELIQUIS 5 mg Tab Take 1 tablet by mouth twice daily 180 tablet 3    fish oil-omega-3 fatty acids 300-1,000 mg capsule Take 2 g by mouth 2 (two) times daily.      furosemide (LASIX) 40 MG tablet Take 20 mg by mouth daily as needed.      levothyroxine (SYNTHROID) 88 MCG tablet TAKE 1 TABLET BY MOUTH ONCE DAILY BEFORE BREAKFAST 90 tablet 3    multivitamin capsule Take 1 capsule by mouth nightly.      nitroGLYCERIN (NITROSTAT) 0.4 MG SL tablet Place 1 tablet (0.4 mg total) under the tongue every 5 (five) minutes as needed for Chest pain. 25 tablet 4    pantoprazole (PROTONIX) 40 MG tablet Take 40 mg by mouth once daily.      sacubitriL-valsartan (ENTRESTO)  mg per tablet Take 1 tablet by mouth 2 (two) times daily. 180 tablet 3    SLO-NIACIN 750 mg TbSR Take 1 tablet (750 mg total) by mouth 2 (two) times daily. (Patient taking differently: Take 500 mg by mouth 2 (two) times daily.) 180 each 3    zinc 50 mg Tab Take 40 mg by mouth.         Past Surgical History:   Procedure Laterality Date    ABLATION OF ARRHYTHMOGENIC FOCUS FOR ATRIAL FIBRILLATION N/A 12/9/2019    Procedure: ABLATION, ARRHYTHMOGENIC FOCUS, FOR ATRIAL FIBRILLATION;  Surgeon: Camron Isbell MD;  Location: [facility] EP LAB;  Service: Cardiology;  Laterality: N/A;  AF, NOE (firm), PVI, CRYO, LORENZA, GEN, DM, 3 PREP * Dual ICD SJM*    ABLATION OF ARRHYTHMOGENIC FOCUS FOR ATRIAL FIBRILLATION N/A 11/23/2020    Procedure: ABLATION, ARRHYTHMOGENIC FOCUS, FOR ATRIAL FIBRILLATION;  Surgeon: Camron Isbell MD;  Location: [facility] EP  LAB;  Service: Cardiology;  Laterality: N/A;  AF, Redo PVI, RFA, CARTO, GEN, DM, 3 PREP* Dual ICD SJM in situ* NOE deferred pt compliant*    CARDIAC CATHETERIZATION      CARDIAC DEFIBRILLATOR PLACEMENT      CORONARY ANGIOPLASTY      HERNIA REPAIR      KNEE ARTHROSCOPY      REPLACEMENT OF IMPLANTABLE CARDIOVERTER-DEFIBRILLATOR (ICD) GENERATOR Left 6/7/2018    Procedure: REPLACEMENT-GENERATOR-ICD;  Surgeon: Camron Isbell MD;  Location: [facility] CATH LAB;  Service: Cardiology;  Laterality: Left;  GENIE, DUAL ICD GEN CHG, SJM, MAC, DM, 3 PREP    STENTS      TRANSESOPHAGEAL ECHOCARDIOGRAPHY N/A 10/31/2019    Procedure: ECHOCARDIOGRAM, TRANSESOPHAGEAL;  Surgeon: [facility] Diagnostic Provider;  Location: [facility] EP LAB;  Service: Cardiology;  Laterality: N/A;  AF, NOE, DCCV, MAC, DM, 3 PREP    TRANSESOPHAGEAL ECHOCARDIOGRAPHY N/A 12/9/2019    Procedure: ECHOCARDIOGRAM, TRANSESOPHAGEAL;  Surgeon: Sun Merchant MD;  Location: [facility] EP LAB;  Service: Cardiology;  Laterality: N/A;    TRANSESOPHAGEAL ECHOCARDIOGRAPHY N/A 10/29/2020    Procedure: ECHOCARDIOGRAM, TRANSESOPHAGEAL;  Surgeon: Dannie Mittal III, MD;  Location: [facility] EP LAB;  Service: Cardiology;  Laterality: N/A;    TREATMENT OF CARDIAC ARRHYTHMIA N/A 10/31/2019    Procedure: CARDIOVERSION;  Surgeon: Camron Isbell MD;  Location: [facility] EP LAB;  Service: Cardiology;  Laterality: N/A;  AF, NOE, DCCV, MAC, DM, 3 PREP*SJM  Dual ICD in situ*    TREATMENT OF CARDIAC ARRHYTHMIA N/A 10/29/2020    Procedure: CARDIOVERSION;  Surgeon: Camron Isbell MD;  Location: [facility] EP LAB;  Service: Cardiology;  Laterality: N/A;  AF, NOE, DCCV, MAC, DM, 3 PREP*SJM dual ICD in situ*    TREATMENT OF CARDIAC ARRHYTHMIA  11/23/2020    Procedure: Cardioversion or Defibrillation;  Surgeon: Camron Isbell MD;  Location: [facility] EP LAB;  Service: Cardiology;;    VASCULAR SURGERY      stents placed       Social History     Socioeconomic History    Marital status:    Occupational  History     Employer: Retired   Tobacco Use    Smoking status: Never    Smokeless tobacco: Former     Quit date: 1980   Substance and Sexual Activity    Alcohol use: Yes     Comment: occasionally    Drug use: No   Social History Narrative    Environmental History:     How many years in current home: 23    House/Apt/Mobile Home: house    Pets in the home: none.    Bedroom Evens: [id number] carpeting    Main Area Evens: [id number] carpeting    Climate Control: central or room air conditioning    Dust Mite Controls: Dust mite controls are not in place.    Tobacco Smoke in Home: no    Outdoor Smoker in Home: no    Other notes:             CBC:   Recent Labs     08/02/23 0111   WBC 7.93   RBC 3.33*   HGB 10.9*   HCT 32.3*   *   MCV 97   MCH 32.7*   MCHC 33.7       CMP:   Recent Labs     08/02/23 0111      K 4.3      CO2 19*   BUN 18   CREATININE 1.5*   *   MG 1.9   PHOS 2.8   CALCIUM 8.1*   ALBUMIN 2.8*   PROT 6.0   ALKPHOS 42*   ALT 17   AST 24   BILITOT 0.4       INR  Recent Labs     08/02/23 0111   INR 1.0   APTT 31.6             2D Echo:  Results for orders placed or performed during the hospital encounter of 06/06/18   2D echo with color flow doppler   Result Value Ref Range    EF + QEF 25 (A) 55 - 65    Mitral Valve Regurgitation MILD     Diastolic Dysfunction Yes (A)     Aortic Valve Regurgitation TRIVIAL     Est. PA Systolic Pressure 24.16     Tricuspid Valve Regurgitation MILD            Pre-op Assessment    I have reviewed the Patient Summary Reports.     I have reviewed the Nursing Notes. I have reviewed the NPO Status.      Review of Systems  Anesthesia Hx:  No problems with previous Anesthesia    Cardiovascular:   Hypertension Past MI CAD   CHF    Pulmonary:   Shortness of breath Sleep Apnea    Renal/:   Chronic Renal Disease    Endocrine:   Hypothyroidism        Physical Exam    Airway:  Mouth Opening: Normal  Tongue: Normal    Chest/Lungs:  Normal Respiratory  Rate    Heart:  Rhythm: Regular Rhythm        Anesthesia Plan  Type of Anesthesia, risks & benefits discussed:    Anesthesia Type: Gen Natural Airway  Intra-op Monitoring Plan: Standard ASA Monitors  Induction:  IV  Informed Consent: Informed consent signed with the Patient and all parties understand the risks and agree with anesthesia plan.  All questions answered.   ASA Score: 3    Ready For Surgery From Anesthesia Perspective.     .

## 2023-08-02 NOTE — SUBJECTIVE & OBJECTIVE
Past Medical History:   Diagnosis Date    Anticoagulant long-term use     Atrial fibrillation     Benign essential tremor 6/6/2018    Cardiomyopathy     Chest congestion     CHF (congestive heart failure)     Coronary artery disease     Hypertension     Left ventricular thrombus     Obstructive sleep apnea 4/3/2022    Syncope and collapse     Thyroid disease        Past Surgical History:   Procedure Laterality Date    ABLATION OF ARRHYTHMOGENIC FOCUS FOR ATRIAL FIBRILLATION N/A 12/9/2019    Procedure: ABLATION, ARRHYTHMOGENIC FOCUS, FOR ATRIAL FIBRILLATION;  Surgeon: Camron Isbell MD;  Location: [facility] EP LAB;  Service: Cardiology;  Laterality: N/A;  AF, NOE (firm), PVI, CRYO, LORENZA, GEN, DM, 3 PREP * Dual ICD SJM*    ABLATION OF ARRHYTHMOGENIC FOCUS FOR ATRIAL FIBRILLATION N/A 11/23/2020    Procedure: ABLATION, ARRHYTHMOGENIC FOCUS, FOR ATRIAL FIBRILLATION;  Surgeon: Camron Isbell MD;  Location: [facility] EP LAB;  Service: Cardiology;  Laterality: N/A;  AF, Redo PVI, RFA, CARTO, GEN, DM, 3 PREP* Dual ICD SJM in situ* NOE deferred pt compliant*    CARDIAC CATHETERIZATION      CARDIAC DEFIBRILLATOR PLACEMENT      CORONARY ANGIOPLASTY      HERNIA REPAIR      KNEE ARTHROSCOPY      REPLACEMENT OF IMPLANTABLE CARDIOVERTER-DEFIBRILLATOR (ICD) GENERATOR Left 6/7/2018    Procedure: REPLACEMENT-GENERATOR-ICD;  Surgeon: Camron Isbell MD;  Location: [facility] CATH LAB;  Service: Cardiology;  Laterality: Left;  GENIE, DUAL ICD GEN CHG, SJM, MAC, DM, 3 PREP    STENTS      TRANSESOPHAGEAL ECHOCARDIOGRAPHY N/A 10/31/2019    Procedure: ECHOCARDIOGRAM, TRANSESOPHAGEAL;  Surgeon: [facility] Provider;  Location: [facility] EP LAB;  Service: Cardiology;  Laterality: N/A;  AF, NOE, DCCV, MAC, DM, 3 PREP    TRANSESOPHAGEAL ECHOCARDIOGRAPHY N/A 12/9/2019    Procedure: ECHOCARDIOGRAM, TRANSESOPHAGEAL;  Surgeon: Sun Merchant MD;  Location: [facility] EP LAB;  Service: Cardiology;  Laterality: N/A;    TRANSESOPHAGEAL ECHOCARDIOGRAPHY N/A 10/29/2020     Procedure: ECHOCARDIOGRAM, TRANSESOPHAGEAL;  Surgeon: Dannie Mittal III, MD;  Location: [facility] EP LAB;  Service: Cardiology;  Laterality: N/A;    TREATMENT OF CARDIAC ARRHYTHMIA N/A 10/31/2019    Procedure: CARDIOVERSION;  Surgeon: Camron Isbell MD;  Location: [facility] EP LAB;  Service: Cardiology;  Laterality: N/A;  AF, NOE, DCCV, MAC, DM, 3 PREP*SJM  Dual ICD in situ*    TREATMENT OF CARDIAC ARRHYTHMIA N/A 10/29/2020    Procedure: CARDIOVERSION;  Surgeon: Camron Isbell MD;  Location: [facility] EP LAB;  Service: Cardiology;  Laterality: N/A;  AF, NOE, DCCV, MAC, DM, 3 PREP*SJM dual ICD in situ*    TREATMENT OF CARDIAC ARRHYTHMIA  11/23/2020    Procedure: Cardioversion or Defibrillation;  Surgeon: Camron Isbell MD;  Location: [facility] EP LAB;  Service: Cardiology;;    VASCULAR SURGERY      stents placed       Review of patient's allergies indicates:  No Known Allergies    Current Facility-Administered Medications on File Prior to Encounter   Medication    0.9%  NaCl infusion    sodium chloride 0.9% flush 5 mL    sodium chloride 0.9% flush 5 mL     Current Outpatient Medications on File Prior to Encounter   Medication Sig    ascorbic acid, vitamin C, (VITAMIN C) 100 MG tablet Take 1,000 mg by mouth once daily.    aspirin (ECOTRIN) 81 MG EC tablet Take 81 mg by mouth once daily.    atorvastatin (LIPITOR) 20 MG tablet Take 1 tablet by mouth once daily    carvediloL (COREG) 12.5 MG tablet Take 1 tablet (12.5 mg total) by mouth 2 (two) times daily with meals.    coenzyme Q10 200 mg capsule Take 200 mg by mouth once daily.    dofetilide (TIKOSYN) 500 MCG Cap TAKE 1 CAPSULE BY MOUTH EVERY 12 HOURS    ELIQUIS 5 mg Tab Take 1 tablet by mouth twice daily    fish oil-omega-3 fatty acids 300-1,000 mg capsule Take 2 g by mouth 2 (two) times daily.    furosemide (LASIX) 40 MG tablet Take 20 mg by mouth 3 (three) times daily.     levothyroxine (SYNTHROID) 88 MCG tablet TAKE 1 TABLET BY MOUTH ONCE DAILY BEFORE BREAKFAST     multivitamin capsule Take 1 capsule by mouth nightly.    nitroGLYCERIN (NITROSTAT) 0.4 MG SL tablet Place 1 tablet (0.4 mg total) under the tongue every 5 (five) minutes as needed for Chest pain.    pantoprazole (PROTONIX) 20 MG tablet Take 20 mg by mouth once daily.    sacubitriL-valsartan (ENTRESTO)  mg per tablet Take 1 tablet by mouth 2 (two) times daily.    SLO-NIACIN 750 mg TbSR Take 1 tablet (750 mg total) by mouth 2 (two) times daily. (Patient taking differently: Take 500 mg by mouth 2 (two) times daily.)    zinc 50 mg Tab Take 40 mg by mouth.     Family History       Problem Relation (Age of Onset)    Heart disease Mother, Father, Brother          Tobacco Use    Smoking status: Never    Smokeless tobacco: Former     Quit date: 1980   Substance and Sexual Activity    Alcohol use: Yes     Comment: occasionally    Drug use: No    Sexual activity: Not on file     Review of Systems   Constitutional: Positive for chills, fever and malaise/fatigue.   HENT:  Negative for ear pain.    Eyes:  Negative for double vision.   Cardiovascular:  Negative for chest pain and palpitations.   Respiratory:  Negative for shortness of breath.    Endocrine: Negative for heat intolerance.   Hematologic/Lymphatic: Negative for adenopathy.   Skin:  Negative for dry skin.   Musculoskeletal:  Negative for falls.   Gastrointestinal:  Negative for anorexia.   Genitourinary:  Negative for flank pain.   Neurological:  Negative for disturbances in coordination.   Psychiatric/Behavioral:  Negative for depression.      Objective:     Vital Signs (Most Recent):  Temp: 98.5 °F (36.9 °C) (08/02/23 0030)  Pulse: 65 (08/02/23 0200)  Resp: (!) 21 (08/02/23 0200)  BP: 113/69 (08/02/23 0200)  SpO2: (!) 92 % (08/02/23 0200) Vital Signs (24h Range):  Temp:  [98.5 °F (36.9 °C)] 98.5 °F (36.9 °C)  Pulse:  [65-70] 65  Resp:  [21-23] 21  SpO2:  [92 %-94 %] 92 %  BP: (113-124)/(69-71) 113/69     Weight: 125.4 kg (276 lb 7.3 oz)  Body mass index is  "35.49 kg/m².    SpO2: (!) 92 %       No intake or output data in the 24 hours ending 08/02/23 0252    Lines/Drains/Airways       Peripheral Intravenous Line  Duration                  Peripheral IV - Single Lumen 08/01/23 18 G Posterior;Right Wrist 1 day         Peripheral IV - Single Lumen 08/01/23 20 G Posterior;Right Hand 1 day                     Physical Exam  Constitutional:       General: He is not in acute distress.     Appearance: He is obese.   HENT:      Head: Normocephalic.      Mouth/Throat:      Mouth: Mucous membranes are moist.   Eyes:      Pupils: Pupils are equal, round, and reactive to light.   Cardiovascular:      Rate and Rhythm: Normal rate and regular rhythm.   Pulmonary:      Effort: Pulmonary effort is normal.   Abdominal:      General: There is distension.      Palpations: Abdomen is soft.   Skin:     General: Skin is warm.   Neurological:      General: No focal deficit present.      Mental Status: He is alert.   Psychiatric:         Mood and Affect: Mood normal.          Significant Labs: Blood Culture: No results for input(s): "LABBLOO" in the last 48 hours., BMP:   Recent Labs   Lab 08/02/23  0111   *      K 4.3      CO2 19*   BUN 18   CREATININE 1.5*   CALCIUM 8.1*   MG 1.9   , CMP   Recent Labs   Lab 08/02/23  0111      K 4.3      CO2 19*   *   BUN 18   CREATININE 1.5*   CALCIUM 8.1*   PROT 6.0   ALBUMIN 2.8*   BILITOT 0.4   ALKPHOS 42*   AST 24   ALT 17   ANIONGAP 10   , CBC   Recent Labs   Lab 08/02/23  0111   WBC 7.93   HGB 10.9*   HCT 32.3*   *   , INR   Recent Labs   Lab 08/02/23  0111   INR 1.0   , and Lipid Panel No results for input(s): "CHOL", "HDL", "LDLCALC", "TRIG", "CHOLHDL" in the last 48 hours.        "

## 2023-08-02 NOTE — ASSESSMENT & PLAN NOTE
Patient admitted for sepsis and currently undergoing evaluation for infectious cause.   Device interrogated and no AF or treated events since last interrogation. No changes made to .   -Patient undergoing NOE for evaluation of endocarditis/lead infection.  -Blood cultures negative growth to date

## 2023-08-02 NOTE — HPI
Mr. Grossman is a 77 y.o. male with AF, CAD (MI, PCI with with stent placement in 2000 and 2004), ischemic cardiomyopathy with EF 40%, HTN, HLD, CKD, hypothyroid, RV thrombus, VT (12/2014), resulting in ICD shock, HTN, HLD, CKD, hypothyroidism, AF, PSVT (AVNRT, s/p RFA) and follows with Dr Coburn and Dr Isbell.  Patient presented to ICU as a transfer from [facility] in [place], [place] with high-grade fevers and concern for infective endocarditis.  Patient presented in local hospital 4 days ago with fevers, chills, headache and generalized muscle aches.  He says he was outdoors with 100° F temperature and still was feeling cold.  He was started on vancomycin and meropenem in his local hospital and continued to be febrile and thus sent here.  Upon arrival patient is comfortable and symptom free.  Bedside echo shows CVP 3 and his lactic acid here is 0.7 on arrival.  His EKG shows a paced V sensed rhythm.  His blood pressure is 113/69 and heart rate 68.  Vancomycin was continued upon arrival and cefepime was started and blood cultures obtained.  Patient will be made NPO in case he undergoes a procedure tomorrow.

## 2023-08-02 NOTE — HPI
78 yo male with ICM s/p ICD admitted to OSH for fevers. ID consulted for abx recs. Pt reported acute onset of fevers prior to admission with associated chills. He was briefly admitted at [facility] and transferred to [facility] for higher level of care. Per chart review, infectious work up at OSH (blcx, flu/covid) that were unrevealing. Pt denies allergies to abx, sick contacts, abdominal complaints/dysuria, cough/sob, new rash or diarrhea. Denies recent travel, TB exposure, bug bites. His current admission course notable for CXR without focal consolidation. Blcx on admit ngtd and UA unrevealing for infection. NOE noted to have small echodensity on RV pacing wire.  He is currently on vancomycin and cefepime.

## 2023-08-02 NOTE — PLAN OF CARE
Problem: Physical Therapy  Goal: Physical Therapy Goal  Description: Goals to be met by: 23    Patient will increase functional independence with mobility by performin. Supine to sit with [place]  2. Sit to supine with [place]  3. Sit to stand transfer with [place]  4. Gait  x 300 feet with [place]  5. Lower extremity exercise program x15 reps per handout, with independence    Outcome: Ongoing, Progressing     Pt tolerated PT session well.      All needs met, all questions answered within PT scope of practice.

## 2023-08-02 NOTE — PT/OT/SLP EVAL
Occupational Therapy   Co-Evaluation, Treatment and Discharge Note  Co-evaluation and treatment necessary due for safe and accurate assessment of patient's activity tolerance due to patient's medical complexity.    Name: Amish Grossman  MRN: [id number]  Admitting Diagnosis: Fever  Recent Surgery: Procedure(s) (LRB):  Transesophageal echo (NOE) intra-procedure log documentation (N/A) Day of Surgery    Recommendations:     Discharge Recommendations: other (see comments)  Discharge Equipment Recommendations: none  Barriers to discharge:  None    Assessment:     Amish Grossman is a 77 y.o. male with a medical diagnosis of Fever. At this time, patient is functioning at their prior level of function and does not require further acute OT services.     Plan:     During this hospitalization, patient does not require further acute OT services.  Please re-consult if situation changes.    Plan of Care Reviewed with: patient    Subjective     Chief Complaint: headache  Patient/Family Comments/goals: get better and go home     Occupational Profile:  Living Environment: patient lives with wife in a single story home with no steps to enter.   Previous level of function: patient reports he was independent with ADLs and IADLs; patient reports good activity levels, playing golf often   Roles and Routines: , father, avid golfer, retired [employer] manager   Equipment Used at home: cane, straight  Assistance upon Discharge: wife    Pain/Comfort:  Pain Rating 1: 8/10  Location 1:  (headache)  Pain Addressed 1: Reposition, Distraction, Pre-medicate for activity  Pain Rating Post-Intervention 1: 0/10    Patients cultural, spiritual, Scientologist conflicts given the current situation: no    Objective:     Communicated with: nursing prior to session.  Patient found HOB elevated with blood pressure cuff, telemetry, pulse ox (continuous), peripheral IV upon OT entry to room.    General Precautions: Standard, fall  Orthopedic  Precautions: N/A  Braces: N/A  Respiratory Status: Room air     Occupational Performance:    Bed Mobility:    Patient completed Scooting/Bridging with modified independence  Patient completed Supine to Sit with modified independence  Patient completed Sit to Supine with modified independence    Functional Mobility/Transfers:  Patient completed Sit <> Stand Transfer with supervision  with  no assistive device   Functional Mobility: patient ambulated to bathroom and around room with supervision; some SOB noted after activity    Activities of Daily Living:  Grooming: modified independence to apply deodorant, wash face and brush teeth while standing at sink  Lower Body Dressing: supervision to don socks while sitting EOB    Cognitive/Visual Perceptual:  Cognitive/Psychosocial Skills:     -       Oriented to: Person, Place, Time, and Situation   -       Follows Commands/attention:Follows multistep  commands  -       Communication: clear/fluent  -       Memory: No Deficits noted  -       Safety awareness/insight to disability: intact   -       Mood/Affect/Coping skills/emotional control: Appropriate to situation  Visual/Perceptual:      -Intact      Physical Exam:  Sensation:    -       patient reports waking up last night with L forearm numbness, resolving with movement  Dominant hand:    -       R  Upper Extremity Range of Motion:     -       Right Upper Extremity: WFL  -       Left Upper Extremity: WFL  Upper Extremity Strength:    -       Right Upper Extremity: WFL  -       Left Upper Extremity: WFL   Strength:    -       Right Upper Extremity: WFL  -       Left Upper Extremity: WFL  Fine Motor Coordination:    -       Intact    AMPAC 6 Click ADL:  AMPAC Total Score: 24    Treatment & Education:    Patient educated on:   -purpose of OT and OT POC  -facilitation and education on proper body mechanics, energy conservation, and safety  -importance of early mobility and out of bed activities with staff  assist  -overall benefits of therapy     All questions answered within OT scope and to patient's satisfaction    Patient left HOB elevated with all lines intact, call button in reach, nursing notified, and transport present    GOALS:   Multidisciplinary Problems       Occupational Therapy Goals       Not on file                    History:     Past Medical History:   Diagnosis Date    Anticoagulant long-term use     Atrial fibrillation     Benign essential tremor 6/6/2018    Cardiomyopathy     Chest congestion     CHF (congestive heart failure)     Coronary artery disease     Hypertension     Left ventricular thrombus     Obstructive sleep apnea 4/3/2022    Syncope and collapse     Thyroid disease          Past Surgical History:   Procedure Laterality Date    ABLATION OF ARRHYTHMOGENIC FOCUS FOR ATRIAL FIBRILLATION N/A 12/9/2019    Procedure: ABLATION, ARRHYTHMOGENIC FOCUS, FOR ATRIAL FIBRILLATION;  Surgeon: Camron Isbell MD;  Location: [facility] EP LAB;  Service: Cardiology;  Laterality: N/A;  AF, NOE (firm), PVI, CRYO, LORENZA, GEN, DM, 3 PREP * Dual ICD SJM*    ABLATION OF ARRHYTHMOGENIC FOCUS FOR ATRIAL FIBRILLATION N/A 11/23/2020    Procedure: ABLATION, ARRHYTHMOGENIC FOCUS, FOR ATRIAL FIBRILLATION;  Surgeon: Camron Isbell MD;  Location: [facility] EP LAB;  Service: Cardiology;  Laterality: N/A;  AF, Redo PVI, RFA, CARTO, GEN, DM, 3 PREP* Dual ICD SJM in situ* NOE deferred pt compliant*    CARDIAC CATHETERIZATION      CARDIAC DEFIBRILLATOR PLACEMENT      CORONARY ANGIOPLASTY      HERNIA REPAIR      KNEE ARTHROSCOPY      REPLACEMENT OF IMPLANTABLE CARDIOVERTER-DEFIBRILLATOR (ICD) GENERATOR Left 6/7/2018    Procedure: REPLACEMENT-GENERATOR-ICD;  Surgeon: Camron Isbell MD;  Location: [facility] CATH LAB;  Service: Cardiology;  Laterality: Left;  GENIE, DUAL ICD GEN CHG, SJM, MAC, DM, 3 PREP    STENTS      TRANSESOPHAGEAL ECHOCARDIOGRAPHY N/A 10/31/2019    Procedure: ECHOCARDIOGRAM, TRANSESOPHAGEAL;  Surgeon: Dosc Diagnostic  Provider;  Location: [facility] EP LAB;  Service: Cardiology;  Laterality: N/A;  AF, NOE, DCCV, MAC, DM, 3 PREP    TRANSESOPHAGEAL ECHOCARDIOGRAPHY N/A 12/9/2019    Procedure: ECHOCARDIOGRAM, TRANSESOPHAGEAL;  Surgeon: Sun Merchant MD;  Location: [facility] EP LAB;  Service: Cardiology;  Laterality: N/A;    TRANSESOPHAGEAL ECHOCARDIOGRAPHY N/A 10/29/2020    Procedure: ECHOCARDIOGRAM, TRANSESOPHAGEAL;  Surgeon: Dannie Mittal III, MD;  Location: [facility] EP LAB;  Service: Cardiology;  Laterality: N/A;    TREATMENT OF CARDIAC ARRHYTHMIA N/A 10/31/2019    Procedure: CARDIOVERSION;  Surgeon: Camron Isbell MD;  Location: [facility] EP LAB;  Service: Cardiology;  Laterality: N/A;  AF, NOE, DCCV, MAC, DM, 3 PREP*SJM  Dual ICD in situ*    TREATMENT OF CARDIAC ARRHYTHMIA N/A 10/29/2020    Procedure: CARDIOVERSION;  Surgeon: Camron Isbell MD;  Location: [facility] EP LAB;  Service: Cardiology;  Laterality: N/A;  AF, NOE, DCCV, MAC, DM, 3 PREP*SJM dual ICD in situ*    TREATMENT OF CARDIAC ARRHYTHMIA  11/23/2020    Procedure: Cardioversion or Defibrillation;  Surgeon: Camron Isbell MD;  Location: [facility] EP LAB;  Service: Cardiology;;    VASCULAR SURGERY      stents placed       Time Tracking:     OT Date of Treatment: 08/02/23  OT Start Time: 0912  OT Stop Time: 0927  OT Total Time (min): 15 min    Billable Minutes:Evaluation 7  Self Care/Home Management 8    8/2/2023

## 2023-08-02 NOTE — CONSULTS
[facility], [place]  NOE Consult      Amish Grossman  YOB: 1945  Medical Record Number:  [id number]  Attending Physician:  Abdelrahman Vital MD   Current Principal Problem:  Fever    History     Cc: NOE for endocarditis.     HPI  Mr. Grossman is a 77 y.o. male with AF, CAD (MI, PCI with with stent placement in 2000 and 2004), ischemic cardiomyopathy with EF 40%, HTN, HLD, CKD, hypothyroid, RV thrombus, VT (12/2014), resulting in ICD shock, HTN, HLD, CKD, hypothyroidism, AF, PSVT (AVNRT, s/p RFA) and follows with Dr Coburn and Dr Isbell.  Patient presented to ICU as a transfer from [facility] in [place], [place] with high-grade fevers and concern for infective endocarditis.  Patient presented in local hospital 4 days ago with fevers, chills, headache and generalized muscle aches.  He says he was outdoors with 100° F temperature and still was feeling cold.  He was started on vancomycin and meropenem in his local hospital and continued to be febrile and thus sent here.  Upon arrival patient is comfortable and symptom free.  Bedside echo shows CVP 3 and his lactic acid here is 0.7 on arrival.  His EKG shows a paced V sensed rhythm.  His blood pressure is 113/69 and heart rate 68.  Vancomycin was continued upon arrival and cefepime was started and blood cultures obtained.        Dysphagia or odynophagia:  No  Liver Disease, esophageal disease, or known varices:  No  Upper GI Bleeding: No  Snoring:  No  Sleep Apnea:  No  Prior neck surgery or radiation:  No  History of anesthetic difficulties:  No  Family history of anesthetic difficulties:  No  Last oral intake: yesterday before midnight  Able to move neck in all directions:  Yes    Medications - Outpatient  Prior to Admission medications    Medication Sig Start Date End Date Taking? Authorizing Provider   ascorbic acid, vitamin C, (VITAMIN C) 100 MG tablet Take 1,000 mg by mouth once daily.    Historical Provider    aspirin (ECOTRIN) 81 MG EC tablet Take 81 mg by mouth once daily.    Historical Provider   atorvastatin (LIPITOR) 20 MG tablet Take 1 tablet by mouth once daily 10/10/22   Angel Coburn MD   carvediloL (COREG) 12.5 MG tablet Take 1 tablet (12.5 mg total) by mouth 2 (two) times daily with meals. 5/1/23   Angel Coburn MD   coenzyme Q10 200 mg capsule Take 200 mg by mouth once daily.    Historical Provider   dofetilide (TIKOSYN) 500 MCG Cap TAKE 1 CAPSULE BY MOUTH EVERY 12 HOURS 2/13/23   Camron Isbell MD   ELIQUIS 5 mg Tab Take 1 tablet by mouth twice daily 12/27/22   Laila Torres NP   fish oil-omega-3 fatty acids 300-1,000 mg capsule Take 2 g by mouth 2 (two) times daily.    Historical Provider   furosemide (LASIX) 40 MG tablet Take 20 mg by mouth daily as needed.    Historical Provider   levothyroxine (SYNTHROID) 88 MCG tablet TAKE 1 TABLET BY MOUTH ONCE DAILY BEFORE BREAKFAST 6/18/23   Angel Coburn MD   multivitamin capsule Take 1 capsule by mouth nightly.    Historical Provider   nitroGLYCERIN (NITROSTAT) 0.4 MG SL tablet Place 1 tablet (0.4 mg total) under the tongue every 5 (five) minutes as needed for Chest pain. 12/3/13   Angel Coburn MD   pantoprazole (PROTONIX) 40 MG tablet Take 40 mg by mouth once daily.    Historical Provider   sacubitriL-valsartan (ENTRESTO)  mg per tablet Take 1 tablet by mouth 2 (two) times daily. 10/10/22   Angel Coburn MD   SLO-NIACIN 750 mg TbSR Take 1 tablet (750 mg total) by mouth 2 (two) times daily.  Patient taking differently: Take 500 mg by mouth 2 (two) times daily. 12/3/13   Angel Coburn MD   zinc 50 mg Tab Take 40 mg by mouth.    Historical Provider       Medications - Current  Scheduled Meds:   apixaban  5 mg Oral BID    aspirin  81 mg Oral Daily    atorvastatin  20 mg Oral Daily    carvediloL  12.5 mg Oral BID WM    ceFEPime (MAXIPIME) IVPB  2 g Intravenous Q8H    dofetilide  500 mcg Oral Q12H    levothyroxine  88 mcg Oral Before breakfast     mupirocin   Nasal BID    pantoprazole  40 mg Oral Before breakfast    sacubitriL-valsartan  1 tablet Oral BID    vancomycin (VANCOCIN) IV (PEDS and ADULTS)  15 mg/kg Intravenous Q24H     Continuous Infusions:    Allergies  Review of patient's allergies indicates:  No Known Allergies  Past Medical History  Past Medical History:   Diagnosis Date    Anticoagulant long-term use     Atrial fibrillation     Benign essential tremor 6/6/2018    Cardiomyopathy     Chest congestion     CHF (congestive heart failure)     Coronary artery disease     Hypertension     Left ventricular thrombus     Obstructive sleep apnea 4/3/2022    Syncope and collapse     Thyroid disease      Past Surgical History  Past Surgical History:   Procedure Laterality Date    ABLATION OF ARRHYTHMOGENIC FOCUS FOR ATRIAL FIBRILLATION N/A 12/9/2019    Procedure: ABLATION, ARRHYTHMOGENIC FOCUS, FOR ATRIAL FIBRILLATION;  Surgeon: Camron Isbell MD;  Location: [facility] EP LAB;  Service: Cardiology;  Laterality: N/A;  AF, NOE (firm), PVI, CRYO, LORENZA, GEN, DM, 3 PREP * Dual ICD SJM*    ABLATION OF ARRHYTHMOGENIC FOCUS FOR ATRIAL FIBRILLATION N/A 11/23/2020    Procedure: ABLATION, ARRHYTHMOGENIC FOCUS, FOR ATRIAL FIBRILLATION;  Surgeon: Camron Isbell MD;  Location: [facility] EP LAB;  Service: Cardiology;  Laterality: N/A;  AF, Redo PVI, RFA, CARTO, GEN, DM, 3 PREP* Dual ICD SJM in situ* NOE deferred pt compliant*    CARDIAC CATHETERIZATION      CARDIAC DEFIBRILLATOR PLACEMENT      CORONARY ANGIOPLASTY      HERNIA REPAIR      KNEE ARTHROSCOPY      REPLACEMENT OF IMPLANTABLE CARDIOVERTER-DEFIBRILLATOR (ICD) GENERATOR Left 6/7/2018    Procedure: REPLACEMENT-GENERATOR-ICD;  Surgeon: Camron Isbell MD;  Location: [facility] CATH LAB;  Service: Cardiology;  Laterality: Left;  GENIE, DUAL ICD GEN CHG, SJM, MAC, DM, 3 PREP    STENTS      TRANSESOPHAGEAL ECHOCARDIOGRAPHY N/A 10/31/2019    Procedure: ECHOCARDIOGRAM, TRANSESOPHAGEAL;  Surgeon: [facility] Diagnostic Provider;  Location:  [facility] EP LAB;  Service: Cardiology;  Laterality: N/A;  AF, NOE, DCCV, MAC, DM, 3 PREP    TRANSESOPHAGEAL ECHOCARDIOGRAPHY N/A 12/9/2019    Procedure: ECHOCARDIOGRAM, TRANSESOPHAGEAL;  Surgeon: Sun Merchant MD;  Location: [facility] EP LAB;  Service: Cardiology;  Laterality: N/A;    TRANSESOPHAGEAL ECHOCARDIOGRAPHY N/A 10/29/2020    Procedure: ECHOCARDIOGRAM, TRANSESOPHAGEAL;  Surgeon: Dannie Mittal III, MD;  Location: [facility] EP LAB;  Service: Cardiology;  Laterality: N/A;    TREATMENT OF CARDIAC ARRHYTHMIA N/A 10/31/2019    Procedure: CARDIOVERSION;  Surgeon: Camron Isbell MD;  Location: [facility] EP LAB;  Service: Cardiology;  Laterality: N/A;  AF, NOE, DCCV, MAC, DM, 3 PREP*SJM  Dual ICD in situ*    TREATMENT OF CARDIAC ARRHYTHMIA N/A 10/29/2020    Procedure: CARDIOVERSION;  Surgeon: Camron Isbell MD;  Location: [facility] EP LAB;  Service: Cardiology;  Laterality: N/A;  AF, NOE, DCCV, MAC, DM, 3 PREP*SJM dual ICD in situ*    TREATMENT OF CARDIAC ARRHYTHMIA  11/23/2020    Procedure: Cardioversion or Defibrillation;  Surgeon: Camron Isbell MD;  Location: [facility] EP LAB;  Service: Cardiology;;    VASCULAR SURGERY      stents placed     ROS  10 point ROS performed and negative except as stated in HPI     Physical Examination   Vital Signs  Vitals  Temp: 98.5 °F (36.9 °C)  Temp Source: Oral  Pulse: 66  Heart Rate Source: Monitor, Continuous  Resp: (!) 22  SpO2: (!) 92 %  Pulse Oximetry Type: Continuous  BP: 104/60  MAP (mmHg): 77  BP Location: Left arm  BP Method: Automatic  Patient Position: Lying    24 Hour VS Range  Temp:  [98.5 °F (36.9 °C)]   Pulse:  [64-82]   Resp:  [21-31]   BP: (104-124)/(60-71)   SpO2:  [92 %-96 %]     Intake/Output Summary (Last 24 hours) at 8/2/2023 5681  Last data filed at 8/2/2023 0650  Gross per 24 hour   Intake 596.73 ml   Output 550 ml   Net 46.73 ml         Physical Exam:   Constitutional: no acute distress  HEENT: NCAT, EOMI, no scleral icterus  Cardiovascular: Regular rate and  rhythm  Pulmonary: Normal respiratory effort   Abdomen: nontender, non-distended   Neuro: alert and oriented, no focal deficits  Extremities: warm, no edema   MSK: no deformities  Skin: intact, no rashes     Data     Recent Labs   Lab 08/02/23 0111   WBC 7.93   HGB 10.9*   HCT 32.3*   *      Recent Labs   Lab 08/02/23 0111   INR 1.0      Recent Labs   Lab 08/02/23 0111      K 4.3      CO2 19*   BUN 18   CREATININE 1.5*   ANIONGAP 10   CALCIUM 8.1*      Assessment & Plan     NOE for PRISCA assessment of endocarditis.  -No absolute contraindications of esophageal stricture, tumor, perforation, laceration,or diverticulum and/or active GI bleed  -The risks, benefits & alternatives of the procedure were explained to the patient.   -The risks of transesophageal echo include but are not limited to:  Dental trauma, esophageal trauma/perforation, bleeding, laryngospasm/brochospasm, aspiration, sore throat/hoarseness, & dislodgement of the endotracheal tube/nasogastric tube (where applicable).    -The risks of ANES monitored sedation include hypotension, respiratory depression, arrhythmias, bronchospasm, & death.    -Informed consent was obtained. The patient is agreeable to proceed with the procedure and all questions and concerns addressed.    Case discussed with an attending in echocardiography lab.    Mauro Matta MD  [facility]   Cardiovascular Disease PGY-V

## 2023-08-02 NOTE — SUBJECTIVE & OBJECTIVE
Past Medical History:   Diagnosis Date    Anticoagulant long-term use     Atrial fibrillation     Benign essential tremor 6/6/2018    Cardiomyopathy     Chest congestion     CHF (congestive heart failure)     Coronary artery disease     Hypertension     Left ventricular thrombus     Obstructive sleep apnea 4/3/2022    Syncope and collapse     Thyroid disease        Past Surgical History:   Procedure Laterality Date    ABLATION OF ARRHYTHMOGENIC FOCUS FOR ATRIAL FIBRILLATION N/A 12/9/2019    Procedure: ABLATION, ARRHYTHMOGENIC FOCUS, FOR ATRIAL FIBRILLATION;  Surgeon: Camron Isbell MD;  Location: [facility] EP LAB;  Service: Cardiology;  Laterality: N/A;  AF, NOE (firm), PVI, CRYO, LORENZA, GEN, DM, 3 PREP * Dual ICD SJM*    ABLATION OF ARRHYTHMOGENIC FOCUS FOR ATRIAL FIBRILLATION N/A 11/23/2020    Procedure: ABLATION, ARRHYTHMOGENIC FOCUS, FOR ATRIAL FIBRILLATION;  Surgeon: Camron Isbell MD;  Location: [facility] EP LAB;  Service: Cardiology;  Laterality: N/A;  AF, Redo PVI, RFA, CARTO, GEN, DM, 3 PREP* Dual ICD SJM in situ* NOE deferred pt compliant*    CARDIAC CATHETERIZATION      CARDIAC DEFIBRILLATOR PLACEMENT      CORONARY ANGIOPLASTY      HERNIA REPAIR      KNEE ARTHROSCOPY      REPLACEMENT OF IMPLANTABLE CARDIOVERTER-DEFIBRILLATOR (ICD) GENERATOR Left 6/7/2018    Procedure: REPLACEMENT-GENERATOR-ICD;  Surgeon: Camron Isbell MD;  Location: [facility] CATH LAB;  Service: Cardiology;  Laterality: Left;  GENIE, DUAL ICD GEN CHG, SJM, MAC, DM, 3 PREP    STENTS      TRANSESOPHAGEAL ECHOCARDIOGRAPHY N/A 10/31/2019    Procedure: ECHOCARDIOGRAM, TRANSESOPHAGEAL;  Surgeon: [facility] Diagnostic Provider;  Location: [facility] EP LAB;  Service: Cardiology;  Laterality: N/A;  AF, NOE, DCCV, MAC, DM, 3 PREP    TRANSESOPHAGEAL ECHOCARDIOGRAPHY N/A 12/9/2019    Procedure: ECHOCARDIOGRAM, TRANSESOPHAGEAL;  Surgeon: Sun Merchant MD;  Location: [facility] EP LAB;  Service: Cardiology;  Laterality: N/A;    TRANSESOPHAGEAL ECHOCARDIOGRAPHY N/A 10/29/2020     Procedure: ECHOCARDIOGRAM, TRANSESOPHAGEAL;  Surgeon: Dannie Mittal III, MD;  Location: [facility] EP LAB;  Service: Cardiology;  Laterality: N/A;    TREATMENT OF CARDIAC ARRHYTHMIA N/A 10/31/2019    Procedure: CARDIOVERSION;  Surgeon: Camron Isbell MD;  Location: [facility] EP LAB;  Service: Cardiology;  Laterality: N/A;  AF, NOE, DCCV, MAC, DM, 3 PREP*SJM  Dual ICD in situ*    TREATMENT OF CARDIAC ARRHYTHMIA N/A 10/29/2020    Procedure: CARDIOVERSION;  Surgeon: Camron sIbell MD;  Location: [facility] EP LAB;  Service: Cardiology;  Laterality: N/A;  AF, NOE, DCCV, MAC, DM, 3 PREP*SJM dual ICD in situ*    TREATMENT OF CARDIAC ARRHYTHMIA  11/23/2020    Procedure: Cardioversion or Defibrillation;  Surgeon: Camron Isbell MD;  Location: [facility] EP LAB;  Service: Cardiology;;    VASCULAR SURGERY      stents placed       Review of patient's allergies indicates:  No Known Allergies    Current Facility-Administered Medications on File Prior to Encounter   Medication    0.9%  NaCl infusion    sodium chloride 0.9% flush 5 mL    sodium chloride 0.9% flush 5 mL     Current Outpatient Medications on File Prior to Encounter   Medication Sig    ascorbic acid, vitamin C, (VITAMIN C) 100 MG tablet Take 1,000 mg by mouth once daily.    aspirin (ECOTRIN) 81 MG EC tablet Take 81 mg by mouth once daily.    atorvastatin (LIPITOR) 20 MG tablet Take 1 tablet by mouth once daily    carvediloL (COREG) 12.5 MG tablet Take 1 tablet (12.5 mg total) by mouth 2 (two) times daily with meals.    coenzyme Q10 200 mg capsule Take 200 mg by mouth once daily.    dofetilide (TIKOSYN) 500 MCG Cap TAKE 1 CAPSULE BY MOUTH EVERY 12 HOURS    ELIQUIS 5 mg Tab Take 1 tablet by mouth twice daily    fish oil-omega-3 fatty acids 300-1,000 mg capsule Take 2 g by mouth 2 (two) times daily.    furosemide (LASIX) 40 MG tablet Take 20 mg by mouth daily as needed.    levothyroxine (SYNTHROID) 88 MCG tablet TAKE 1 TABLET BY MOUTH ONCE DAILY BEFORE BREAKFAST    multivitamin  capsule Take 1 capsule by mouth nightly.    nitroGLYCERIN (NITROSTAT) 0.4 MG SL tablet Place 1 tablet (0.4 mg total) under the tongue every 5 (five) minutes as needed for Chest pain.    pantoprazole (PROTONIX) 40 MG tablet Take 40 mg by mouth once daily.    sacubitriL-valsartan (ENTRESTO)  mg per tablet Take 1 tablet by mouth 2 (two) times daily.    SLO-NIACIN 750 mg TbSR Take 1 tablet (750 mg total) by mouth 2 (two) times daily. (Patient taking differently: Take 500 mg by mouth 2 (two) times daily.)    zinc 50 mg Tab Take 40 mg by mouth.     Family History       Problem Relation (Age of Onset)    Heart disease Mother, Father, Brother          Tobacco Use    Smoking status: Never    Smokeless tobacco: Former     Quit date: 1980   Substance and Sexual Activity    Alcohol use: Yes     Comment: occasionally    Drug use: No    Sexual activity: Not on file     Review of Systems   Constitutional: Positive for chills and fever.   HENT: Negative.     Eyes: Negative.    Cardiovascular:  Negative for chest pain and dyspnea on exertion.   Respiratory: Negative.     Endocrine: Negative.    Hematologic/Lymphatic: Negative.    Gastrointestinal: Negative.    Genitourinary: Negative.    Neurological: Negative.      Objective:     Vital Signs (Most Recent):  Temp: (!) 101.8 °F (38.8 °C) (08/02/23 0824)  Pulse: 73 (08/02/23 0900)  Resp: (!) 27 (08/02/23 0900)  BP: 122/69 (08/02/23 0958)  SpO2: (!) 91 % (08/02/23 0900) Vital Signs (24h Range):  Temp:  [98.5 °F (36.9 °C)-101.8 °F (38.8 °C)] 101.8 °F (38.8 °C)  Pulse:  [64-82] 73  Resp:  [21-31] 27  SpO2:  [90 %-96 %] 91 %  BP: (104-135)/(60-71) 122/69       Weight: 125.2 kg (276 lb)  Body mass index is 35.44 kg/m².    SpO2: (!) 91 %        Physical Exam  Constitutional:       Appearance: Normal appearance.   Cardiovascular:      Rate and Rhythm: Normal rate and regular rhythm.      Pulses: Normal pulses.      Heart sounds: Normal heart sounds.   Abdominal:      Palpations:  Abdomen is soft.   Musculoskeletal:      Right lower leg: No edema.      Left lower leg: No edema.   Skin:     General: Skin is warm.   Neurological:      General: No focal deficit present.      Mental Status: He is alert.   Psychiatric:         Mood and Affect: Mood normal.            Significant Labs: All pertinent lab results from the last 24 hours have been reviewed.

## 2023-08-02 NOTE — PROGRESS NOTES
Pharmacokinetic Initial Assessment: IV Vancomycin    Assessment/Plan:    Random vancomycin level at [facility] resulted at 8 mcg/mL  Initiate intravenous vancomycin maintenance dose of vancomycin 2000 mg IV every 24 hours  Desired empiric serum trough concentration is 15 to 20 mcg/mL  Draw vancomycin trough level 60 min prior to third dose on 8/4 at approximately 0230  Pharmacy will continue to follow and monitor vancomycin.      Please contact pharmacy at extension [phone] with any questions regarding this assessment.     Thank you for the consult,   Farzana Brizuela       Patient brief summary:  Amish Grossman is a 77 y.o. male initiated on antimicrobial therapy with IV Vancomycin for treatment of suspected bacteremia    Drug Allergies:   Review of patient's allergies indicates:  No Known Allergies    Actual Body Weight:   125.4 kg    Renal Function:   Estimated Creatinine Clearance: 59 mL/min (A) (based on SCr of 1.5 mg/dL (H)).    Dialysis Method (if applicable):  N/A    CBC (last 72 hours):  Recent Labs   Lab Result Units 08/02/23  0111   WBC K/uL 7.93   Hemoglobin g/dL 10.9*   Hematocrit % 32.3*   Platelets K/uL 142*   Gran % % 70.4   Lymph % % 20.4   Mono % % 8.1   Eosinophil % % 0.9   Basophil % % 0.1   Differential Method  Automated       Metabolic Panel (last 72 hours):  Recent Labs   Lab Result Units 08/02/23  0111   Sodium mmol/L 138   Potassium mmol/L 4.3   Chloride mmol/L 109   CO2 mmol/L 19*   Glucose mg/dL 111*   BUN mg/dL 18   Creatinine mg/dL 1.5*   Albumin g/dL 2.8*   Total Bilirubin mg/dL 0.4   Alkaline Phosphatase U/L 42*   AST U/L 24   ALT U/L 17   Magnesium mg/dL 1.9   Phosphorus mg/dL 2.8       Drug levels (last 3 results):  Recent Labs   Lab Result Units 08/02/23  0111   Vancomycin, Random ug/mL 8.1       Microbiologic Results:  Microbiology Results (last 7 days)       Procedure Component Value Units Date/Time    Blood culture [[id number]] Collected: 08/02/23 0124    Order Status: Sent Specimen: Blood  from Peripheral, Antecubital, Right Updated: 08/02/23 0147    Blood culture [[id number]] Collected: 08/02/23 0141    Order Status: Sent Specimen: Blood from Peripheral, Antecubital, Left Updated: 08/02/23 0147

## 2023-08-02 NOTE — PLAN OF CARE
[facility] - Cardiology Stepdown  Initial Discharge Assessment       Primary Care Provider: Marco Antonio Devi MD    Admission Diagnosis: Heart failure [I50.9]    Admission Date: 8/2/2023  Expected Discharge Date: 8/8/2023    Transition of Care Barriers: None    Payor: MEDICARE / Plan: MEDICARE PART A & B / Product Type: Government /     Extended Emergency Contact Information  Primary Emergency Contact: Hiwot Grossman  Address: [address] [place] [address] [id number] [place] [address]  Home Phone: [phone]  Mobile Phone: [phone]  Relation: Spouse    Discharge Plan A: Home with family  Discharge Plan B: Home with family      [facility] [address] [place] [address] [address] [facility] [address] [address] [facility] [address]  Phone: [phone] Fax: [phone]      Initial Assessment (most recent)       Adult Discharge Assessment - 08/02/23 1416          Discharge Assessment    Assessment Type Discharge Planning Assessment     Confirmed/corrected address, phone number and insurance Yes     Confirmed Demographics Correct on Facesheet     Source of Information patient;family     Communicated SHAUN with patient/caregiver Date not available/Unable to determine     Reason For Admission fever/heart failure     People in Home spouse     Facility Arrived From: [facility]     Do you expect to return to your current living situation? Yes     Do you have help at home or someone to help you manage your care at home? Yes     Who are your caregiver(s) and their phone number(s)? Hiwot Grossman (spouse) [phone]     Prior to hospitilization cognitive status: Alert/Oriented     Current cognitive status: Alert/Oriented     Equipment Currently Used at Home cane, straight;BIPAP     Readmission within 30 days? No     Patient currently being followed by outpatient case management? No     Do you currently have service(s) that help you manage your care at home? No     Do you take prescription medications?  Yes     Do you have prescription coverage? Yes     Coverage Medicare A&B and [facility]     Do you have any problems affording any of your prescribed medications? No     Is the patient taking medications as prescribed? yes     Who is going to help you get home at discharge? Hiwot Grossman (spouse) [phone]     How do you get to doctors appointments? car, drives self;family or friend will provide     Are you on dialysis? No     Do you take coumadin? No     Discharge Plan A Home with family     Discharge Plan B Home with family     DME Needed Upon Discharge  none     Discharge Plan discussed with: Spouse/sig other;Patient     Name(s) and Number(s) Hiwot Grossman (spouse) [phone]     Transition of Care Barriers None        Physical Activity    On average, how many days per week do you engage in moderate to strenuous exercise (like a brisk walk)? 5 days   varies , golfs , fishes and works in yard       Financial Resource Strain    How hard is it for you to pay for the very basics like food, housing, medical care, and heating? Not hard at all        Housing Stability    In the last 12 months, was there a time when you were not able to pay the mortgage or rent on time? No     In the last 12 months, how many places have you lived? 1     In the last 12 months, was there a time when you did not have a steady place to sleep or slept in a shelter (including now)? No        Transportation Needs    In the past 12 months, has lack of transportation kept you from medical appointments or from getting medications? No     In the past 12 months, has lack of transportation kept you from meetings, work, or from getting things needed for daily living? No        Food Insecurity    Within the past 12 months, you worried that your food would run out before you got the money to buy more. Never true     Within the past 12 months, the food you bought just didn't last and you didn't have money to get more. Never true        Stress    Do you  feel stress - tense, restless, nervous, or anxious, or unable to sleep at night because your mind is troubled all the time - these days? Not at all        Social Connections    In a typical week, how many times do you talk on the phone with family, friends, or neighbors? More than three times a week     How often do you get together with friends or relatives? --   occassionally    How often do you attend Temple or Jewish services? More than 4 times per year     Do you belong to any clubs or organizations such as Temple groups, unions, fraternal or athletic groups, or school groups? Yes     How often do you attend meetings of the clubs or organizations you belong to? More than 4 times per year     Are you , , , , never , or living with a partner?         Alcohol Use    Q1: How often do you have a drink containing alcohol? Monthly or less     Q2: How many drinks containing alcohol do you have on a typical day when you are drinking? 1 or 2     Q3: How often do you have six or more drinks on one occasion? Never        OTHER    Name(s) of People in Home Hiwot Grossman (spouse) [phone]                       met with the patient at the bedside and discussed the discharge process and handed them the discharge booklet and wrote contact numbers on the white board in the room. Patient verified his name and , insurance and PCP. He lives withHiwot Grossman (spouse) [phone] in a single story house and only one step to port of entry. Spouse to drive him home. DMe is a Bi-PAP and a pacemaker machine to monitor the pacemaker. Patient is not on coumadin and stated he is on eliquis twice a day and is not on dialysis. Patient stated he uses [facility] in [facility]. Will continue to monitor for discharge needs.     Marcia Terrell RN    [phone]

## 2023-08-02 NOTE — HPI
Mr. Grossman is a 77 y.o. male with AF, CAD (MI, PCI with with stent placement in 2000 and 2004), ischemic cardiomyopathy with EF 40%, HTN, HLD, CKD, hypothyroid, RV thrombus, VT (12/2014), resulting in ICD shock, HTN, HLD, CKD, hypothyroidism, AF, PSVT (AVNRT, s/p RFA) that presented from [facility] in [place] with high grade fevers and concerns for endocarditis and now under the care of the CICU team.     Patient presented in local hospital 4 days ago with fevers, chills, headache and generalized muscle aches. His EKG shows a paced V sensed rhythm.  His blood pressure is 113/69 and heart rate 68.  Patient was started on antibiotics and is undergoing infectious evaluation of unknown source     E history: Patient follow up with Dr. Isbell     06/2018 ICD gen change  This was c/b VT intraop, requiring external rescue to NSR. Also, a breach in the RV lead's insulation was repaired at that time.     12/9/2019: Cryoblative pulmonary vein isolation of all 4 pulmonary veins. Amiodarone was restarted. Stopped due to decreased DLCO on PFTs 1/2020. 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     11/2020 Had AF recurrence, as well as PSVT found on ICD. Underwent redo PVI (posterior box, CFAE) ablation as well as AVNRT ablation

## 2023-08-02 NOTE — TRANSFER OF CARE
"Anesthesia Transfer of Care Note    Patient: Amish Grossman    Procedure(s) Performed: Procedure(s) (LRB):  Transesophageal echo (NOE) intra-procedure log documentation (N/A)    Patient location: ICU    Anesthesia Type: general    Transport from OR: Transported from OR on 6-10 L/min O2 by face mask with adequate spontaneous ventilation    Post pain: adequate analgesia    Post assessment: no apparent anesthetic complications and tolerated procedure well    Post vital signs: stable    Level of consciousness: awake    Nausea/Vomiting: no nausea/vomiting    Complications: none    Transfer of care protocol was followed      Last vitals:   Visit Vitals  /69   Pulse 73   Temp (!) 38.8 °C (101.8 °F)   Resp (!) 27   Ht 6' 2" (1.88 m)   Wt 125.2 kg (276 lb)   SpO2 (!) 91%   BMI 35.44 kg/m²     "

## 2023-08-02 NOTE — H&P
[facility] - Cardiac Intensive Care  Cardiology  History and Physical     Patient Name: Amish Grossman  MRN: [id number]  Admission Date: 8/2/2023  Code Status: Full Code   Attending Provider: Abdelrahman Vital MD   Primary Care Physician: Marco Antonio Devi MD  Principal Problem:Fever    Patient information was obtained from patient and ER records.     Subjective:     Chief Complaint:  Fevers      HPI:  Mr. Grossman is a 77 y.o. male with AF, CAD (MI, PCI with with stent placement in 2000 and 2004), ischemic cardiomyopathy with EF 40%, HTN, HLD, CKD, hypothyroid, RV thrombus, VT with DC ICD (12/2014), resulting in ICD shock, HTN, HLD, CKD, hypothyroidism, AF, PSVT (AVNRT, s/p RFA) and follows with Dr Coburn and Dr Isbell.  Patient presented to ICU as a transfer from [facility] in [place], [place] with high-grade fevers and concern for infective endocarditis.  Patient presented in local hospital 4 days ago with fevers, chills, headache and generalized muscle aches.  He says he was outdoors with 100° F temperature and still was feeling cold.  He was started on vancomycin and meropenem in his local hospital and continued to be febrile and thus sent here.  Upon arrival patient is comfortable and symptom free.  Bedside echo shows CVP 3 and his lactic acid here is 0.7 on arrival.  His EKG shows a paced V sensed rhythm.  His blood pressure is 113/69 and heart rate 68.  Vancomycin was continued upon arrival and cefepime was started and blood cultures obtained.  Patient will be made NPO in case he undergoes a procedure tomorrow.    Echo 4/4/23   Large apical aneurysm/infarct in LAD territory.   The left ventricle is mildly enlarged with mildly decreased systolic function.   Moderate to severe left atrial enlargement.   The estimated ejection fraction is 40%.   The quantitatively derived ejection fraction is 44%.   Grade I left ventricular diastolic dysfunction.   Mild tricuspid regurgitation.   Normal right  ventricular size with normal right ventricular systolic function.   Mild mitral regurgitation.   Mild aortic regurgitation.   Normal central venous pressure (3 mmHg).   The estimated PA systolic pressure is 29 mmHg.   The ascending aorta is mildly dilated.   No thrombus in apex (contrast used)        Past Medical History:   Diagnosis Date    Anticoagulant long-term use     Atrial fibrillation     Benign essential tremor 6/6/2018    Cardiomyopathy     Chest congestion     CHF (congestive heart failure)     Coronary artery disease     Hypertension     Left ventricular thrombus     Obstructive sleep apnea 4/3/2022    Syncope and collapse     Thyroid disease        Past Surgical History:   Procedure Laterality Date    ABLATION OF ARRHYTHMOGENIC FOCUS FOR ATRIAL FIBRILLATION N/A 12/9/2019    Procedure: ABLATION, ARRHYTHMOGENIC FOCUS, FOR ATRIAL FIBRILLATION;  Surgeon: Camron Isbell MD;  Location: [facility] EP LAB;  Service: Cardiology;  Laterality: N/A;  AF, NOE (firm), PVI, CRYO, LORENZA, GEN, DM, 3 PREP * Dual ICD SJM*    ABLATION OF ARRHYTHMOGENIC FOCUS FOR ATRIAL FIBRILLATION N/A 11/23/2020    Procedure: ABLATION, ARRHYTHMOGENIC FOCUS, FOR ATRIAL FIBRILLATION;  Surgeon: Camron Isbell MD;  Location: [facility] EP LAB;  Service: Cardiology;  Laterality: N/A;  AF, Redo PVI, RFA, CARTO, GEN, DM, 3 PREP* Dual ICD SJM in situ* NOE deferred pt compliant*    CARDIAC CATHETERIZATION      CARDIAC DEFIBRILLATOR PLACEMENT      CORONARY ANGIOPLASTY      HERNIA REPAIR      KNEE ARTHROSCOPY      REPLACEMENT OF IMPLANTABLE CARDIOVERTER-DEFIBRILLATOR (ICD) GENERATOR Left 6/7/2018    Procedure: REPLACEMENT-GENERATOR-ICD;  Surgeon: Camron Isbell MD;  Location: [facility] CATH LAB;  Service: Cardiology;  Laterality: Left;  GENIE, DUAL ICD GEN CHG, SJM, MAC, DM, 3 PREP    STENTS      TRANSESOPHAGEAL ECHOCARDIOGRAPHY N/A 10/31/2019    Procedure: ECHOCARDIOGRAM, TRANSESOPHAGEAL;  Surgeon: [facility] Provider;  Location:  [facility] EP LAB;  Service: Cardiology;  Laterality: N/A;  AF, NOE, DCCV, MAC, DM, 3 PREP    TRANSESOPHAGEAL ECHOCARDIOGRAPHY N/A 12/9/2019    Procedure: ECHOCARDIOGRAM, TRANSESOPHAGEAL;  Surgeon: Sun Merchant MD;  Location: [facility] EP LAB;  Service: Cardiology;  Laterality: N/A;    TRANSESOPHAGEAL ECHOCARDIOGRAPHY N/A 10/29/2020    Procedure: ECHOCARDIOGRAM, TRANSESOPHAGEAL;  Surgeon: Dannie Mittal III, MD;  Location: [facility] EP LAB;  Service: Cardiology;  Laterality: N/A;    TREATMENT OF CARDIAC ARRHYTHMIA N/A 10/31/2019    Procedure: CARDIOVERSION;  Surgeon: Camron Isbell MD;  Location: [facility] EP LAB;  Service: Cardiology;  Laterality: N/A;  AF, NOE, DCCV, MAC, DM, 3 PREP*SJM  Dual ICD in situ*    TREATMENT OF CARDIAC ARRHYTHMIA N/A 10/29/2020    Procedure: CARDIOVERSION;  Surgeon: Camron Isbell MD;  Location: [facility] EP LAB;  Service: Cardiology;  Laterality: N/A;  AF, NOE, DCCV, MAC, DM, 3 PREP*SJM dual ICD in situ*    TREATMENT OF CARDIAC ARRHYTHMIA  11/23/2020    Procedure: Cardioversion or Defibrillation;  Surgeon: Camron Isbell MD;  Location: [facility] EP LAB;  Service: Cardiology;;    VASCULAR SURGERY      stents placed       Review of patient's allergies indicates:  No Known Allergies    Current Facility-Administered Medications on File Prior to Encounter   Medication    0.9%  NaCl infusion    sodium chloride 0.9% flush 5 mL    sodium chloride 0.9% flush 5 mL     Current Outpatient Medications on File Prior to Encounter   Medication Sig    ascorbic acid, vitamin C, (VITAMIN C) 100 MG tablet Take 1,000 mg by mouth once daily.    aspirin (ECOTRIN) 81 MG EC tablet Take 81 mg by mouth once daily.    atorvastatin (LIPITOR) 20 MG tablet Take 1 tablet by mouth once daily    carvediloL (COREG) 12.5 MG tablet Take 1 tablet (12.5 mg total) by mouth 2 (two) times daily with meals.    coenzyme Q10 200 mg capsule Take 200 mg by mouth once daily.    dofetilide (TIKOSYN) 500 MCG Cap TAKE 1 CAPSULE BY MOUTH  EVERY 12 HOURS    ELIQUIS 5 mg Tab Take 1 tablet by mouth twice daily    fish oil-omega-3 fatty acids 300-1,000 mg capsule Take 2 g by mouth 2 (two) times daily.    furosemide (LASIX) 40 MG tablet Take 20 mg by mouth 3 (three) times daily.     levothyroxine (SYNTHROID) 88 MCG tablet TAKE 1 TABLET BY MOUTH ONCE DAILY BEFORE BREAKFAST    multivitamin capsule Take 1 capsule by mouth nightly.    nitroGLYCERIN (NITROSTAT) 0.4 MG SL tablet Place 1 tablet (0.4 mg total) under the tongue every 5 (five) minutes as needed for Chest pain.    pantoprazole (PROTONIX) 20 MG tablet Take 20 mg by mouth once daily.    sacubitriL-valsartan (ENTRESTO)  mg per tablet Take 1 tablet by mouth 2 (two) times daily.    SLO-NIACIN 750 mg TbSR Take 1 tablet (750 mg total) by mouth 2 (two) times daily. (Patient taking differently: Take 500 mg by mouth 2 (two) times daily.)    zinc 50 mg Tab Take 40 mg by mouth.     Family History       Problem Relation (Age of Onset)    Heart disease Mother, Father, Brother          Tobacco Use    Smoking status: Never    Smokeless tobacco: Former     Quit date: 1980   Substance and Sexual Activity    Alcohol use: Yes     Comment: occasionally    Drug use: No    Sexual activity: Not on file     Review of Systems   Constitutional: Positive for chills, fever and malaise/fatigue.   HENT:  Negative for ear pain.    Eyes:  Negative for double vision.   Cardiovascular:  Negative for chest pain and palpitations.   Respiratory:  Negative for shortness of breath.    Endocrine: Negative for heat intolerance.   Hematologic/Lymphatic: Negative for adenopathy.   Skin:  Negative for dry skin.   Musculoskeletal:  Negative for falls.   Gastrointestinal:  Negative for anorexia.   Genitourinary:  Negative for flank pain.   Neurological:  Negative for disturbances in coordination.   Psychiatric/Behavioral:  Negative for depression.      Objective:     Vital Signs (Most Recent):  Temp: 98.5 °F (36.9 °C)  "(08/02/23 0030)  Pulse: 65 (08/02/23 0200)  Resp: (!) 21 (08/02/23 0200)  BP: 113/69 (08/02/23 0200)  SpO2: (!) 92 % (08/02/23 0200) Vital Signs (24h Range):  Temp:  [98.5 °F (36.9 °C)] 98.5 °F (36.9 °C)  Pulse:  [65-70] 65  Resp:  [21-23] 21  SpO2:  [92 %-94 %] 92 %  BP: (113-124)/(69-71) 113/69     Weight: 125.4 kg (276 lb 7.3 oz)  Body mass index is 35.49 kg/m².    SpO2: (!) 92 %       No intake or output data in the 24 hours ending 08/02/23 0252    Lines/Drains/Airways       Peripheral Intravenous Line  Duration                  Peripheral IV - Single Lumen 08/01/23 18 G Posterior;Right Wrist 1 day         Peripheral IV - Single Lumen 08/01/23 20 G Posterior;Right Hand 1 day                     Physical Exam  Constitutional:       General: He is not in acute distress.     Appearance: He is obese.   HENT:      Head: Normocephalic.      Mouth/Throat:      Mouth: Mucous membranes are moist.   Eyes:      Pupils: Pupils are equal, round, and reactive to light.   Cardiovascular:      Rate and Rhythm: Normal rate and regular rhythm.   Pulmonary:      Effort: Pulmonary effort is normal.   Abdominal:      General: There is distension.      Palpations: Abdomen is soft.   Skin:     General: Skin is warm.   Neurological:      General: No focal deficit present.      Mental Status: He is alert.   Psychiatric:         Mood and Affect: Mood normal.          Significant Labs: Blood Culture: No results for input(s): "LABBLOO" in the last 48 hours., BMP:   Recent Labs   Lab 08/02/23  0111   *      K 4.3      CO2 19*   BUN 18   CREATININE 1.5*   CALCIUM 8.1*   MG 1.9   , CMP   Recent Labs   Lab 08/02/23  0111      K 4.3      CO2 19*   *   BUN 18   CREATININE 1.5*   CALCIUM 8.1*   PROT 6.0   ALBUMIN 2.8*   BILITOT 0.4   ALKPHOS 42*   AST 24   ALT 17   ANIONGAP 10   , CBC   Recent Labs   Lab 08/02/23 0111   WBC 7.93   HGB 10.9*   HCT 32.3*   *   , INR   Recent Labs   Lab 08/02/23 0111 " "  INR 1.0   , and Lipid Panel No results for input(s): "CHOL", "HDL", "LDLCALC", "TRIG", "CHOLHDL" in the last 48 hours.          Assessment and Plan:     * Fever  Transferred from OSH with fever 101, myalgias, headaches and fatigue x 4 days  Upon arrival, symptom free  Continue antibiotics, vanc and cefepime (was on vanc and vic in OSH)  Blood cultures ordered  De escalate antibiotics per culture data  TTE in AM    Ischemic cardiomyopathy  EF 40%  Symptom free from cardiac standpoint  Takes lasix 2-3x/week only  CVP 3 on echo on arrival  Will watch w/o diuretics  Continue home entresto and coreg    Obstructive sleep apnea  Ordered home bipap  Doesn't want to wear tonight     Chronic anticoagulation  Continue eliquis which he takes for afib and h/o RV thrombus    CKD (chronic kidney disease) stage 1, GFR 90 ml/min or greater  Avoid nephtrotoxins and renally dose medicines        VTE Risk Mitigation (From admission, onward)         Ordered     apixaban tablet 5 mg  2 times daily         08/02/23 0239                Mk Wakefield MD  Cardiology   [facility] - Cardiac Intensive Care    "

## 2023-08-02 NOTE — NURSING
Nurses Note -- 4 Eyes      8/2/2023   7:47 AM      Skin assessed during: Admit      [x] No Altered Skin Integrity Present    [x]Prevention Measures Documented      [] Yes- Altered Skin Integrity Present or Discovered   [] LDA Added if Not in Epic (Describe Wound)   [] New Altered Skin Integrity was Present on Admit and Documented in LDA   [] Wound Image Taken    Wound Care Consulted? No    Attending Nurse:  Maribel Acuna RN    Second RN/Staff Member:  Alie Lindsey

## 2023-08-02 NOTE — PROGRESS NOTES
Cardiac device interrogation and/or reprogramming completed by industry representative,Gerson MENDEZ with [employer]; please refer to report located in the Media tab.

## 2023-08-02 NOTE — ASSESSMENT & PLAN NOTE
I independently reviewed patient's lab work, OSH records and images as documented.   78 yo male with ICM s/p ICD admitted to OSH for fevers. Work up at [facility] (blcx, UA) unrevealing. Unclear source of fevers at this time, blood cx ngtd, NOE with noted small echodensity on RV pacing wire (?fibrinous material vs veg). Doppler pending.     Recommendations:  -continue vancomycin pharm to dose and cefepime pending work up/cx data   -monitor renal fx closely while on abx therapy  -CT c/a/p to evaluate for source  -RIP (ordered)

## 2023-08-02 NOTE — PROGRESS NOTES
Pharmacist Renal Dose Adjustment Note    Amish Grossman is a 77 y.o. male being treated with the medication cefepime    Patient Data:    Vital Signs (Most Recent):  Temp: 98.5 °F (36.9 °C) (08/02/23 0030)  Pulse: 65 (08/02/23 0200)  Resp: (!) 21 (08/02/23 0200)  BP: 113/69 (08/02/23 0200)  SpO2: (!) 92 % (08/02/23 0200) Vital Signs (72h Range):  Temp:  [98.5 °F (36.9 °C)]   Pulse:  [65-70]   Resp:  [21-23]   BP: (113-124)/(69-71)   SpO2:  [92 %-94 %]      Recent Labs   Lab 08/02/23  0111   CREATININE 1.5*     Serum creatinine: 1.5 mg/dL (H) 08/02/23 0111  Estimated creatinine clearance: 59 mL/min (A)    Medication: cefepime 1 gram every 8 hours will be changed to cefepime 1 gram every 12 hours    Farzana Brizuela, Obed[place]

## 2023-08-02 NOTE — PT/OT/SLP EVAL
"Physical Therapy Co-Evaluation and Co-Treatment    Patient Name:  Amish Grossman   MRN:  [id number]    Recommendations:     Discharge Recommendations:  other (see comments)   Discharge Equipment Recommendations: none   Barriers to discharge: decreased functional mobility, fall risk, and decreased caregiver support    Assessment:     Amish Grossman is a 77 y.o. male admitted with a medical diagnosis of Fever.  Pt demonstrates the below listed impairments with decreased tolerance to functional mobility, impaired endurance, and gait instability being the most limiting.  Pt demonstrates fair tolerance to out of bed mobility and is willing to ambulate in the room.  Close to his baseline, slightly SOB.  Pt requires skilled PT due to patient's status as: a fall risk to allow safe d/c home.     Impairments and functional limitations:  impaired endurance, impaired cardiopulmonary response to activity, impaired skin, edema, gait instability, impaired functional mobility.  These deficits affect their roles and responsibilities in which they were able to complete prior to admit.  Rehab Prognosis:   Good ; patient would benefit from acute skilled PT services 3 x/week to address these deficits, improve quality of life, focus on recovery of impairments, provide patient/caregiver education, reduce fall risk, and reach maximum level of function.  Pt is highly  motivated to participated in skilled PT.    Recent Surgery:   Procedure(s) (LRB):  Transesophageal echo (NOE) intra-procedure log documentation (N/A) Day of Surgery    Plan:     During this hospitalization, patient to be seen 3 x/week to address the identified rehab impairments via gait training, therapeutic activities, neuromuscular re-education, therapeutic exercises and progress toward the following goals:    Plan of Care Expires:  09/01/23    Subjective     Chief Complaint: "I'm sweaty"  Patient/Family Comments/Goals: Return home  Pain/Comfort:  Pain Rating 1: " 8/10  Location - Side 1: Bilateral  Location - Orientation 1: generalized  Location 1: head  Pain Addressed 1: Reposition, Distraction  Pain Rating Post-Intervention 1:  (not rated)    Patients cultural, spiritual, Congregational conflicts given the current situation: no    Living Environment:  Patient lives with their spouse in single story home, no steps, walk in shower.   Pt utilizes No AD for ambulation of all distances.    Prior to admission, patient was independent with ADLs.   DME owned: cane, straight.   DME not currently used: SPC.   Upon discharge, patient will have assistance from family with 24/7 assist.     Objective:     Communicated with nursing prior to session.  Patient found HOB elevated with blood pressure cuff, pulse ox (continuous), telemetry, peripheral IV  upon PT entry to room.    General Precautions: Standard, fall   Orthopedic Precautions:N/A   Braces: N/A   Oxygen Device:      Exams:  Cognitive Exam:  Patient is oriented to Person, Place, Time, and Situation  Command following: Patient follows 100% of commands   RLE ROM: WFL  RLE Strength: WFL  LLE ROM: WFL  LLE Strength: WFL  Postural Exam:  Patient presented with the following abnormalities:    -       Rounded shoulders  Sensation:    -       Intact    Functional Mobility:  Bed Mobility:  Rolling Right: MOD I  Scooting: MOD I  Supine to Sit: MOD I  Sit to Supine: MOD I  Head of bed position: HOB elevated    Transfers:  Sit to Stand: MOD I with No AD    Gait: Patient ambulated 30' with No AD and Sup. Patient demonstrates occasional unsteady gait and decreased step length. All lines remained intact throughout ambulation trial.  Minor SOB, SpO2 ~90% throughout     Balance:   Position Score Time   Static Sitting GOOD: Takes MODERATE challenges n/a   Dynamic Sitting GOOD-: Maintains balance through MODERATE excursions of active trunk motion, but inconstantly  n/a   Static Standing FAIR+: Takes MINIMAL challenges n/a   Dynamic Standing FAIR:  Maintains without assist, but is unable to take any challenges n/a       Therapeutic Activities:  Patient educated on role of acute care PT and PT POC, safety while in hospital including calling nurse for mobility, call light usage, benefits of out of bed mobility, home walking program, breathing technique, fall risk, bed mobility , transfers, gait technique, positioning, posture, risks of prolonged bed rest, possible discharge disposition , and benefits of continued PT by explanation and demonstration.    Patient demonstrates good understanding of education provided this day.   Whiteboard updated    Therapeutic Exercises:  n/a    AM-PAC 6 CLICK MOBILITY  Total Score:22     Patient left HOB elevated with all lines intact, call button in reach, and RN notified.    GOALS:   Multidisciplinary Problems       Physical Therapy Goals          Problem: Physical Therapy    Goal Priority Disciplines Outcome Goal Variances Interventions   Physical Therapy Goal     PT, PT/OT Ongoing, Progressing     Description: Goals to be met by: 23    Patient will increase functional independence with mobility by performin. Supine to sit with [place]  2. Sit to supine with [place]  3. Sit to stand transfer with [place]  4. Gait  x 300 feet with [place]  5. Lower extremity exercise program x15 reps per handout, with independence                         History:     Past Medical History:   Diagnosis Date    Anticoagulant long-term use     Atrial fibrillation     Benign essential tremor 2018    Cardiomyopathy     Chest congestion     CHF (congestive heart failure)     Coronary artery disease     Hypertension     Left ventricular thrombus     Obstructive sleep apnea 4/3/2022    Syncope and collapse     Thyroid disease        Past Surgical History:   Procedure Laterality Date    ABLATION OF ARRHYTHMOGENIC FOCUS FOR ATRIAL FIBRILLATION N/A 2019    Procedure: ABLATION, ARRHYTHMOGENIC FOCUS, FOR ATRIAL  FIBRILLATION;  Surgeon: Camron Isbell MD;  Location: [facility] EP LAB;  Service: Cardiology;  Laterality: N/A;  AF, NOE (firm), PVI, CRYO, LORENZA, GEN, DM, 3 PREP * Dual ICD SJM*    ABLATION OF ARRHYTHMOGENIC FOCUS FOR ATRIAL FIBRILLATION N/A 11/23/2020    Procedure: ABLATION, ARRHYTHMOGENIC FOCUS, FOR ATRIAL FIBRILLATION;  Surgeon: Camron Isbell MD;  Location: [facility] EP LAB;  Service: Cardiology;  Laterality: N/A;  AF, Redo PVI, RFA, CARTO, GEN, DM, 3 PREP* Dual ICD SJM in situ* NOE deferred pt compliant*    CARDIAC CATHETERIZATION      CARDIAC DEFIBRILLATOR PLACEMENT      CORONARY ANGIOPLASTY      HERNIA REPAIR      KNEE ARTHROSCOPY      REPLACEMENT OF IMPLANTABLE CARDIOVERTER-DEFIBRILLATOR (ICD) GENERATOR Left 6/7/2018    Procedure: REPLACEMENT-GENERATOR-ICD;  Surgeon: Camron Isbell MD;  Location: [facility] CATH LAB;  Service: Cardiology;  Laterality: Left;  GENIE, DUAL ICD GEN CHG, SJM, MAC, DM, 3 PREP    STENTS      TRANSESOPHAGEAL ECHOCARDIOGRAPHY N/A 10/31/2019    Procedure: ECHOCARDIOGRAM, TRANSESOPHAGEAL;  Surgeon: [facility] Provider;  Location: [facility] EP LAB;  Service: Cardiology;  Laterality: N/A;  AF, NOE, DCCV, MAC, DM, 3 PREP    TRANSESOPHAGEAL ECHOCARDIOGRAPHY N/A 12/9/2019    Procedure: ECHOCARDIOGRAM, TRANSESOPHAGEAL;  Surgeon: Sun Merchant MD;  Location: [facility] EP LAB;  Service: Cardiology;  Laterality: N/A;    TRANSESOPHAGEAL ECHOCARDIOGRAPHY N/A 10/29/2020    Procedure: ECHOCARDIOGRAM, TRANSESOPHAGEAL;  Surgeon: Dannie Mittal III, MD;  Location: [facility] EP LAB;  Service: Cardiology;  Laterality: N/A;    TREATMENT OF CARDIAC ARRHYTHMIA N/A 10/31/2019    Procedure: CARDIOVERSION;  Surgeon: Camron Isbell MD;  Location: [facility] EP LAB;  Service: Cardiology;  Laterality: N/A;  AF, NOE, DCCV, MAC, DM, 3 PREP*SJM  Dual ICD in situ*    TREATMENT OF CARDIAC ARRHYTHMIA N/A 10/29/2020    Procedure: CARDIOVERSION;  Surgeon: Camron Isbell MD;  Location: [facility] EP LAB;  Service: Cardiology;  Laterality: N/A;   AF, NOE, DCCV, MAC, DM, 3 PREP*SJM dual ICD in situ*    TREATMENT OF CARDIAC ARRHYTHMIA  11/23/2020    Procedure: Cardioversion or Defibrillation;  Surgeon: Camron Isbell MD;  Location: [facility] EP LAB;  Service: Cardiology;;    VASCULAR SURGERY      stents placed       Time Tracking:     PT Received On: 08/02/23  PT Start Time: 0912     PT Stop Time: 0928  PT Total Time (min): 16 min     Billable Minutes: Evaluation 8 and Gait Training 8    08/02/2023    Co-treatment performed for this visit due to patient need for two skilled therapists to ensure patient and staff safety, to accommodate for patient activity tolerance/pain management, and maximize functional potential.

## 2023-08-03 VITALS
SYSTOLIC BLOOD PRESSURE: 100 MMHG | RESPIRATION RATE: 17 BRPM | OXYGEN SATURATION: 93 % | HEIGHT: 74 IN | WEIGHT: 259.5 LBS | DIASTOLIC BLOOD PRESSURE: 60 MMHG | BODY MASS INDEX: 33.3 KG/M2 | TEMPERATURE: 97 F | HEART RATE: 61 BPM

## 2023-08-03 LAB
ANION GAP SERPL CALC-SCNC: 9 MMOL/L (ref 8–16)
BASOPHILS # BLD AUTO: 0.02 K/UL (ref 0–0.2)
BASOPHILS NFR BLD: 0.3 % (ref 0–1.9)
BUN SERPL-MCNC: 17 MG/DL (ref 8–23)
CALCIUM SERPL-MCNC: 8.4 MG/DL (ref 8.7–10.5)
CHLORIDE SERPL-SCNC: 105 MMOL/L (ref 95–110)
CO2 SERPL-SCNC: 21 MMOL/L (ref 23–29)
CREAT SERPL-MCNC: 1.5 MG/DL (ref 0.5–1.4)
DIFFERENTIAL METHOD: ABNORMAL
EOSINOPHIL # BLD AUTO: 0.2 K/UL (ref 0–0.5)
EOSINOPHIL NFR BLD: 2.6 % (ref 0–8)
ERYTHROCYTE [DISTWIDTH] IN BLOOD BY AUTOMATED COUNT: 14.2 % (ref 11.5–14.5)
EST. GFR  (NO RACE VARIABLE): 48 ML/MIN/1.73 M^2
GLUCOSE SERPL-MCNC: 123 MG/DL (ref 70–110)
HCT VFR BLD AUTO: 31.2 % (ref 40–54)
HGB BLD-MCNC: 10.1 G/DL (ref 14–18)
IMM GRANULOCYTES # BLD AUTO: 0.03 K/UL (ref 0–0.04)
IMM GRANULOCYTES NFR BLD AUTO: 0.5 % (ref 0–0.5)
LYMPHOCYTES # BLD AUTO: 1.5 K/UL (ref 1–4.8)
LYMPHOCYTES NFR BLD: 24.8 % (ref 18–48)
MAGNESIUM SERPL-MCNC: 1.9 MG/DL (ref 1.6–2.6)
MCH RBC QN AUTO: 31.6 PG (ref 27–31)
MCHC RBC AUTO-ENTMCNC: 32.4 G/DL (ref 32–36)
MCV RBC AUTO: 98 FL (ref 82–98)
MONOCYTES # BLD AUTO: 0.7 K/UL (ref 0.3–1)
MONOCYTES NFR BLD: 10.8 % (ref 4–15)
NEUTROPHILS # BLD AUTO: 3.7 K/UL (ref 1.8–7.7)
NEUTROPHILS NFR BLD: 61 % (ref 38–73)
NRBC BLD-RTO: 0 /100 WBC
PLATELET # BLD AUTO: 143 K/UL (ref 150–450)
PMV BLD AUTO: 10.4 FL (ref 9.2–12.9)
POTASSIUM SERPL-SCNC: 3.7 MMOL/L (ref 3.5–5.1)
RBC # BLD AUTO: 3.2 M/UL (ref 4.6–6.2)
SODIUM SERPL-SCNC: 135 MMOL/L (ref 136–145)
WBC # BLD AUTO: 6.1 K/UL (ref 3.9–12.7)

## 2023-08-03 PROCEDURE — 99238 PR HOSPITAL DISCHARGE DAY,<30 MIN: ICD-10-PCS | Mod: GC,,, | Performed by: INTERNAL MEDICINE

## 2023-08-03 PROCEDURE — 99900035 HC TECH TIME PER 15 MIN (STAT)

## 2023-08-03 PROCEDURE — 99238 HOSP IP/OBS DSCHRG MGMT 30/<: CPT | Mod: GC,,, | Performed by: INTERNAL MEDICINE

## 2023-08-03 PROCEDURE — 63600175 PHARM REV CODE 636 W HCPCS: Performed by: STUDENT IN AN ORGANIZED HEALTH CARE EDUCATION/TRAINING PROGRAM

## 2023-08-03 PROCEDURE — 85025 COMPLETE CBC W/AUTO DIFF WBC: CPT | Performed by: INTERNAL MEDICINE

## 2023-08-03 PROCEDURE — 80048 BASIC METABOLIC PNL TOTAL CA: CPT | Performed by: INTERNAL MEDICINE

## 2023-08-03 PROCEDURE — 25000003 PHARM REV CODE 250: Performed by: INTERNAL MEDICINE

## 2023-08-03 PROCEDURE — 99233 SBSQ HOSP IP/OBS HIGH 50: CPT | Mod: ,,, | Performed by: PHYSICIAN ASSISTANT

## 2023-08-03 PROCEDURE — 94761 N-INVAS EAR/PLS OXIMETRY MLT: CPT

## 2023-08-03 PROCEDURE — 99233 PR SUBSEQUENT HOSPITAL CARE,LEVL III: ICD-10-PCS | Mod: ,,, | Performed by: INTERNAL MEDICINE

## 2023-08-03 PROCEDURE — 36415 COLL VENOUS BLD VENIPUNCTURE: CPT | Performed by: INTERNAL MEDICINE

## 2023-08-03 PROCEDURE — 63600175 PHARM REV CODE 636 W HCPCS: Performed by: INTERNAL MEDICINE

## 2023-08-03 PROCEDURE — 83735 ASSAY OF MAGNESIUM: CPT | Performed by: INTERNAL MEDICINE

## 2023-08-03 PROCEDURE — 25000003 PHARM REV CODE 250: Performed by: STUDENT IN AN ORGANIZED HEALTH CARE EDUCATION/TRAINING PROGRAM

## 2023-08-03 PROCEDURE — 99233 PR SUBSEQUENT HOSPITAL CARE,LEVL III: ICD-10-PCS | Mod: ,,, | Performed by: PHYSICIAN ASSISTANT

## 2023-08-03 PROCEDURE — 99233 SBSQ HOSP IP/OBS HIGH 50: CPT | Mod: ,,, | Performed by: INTERNAL MEDICINE

## 2023-08-03 RX ORDER — DOXYCYCLINE HYCLATE 100 MG
100 TABLET ORAL EVERY 12 HOURS
Status: DISCONTINUED | OUTPATIENT
Start: 2023-08-03 | End: 2023-08-03 | Stop reason: HOSPADM

## 2023-08-03 RX ORDER — SPIRONOLACTONE 25 MG/1
25 TABLET ORAL DAILY
Qty: 30 TABLET | Refills: 11 | Status: SHIPPED | OUTPATIENT
Start: 2023-08-04 | End: 2024-02-01

## 2023-08-03 RX ORDER — CEFPODOXIME PROXETIL 200 MG/1
200 TABLET, FILM COATED ORAL EVERY 12 HOURS
Qty: 14 TABLET | Refills: 0 | Status: SHIPPED | OUTPATIENT
Start: 2023-08-03 | End: 2023-08-10

## 2023-08-03 RX ORDER — DOXYCYCLINE 100 MG/1
100 CAPSULE ORAL EVERY 12 HOURS
Qty: 14 CAPSULE | Refills: 0 | Status: SHIPPED | OUTPATIENT
Start: 2023-08-03 | End: 2023-08-10

## 2023-08-03 RX ORDER — DOXYCYCLINE HYCLATE 100 MG
100 TABLET ORAL 2 TIMES DAILY
Qty: 10 TABLET | Refills: 0 | OUTPATIENT
Start: 2023-08-03 | End: 2023-08-08

## 2023-08-03 RX ADMIN — SPIRONOLACTONE 25 MG: 25 TABLET, FILM COATED ORAL at 09:08

## 2023-08-03 RX ADMIN — CARVEDILOL 12.5 MG: 12.5 TABLET, FILM COATED ORAL at 09:08

## 2023-08-03 RX ADMIN — SACUBITRIL AND VALSARTAN 1 TABLET: 97; 103 TABLET, FILM COATED ORAL at 09:08

## 2023-08-03 RX ADMIN — DOFETILIDE 500 MCG: 250 CAPSULE ORAL at 09:08

## 2023-08-03 RX ADMIN — PANTOPRAZOLE SODIUM 40 MG: 40 TABLET, DELAYED RELEASE ORAL at 05:08

## 2023-08-03 RX ADMIN — ASPIRIN 81 MG: 81 TABLET, COATED ORAL at 09:08

## 2023-08-03 RX ADMIN — VANCOMYCIN HYDROCHLORIDE 2000 MG: 1.5 INJECTION, POWDER, LYOPHILIZED, FOR SOLUTION INTRAVENOUS at 03:08

## 2023-08-03 RX ADMIN — APIXABAN 5 MG: 5 TABLET, FILM COATED ORAL at 09:08

## 2023-08-03 RX ADMIN — ATORVASTATIN CALCIUM 20 MG: 20 TABLET, FILM COATED ORAL at 09:08

## 2023-08-03 RX ADMIN — DOXYCYCLINE HYCLATE 100 MG: 100 TABLET, COATED ORAL at 11:08

## 2023-08-03 RX ADMIN — CEFEPIME 2 G: 2 INJECTION, POWDER, FOR SOLUTION INTRAVENOUS at 09:08

## 2023-08-03 RX ADMIN — MUPIROCIN: 20 OINTMENT TOPICAL at 09:08

## 2023-08-03 RX ADMIN — CEFEPIME 2 G: 2 INJECTION, POWDER, FOR SOLUTION INTRAVENOUS at 12:08

## 2023-08-03 RX ADMIN — LEVOTHYROXINE SODIUM 88 MCG: 88 TABLET ORAL at 05:08

## 2023-08-03 NOTE — PLAN OF CARE
[facility] scheduled pcp f/u   Follow up with Marco Antonio Devi MD  Tuesday Aug 8, 2023  @1:00pm [address] [place] [address]  [phone]

## 2023-08-03 NOTE — PLAN OF CARE
08/03/23 1510   Final Note   Assessment Type Final Discharge Note   Anticipated Discharge Disposition Home   What phone number can be called within the next 1-3 days to see how you are doing after discharge? [phone]   Hospital Resources/Appts/Education Provided Provided patient/caregiver with written discharge plan information;Appointments scheduled and added to AVS;Appointments scheduled by Navigator/Coordinator   Post-Acute Status   Discharge Delays None known at this time       Patient discharged home /self care and ambulated off unit with spouse.     Marcia Terrell RN    [phone]    Future Appointments   Date Time Provider Department Center   8/15/2023 10:00 AM HOME MONITOR DEVICE CHECK, [facility]

## 2023-08-03 NOTE — NURSING
Patient sitting in bed A/Ox4. Nares patent and respirations even. Patient denies distress. Low grade temp noted. Will continue to monitor.Safety measures in place. Call light in reach.

## 2023-08-03 NOTE — ASSESSMENT & PLAN NOTE
"  78 yo male with ICM s/p ICD admitted to OSH for fevers. Work up at [facility] (blcx, UA) unrevealing. Unclear source of fevers at this time, blood cx ngtd, NOE with noted small echodensity on RV pacing wire (?fibrinous material vs veg) - per EP note today "Patient underwent NOE yesterday, no concerns of endocarditis or lead infections". Doppler LEs without DVT.  CT CAP - cf possible RUL developing consolidation - primary team added doxy for atypical.  CT also cf perinephric stranding, cystis, and periureteral stranding.  Exam not consistent with pyelonephritis though.  RIP and RF negative.  Source of fever and illness unclear but blood cultures negative and NOE findings appear to be non infectious.    Recommendations:  - continue doxycycline 100mg po bid x7 days  - Start cefpodoxime 200mg po bid x 7 day  - needs OP fu with PCP re:PNA and Urology re: urinary findings  - will sign off as patient to be dc'd today    Discussed with ID staff, Dr. Pappas  Messaged plan with primary team, Dr. Herrera  "

## 2023-08-03 NOTE — SUBJECTIVE & OBJECTIVE
Interval History: VSS, remained afebrile, NAEON. No fever, chills, CP, HA, abdominal pain, N/V. Feels well, expressing desire to go home.     Review of Systems   Constitutional: Negative. Negative for fever, malaise/fatigue and night sweats.   HENT: Negative.     Eyes: Negative.    Cardiovascular: Negative.  Negative for chest pain.   Respiratory: Negative.  Negative for cough and shortness of breath.    Endocrine: Negative.    Skin: Negative.    Musculoskeletal: Negative.    Gastrointestinal: Negative.  Negative for bloating and change in bowel habit.   Genitourinary: Negative.  Negative for bladder incontinence and frequency.   Neurological: Negative.    Psychiatric/Behavioral: Negative.     Allergic/Immunologic: Negative.      Objective:     Vital Signs (Most Recent):  Temp: 98.3 °F (36.8 °C) (08/03/23 0810)  Pulse: 60 (08/03/23 0810)  Resp: 14 (08/03/23 0810)  BP: (!) 146/81 (08/03/23 0810)  SpO2: (!) 93 % (08/03/23 0810) Vital Signs (24h Range):  Temp:  [96.9 °F (36.1 °C)-100 °F (37.8 °C)] 98.3 °F (36.8 °C)  Pulse:  [60-73] 60  Resp:  [14-31] 14  SpO2:  [91 %-98 %] 93 %  BP: ()/(50-88) 146/81     Weight: 125.2 kg (276 lb)  Body mass index is 35.44 kg/m².     SpO2: (!) 93 %         Intake/Output Summary (Last 24 hours) at 8/3/2023 0831  Last data filed at 8/3/2023 0556  Gross per 24 hour   Intake 5293 ml   Output 3500 ml   Net 1793 ml       Lines/Drains/Airways       Peripheral Intravenous Line  Duration                  Peripheral IV - Single Lumen 08/01/23 18 G Posterior;Right Wrist 2 days         Peripheral IV - Single Lumen 08/01/23 20 G Posterior;Right Hand 2 days         Peripheral IV - Single Lumen 08/02/23 1420 18 G Left;Posterior Hand <1 day                       Physical Exam  Constitutional:       Appearance: Normal appearance.   HENT:      Head: Normocephalic and atraumatic.      Right Ear: External ear normal.      Left Ear: External ear normal.      Nose: Nose normal.      Mouth/Throat:       Mouth: Mucous membranes are moist.      Pharynx: Oropharynx is clear.   Eyes:      Extraocular Movements: Extraocular movements intact.      Pupils: Pupils are equal, round, and reactive to light.   Cardiovascular:      Rate and Rhythm: Normal rate and regular rhythm.      Pulses: Normal pulses.      Heart sounds: Normal heart sounds.   Pulmonary:      Effort: Pulmonary effort is normal.      Breath sounds: Normal breath sounds.   Abdominal:      General: There is no distension.      Palpations: Abdomen is soft.      Tenderness: There is no abdominal tenderness.   Musculoskeletal:      Cervical back: Normal range of motion. No tenderness.      Right lower leg: No edema.      Left lower leg: No edema.   Neurological:      General: No focal deficit present.      Mental Status: He is alert and oriented to person, place, and time.            Significant Labs: All pertinent lab results from the last 24 hours have been reviewed.    Significant Imaging: CT CAP: GGOs in apical and posterior lungs, RLL pulmonary nodule 1cm from 0.6cm, small b/l pleural effusions, no evidence of abscess or other infectious process.

## 2023-08-03 NOTE — DISCHARGE SUMMARY
[facility] - Card[facility] Stepdown  Cardiology  Discharge Summary      Patient Name: Amish Grossman  MRN: [id number]  Admission Date: 8/2/2023  Hospital Length of Stay: 1 days  Discharge Date and Time:  08/03/2023 2:33 PM  Attending Physician: Abdelrahman Vital MD    Discharging Provider: Megan Herrera MD  Primary Care Physician: Marco Antonio Devi MD    HPI:   Mr. Grossman is a 77 y.o. male with AF, CAD (MI, PCI with with stent placement in 2000 and 2004), ischemic cardiomyopathy with EF 40%, HTN, HLD, CKD, hypothyroid, RV thrombus, VT (12/2014), resulting in ICD shock, HTN, HLD, CKD, hypothyroidism, AF, PSVT (AVNRT, s/p RFA) and follows with Dr Coburn and Dr Isbell.  Patient presented to ICU as a transfer from [facility] in [place], [place] with high-grade fevers and concern for infective endocarditis.  Patient presented in local hospital 4 days ago with fevers, chills, headache and generalized muscle aches.  He says he was outdoors with 100° F temperature and still was feeling cold.  He was started on vancomycin and meropenem in his local hospital and continued to be febrile and thus sent here.  Upon arrival patient is comfortable and symptom free.  Bedside echo shows CVP 3 and his lactic acid here is 0.7 on arrival.  His EKG shows a paced V sensed rhythm.  His blood pressure is 113/69 and heart rate 68.  Vancomycin was continued upon arrival and cefepime was started and blood cultures obtained.  Patient will be made NPO in case he undergoes a procedure tomorrow.      Procedure(s) (LRB):  Transesophageal echo (NOE) intra-procedure log documentation (N/A)     Indwelling Lines/Drains at time of discharge:  Lines/Drains/Airways     None                 Hospital Course:  Patient transferred to [facility] on 8/2/23 due to persistent high-grade fevers concerning for infective endocarditis. Patient afebrile on arrival and in no acute distress. Obtained NOE on 8/2 which did not reveal vegetations. Bilateral DVT US of  lower extremities did not reveal deep vein thrombosis. CT C/A/P showed GGOs in apical and posterior lung segments c/w atypical PNA given patient's associated sxs of dry cough, HA, myalgias, and fever/chills. Infectious disease consulted for assistance of source control and antibiotic selection. Optimized GDMT during hospital stay, though patient noted Jardiance will be too difficult to afford. Deemed medically stable for discharge on 8/3 with doxycycline 100mg bid and cepodoxime 200mg po bid x 7 days with plan for PCP follow-up for labwork, resolution of symptoms, and f/u for known pulmonary nodules.       Goals of Care Treatment Preferences:  Code Status: Full Code      Consults:   Consults (From admission, onward)        Status Ordering Provider     Inpatient consult to Electrophysiology  Once        Provider:  Camron Isbell MD    Completed [facility]ILA PACE     Inpatient consult to Infectious Diseases  Once        Provider:  (Not yet assigned)    JOSIAH Ware          Significant Diagnostic Studies: Labs:   CMP   Recent Labs   Lab 08/02/23  0111 08/03/23  0359    135*   K 4.3 3.7    105   CO2 19* 21*   * 123*   BUN 18 17   CREATININE 1.5* 1.5*   CALCIUM 8.1* 8.4*   PROT 6.0  --    ALBUMIN 2.8*  --    BILITOT 0.4  --    ALKPHOS 42*  --    AST 24  --    ALT 17  --    ANIONGAP 10 9    and CBC   Recent Labs   Lab 08/02/23  0111 08/03/23  0400   WBC 7.93 6.10   HGB 10.9* 10.1*   HCT 32.3* 31.2*   * 143*       Pending Diagnostic Studies:     None          Final Active Diagnoses:    Diagnosis Date Noted POA    PRINCIPAL PROBLEM:  Fever [R50.9] 08/02/2023 Yes    Obstructive sleep apnea [G47.33] 04/03/2022 Yes    Chronic anticoagulation [Z79.01] 10/28/2020 Not Applicable    Ischemic cardiomyopathy [I25.5] 10/11/2012 Yes    CKD (chronic kidney disease) stage 1, GFR 90 ml/min or greater [N18.1] 10/11/2012 Yes    Automatic implantable cardioverter-defibrillator in situ  [Z95.810] 10/11/2012 Yes      Problems Resolved During this Admission:    Diagnosis Date Noted Date Resolved POA    Stage 3a chronic kidney disease [N18.31] 11/23/2020 08/02/2023 Yes     Other  * Fever  Transferred from OSH with Tmax 103 with chills, myalgias, occasional dry cough, headaches and fatigue x4d duration despite BS-ABX with vancomycin and meropenem. Tmax 101.8 during this hospitilization on morning of 8/2, no further fevers since. BCX at OSH NGTD. Endocarditis ruled out via NOE. CT C/A/P showed apical and posterior GGOs that could represent atypical PNA. Asymmetric leg swelling with superficial tenderness concerning for DVT versus possible cellulitis. ID c/s for assistance with source identification and ABX therapy.     PLAN:  -ID c/s, appreciate recs  -stop vancomycin and cefepime  -b/l DVT US - no DVT  -doxy 100mg bid and cepodoxime 200mg po bid x 7 days   -PCP f/u regarding sxs, labwork (added spironolactone), and pulmonary nodules          Discharged Condition: stable    Disposition: Home or Self Care    Follow Up:   Follow-up Information     Marco Antonio Devi MD Follow up in 1 week(s).    Specialty: Internal Medicine  Why: Labwork to check potassium since Aldactone initiated during this hospital stay.  Contact information:  [address]  [place] [address]  [phone]             Marco Antonio Devi MD Follow up on 8/8/2023.    Specialty: Internal Medicine  Why: @1:00pm  Contact information:  [place] [address]  [phone]                       Patient Instructions:   No discharge procedures on file.  Medications:  Reconciled Home Medications:      Medication List      START taking these medications    cefpodoxime 200 MG tablet  Commonly known as: VANTIN  Take 1 tablet (200 mg total) by mouth every 12 (twelve) hours. for 7 days     doxycycline 100 MG Cap  Commonly known as: VIBRAMYCIN  Take 1 capsule (100 mg total) by mouth every 12 (twelve) hours. for 7 days     empagliflozin  10 mg tablet  Commonly known as: JARDIANCE  Take 1 tablet (10 mg total) by mouth once daily.     spironolactone 25 MG tablet  Commonly known as: ALDACTONE  Take 1 tablet (25 mg total) by mouth once daily.  Start taking on: August 4, 2023        CONTINUE taking these medications    ascorbic acid (vitamin C) 100 MG tablet  Commonly known as: VITAMIN C  Take 1,000 mg by mouth once daily.     aspirin 81 MG EC tablet  Commonly known as: ECOTRIN  Take 81 mg by mouth once daily.     atorvastatin 20 MG tablet  Commonly known as: LIPITOR  Take 1 tablet by mouth once daily     carvediloL 12.5 MG tablet  Commonly known as: COREG  Take 1 tablet (12.5 mg total) by mouth 2 (two) times daily with meals.     coenzyme Q10 200 mg capsule  Take 200 mg by mouth once daily.     dofetilide 500 MCG Cap  Commonly known as: TIKOSYN  TAKE 1 CAPSULE BY MOUTH EVERY 12 HOURS     ELIQUIS 5 mg Tab  Generic drug: apixaban  Take 1 tablet by mouth twice daily     ENTRESTO  mg per tablet  Generic drug: sacubitriL-valsartan  Take 1 tablet by mouth 2 (two) times daily.     fish oil-omega-3 fatty acids 300-1,000 mg capsule  Take 2 g by mouth 2 (two) times daily.     furosemide 40 MG tablet  Commonly known as: LASIX  Take 20 mg by mouth daily as needed.     levothyroxine 88 MCG tablet  Commonly known as: SYNTHROID  TAKE 1 TABLET BY MOUTH ONCE DAILY BEFORE BREAKFAST     multivitamin capsule  Take 1 capsule by mouth nightly.     nitroGLYCERIN 0.4 MG SL tablet  Commonly known as: NITROSTAT  Place 1 tablet (0.4 mg total) under the tongue every 5 (five) minutes as needed for Chest pain.     pantoprazole 40 MG tablet  Commonly known as: PROTONIX  Take 40 mg by mouth once daily.     SLO-NIACIN 750 mg Tbsr  Generic drug: niacin  Take 1 tablet (750 mg total) by mouth 2 (two) times daily.     zinc 50 mg Tab  Take 40 mg by mouth.              Megan Herrera MD  Cardiology  [facility] - Cardiology [facility]

## 2023-08-03 NOTE — PROGRESS NOTES
"[facility] - Cardiology Stepdown  Infectious Disease  Progress Note    Patient Name: Amish Grossman  MRN: [id number]  Admission Date: 8/2/2023  Length of Stay: 1 days  Attending Physician: Abdelrahman Vital MD  Primary Care Provider: Marco Antonio Deiv MD    Isolation Status: No active isolations  Assessment/Plan:      Other  * Fever    76 yo male with ICM s/p ICD admitted to OSH for fevers. Work up at [facility] (blcx, UA) unrevealing. Unclear source of fevers at this time, blood cx ngtd, NOE with noted small echodensity on RV pacing wire (?fibrinous material vs veg) - per EP note today "Patient underwent NOE yesterday, no concerns of endocarditis or lead infections". Doppler LEs without DVT.  CT CAP - cf possible RUL developing consolidation - primary team added doxy for atypical.  CT also cf perinephric stranding, cystis, and periureteral stranding.  Exam not consistent with pyelonephritis though.  RIP and RF negative.  Source of fever and illness unclear but blood cultures negative and NOE findings appear to be non infectious.    Recommendations:  - continue doxycycline 100mg po bid x7 days  - Start cefpodoxime 200mg po bid x 7 day  - needs OP fu with PCP re:PNA and Urology re: urinary findings  - will sign off as patient to be dc'd today    Discussed with ID staff, Dr. Pappas  Messagevu plan with primary team, Dr. Herrera        Anticipated Disposition: tbd    Thank you for your consult. I will sign off. Please contact us if you have any additional questions.    DOLORES Kenyon  Infectious Disease  [facility] - Cardiology Stepdown    Subjective:     Principal Problem:Fever    HPI: 76 yo male with ICM s/p ICD admitted to OSH for fevers. ID consulted for abx recs. Pt reported acute onset of fevers prior to admission with associated chills. He was briefly admitted at [facility] and transferred to [facility] for higher level of care. Per chart review, infectious work up at OSH (blcx, flu/covid) that were unrevealing. Pt " denies allergies to abx, sick contacts, abdominal complaints/dysuria, cough/sob, new rash or diarrhea. Denies recent travel, TB exposure, bug bites. His current admission course notable for CXR without focal consolidation. Blcx on admit ngtd and UA unrevealing for infection. NOE noted to have small echodensity on RV pacing wire.  He is currently on vancomycin and cefepime.       Interval History:   No acute events   Tmax 100/Tcurrent 97.3  Feels much better and all prior symptoms resolved.  Blood cultures NGTD  The patient denies any recent fever, chills, or sweats.      Review of Systems   Constitutional:  Negative for chills, diaphoresis and fever.   Respiratory:  Negative for shortness of breath.    Cardiovascular:  Negative for chest pain.   Gastrointestinal:  Negative for abdominal pain, diarrhea, nausea and vomiting.   Genitourinary:  Negative for dysuria and hematuria.     Objective:     Vital Signs (Most Recent):  Temp: 97.3 °F (36.3 °C) (08/03/23 1155)  Pulse: 61 (08/03/23 1155)  Resp: 17 (08/03/23 1155)  BP: 100/60 (08/03/23 1155)  SpO2: (!) 93 % (08/03/23 1155) Vital Signs (24h Range):  Temp:  [96.9 °F (36.1 °C)-100 °F (37.8 °C)] 97.3 °F (36.3 °C)  Pulse:  [60-67] 61  Resp:  [14-18] 17  SpO2:  [91 %-98 %] 93 %  BP: (100-146)/(59-88) 100/60     Weight: 117.7 kg (259 lb 7.7 oz)  Body mass index is 33.32 kg/m².    Estimated Creatinine Clearance: 57.1 mL/min (A) (based on SCr of 1.5 mg/dL (H)).     Physical Exam  Constitutional:       General: He is not in acute distress.     Appearance: Normal appearance. He is well-developed. He is not ill-appearing, toxic-appearing or diaphoretic.       HENT:      Head: Normocephalic and atraumatic.   Cardiovascular:      Rate and Rhythm: Normal rate and regular rhythm.      Heart sounds: Normal heart sounds. No murmur heard.     No friction rub. No gallop.   Pulmonary:      Effort: Pulmonary effort is normal. No respiratory distress.      Breath sounds: Examination of the  "right-lower field reveals decreased breath sounds and rales. Examination of the left-lower field reveals decreased breath sounds and rales. Decreased breath sounds and rales present. No wheezing or rhonchi.   Abdominal:      General: Bowel sounds are normal. There is no distension.      Palpations: Abdomen is soft. There is no mass.      Tenderness: There is no abdominal tenderness. There is no guarding or rebound.   Musculoskeletal:      Right lower leg: Edema present.      Left lower leg: Edema present.   Skin:     General: Skin is warm and dry.   Neurological:      Mental Status: He is alert and oriented to person, place, and time.   Psychiatric:         Behavior: Behavior normal.          Significant Labs: Blood Culture:   Recent Labs   Lab 08/02/23  0124 08/02/23  0141   LABBLOO No Growth to date  No Growth to date No Growth to date  No Growth to date     CBC:   Recent Labs   Lab 08/02/23  0111 08/03/23  0400   WBC 7.93 6.10   HGB 10.9* 10.1*   HCT 32.3* 31.2*   * 143*     CMP:   Recent Labs   Lab 08/02/23  0111 08/03/23  0359    135*   K 4.3 3.7    105   CO2 19* 21*   * 123*   BUN 18 17   CREATININE 1.5* 1.5*   CALCIUM 8.1* 8.4*   PROT 6.0  --    ALBUMIN 2.8*  --    BILITOT 0.4  --    ALKPHOS 42*  --    AST 24  --    ALT 17  --    ANIONGAP 10 9     Urine Culture: No results for input(s): "LABURIN" in the last 4320 hours.  Urine Studies: No results for input(s): "COLORU", "APPEARANCEUA", "PHUR", "SPECGRAV", "PROTEINUA", "GLUCUA", "KETONESU", "BILIRUBINUA", "OCCULTUA", "NITRITE", "UROBILINOGEN", "LEUKOCYTESUR", "RBCUA", "WBCUA", "BACTERIA", "SQUAMEPITHEL", "HYALINECASTS" in the last 4320 hours.    Invalid input(s): "WRIGHTSUR"  Wound Culture: No results for input(s): "LABAERO" in the last 4320 hours.  All pertinent labs within the past 24 hours have been reviewed.    Significant Imaging: I have reviewed all pertinent imaging results/findings within the past 24 hours.  US Lower " Extremity Veins Bilateral [[id number]] Resulted: 08/03/23 0930   Order Status: Completed Updated: 08/03/23 0932   Narrative:     EXAMINATION:   US LOWER EXTREMITY VEINS BILATERAL     CLINICAL HISTORY:   LLE edema, ?May Thurner;     TECHNIQUE:   Duplex and color flow Doppler and dynamic compression was performed of the bilateral lower extremity veins was performed.     COMPARISON:   Ultrasound lower extremity veins bilateral 12/09/2014     FINDINGS:   Right thigh veins: The common femoral, femoral, popliteal, upper greater saphenous, and deep femoral veins are patent and free of thrombus.  Note is made of duplicated mid femoral vein.  The veins are normally compressible and have normal phasic flow and augmentation response.     Right calf veins: The visualized calf veins are patent.     Left thigh veins: The common femoral, femoral, popliteal, upper greater saphenous, and deep femoral veins are patent and free of thrombus.  Note is made of duplicated mid femoral vein.  The veins are normally compressible and have normal phasic flow and augmentation response.     Left calf veins: The visualized calf veins are patent.     There is a superficial vessel with punctate peripheral calcification, which may relate to sequela of prior thrombus.  Flow is visualized in this vessel.     Miscellaneous: There is a well-circumscribed isoechoic structure superficial to the left SFA the measuring approximately 3.4 x 1.4 x 3.0 cm.    Impression:       No evidence of deep venous thrombosis in either lower extremity.     Suspected enlarged lymph node superficial to the left proximal SFA measuring approximately 3.4 x 1.4 x 3.0 cm.  Correlation with physical exam is recommended.     Superficial vessel with punctate peripheral calcification in the left calf, which may relate to sequela of prior thrombus.  Normal flow is visualized in this vessel.       Electronically signed by: Cody Avina   Date: 08/03/2023   Time: 09:30   CT Chest Abdomen  Pelvis With Contrast (xpd) [[id number]] (Abnormal) Resulted: 08/02/23 2341   Order Status: Completed Updated: 08/02/23 2344   Narrative:     EXAMINATION:   CT CHEST ABDOMEN PELVIS WITH CONTRAST (XPD)     CLINICAL HISTORY:   Sepsis;To identify source in patient with recurring fever despite x4d BS-ABX therapy;     TECHNIQUE:   Axial images of the chest, abdomen, and pelvis were acquired after the use of 100 cc Exjt634 IV contrast .   Coronal and sagittal reconstructions were also obtained     COMPARISON:   CT 10/29/2021     FINDINGS:   THORACIC SOFT TISSUE: Partially visualized left subclavian transvenous ICD/pacemaker with leads terminating in the region of the right atrium and right ventricle apex.     Stable calcification in the right thyroid gland.     THORACIC AORTA: Thoracic aorta is normal in caliber and contour with no significant calcific atherosclerosis.     HEART: Left atrial, and probably left ventricular, cardiac chamber enlargement.     No pericardial effusion.     Multivessel calcific coronary atherosclerosis.     There is a large region of apical and distal septal low-attenuation left ventricular myocardial thinning, likely representing sequela of an old apicoseptal left ventricular myocardial infarction.  There is a coarse opacity at the ventricular apex, possibly representing dystrophic calcification, versus a calcified mural thrombus.  A left ventricular apical aneurysm is also questioned; motion and beam hardening artifacts limit more definitive assessment on this scan.     PARISH/MEDIASTINUM: No significant mediastinal, hilar, or axillary lymphadenopathy (by 1 cm short axis diameter size criteria).     ESOPHAGUS: Unremarkable     LUNGS: Airways are patent.  Bilateral small pleural effusions associated with compressive atelectasis.  Right lower lobe 1.0 cm nodule, previously 0.6 cm.  Left lower lobe nodule measuring 0.6 cm, previously 0.4 cm.     There is a region of faint ground-glass attenuation in  the apical and posterior portions of the right upper lobe, new since 01/29/2021.     LIVER: Normal in size and contour.  Scattered hepatic cysts.  Subcentimeter hypodensities, too small to characterize.     GALLBLADDER: No calcified gallstones.     BILE DUCTS: No evidence of dilated ducts.     PANCREAS: Fatty atrophy.  No mass or peripancreatic fat stranding.     SPLEEN: Unremarkable.     ADRENALS: Unremarkable.     KIDNEYS/URETERS: Normal in size and location. Right renal cyst.  Symmetrical perinephric fat stranding.  No hydronephrosis or radiopaque nephrolithiasis.     The sacral segments of both ureters demonstrate tortuosity and mild ectasia, with ady-ureteral stranding on the right.     BLADDER: The urinary bladder is suboptimally distended, making it difficult to exclude some mural or mucosal thickening. There is some pericystic stranding.     REPRODUCTIVE: Mildly enlarged prostate gland, protruding slightly into the base of the urinary bladder.     Prominent but symmetric seminal vesicles.     STOMACH/DUODENUM:Small hiatal hernia.  The gastric lumen is suboptimally distended, limiting CT assessment for gastric mucosal or mural thickening.     BOWEL/MESENTERY: Small bowel is normal in caliber with no evidence of obstruction.  No evidence of inflammation or wall thickening.  Colon demonstrates no focal wall thickening.  Appendix is normal.     PERITONEUM: No intraperitoneal air or fluid.  No rim enhancing fluid collections in the peritoneal cavity (or retroperitoneum).     LYMPH NODES: No intra-abdominal or intrapelvic lymphadenopathy (by 1 cm short axis diameter size criteria).     ABDOMINAL WALL:  No acute CT abnormality.  Note however that the perineum is not fully included in this scan.     Small, fat-containing umbilical hernia.     Bilateral inguinal regions surgical clips right anchors, likely related to prior bilateral inguinal hernia repairs.     VESSELS: No aneurysm. Mild calcific atherosclerosis.      BONES: Scattered degenerative changes.  Increased anterior vertebral body wedge deformity at T1, relative to 01/29/2021.     New since 01/29/2021, there are healing fractures in the anterolateral aspects of the left 8th and 9th, and possibly 10th, ribs.  Otherwise, no suspicious osseous or articular lesions.  Note however that the pubic symphysis and inferior pubic rami are not fully included in this scan.    Impression:       Bilateral perinephric stranding, possibly related to medical renal disease.     Pericystic stranding, for which cystitis is not excluded.     Mild prostatomegaly.     Short-segment tortuosity and ectasia of the sacral segments of both ureters, possibly congenital/developmental.  However, there is some associated right ady-ureteral stranding, for which underlying ureteritis or ureteral neoplasm are not excluded.  Correlate clinically, and with urology follow-up.     Enlarging right lower lobe pulmonary nodule.  Neoplasm not excluded.  Correlate clinically, and with short-term follow-up with CT chest.     New region of faint ground-glass opacification in the right upper lobe, suspicious for possible atypical pneumonia.  Other etiologies not excluded.  Correlate clinically, and with follow-up chest CT within 3-6 months.     Increased anterior vertebral body wedging at T1, compatible with a worsening compression fracture since 01/29/2021.     Healing fractures of left 8th, 9th, and possibly 10th, ribs.     Small hiatal hernia.     Partially visualized left subclavian transvenous ICD/pacemaker.     Left atrial and probable left ventricular chamber enlargement.  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  Correlate clinically.     Hepatic and renal cysts.     Other findings as detailed above.     This report was flagged in Epic as abnormal.     Electronically signed by resident: Tess Wesley   Date: 08/02/2023   Time:  21:52     Electronically signed by: Sajan Langston   Date: 08/02/2023   Time: 23:41   X-Ray Chest 1 View [[id number]] Resulted: 08/02/23 0148   Order Status: Completed Updated: 08/02/23 0150   Narrative:     EXAMINATION:   XR CHEST 1 VIEW     CLINICAL HISTORY:   CHF;     TECHNIQUE:   Single frontal view of the chest was performed.     COMPARISON:   Of 12/09/2014     FINDINGS:   Twin lead pacer defibrillator unchanged.  The heart is not enlarged the trachea is midline.  There is mild prominence of the perihilar lung markings and vasculature without consolidation or effusion.  Bony structures are intact.    Impression:       Persistent mild cardiac enlargement with multi lead pacemaker defibrillator.     Mild increase in pulmonary vasculature.  Correlate for early cardiac decompensation without diffuse CHF.       Electronically signed by: Fili Ham   Date: 08/02/2023   Time: 01:48     Imaging History    2023    Date Procedure Name Study Review Link PACS Link Status Accession Number Location   08/03/23 08:58 AM US Lower Extremity Veins Bilateral Study Review  Images Final [id number] [facility]   08/02/23 09:06 PM CT Chest Abdomen Pelvis With Contrast (xpd) Study Review  Images Final [id number] [facility]   08/02/23 01:37 AM X-Ray Chest 1 View Study Review  Images Final [id number] [facility]   07/03/23 04:00 AM Cardiac device check - Remote Study Review  Images Final [id number]    08/02/23 10:37 AM Transesophageal echo (NOE) Study Review  Images Final [id number] [facility]   08/02/23 10:53 AM Intra-Procedure Documentation Study Review  Images Final [id number] [facility]

## 2023-08-03 NOTE — PROGRESS NOTES
[facility] - Cardiology Stepdown  Cardiac Electrophysiology  Progress Note    Admission Date: 8/2/2023  Code Status: Full Code   Attending Physician: Abdelrahman Vital MD   Expected Discharge Date: 8/8/2023  Principal Problem:Fever    Subjective:     Interval History: Patient underwent NOE yesterday, no concerns of endocarditis or lead infections. Patient has remained afebrile for the las 24 hours     Review of Systems   Constitutional: Negative.   HENT: Negative.     Eyes: Negative.    Cardiovascular: Negative.    Respiratory: Negative.     Endocrine: Negative.    Skin: Negative.    Musculoskeletal: Negative.    Gastrointestinal: Negative.    Genitourinary: Negative.    Neurological: Negative.    Psychiatric/Behavioral: Negative.     Allergic/Immunologic: Negative.      Objective:     Vital Signs (Most Recent):  Temp: 98.3 °F (36.8 °C) (08/03/23 0810)  Pulse: 60 (08/03/23 0810)  Resp: 14 (08/03/23 0810)  BP: (!) 146/81 (08/03/23 0810)  SpO2: (!) 93 % (08/03/23 0810) Vital Signs (24h Range):  Temp:  [96.9 °F (36.1 °C)-101.8 °F (38.8 °C)] 98.3 °F (36.8 °C)  Pulse:  [60-73] 60  Resp:  [14-31] 14  SpO2:  [91 %-98 %] 93 %  BP: ()/(50-88) 146/81     Weight: 125.2 kg (276 lb)  Body mass index is 35.44 kg/m².     SpO2: (!) 93 %        Physical Exam  Constitutional:       Appearance: Normal appearance.   Cardiovascular:      Rate and Rhythm: Normal rate and regular rhythm.   Pulmonary:      Effort: Pulmonary effort is normal.      Breath sounds: Normal breath sounds.   Abdominal:      Palpations: Abdomen is soft.   Musculoskeletal:      Right lower leg: No edema.      Left lower leg: No edema.   Neurological:      General: No focal deficit present.      Mental Status: He is alert and oriented to person, place, and time.            Significant Labs: All pertinent lab results from the last 24 hours have been reviewed.        Assessment and Plan:     Automatic implantable cardioverter-defibrillator in situ  Patient  admitted for sepsis and currently undergoing evaluation for infectious cause.   Device interrogated and no AF or treated events since last interrogation. No changes made to .   -NOE for evaluation of endocarditis/lead infection negative  -Blood cultures negative growth to date      Thank you for this consult. We will sign off and patient to follow up with Dr. Isbell as an outpatient for regular follow up     Diya Nayak MD  Cardiac Electrophysiology  [facility] - Cardiology Stepdown

## 2023-08-03 NOTE — ASSESSMENT & PLAN NOTE
Transferred from OSH with Tmax 103 with chills, myalgias, occasional dry cough, headaches and fatigue x4d duration despite BS-ABX with vancomycin and meropenem. Tmax 101.8 during this hospitilization on morning of 8/2, no further fevers since. BCX at OSH NGTD. Endocarditis ruled out via NOE. CT C/A/P showed apical and posterior GGOs that could represent atypical PNA. Asymmetric leg swelling with superficial tenderness concerning for DVT versus possible cellulitis. ID c/s for assistance with source identification and ABX therapy.     PLAN:  -ID c/s, appreciate recs  -Continue vancomycin and cefepime pending source identification  -b/l DVT US

## 2023-08-03 NOTE — ASSESSMENT & PLAN NOTE
RA pacing 92%. RV pacing 1.7%   Atrial arrhythmias: None   Ventricular arrhythmias: None     -continue following with Dr. Isbell

## 2023-08-03 NOTE — PROGRESS NOTES
Pharmacokinetic Assessment Follow Up: IV Vancomycin    Therapy with vancomycin complete and/or consult discontinued by provider. Pharmacy will sign off, please re-consult as needed.    Porter Aguilera, PharmD, BCPS

## 2023-08-03 NOTE — SUBJECTIVE & OBJECTIVE
Interval History: Patient underwent NOE yesterday, no concerns of endocarditis or lead infections. Patient has remained afebrile for the las 24 hours     Review of Systems   Constitutional: Negative.   HENT: Negative.     Eyes: Negative.    Cardiovascular: Negative.    Respiratory: Negative.     Endocrine: Negative.    Skin: Negative.    Musculoskeletal: Negative.    Gastrointestinal: Negative.    Genitourinary: Negative.    Neurological: Negative.    Psychiatric/Behavioral: Negative.     Allergic/Immunologic: Negative.      Objective:     Vital Signs (Most Recent):  Temp: 98.3 °F (36.8 °C) (08/03/23 0810)  Pulse: 60 (08/03/23 0810)  Resp: 14 (08/03/23 0810)  BP: (!) 146/81 (08/03/23 0810)  SpO2: (!) 93 % (08/03/23 0810) Vital Signs (24h Range):  Temp:  [96.9 °F (36.1 °C)-101.8 °F (38.8 °C)] 98.3 °F (36.8 °C)  Pulse:  [60-73] 60  Resp:  [14-31] 14  SpO2:  [91 %-98 %] 93 %  BP: ()/(50-88) 146/81     Weight: 125.2 kg (276 lb)  Body mass index is 35.44 kg/m².     SpO2: (!) 93 %        Physical Exam  Constitutional:       Appearance: Normal appearance.   Cardiovascular:      Rate and Rhythm: Normal rate and regular rhythm.   Pulmonary:      Effort: Pulmonary effort is normal.      Breath sounds: Normal breath sounds.   Abdominal:      Palpations: Abdomen is soft.   Musculoskeletal:      Right lower leg: No edema.      Left lower leg: No edema.   Neurological:      General: No focal deficit present.      Mental Status: He is alert and oriented to person, place, and time.            Significant Labs: All pertinent lab results from the last 24 hours have been reviewed.

## 2023-08-03 NOTE — ASSESSMENT & PLAN NOTE
Transferred from OSH with Tmax 103 with chills, myalgias, occasional dry cough, headaches and fatigue x4d duration despite BS-ABX with vancomycin and meropenem. Tmax 101.8 during this hospitilization on morning of 8/2, no further fevers since. BCX at OSH NGTD. Endocarditis ruled out via NOE. CT C/A/P showed apical and posterior GGOs that could represent atypical PNA. Asymmetric leg swelling with superficial tenderness concerning for DVT versus possible cellulitis. ID c/s for assistance with source identification and ABX therapy.     PLAN:  -ID c/s, appreciate recs  -stop vancomycin and cefepime  -b/l DVT US - no DVT  -doxy 100mg bid and cepodoxime 200mg po bid x 7 days   -PCP f/u regarding sxs, labwork (added spironolactone), and pulmonary nodules

## 2023-08-03 NOTE — SUBJECTIVE & OBJECTIVE
Interval History:   No acute events   Tmax 100/Tcurrent 97.3  Feels much better and all prior symptoms resolved.  Blood cultures NGTD  The patient denies any recent fever, chills, or sweats.      Review of Systems   Constitutional:  Negative for chills, diaphoresis and fever.   Respiratory:  Negative for shortness of breath.    Cardiovascular:  Negative for chest pain.   Gastrointestinal:  Negative for abdominal pain, diarrhea, nausea and vomiting.   Genitourinary:  Negative for dysuria and hematuria.     Objective:     Vital Signs (Most Recent):  Temp: 97.3 °F (36.3 °C) (08/03/23 1155)  Pulse: 61 (08/03/23 1155)  Resp: 17 (08/03/23 1155)  BP: 100/60 (08/03/23 1155)  SpO2: (!) 93 % (08/03/23 1155) Vital Signs (24h Range):  Temp:  [96.9 °F (36.1 °C)-100 °F (37.8 °C)] 97.3 °F (36.3 °C)  Pulse:  [60-67] 61  Resp:  [14-18] 17  SpO2:  [91 %-98 %] 93 %  BP: (100-146)/(59-88) 100/60     Weight: 117.7 kg (259 lb 7.7 oz)  Body mass index is 33.32 kg/m².    Estimated Creatinine Clearance: 57.1 mL/min (A) (based on SCr of 1.5 mg/dL (H)).     Physical Exam  Constitutional:       General: He is not in acute distress.     Appearance: Normal appearance. He is well-developed. He is not ill-appearing, toxic-appearing or diaphoretic.       HENT:      Head: Normocephalic and atraumatic.   Cardiovascular:      Rate and Rhythm: Normal rate and regular rhythm.      Heart sounds: Normal heart sounds. No murmur heard.     No friction rub. No gallop.   Pulmonary:      Effort: Pulmonary effort is normal. No respiratory distress.      Breath sounds: Examination of the right-lower field reveals decreased breath sounds and rales. Examination of the left-lower field reveals decreased breath sounds and rales. Decreased breath sounds and rales present. No wheezing or rhonchi.   Abdominal:      General: Bowel sounds are normal. There is no distension.      Palpations: Abdomen is soft. There is no mass.      Tenderness: There is no abdominal  "tenderness. There is no guarding or rebound.   Musculoskeletal:      Right lower leg: Edema present.      Left lower leg: Edema present.   Skin:     General: Skin is warm and dry.   Neurological:      Mental Status: He is alert and oriented to person, place, and time.   Psychiatric:         Behavior: Behavior normal.          Significant Labs: Blood Culture:   Recent Labs   Lab 08/02/23  0124 08/02/23  0141   LABBLOO No Growth to date  No Growth to date No Growth to date  No Growth to date     CBC:   Recent Labs   Lab 08/02/23  0111 08/03/23  0400   WBC 7.93 6.10   HGB 10.9* 10.1*   HCT 32.3* 31.2*   * 143*     CMP:   Recent Labs   Lab 08/02/23  0111 08/03/23  0359    135*   K 4.3 3.7    105   CO2 19* 21*   * 123*   BUN 18 17   CREATININE 1.5* 1.5*   CALCIUM 8.1* 8.4*   PROT 6.0  --    ALBUMIN 2.8*  --    BILITOT 0.4  --    ALKPHOS 42*  --    AST 24  --    ALT 17  --    ANIONGAP 10 9     Urine Culture: No results for input(s): "LABURIN" in the last 4320 hours.  Urine Studies: No results for input(s): "COLORU", "APPEARANCEUA", "PHUR", "SPECGRAV", "PROTEINUA", "GLUCUA", "KETONESU", "BILIRUBINUA", "OCCULTUA", "NITRITE", "UROBILINOGEN", "LEUKOCYTESUR", "RBCUA", "WBCUA", "BACTERIA", "SQUAMEPITHEL", "HYALINECASTS" in the last 4320 hours.    Invalid input(s): "WRIGHTSUR"  Wound Culture: No results for input(s): "LABAERO" in the last 4320 hours.  All pertinent labs within the past 24 hours have been reviewed.    Significant Imaging: I have reviewed all pertinent imaging results/findings within the past 24 hours.  US Lower Extremity Veins Bilateral [[id number]] Resulted: 08/03/23 0930   Order Status: Completed Updated: 08/03/23 0932   Narrative:     EXAMINATION:   US LOWER EXTREMITY VEINS BILATERAL     CLINICAL HISTORY:   LLE edema, ?May Thurner;     TECHNIQUE:   Duplex and color flow Doppler and dynamic compression was performed of the bilateral lower extremity veins was performed.     COMPARISON: "   Ultrasound lower extremity veins bilateral 12/09/2014     FINDINGS:   Right thigh veins: The common femoral, femoral, popliteal, upper greater saphenous, and deep femoral veins are patent and free of thrombus.  Note is made of duplicated mid femoral vein.  The veins are normally compressible and have normal phasic flow and augmentation response.     Right calf veins: The visualized calf veins are patent.     Left thigh veins: The common femoral, femoral, popliteal, upper greater saphenous, and deep femoral veins are patent and free of thrombus.  Note is made of duplicated mid femoral vein.  The veins are normally compressible and have normal phasic flow and augmentation response.     Left calf veins: The visualized calf veins are patent.     There is a superficial vessel with punctate peripheral calcification, which may relate to sequela of prior thrombus.  Flow is visualized in this vessel.     Miscellaneous: There is a well-circumscribed isoechoic structure superficial to the left SFA the measuring approximately 3.4 x 1.4 x 3.0 cm.    Impression:       No evidence of deep venous thrombosis in either lower extremity.     Suspected enlarged lymph node superficial to the left proximal SFA measuring approximately 3.4 x 1.4 x 3.0 cm.  Correlation with physical exam is recommended.     Superficial vessel with punctate peripheral calcification in the left calf, which may relate to sequela of prior thrombus.  Normal flow is visualized in this vessel.       Electronically signed by: Cody Avina   Date: 08/03/2023   Time: 09:30   CT Chest Abdomen Pelvis With Contrast (xpd) [[id number]] (Abnormal) Resulted: 08/02/23 2341   Order Status: Completed Updated: 08/02/23 2344   Narrative:     EXAMINATION:   CT CHEST ABDOMEN PELVIS WITH CONTRAST (XPD)     CLINICAL HISTORY:   Sepsis;To identify source in patient with recurring fever despite x4d BS-ABX therapy;     TECHNIQUE:   Axial images of the chest, abdomen, and pelvis were  acquired after the use of 100 cc [id number] IV contrast .   Coronal and sagittal reconstructions were also obtained     COMPARISON:   CT 10/29/2021     FINDINGS:   THORACIC SOFT TISSUE: Partially visualized left subclavian transvenous ICD/pacemaker with leads terminating in the region of the right atrium and right ventricle apex.     Stable calcification in the right thyroid gland.     THORACIC AORTA: Thoracic aorta is normal in caliber and contour with no significant calcific atherosclerosis.     HEART: Left atrial, and probably left ventricular, cardiac chamber enlargement.     No pericardial effusion.     Multivessel calcific coronary atherosclerosis.     There is a large region of apical and distal septal low-attenuation left ventricular myocardial thinning, likely representing sequela of an old apicoseptal left ventricular myocardial infarction.  There is a coarse opacity at the ventricular apex, possibly representing dystrophic calcification, versus a calcified mural thrombus.  A left ventricular apical aneurysm is also questioned; motion and beam hardening artifacts limit more definitive assessment on this scan.     PARISH/MEDIASTINUM: No significant mediastinal, hilar, or axillary lymphadenopathy (by 1 cm short axis diameter size criteria).     ESOPHAGUS: Unremarkable     LUNGS: Airways are patent.  Bilateral small pleural effusions associated with compressive atelectasis.  Right lower lobe 1.0 cm nodule, previously 0.6 cm.  Left lower lobe nodule measuring 0.6 cm, previously 0.4 cm.     There is a region of faint ground-glass attenuation in the apical and posterior portions of the right upper lobe, new since 01/29/2021.     LIVER: Normal in size and contour.  Scattered hepatic cysts.  Subcentimeter hypodensities, too small to characterize.     GALLBLADDER: No calcified gallstones.     BILE DUCTS: No evidence of dilated ducts.     PANCREAS: Fatty atrophy.  No mass or peripancreatic fat stranding.     SPLEEN:  Unremarkable.     ADRENALS: Unremarkable.     KIDNEYS/URETERS: Normal in size and location. Right renal cyst.  Symmetrical perinephric fat stranding.  No hydronephrosis or radiopaque nephrolithiasis.     The sacral segments of both ureters demonstrate tortuosity and mild ectasia, with ady-ureteral stranding on the right.     BLADDER: The urinary bladder is suboptimally distended, making it difficult to exclude some mural or mucosal thickening. There is some pericystic stranding.     REPRODUCTIVE: Mildly enlarged prostate gland, protruding slightly into the base of the urinary bladder.     Prominent but symmetric seminal vesicles.     STOMACH/DUODENUM:Small hiatal hernia.  The gastric lumen is suboptimally distended, limiting CT assessment for gastric mucosal or mural thickening.     BOWEL/MESENTERY: Small bowel is normal in caliber with no evidence of obstruction.  No evidence of inflammation or wall thickening.  Colon demonstrates no focal wall thickening.  Appendix is normal.     PERITONEUM: No intraperitoneal air or fluid.  No rim enhancing fluid collections in the peritoneal cavity (or retroperitoneum).     LYMPH NODES: No intra-abdominal or intrapelvic lymphadenopathy (by 1 cm short axis diameter size criteria).     ABDOMINAL WALL:  No acute CT abnormality.  Note however that the perineum is not fully included in this scan.     Small, fat-containing umbilical hernia.     Bilateral inguinal regions surgical clips right anchors, likely related to prior bilateral inguinal hernia repairs.     VESSELS: No aneurysm. Mild calcific atherosclerosis.     BONES: Scattered degenerative changes.  Increased anterior vertebral body wedge deformity at T1, relative to 01/29/2021.     New since 01/29/2021, there are healing fractures in the anterolateral aspects of the left 8th and 9th, and possibly 10th, ribs.  Otherwise, no suspicious osseous or articular lesions.  Note however that the pubic symphysis and inferior pubic rami  are not fully included in this scan.    Impression:       Bilateral perinephric stranding, possibly related to medical renal disease.     Pericystic stranding, for which cystitis is not excluded.     Mild prostatomegaly.     Short-segment tortuosity and ectasia of the sacral segments of both ureters, possibly congenital/developmental.  However, there is some associated right ady-ureteral stranding, for which underlying ureteritis or ureteral neoplasm are not excluded.  Correlate clinically, and with urology follow-up.     Enlarging right lower lobe pulmonary nodule.  Neoplasm not excluded.  Correlate clinically, and with short-term follow-up with CT chest.     New region of faint ground-glass opacification in the right upper lobe, suspicious for possible atypical pneumonia.  Other etiologies not excluded.  Correlate clinically, and with follow-up chest CT within 3-6 months.     Increased anterior vertebral body wedging at T1, compatible with a worsening compression fracture since 01/29/2021.     Healing fractures of left 8th, 9th, and possibly 10th, ribs.     Small hiatal hernia.     Partially visualized left subclavian transvenous ICD/pacemaker.     Left atrial and probable left ventricular chamber enlargement.  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  Correlate clinically.     Hepatic and renal cysts.     Other findings as detailed above.     This report was flagged in Epic as abnormal.     Electronically signed by resident: Tess Wesley   Date: 08/02/2023   Time: 21:52     Electronically signed by: Sajan Langston   Date: 08/02/2023   Time: 23:41   X-Ray Chest 1 View [[id number]] Resulted: 08/02/23 0148   Order Status: Completed Updated: 08/02/23 0150   Narrative:     EXAMINATION:   XR CHEST 1 VIEW     CLINICAL HISTORY:   CHF;     TECHNIQUE:   Single frontal view of the chest was performed.     COMPARISON:   Of 12/09/2014     FINDINGS:    Twin lead pacer defibrillator unchanged.  The heart is not enlarged the trachea is midline.  There is mild prominence of the perihilar lung markings and vasculature without consolidation or effusion.  Bony structures are intact.    Impression:       Persistent mild cardiac enlargement with multi lead pacemaker defibrillator.     Mild increase in pulmonary vasculature.  Correlate for early cardiac decompensation without diffuse CHF.       Electronically signed by: Fili Ham   Date: 08/02/2023   Time: 01:48     Imaging History    2023    Date Procedure Name Study Review Link PACS Link Status Accession Number Location   08/03/23 08:58 AM US Lower Extremity Veins Bilateral Study Review  Images Final [id number] [facility]   08/02/23 09:06 PM CT Chest Abdomen Pelvis With Contrast (xpd) Study Review  Images Final [id number] [facility]   08/02/23 01:37 AM X-Ray Chest 1 View Study Review  Images Final [id number] [facility]   07/03/23 04:00 AM Cardiac device check - Remote Study Review  Images Final [id number]    08/02/23 10:37 AM Transesophageal echo (NOE) Study Review  Images Final [id number] [facility]   08/02/23 10:53 AM Intra-Procedure Documentation Study Review  Images Final [id number] [facility]

## 2023-08-03 NOTE — PROGRESS NOTES
[facility] - Cardiology Stepdown  Cardiology  Progress Note    Patient Name: Amish Grossman  MRN: [id number]  Admission Date: 8/2/2023  Hospital Length of Stay: 1 days  Code Status: Full Code   Attending Physician: Abdelrahman Vital MD   Primary Care Physician: Marco Antonio Devi MD  Expected Discharge Date: 8/8/2023  Principal Problem:Fever    Subjective:     Hospital Course:   Patient transferred to [facility] on 8/2/23 due to persistent high-grade fevers concerning for infective endocarditis. Patient afebrile on arrival and in no acute distress. Obtained NOE on 8/2.       Interval History: VSS, remained afebrile, NAEON. No fever, chills, CP, HA, abdominal pain, N/V. Feels well, expressing desire to go home.     Review of Systems   Constitutional: Negative. Negative for fever, malaise/fatigue and night sweats.   HENT: Negative.     Eyes: Negative.    Cardiovascular: Negative.  Negative for chest pain.   Respiratory: Negative.  Negative for cough and shortness of breath.    Endocrine: Negative.    Skin: Negative.    Musculoskeletal: Negative.    Gastrointestinal: Negative.  Negative for bloating and change in bowel habit.   Genitourinary: Negative.  Negative for bladder incontinence and frequency.   Neurological: Negative.    Psychiatric/Behavioral: Negative.     Allergic/Immunologic: Negative.      Objective:     Vital Signs (Most Recent):  Temp: 98.3 °F (36.8 °C) (08/03/23 0810)  Pulse: 60 (08/03/23 0810)  Resp: 14 (08/03/23 0810)  BP: (!) 146/81 (08/03/23 0810)  SpO2: (!) 93 % (08/03/23 0810) Vital Signs (24h Range):  Temp:  [96.9 °F (36.1 °C)-100 °F (37.8 °C)] 98.3 °F (36.8 °C)  Pulse:  [60-73] 60  Resp:  [14-31] 14  SpO2:  [91 %-98 %] 93 %  BP: ()/(50-88) 146/81     Weight: 125.2 kg (276 lb)  Body mass index is 35.44 kg/m².     SpO2: (!) 93 %         Intake/Output Summary (Last 24 hours) at 8/3/2023 0831  Last data filed at 8/3/2023 0556  Gross per 24 hour   Intake 5293 ml   Output 3500 ml   Net 1793 ml        Lines/Drains/Airways       Peripheral Intravenous Line  Duration                  Peripheral IV - Single Lumen 08/01/23 18 G Posterior;Right Wrist 2 days         Peripheral IV - Single Lumen 08/01/23 20 G Posterior;Right Hand 2 days         Peripheral IV - Single Lumen 08/02/23 1420 18 G Left;Posterior Hand <1 day                       Physical Exam  Constitutional:       Appearance: Normal appearance.   HENT:      Head: Normocephalic and atraumatic.      Right Ear: External ear normal.      Left Ear: External ear normal.      Nose: Nose normal.      Mouth/Throat:      Mouth: Mucous membranes are moist.      Pharynx: Oropharynx is clear.   Eyes:      Extraocular Movements: Extraocular movements intact.      Pupils: Pupils are equal, round, and reactive to light.   Cardiovascular:      Rate and Rhythm: Normal rate and regular rhythm.      Pulses: Normal pulses.      Heart sounds: Normal heart sounds.   Pulmonary:      Effort: Pulmonary effort is normal.      Breath sounds: Normal breath sounds.   Abdominal:      General: There is no distension.      Palpations: Abdomen is soft.      Tenderness: There is no abdominal tenderness.   Musculoskeletal:      Cervical back: Normal range of motion. No tenderness.      Right lower leg: No edema.      Left lower leg: No edema.   Neurological:      General: No focal deficit present.      Mental Status: He is alert and oriented to person, place, and time.            Significant Labs: All pertinent lab results from the last 24 hours have been reviewed.    Significant Imaging: CT CAP: GGOs in apical and posterior lungs, RLL pulmonary nodule 1cm from 0.6cm, small b/l pleural effusions, no evidence of abscess or other infectious process.     Assessment and Plan:       * Fever  Transferred from OSH with Tmax 103 with chills, myalgias, occasional dry cough, headaches and fatigue x4d duration despite BS-ABX with vancomycin and meropenem. Tmax 101.8 during this hospitilization on  morning of 8/2, no further fevers since. BCX at OSH NGTD. Endocarditis ruled out via NOE. CT C/A/P showed apical and posterior GGOs that could represent atypical PNA. Asymmetric leg swelling with superficial tenderness concerning for DVT versus possible cellulitis. ID c/s for assistance with source identification and ABX therapy.     PLAN:  -ID c/s, appreciate recs  -Continue vancomycin and cefepime pending source identification  -b/l DVT US     Obstructive sleep apnea  Ordered home bipap  Doesn't want to wear tonight     Chronic anticoagulation  Continue eliquis which he takes for afib and h/o RV thrombus    Ischemic cardiomyopathy  EF 40%  Symptom free from cardiac standpoint  Takes lasix 2-3x/week only  CVP 3 on echo on arrival  Will watch w/o diuretics  Continue home entresto and coreg    Automatic implantable cardioverter-defibrillator in situ  RA pacing 92%. RV pacing 1.7%   Atrial arrhythmias: None   Ventricular arrhythmias: None     -continue following with Dr. Isbell    CKD (chronic kidney disease) stage 1, GFR 90 ml/min or greater  Avoid nephtrotoxins and renally dose medicines      VTE Risk Mitigation (From admission, onward)         Ordered     apixaban tablet 5 mg  2 times daily         08/02/23 8235                Megan Herrera MD  Cardiology  [facility] - Cardiology Stepdown

## 2023-08-03 NOTE — PT/OT/SLP PROGRESS
Physical Therapy      Patient Name:  Amish Grossman   MRN:  [id number]    Patient not seen today secondary to  (AM pt not seen due to going for US. PT was not able to make 2nd attempt to see pt.). Will follow-up at a later date.    8/3/2023  .

## 2023-08-03 NOTE — ASSESSMENT & PLAN NOTE
Patient admitted for sepsis and currently undergoing evaluation for infectious cause.   Device interrogated and no AF or treated events since last interrogation. No changes made to .   -NOE for evaluation of endocarditis/lead infection negative  -Blood cultures negative growth to date

## 2023-08-04 ENCOUNTER — PATIENT OUTREACH (OUTPATIENT)
Dept: ADMINISTRATIVE | Facility: CLINIC | Age: 78
End: 2023-08-04
Payer: MEDICARE

## 2023-08-04 NOTE — PROGRESS NOTES
C3 nurse spoke with Amish Grossman  for a TCC post hospital discharge follow up call. The patient has a scheduled HOSFU appointment with Marco Antonio Devi MD on 08/08/2023 @ 1300.

## 2023-08-07 LAB
BACTERIA BLD CULT: NORMAL
BACTERIA BLD CULT: NORMAL

## 2023-08-08 NOTE — PHYSICIAN QUERY
PT Name: Amish Grossman  MR #: [id number]     DOCUMENTATION CLARIFICATION     CDS/: CAMPBELL Mcneal, RN, CCDS               Contact information: [email or web address]  This form is a permanent document in the medical record.     Query Date: August 8, 2023    By submitting this query, we are merely seeking further clarification of documentation.  Please utilize your independent clinical judgment when addressing the question(s) below.    The Medical Record contains the following   Indicators Supporting Clinical Findings Location in Medical Record   X Heart Failure documented PMH: CHF; ischemic cardiomyopathy with EF 40% Cards: Dr. Vital 8/2   X    Lab: 8/2   X EF/Echo moderately reduced systolic function with a visually estimated ejection fraction of 35 - 40%.    Echo 4/4/23  The left ventricle is mildly enlarged with mildly decreased systolic function.  Moderate to severe left atrial enlargement.  The estimated ejection fraction is 40%.  The quantitatively derived ejection fraction is 44%.  Grade I left ventricular diastolic dysfunction   Echo: 8/2      Cards: Dr. Vital 8/2   X Radiology findings Persistent mild cardiac enlargement with multi lead pacemaker defibrillator.     Mild increase in pulmonary vasculature.  Correlate for early cardiac decompensation without diffuse CHF.   CXR: 8/2    Subjective/Objective Respiratory Conditions      Recent/Current MI      Heart Transplant, LVAD     X Edema, JVD Extremities: warm, no edema    Cards: Dr. Mcrae 8/2    Ascites     X Diuretics/Meds furosemide injection 40 mg  Dose: 40 mg  Freq: Once Route: IV  Start: 08/02/23 0915 End: 08/02/23 1100   MAR    Other Treatment     X Other Ischemic cardiomyopathy  EF 40%  Symptom free from cardiac standpoint  Takes lasix 2-3x/week only  CVP 3 on echo on arrival Cards: Dr. Vital 8/2     Heart failure is a clinical diagnosis which includes symptomatic fluid retention, elevated intracardiac pressures, and/or the  inability of the heart to deliver adequate blood flow.    Heart Failure with reduced Ejection Fraction (HFrEF) or Systolic Heart Failure (loses ability to contract normally, EF is <40%)    Heart Failure with preserved Ejection Fraction (HFpEF) or Diastolic Heart Failure (stiff ventricles, does not relax properly, EF is >50%)     Heart Failure with Combined Systolic and Diastolic Failure (stiff ventricles, does not relax properly and EF is <50%)    Mid-range or mildly reduced ejection fraction (HFmrEF) is classified as systolic heart failure.  Congestive heart failure with a recovered EF is classified as Diastolic Heart Failure.  Common clues to acute exacerbation:  Rapidly progressive symptoms (w/in 2 weeks of presentation), using IV diuretics, using supplemental O2, pulmonary edema on Xray, new or worsening pleural effusion, +JVD or other signs of volume overload, MI w/in 4 weeks, and/or BNP >500  The clinical guidelines noted are only system guidelines, and do not replace the providers clinical judgment.    Provider, please specify type/acuity of CHF associated with the above clinical findings.    [ x  ]  Acute on Chronic Systolic Heart Failure (HFrEF or HFmrEF) - worsening of CHF signs/symptoms in preexisting CHF   [   ]  Chronic Systolic Heart Failure (HFrEF or HFmrEF) - preexisting and stable   [   ]  Acute on Chronic Combined Systolic and Diastolic Heart Failure - worsening of CHF signs/symptoms in preexisting CHF   [   ]  Chronic Combined Systolic and Diastolic Heart Failure - pre-existing and stable   [   ]  Other (please specify): ___________________________________       Please document in your progress notes daily for the duration of treatment until resolved and include in your discharge summary.    References:  American Heart Association editorial staff. (2017, May). Ejection Fraction Heart Failure Measurement. American Heart Association.  https://www.heart.org/en/health-topics/heart-failure/diagnosing-heart-failure/ejection-fraction-heart-failure-measurement#:~:text=Ejection%20fraction%20(EF)%20is%20a,pushed%20out%20with%20each%20heartbeat  SIMI Pollard (2020, December 15). Heart failure with preserved ejection fraction: Clinical manifestations and diagnosis. [employer]. https://www.tokia.lt.Critical Media/contents/heart-failure-with-preserved-ejection-fraction-clinical-manifestations-and-diagnosis.  ICD-10-CM/PCS Coding Clinic Third Quarter ICD-10, Effective with discharges: September 8, 2020 [facility] [employer] § Heart failure with mid-range or mildly reduced ejection fraction (2020).  ICD-10-CM/PCS Coding Clinic Third Quarter ICD-10, Effective with discharges: September 8, 2020 [facility] [employer] § Heart failure with recovered ejection fraction (2020).  Form No. 84428

## 2023-08-08 NOTE — PHYSICIAN QUERY
PT Name: Amish Grossman  MR #: [id number]     Documentation Clarification      CDS/: CAMPBELL Mcneal, RN, CCDS              Contact information: [email or web address]    This form is a permanent document in the medical record.     Query Date: August 8, 2023    By submitting this query, we are merely seeking further clarification of documentation. Please utilize your independent clinical judgment when addressing the question(s) below.    The Medical Record reflects the following:    Clinical Findings Location in Medical Records   presented in local hospital 4 days ago with fevers, chills, headache and generalized muscle aches.    started on vancomycin and meropenem in his local hospital and continued to be febrile and thus sent here.  Upon arrival patient is comfortable and symptom free    T: 98.5; HR: 65; RR: 21    WBC: 7.93, 6.10    Lactic acid: 0.7    Procal: 1.23    Blood culture: no growth after 5 days   H & P: Dr. Vital 8/2              Lab: 8/2, 8/3    Lab: 8/2    Lab: 8/2    Lab: 8/2   T: 98.5; HR: 66; RR: 22    Fever, sepsis. Unclear source.   NOE: no vegetation.  Cultures: NGTD. WBC normal.     No evidence of endocarditis and/or ICD infection.  Await ID consult.    admitted to OSH for fevers. Work up at [facility] (blcx, UA) unrevealing. Unclear source of fevers at this time, blood cx ngtd, NOE with noted small echodensity on RV pacing wire (?fibrinous material vs veg).    current admission course notable for CXR without focal consolidation. Blcx on admit ngtd and UA unrevealing for infection. NOE noted to have small echodensity on RV pacing wire.  He is currently on vancomycin and cefepime    Tmax 101.8 during this hospitilization on morning of 8/2, no further fevers since. BCX at OSH NGTD. Endocarditis ruled out via NOE. CT C/A/P showed apical and posterior GGOs that could represent atypical PNA    underwent NOE yesterday, no concerns of endocarditis or lead infections. Patient has remained afebrile  for the las 24 hours     CT CAP - cf possible RUL developing consolidation - primary team added doxy for atypical.      CT also cf perinephric stranding, cystis, and periureteral stranding.  Exam not consistent with pyelonephritis though.  RIP and RF negative.  Source of fever and illness unclear but blood cultures negative and NOE findings appear to be non infectious    PRINCIPAL PROBLEM:  Fever   Cards: Dr. Mcrae 8/2    Arrhythmia: Dr. Isbell 8/2              ID: Dr. Sylvester 8/2                Cards: Dr. Vital 8/3        Arrhythmia: Dr. Isbell 8/3      ID: Dr. Syed 8/3            DCS: Dr. Vital 8/3     Due to the conflicting clinical picture, please clinically validate the diagnosis of ____sepsis_____________.    If validated, please provide additional clinical support for the diagnosis.       [  x ] Above stated diagnosis is not confirmed and/or it has been ruled out (No etiology of fever determined)   [   ] Above stated diagnosis is not confirmed and/or it has been ruled out, other diagnosis ruled in (please          specify):_______________   [   ] Above stated diagnosis is confirmed. Please specify clinical support (signs, symptoms, and treatment) for  the confirmed diagnosis: ____________________Source: ________________   [   ] Other clarification (please specify): ___________________     Form No. [id number]

## 2023-08-15 ENCOUNTER — CLINICAL SUPPORT (OUTPATIENT)
Dept: CARDIOLOGY | Facility: HOSPITAL | Age: 78
End: 2023-08-15
Payer: MEDICARE

## 2023-08-15 DIAGNOSIS — Z95.810 PRESENCE OF AUTOMATIC (IMPLANTABLE) CARDIAC DEFIBRILLATOR: ICD-10-CM

## 2023-10-09 DIAGNOSIS — E78.5 DYSLIPIDEMIA: ICD-10-CM

## 2023-10-09 DIAGNOSIS — E66.09 CLASS 1 OBESITY DUE TO EXCESS CALORIES WITH SERIOUS COMORBIDITY IN ADULT, UNSPECIFIED BMI: ICD-10-CM

## 2023-10-09 DIAGNOSIS — I10 HTN (HYPERTENSION), BENIGN: ICD-10-CM

## 2023-10-09 DIAGNOSIS — I25.83 CORONARY ARTERY DISEASE DUE TO LIPID RICH PLAQUE: ICD-10-CM

## 2023-10-09 DIAGNOSIS — I25.10 CORONARY ARTERY DISEASE DUE TO LIPID RICH PLAQUE: ICD-10-CM

## 2023-10-10 RX ORDER — ATORVASTATIN CALCIUM 20 MG/1
TABLET, FILM COATED ORAL
Qty: 90 TABLET | Refills: 3 | Status: SHIPPED | OUTPATIENT
Start: 2023-10-10 | End: 2024-03-14

## 2023-10-16 DIAGNOSIS — I25.83 CORONARY ARTERY DISEASE DUE TO LIPID RICH PLAQUE: ICD-10-CM

## 2023-10-16 DIAGNOSIS — I50.43 ACUTE ON CHRONIC COMBINED SYSTOLIC AND DIASTOLIC HEART FAILURE: ICD-10-CM

## 2023-10-16 DIAGNOSIS — I25.5 ISCHEMIC CARDIOMYOPATHY: ICD-10-CM

## 2023-10-16 DIAGNOSIS — I25.10 CORONARY ARTERY DISEASE DUE TO LIPID RICH PLAQUE: ICD-10-CM

## 2023-10-16 RX ORDER — SACUBITRIL AND VALSARTAN 97; 103 MG/1; MG/1
1 TABLET, FILM COATED ORAL 2 TIMES DAILY
Qty: 180 TABLET | Refills: 3 | Status: SHIPPED | OUTPATIENT
Start: 2023-10-16 | End: 2024-02-01

## 2023-10-16 RX ORDER — SACUBITRIL AND VALSARTAN 97; 103 MG/1; MG/1
1 TABLET, FILM COATED ORAL 2 TIMES DAILY
Qty: 60 TABLET | Refills: 6 | Status: SHIPPED | OUTPATIENT
Start: 2023-10-16

## 2023-11-14 ENCOUNTER — CLINICAL SUPPORT (OUTPATIENT)
Dept: CARDIOLOGY | Facility: HOSPITAL | Age: 78
End: 2023-11-14
Attending: INTERNAL MEDICINE
Payer: MEDICARE

## 2023-11-14 ENCOUNTER — CLINICAL SUPPORT (OUTPATIENT)
Dept: CARDIOLOGY | Facility: HOSPITAL | Age: 78
End: 2023-11-14
Payer: MEDICARE

## 2023-11-14 DIAGNOSIS — I48.19 OTHER PERSISTENT ATRIAL FIBRILLATION: ICD-10-CM

## 2023-11-14 PROCEDURE — 93295 CARDIAC DEVICE CHECK - REMOTE: ICD-10-PCS | Mod: ,,, | Performed by: INTERNAL MEDICINE

## 2023-11-14 PROCEDURE — 93295 DEV INTERROG REMOTE 1/2/MLT: CPT | Mod: ,,, | Performed by: INTERNAL MEDICINE

## 2023-11-17 LAB
OHS CV AF BURDEN PERCENT: < 1
OHS CV DC REMOTE DEVICE TYPE: NORMAL
OHS CV RV PACING PERCENT: 1.7 %

## 2023-12-13 DIAGNOSIS — I47.10 PSVT (PAROXYSMAL SUPRAVENTRICULAR TACHYCARDIA): Primary | ICD-10-CM

## 2023-12-18 RX ORDER — APIXABAN 5 MG/1
TABLET, FILM COATED ORAL
Qty: 180 TABLET | Refills: 1 | Status: SHIPPED | OUTPATIENT
Start: 2023-12-18

## 2023-12-20 ENCOUNTER — PATIENT MESSAGE (OUTPATIENT)
Dept: ELECTROPHYSIOLOGY | Facility: CLINIC | Age: 78
End: 2023-12-20
Payer: MEDICARE

## 2023-12-26 ENCOUNTER — PATIENT MESSAGE (OUTPATIENT)
Dept: CARDIOLOGY | Facility: CLINIC | Age: 78
End: 2023-12-26
Payer: MEDICARE

## 2023-12-26 RX ORDER — AMLODIPINE BESYLATE 5 MG/1
TABLET ORAL
Qty: 180 TABLET | Refills: 3 | Status: SHIPPED | OUTPATIENT
Start: 2023-12-26 | End: 2023-12-27 | Stop reason: SDUPTHER

## 2023-12-26 NOTE — TELEPHONE ENCOUNTER
You  Amish Conde now (10:20 AM)     LL  Your amlodipine was refilled today. I am updating your record to show that you only take one pill daily.     Linda Boateng,  General Cardiology Triage RN       This Patient Portal message has not been read.     You routed conversation to You6 minutes ago (10:13 AM)     Amish CAMERON Staff (supporting Angel Coburn MD)43 minutes ago (9:37 AM)       Need my amlodipine refill called in [facility]. I only take one per day instead of 2, but meds list dont indicate latest change.

## 2023-12-27 RX ORDER — AMLODIPINE BESYLATE 5 MG/1
5 TABLET ORAL DAILY
Qty: 90 TABLET | Refills: 3 | Status: ON HOLD | OUTPATIENT
Start: 2023-12-27 | End: 2024-03-07 | Stop reason: HOSPADM

## 2024-01-05 ENCOUNTER — TELEPHONE (OUTPATIENT)
Dept: CARDIOLOGY | Facility: CLINIC | Age: 79
End: 2024-01-05
Payer: MEDICARE

## 2024-01-05 DIAGNOSIS — I25.5 ISCHEMIC CARDIOMYOPATHY: ICD-10-CM

## 2024-01-05 DIAGNOSIS — I25.83 CORONARY ARTERY DISEASE DUE TO LIPID RICH PLAQUE: ICD-10-CM

## 2024-01-05 DIAGNOSIS — N18.31 STAGE 3A CHRONIC KIDNEY DISEASE: Primary | ICD-10-CM

## 2024-01-05 DIAGNOSIS — I50.42 CHRONIC COMBINED SYSTOLIC (CONGESTIVE) AND DIASTOLIC (CONGESTIVE) HEART FAILURE: ICD-10-CM

## 2024-01-05 DIAGNOSIS — I25.10 CORONARY ARTERY DISEASE DUE TO LIPID RICH PLAQUE: ICD-10-CM

## 2024-01-05 DIAGNOSIS — E78.5 DYSLIPIDEMIA: ICD-10-CM

## 2024-01-05 NOTE — TELEPHONE ENCOUNTER
----- Message from Linda Hampton RN sent at 12/26/2023  4:49 PM CST -----  Regarding: FW: new meds  Pt updated on dr Coburn's instructions and verbalized understanding. He will start the Jardiance after Jan 1st due to cost. Teaching done on Pharmacy Patient Assistance Department program in view of him being on 3 expensive meds. He wants to do the lab locally (lives in [place] [facility]) and will message me the lab fax number for the order.  ----- Message -----  From: Angel Coburn MD  Sent: 12/26/2023   1:43 PM CST  To: Linda Hampton RN  Subject: RE: new meds                                     Those would both be good to be on ( Jardiance or Farxiga 10 mg whichever is better for insurance) -if BP gets too low on these can reduce amlodipine to half pill or stop if too low. Get comp , lipids and Pro BNP in 6 weeks,CJL  ----- Message -----  From: Linda Hampton RN  Sent: 12/26/2023  10:41 AM CST  To: Angel Coburn MD; Linda Hampton RN  Subject: new meds                                         Found out accidentally that pt back in August was prescribed Jardiance and spironolactone after hospital stay. He never started them due to not having been prescribed by dr Coburn.    Please advise on whether or not he should take them.     Note:  I will also adjust his record for amlodipine to take once a day as he has been doing it and schedule him for a f/u appt.

## 2024-01-05 NOTE — TELEPHONE ENCOUNTER
Pt was reminded today on need to send me fax # for lab. His wife has been sick and he forgot.I offered to mail the lab orders and he stated that he had still not started the medication. The issues had been cost and the fact that dr Coburn had not ordered the meds. We reviewed again the points of our last conversation: Jardiance should be around $47 now that it is after 1/1 as he probably was in the coverage gap when we talked. Also as previously discussed dr Coburn approved/ agreed with the orders for spironolactone and jardiance. Teaching also done. Pt verbalized understanding. and stated that he is interested in a referral to pt assistance. I told him to contact me when he starts the Jardiance and that he needs labs 6 weeks later. He wants to wait to March when [place] to [place] app[place] and I explained that it is done after 6 weeks to evaluate his response to the med. He verbalized understanding. I mailed the lab orders to him with note for NPO and to be done 6wks for start just in case.

## 2024-01-31 ENCOUNTER — TELEPHONE (OUTPATIENT)
Dept: CARDIOLOGY | Facility: CLINIC | Age: 79
End: 2024-01-31
Payer: MEDICARE

## 2024-01-31 NOTE — TELEPHONE ENCOUNTER
Spoke w. pt yesterday about starting Jardiance and doing f/u labwork. He stated that he never received the lab slips and never started Jardiance. Waiting for his upcoming visit w. dr Coburn . He was made aware that dr Coburn is not in clinic on 5/13 when he sees dr Isbell. Pt does not want to be scheduled w. any other cardiologist which I recommended doing. I told him to call back later then to make an appt w. dr Coburn and he verbalized understanding.  Lab order mailed again to pt, with instructions to do 6 weeks after starting Jardiance, address verified.

## 2024-02-05 ENCOUNTER — TELEPHONE (OUTPATIENT)
Dept: ELECTROPHYSIOLOGY | Facility: CLINIC | Age: 79
End: 2024-02-05
Payer: MEDICARE

## 2024-02-05 NOTE — TELEPHONE ENCOUNTER
Pt returned the call on this am after sending a manual.  On manual transmission @ 9:03 am presenting rhythm Ap/Vs.  Pt states he is currently on Vacation in his motor home and outside of having a very mild HA on Friday and Sat night (HA did not require him to have to take anything for them).  Pt asymptomatic and reports enjoying his time out fishing.  BP reading this am 117/77 HR 61.  Informed the pt that this information would be sent to the Device RN to discuss with Dr. Isbell and that he would only receive a return call if the doctor has any recommendations otherwise the Device Clinic will continue to monitor.  Understanding was verbalized.   Message routed to Device RN (Derick).

## 2024-02-05 NOTE — TELEPHONE ENCOUNTER
Patient is status post cryo PVI on 12/9/19.    Anticoagulant:  eliquis    Atrial fibrillation has occurred outside of the 3 month blanking period; in current episode since 2/1/24 at 2053    Rate controlled:  Yes    AF burden 4.3%    Spoke with patient, he is currently camping/fishing in [place].  He is asymptomatic and feeling good.  Compliant with Eliquis, Coreg 12.5mg BID, Tikosyn 500mcg BID.    BP at home last week 117/74.    Scheduled in EP clinic 3/13/24

## 2024-02-08 ENCOUNTER — PATIENT MESSAGE (OUTPATIENT)
Dept: CARDIOLOGY | Facility: CLINIC | Age: 79
End: 2024-02-08
Payer: MEDICARE

## 2024-02-08 DIAGNOSIS — E78.5 DYSLIPIDEMIA: ICD-10-CM

## 2024-02-08 DIAGNOSIS — N18.31 STAGE 3A CHRONIC KIDNEY DISEASE: ICD-10-CM

## 2024-02-08 DIAGNOSIS — I50.42 CHRONIC COMBINED SYSTOLIC (CONGESTIVE) AND DIASTOLIC (CONGESTIVE) HEART FAILURE: Primary | ICD-10-CM

## 2024-02-08 NOTE — TELEPHONE ENCOUNTER
Spoke w. patient. He still has not started spironolactone which I reminded him dr Coburn approved of. I scheduled him for fasting labs in 5 weeks instead of 6 as he is coming to town that day and he agreed to date/time of appointment(s).

## 2024-02-12 DIAGNOSIS — I48.0 PAROXYSMAL ATRIAL FIBRILLATION: ICD-10-CM

## 2024-02-12 RX ORDER — DOFETILIDE 0.5 MG/1
CAPSULE ORAL
Qty: 180 CAPSULE | Refills: 3 | Status: SHIPPED | OUTPATIENT
Start: 2024-02-12

## 2024-02-13 ENCOUNTER — CLINICAL SUPPORT (OUTPATIENT)
Dept: CARDIOLOGY | Facility: HOSPITAL | Age: 79
End: 2024-02-13

## 2024-02-13 ENCOUNTER — CLINICAL SUPPORT (OUTPATIENT)
Dept: CARDIOLOGY | Facility: HOSPITAL | Age: 79
End: 2024-02-13
Attending: INTERNAL MEDICINE
Payer: MEDICARE

## 2024-02-13 DIAGNOSIS — I48.19 OTHER PERSISTENT ATRIAL FIBRILLATION: ICD-10-CM

## 2024-02-13 PROCEDURE — 93295 DEV INTERROG REMOTE 1/2/MLT: CPT | Mod: ,,, | Performed by: INTERNAL MEDICINE

## 2024-03-02 NOTE — TELEPHONE ENCOUNTER
Spoke to Mr. Grossman    CONFIRMED procedure arrival time of 5:15AM    Reiterated instructions including:  -Directions to check in desk  -NPO after midnight night prior to procedure  -High importance of HOLDING Eliquis morning of procedure   -Confirmed compliance of Eliquis  -Pre-procedure LABS today, pending  -COVID test today, pending  -Confirmed no fever, cough, or shortness of breath in the past 30 days  -Confirmed no redness, rash, irritation, or yeast infection to groin area.   -Reviewed current visitor policy    Patient verbalizes understanding of above and appreciates call.    
never

## 2024-03-04 ENCOUNTER — HOSPITAL ENCOUNTER (INPATIENT)
Facility: HOSPITAL | Age: 79
LOS: 3 days | Discharge: HOME | DRG: 309 | End: 2024-03-07
Attending: INTERNAL MEDICINE | Admitting: INTERNAL MEDICINE
Payer: MEDICARE

## 2024-03-04 ENCOUNTER — TELEPHONE (OUTPATIENT)
Dept: ELECTROPHYSIOLOGY | Facility: CLINIC | Age: 79
End: 2024-03-04
Payer: MEDICARE

## 2024-03-04 DIAGNOSIS — I21.4 NSTEMI (NON-ST ELEVATED MYOCARDIAL INFARCTION): ICD-10-CM

## 2024-03-04 DIAGNOSIS — I48.91 ATRIAL FIBRILLATION: ICD-10-CM

## 2024-03-04 DIAGNOSIS — N18.1 CKD (CHRONIC KIDNEY DISEASE) STAGE 1, GFR 90 ML/MIN OR GREATER: ICD-10-CM

## 2024-03-04 DIAGNOSIS — R07.9 CHEST PAIN: ICD-10-CM

## 2024-03-04 DIAGNOSIS — I50.43 ACUTE ON CHRONIC COMBINED SYSTOLIC AND DIASTOLIC HEART FAILURE: ICD-10-CM

## 2024-03-04 DIAGNOSIS — I48.91 AF (ATRIAL FIBRILLATION): ICD-10-CM

## 2024-03-04 DIAGNOSIS — I50.20 HEART FAILURE WITH REDUCED EJECTION FRACTION: ICD-10-CM

## 2024-03-04 DIAGNOSIS — Z79.899 VISIT FOR MONITORING TIKOSYN THERAPY: ICD-10-CM

## 2024-03-04 DIAGNOSIS — I10 HTN (HYPERTENSION), BENIGN: ICD-10-CM

## 2024-03-04 DIAGNOSIS — E03.8 OTHER SPECIFIED HYPOTHYROIDISM: ICD-10-CM

## 2024-03-04 DIAGNOSIS — I48.91 ATRIAL FIBRILLATION, UNSPECIFIED TYPE: Primary | ICD-10-CM

## 2024-03-04 DIAGNOSIS — Z51.81 VISIT FOR MONITORING TIKOSYN THERAPY: ICD-10-CM

## 2024-03-04 DIAGNOSIS — E66.09 CLASS 1 OBESITY DUE TO EXCESS CALORIES WITH SERIOUS COMORBIDITY IN ADULT, UNSPECIFIED BMI: ICD-10-CM

## 2024-03-04 LAB
ALBUMIN SERPL BCP-MCNC: 3.1 G/DL (ref 3.5–5.2)
ALP SERPL-CCNC: 44 U/L (ref 55–135)
ALT SERPL W/O P-5'-P-CCNC: 15 U/L (ref 10–44)
ANION GAP SERPL CALC-SCNC: 9 MMOL/L (ref 8–16)
AST SERPL-CCNC: 20 U/L (ref 10–40)
BASOPHILS # BLD AUTO: 0.03 K/UL (ref 0–0.2)
BASOPHILS NFR BLD: 0.5 % (ref 0–1.9)
BILIRUB SERPL-MCNC: 0.9 MG/DL (ref 0.1–1)
BNP SERPL-MCNC: 783 PG/ML (ref 0–99)
BUN SERPL-MCNC: 18 MG/DL (ref 8–23)
CALCIUM SERPL-MCNC: 9 MG/DL (ref 8.7–10.5)
CHLORIDE SERPL-SCNC: 112 MMOL/L (ref 95–110)
CO2 SERPL-SCNC: 22 MMOL/L (ref 23–29)
CREAT SERPL-MCNC: 1.4 MG/DL (ref 0.5–1.4)
DIFFERENTIAL METHOD BLD: ABNORMAL
EOSINOPHIL # BLD AUTO: 0.3 K/UL (ref 0–0.5)
EOSINOPHIL NFR BLD: 5.6 % (ref 0–8)
ERYTHROCYTE [DISTWIDTH] IN BLOOD BY AUTOMATED COUNT: 14.7 % (ref 11.5–14.5)
EST. GFR  (NO RACE VARIABLE): 51.8 ML/MIN/1.73 M^2
GLUCOSE SERPL-MCNC: 77 MG/DL (ref 70–110)
HCT VFR BLD AUTO: 37 % (ref 40–54)
HGB BLD-MCNC: 11.9 G/DL (ref 14–18)
IMM GRANULOCYTES # BLD AUTO: 0.01 K/UL (ref 0–0.04)
IMM GRANULOCYTES NFR BLD AUTO: 0.2 % (ref 0–0.5)
LYMPHOCYTES # BLD AUTO: 2.1 K/UL (ref 1–4.8)
LYMPHOCYTES NFR BLD: 36.4 % (ref 18–48)
MCH RBC QN AUTO: 32 PG (ref 27–31)
MCHC RBC AUTO-ENTMCNC: 32.2 G/DL (ref 32–36)
MCV RBC AUTO: 100 FL (ref 82–98)
MONOCYTES # BLD AUTO: 0.6 K/UL (ref 0.3–1)
MONOCYTES NFR BLD: 10 % (ref 4–15)
NEUTROPHILS # BLD AUTO: 2.7 K/UL (ref 1.8–7.7)
NEUTROPHILS NFR BLD: 47.3 % (ref 38–73)
NRBC BLD-RTO: 0 /100 WBC
PLATELET # BLD AUTO: 202 K/UL (ref 150–450)
PMV BLD AUTO: 10.6 FL (ref 9.2–12.9)
POTASSIUM SERPL-SCNC: 4.3 MMOL/L (ref 3.5–5.1)
PROT SERPL-MCNC: 6.1 G/DL (ref 6–8.4)
RBC # BLD AUTO: 3.72 M/UL (ref 4.6–6.2)
SODIUM SERPL-SCNC: 143 MMOL/L (ref 136–145)
TROPONIN I SERPL DL<=0.01 NG/ML-MCNC: 0.03 NG/ML (ref 0–0.03)
WBC # BLD AUTO: 5.72 K/UL (ref 3.9–12.7)

## 2024-03-04 PROCEDURE — 84484 ASSAY OF TROPONIN QUANT: CPT | Performed by: HOSPITALIST

## 2024-03-04 PROCEDURE — 93005 ELECTROCARDIOGRAM TRACING: CPT

## 2024-03-04 PROCEDURE — 20600001 HC STEP DOWN PRIVATE ROOM

## 2024-03-04 PROCEDURE — 36415 COLL VENOUS BLD VENIPUNCTURE: CPT | Performed by: HOSPITALIST

## 2024-03-04 PROCEDURE — 80053 COMPREHEN METABOLIC PANEL: CPT | Performed by: HOSPITALIST

## 2024-03-04 PROCEDURE — 83880 ASSAY OF NATRIURETIC PEPTIDE: CPT | Performed by: HOSPITALIST

## 2024-03-04 PROCEDURE — 85025 COMPLETE CBC W/AUTO DIFF WBC: CPT | Performed by: HOSPITALIST

## 2024-03-04 PROCEDURE — 93010 ELECTROCARDIOGRAM REPORT: CPT | Mod: ,,, | Performed by: INTERNAL MEDICINE

## 2024-03-04 RX ORDER — SODIUM CHLORIDE 0.9 % (FLUSH) 0.9 %
10 SYRINGE (ML) INJECTION
Status: DISCONTINUED | OUTPATIENT
Start: 2024-03-04 | End: 2024-03-07 | Stop reason: HOSPADM

## 2024-03-04 RX ORDER — ACETAMINOPHEN 325 MG/1
650 TABLET ORAL EVERY 4 HOURS PRN
Status: DISCONTINUED | OUTPATIENT
Start: 2024-03-04 | End: 2024-03-07 | Stop reason: HOSPADM

## 2024-03-04 RX ORDER — PROCHLORPERAZINE EDISYLATE 5 MG/ML
5 INJECTION INTRAMUSCULAR; INTRAVENOUS EVERY 6 HOURS PRN
Status: DISCONTINUED | OUTPATIENT
Start: 2024-03-04 | End: 2024-03-07 | Stop reason: HOSPADM

## 2024-03-04 RX ORDER — ONDANSETRON HYDROCHLORIDE 2 MG/ML
4 INJECTION, SOLUTION INTRAVENOUS EVERY 8 HOURS PRN
Status: DISCONTINUED | OUTPATIENT
Start: 2024-03-04 | End: 2024-03-07 | Stop reason: HOSPADM

## 2024-03-04 RX ORDER — TALC
6 POWDER (GRAM) TOPICAL NIGHTLY PRN
Status: DISCONTINUED | OUTPATIENT
Start: 2024-03-04 | End: 2024-03-07 | Stop reason: HOSPADM

## 2024-03-04 RX ORDER — NALOXONE HCL 0.4 MG/ML
0.02 VIAL (ML) INJECTION
Status: DISCONTINUED | OUTPATIENT
Start: 2024-03-04 | End: 2024-03-07 | Stop reason: HOSPADM

## 2024-03-04 RX ORDER — GLUCAGON 1 MG
1 KIT INJECTION
Status: DISCONTINUED | OUTPATIENT
Start: 2024-03-04 | End: 2024-03-07 | Stop reason: HOSPADM

## 2024-03-04 RX ORDER — IBUPROFEN 200 MG
16 TABLET ORAL
Status: DISCONTINUED | OUTPATIENT
Start: 2024-03-04 | End: 2024-03-07 | Stop reason: HOSPADM

## 2024-03-04 RX ORDER — IBUPROFEN 200 MG
24 TABLET ORAL
Status: DISCONTINUED | OUTPATIENT
Start: 2024-03-04 | End: 2024-03-07 | Stop reason: HOSPADM

## 2024-03-04 NOTE — PROVIDER TRANSFER
(Physician in Lead of Transfers)  Outside Transfer Acceptance Note / Regional Referral Center    Upon patient arrival to floor, please send SecureChat to [facility] or call extension [phone] (if no answer, do NOT leave a callback number after the beep, rather please send a SecureChat to [facility]), for Hospital Medicine admit team assignment and for additional admit orders for the patient.  Do not page the attending physician associated with the patient on arrival (this physician may not be on duty at the time of arrival).  Rather, always send a SecureChat to [facility] or call [phone] to reach the triage physician for orders and team assignment.    Referring facility: [facility]  Referring provider: JUAN C CASTORENA SR  Accepting facility: [facility]  Accepting provider: LOUIS FLEMING  Reason for transfer: Higher Level of Care   Transfer diagnosis: NSTEMI  Transfer specialty requested: Cardiology  Transfer specialty notified: Yes  Transfer level: NUMBER 1-5: 2  Bed type requested: stepdown  Isolation status: No active isolations   Admission class or status: IP- Inpatient      Narrative     78-year-old male with a history of sleep apnea on CPAP, atrial fibrillation (on Eliquis), VT with AICD placement, coronary artery disease (prior stents), hypertension, syncope, thyroid disease, HFrEF, hyperlipidemia, and chronic kidney disease presented to [facility] ED on March 4 with chest pain going into both arms that began about 45 minutes prior to arrival.  He took sublingual nitroglycerin at home that resolved the chest pain.  By report chest x-ray had no acute abnormalities.  EKG showed atrial fibrillation but had no obvious ischemic changes.  Initial heart rate was around 107 but subsequently has been in the 80-90 range.  He had 1 additional episode of chest pain in the emergency department that resolved spontaneously after approximately 2 minutes. He is hemodynamically  stable with normal oxygenation and no ongoing chest pain.  He receives his Cardiology care at [facility].  ED will obtain repeat troponin to follow the trend.  I requested that they continue his home medications while he waits for a bed.  Will plan transfer to Hospital Medicine at [facility] for further Cardiology evaluation. In the emergency department he received 325 mg aspirin.    White blood cells 4.7, hemoglobin 12.3, hematocrit 38.7, platelets 210, , sodium 142, potassium 3.9, chloride 108, CO2 22, BUN 20, creatinine 1.4, total bilirubin 1, AST 24, ALT 21, glucose 90, magnesium 2.1, high sensitivity troponin 76 (reference range 0-60), NT-proBNP 599 (reference range 5-450)    August 2, 2023: Transesophageal echocardiogram had EF 35-40%.  Mild RV systolic enlargement with normal systolic function.    April 4, 2023: Echocardiogram with EF 40%, grade 1 diastolic dysfunction.    VS:  Temperature 97.1°, pulse 92, blood pressure 111/70, O2 sats 99%      Instructions    Admit to Hospital Medicine  Consult Cardiology      DILIP Puckett MD  Hospital Medicine Staff  Cell: [phone]

## 2024-03-04 NOTE — TELEPHONE ENCOUNTER
----- Message from Alicia Mena sent at 3/4/2024 10:56 AM CST -----  Regarding: hospital transfer  Pls call pt at [phone].  He is currently in the ER in his home town due to a possible heartattack and they are wanting to get him transferred to [place].  He says the hospital has been calling and unable to reach us.    Thank you

## 2024-03-04 NOTE — TELEPHONE ENCOUNTER
Spoke with pt, reports he had chest pain this morning and went to the ER close to his house, reports he has been in AF since being there and was told his trop was elevated, they are trying to transfer here, nurse there just reported to him that they did speak with someone here and are working on the transfer

## 2024-03-05 ENCOUNTER — DOCUMENTATION ONLY (OUTPATIENT)
Dept: ELECTROPHYSIOLOGY | Facility: CLINIC | Age: 79
End: 2024-03-05
Payer: MEDICARE

## 2024-03-05 PROBLEM — I50.20 HEART FAILURE WITH REDUCED EJECTION FRACTION: Status: ACTIVE | Noted: 2024-03-05

## 2024-03-05 LAB
ALBUMIN SERPL BCP-MCNC: 3.2 G/DL (ref 3.5–5.2)
ALP SERPL-CCNC: 48 U/L (ref 55–135)
ALT SERPL W/O P-5'-P-CCNC: 17 U/L (ref 10–44)
ANION GAP SERPL CALC-SCNC: 13 MMOL/L (ref 8–16)
ASCENDING AORTA: 4.04 CM
AST SERPL-CCNC: 22 U/L (ref 10–40)
AV INDEX (PROSTH): 0.9
AV MEAN GRADIENT: 4 MMHG
AV PEAK GRADIENT: 7 MMHG
AV VALVE AREA BY VELOCITY RATIO: 3.27 CM²
AV VALVE AREA: 3.46 CM²
AV VELOCITY RATIO: 0.85
BASOPHILS # BLD AUTO: 0.03 K/UL (ref 0–0.2)
BASOPHILS NFR BLD: 0.6 % (ref 0–1.9)
BILIRUB SERPL-MCNC: 0.9 MG/DL (ref 0.1–1)
BSA FOR ECHO PROCEDURE: 2.44 M2
BUN SERPL-MCNC: 17 MG/DL (ref 8–23)
CALCIUM SERPL-MCNC: 9.5 MG/DL (ref 8.7–10.5)
CHLORIDE SERPL-SCNC: 111 MMOL/L (ref 95–110)
CO2 SERPL-SCNC: 19 MMOL/L (ref 23–29)
CREAT SERPL-MCNC: 1.3 MG/DL (ref 0.5–1.4)
CV ECHO LV RWT: 0.4 CM
DIFFERENTIAL METHOD BLD: ABNORMAL
DOP CALC AO PEAK VEL: 1.29 M/S
DOP CALC AO VTI: 24.67 CM
DOP CALC LVOT AREA: 3.8 CM2
DOP CALC LVOT DIAMETER: 2.21 CM
DOP CALC LVOT PEAK VEL: 1.1 M/S
DOP CALC LVOT STROKE VOLUME: 85.42 CM3
DOP CALCLVOT PEAK VEL VTI: 22.28 CM
E WAVE DECELERATION TIME: 181.87 MSEC
E/A RATIO: 3.28
E/E' RATIO: 10.93 M/S
ECHO LV POSTERIOR WALL: 1 CM (ref 0.6–1.1)
EJECTION FRACTION: 30 %
EOSINOPHIL # BLD AUTO: 0.3 K/UL (ref 0–0.5)
EOSINOPHIL NFR BLD: 5.4 % (ref 0–8)
ERYTHROCYTE [DISTWIDTH] IN BLOOD BY AUTOMATED COUNT: 14.6 % (ref 11.5–14.5)
EST. GFR  (NO RACE VARIABLE): 56.6 ML/MIN/1.73 M^2
FRACTIONAL SHORTENING: 19 % (ref 28–44)
GLUCOSE SERPL-MCNC: 73 MG/DL (ref 70–110)
HCT VFR BLD AUTO: 39.8 % (ref 40–54)
HGB BLD-MCNC: 12.9 G/DL (ref 14–18)
IMM GRANULOCYTES # BLD AUTO: 0.01 K/UL (ref 0–0.04)
IMM GRANULOCYTES NFR BLD AUTO: 0.2 % (ref 0–0.5)
INTERVENTRICULAR SEPTUM: 0.92 CM (ref 0.6–1.1)
LA MAJOR: 6.52 CM
LA MINOR: 6.67 CM
LA WIDTH: 4.56 CM
LEFT ATRIUM SIZE: 4.02 CM
LEFT ATRIUM VOLUME INDEX MOD: 48.2 ML/M2
LEFT ATRIUM VOLUME INDEX: 42.8 ML/M2
LEFT ATRIUM VOLUME MOD: 115.75 CM3
LEFT ATRIUM VOLUME: 102.75 CM3
LEFT INTERNAL DIMENSION IN SYSTOLE: 4.04 CM (ref 2.1–4)
LEFT VENTRICLE DIASTOLIC VOLUME INDEX: 49.18 ML/M2
LEFT VENTRICLE DIASTOLIC VOLUME: 118.04 ML
LEFT VENTRICLE MASS INDEX: 72 G/M2
LEFT VENTRICLE SYSTOLIC VOLUME INDEX: 29.8 ML/M2
LEFT VENTRICLE SYSTOLIC VOLUME: 71.53 ML
LEFT VENTRICULAR INTERNAL DIMENSION IN DIASTOLE: 5 CM (ref 3.5–6)
LEFT VENTRICULAR MASS: 172.3 G
LV LATERAL E/E' RATIO: 13.67 M/S
LV SEPTAL E/E' RATIO: 9.11 M/S
LYMPHOCYTES # BLD AUTO: 1.8 K/UL (ref 1–4.8)
LYMPHOCYTES NFR BLD: 37.8 % (ref 18–48)
MCH RBC QN AUTO: 32.6 PG (ref 27–31)
MCHC RBC AUTO-ENTMCNC: 32.4 G/DL (ref 32–36)
MCV RBC AUTO: 101 FL (ref 82–98)
MONOCYTES # BLD AUTO: 0.5 K/UL (ref 0.3–1)
MONOCYTES NFR BLD: 10.5 % (ref 4–15)
MV PEAK A VEL: 0.25 M/S
MV PEAK E VEL: 0.82 M/S
MV STENOSIS PRESSURE HALF TIME: 52.74 MS
MV VALVE AREA P 1/2 METHOD: 4.17 CM2
NEUTROPHILS # BLD AUTO: 2.1 K/UL (ref 1.8–7.7)
NEUTROPHILS NFR BLD: 45.5 % (ref 38–73)
NRBC BLD-RTO: 0 /100 WBC
OHS QRS DURATION: 198 MS
OHS QTC CALCULATION: 574 MS
PISA TR MAX VEL: 2.3 M/S
PLATELET # BLD AUTO: 216 K/UL (ref 150–450)
PMV BLD AUTO: 11.3 FL (ref 9.2–12.9)
POTASSIUM SERPL-SCNC: 4.2 MMOL/L (ref 3.5–5.1)
PROT SERPL-MCNC: 6.3 G/DL (ref 6–8.4)
RA MAJOR: 6.08 CM
RA PRESSURE ESTIMATED: 3 MMHG
RA WIDTH: 3.64 CM
RBC # BLD AUTO: 3.96 M/UL (ref 4.6–6.2)
RIGHT VENTRICULAR END-DIASTOLIC DIMENSION: 3.65 CM
RV TB RVSP: 5 MMHG
SINUS: 3.83 CM
SODIUM SERPL-SCNC: 143 MMOL/L (ref 136–145)
STJ: 3.55 CM
TDI LATERAL: 0.06 M/S
TDI SEPTAL: 0.09 M/S
TDI: 0.08 M/S
TR MAX PG: 21 MMHG
TRICUSPID ANNULAR PLANE SYSTOLIC EXCURSION: 1.67 CM
TROPONIN I SERPL DL<=0.01 NG/ML-MCNC: 0.03 NG/ML (ref 0–0.03)
TROPONIN I SERPL DL<=0.01 NG/ML-MCNC: 0.03 NG/ML (ref 0–0.03)
TSH SERPL DL<=0.005 MIU/L-ACNC: 0.76 UIU/ML (ref 0.4–4)
TV REST PULMONARY ARTERY PRESSURE: 24 MMHG
WBC # BLD AUTO: 4.65 K/UL (ref 3.9–12.7)
Z-SCORE OF LEFT VENTRICULAR DIMENSION IN END DIASTOLE: -7.83
Z-SCORE OF LEFT VENTRICULAR DIMENSION IN END SYSTOLE: -3.82

## 2024-03-05 PROCEDURE — 80053 COMPREHEN METABOLIC PANEL: CPT | Performed by: HOSPITALIST

## 2024-03-05 PROCEDURE — 99223 1ST HOSP IP/OBS HIGH 75: CPT | Mod: ,,, | Performed by: INTERNAL MEDICINE

## 2024-03-05 PROCEDURE — 84484 ASSAY OF TROPONIN QUANT: CPT | Mod: 91 | Performed by: HOSPITALIST

## 2024-03-05 PROCEDURE — 25500020 PHARM REV CODE 255: Performed by: HOSPITALIST

## 2024-03-05 PROCEDURE — 20600001 HC STEP DOWN PRIVATE ROOM

## 2024-03-05 PROCEDURE — 36415 COLL VENOUS BLD VENIPUNCTURE: CPT | Performed by: HOSPITALIST

## 2024-03-05 PROCEDURE — 84484 ASSAY OF TROPONIN QUANT: CPT | Performed by: HOSPITALIST

## 2024-03-05 PROCEDURE — 25000003 PHARM REV CODE 250: Performed by: HOSPITALIST

## 2024-03-05 PROCEDURE — 36415 COLL VENOUS BLD VENIPUNCTURE: CPT | Mod: XB | Performed by: HOSPITALIST

## 2024-03-05 PROCEDURE — 99233 SBSQ HOSP IP/OBS HIGH 50: CPT | Mod: ,,, | Performed by: INTERNAL MEDICINE

## 2024-03-05 PROCEDURE — 84443 ASSAY THYROID STIM HORMONE: CPT | Performed by: HOSPITALIST

## 2024-03-05 PROCEDURE — 85025 COMPLETE CBC W/AUTO DIFF WBC: CPT | Performed by: HOSPITALIST

## 2024-03-05 RX ORDER — CARVEDILOL 12.5 MG/1
12.5 TABLET ORAL 2 TIMES DAILY WITH MEALS
Status: DISCONTINUED | OUTPATIENT
Start: 2024-03-05 | End: 2024-03-07 | Stop reason: HOSPADM

## 2024-03-05 RX ORDER — ATORVASTATIN CALCIUM 20 MG/1
20 TABLET, FILM COATED ORAL DAILY
Status: DISCONTINUED | OUTPATIENT
Start: 2024-03-05 | End: 2024-03-05

## 2024-03-05 RX ORDER — LEVOTHYROXINE SODIUM 88 UG/1
88 TABLET ORAL
Status: DISCONTINUED | OUTPATIENT
Start: 2024-03-05 | End: 2024-03-07 | Stop reason: HOSPADM

## 2024-03-05 RX ORDER — AMLODIPINE BESYLATE 5 MG/1
5 TABLET ORAL DAILY
Status: DISCONTINUED | OUTPATIENT
Start: 2024-03-05 | End: 2024-03-07 | Stop reason: HOSPADM

## 2024-03-05 RX ORDER — ASPIRIN 81 MG/1
81 TABLET ORAL DAILY
Status: DISCONTINUED | OUTPATIENT
Start: 2024-03-05 | End: 2024-03-07 | Stop reason: HOSPADM

## 2024-03-05 RX ORDER — FUROSEMIDE 20 MG/1
20 TABLET ORAL
Status: DISCONTINUED | OUTPATIENT
Start: 2024-03-06 | End: 2024-03-06

## 2024-03-05 RX ORDER — ATORVASTATIN CALCIUM 20 MG/1
80 TABLET, FILM COATED ORAL DAILY
Status: DISCONTINUED | OUTPATIENT
Start: 2024-03-06 | End: 2024-03-07 | Stop reason: HOSPADM

## 2024-03-05 RX ORDER — PANTOPRAZOLE SODIUM 40 MG/1
40 TABLET, DELAYED RELEASE ORAL DAILY
Status: DISCONTINUED | OUTPATIENT
Start: 2024-03-05 | End: 2024-03-07 | Stop reason: HOSPADM

## 2024-03-05 RX ORDER — DOFETILIDE 0.25 MG/1
500 CAPSULE ORAL EVERY 12 HOURS
Status: DISCONTINUED | OUTPATIENT
Start: 2024-03-05 | End: 2024-03-07 | Stop reason: HOSPADM

## 2024-03-05 RX ADMIN — APIXABAN 5 MG: 5 TABLET, FILM COATED ORAL at 08:03

## 2024-03-05 RX ADMIN — CARVEDILOL 12.5 MG: 12.5 TABLET, FILM COATED ORAL at 08:03

## 2024-03-05 RX ADMIN — ASPIRIN 81 MG: 81 TABLET, COATED ORAL at 08:03

## 2024-03-05 RX ADMIN — DOFETILIDE 500 MCG: 0.25 CAPSULE ORAL at 08:03

## 2024-03-05 RX ADMIN — LEVOTHYROXINE SODIUM 88 MCG: 88 TABLET ORAL at 05:03

## 2024-03-05 RX ADMIN — HUMAN ALBUMIN MICROSPHERES AND PERFLUTREN 0.11 MG: 10; .22 INJECTION, SOLUTION INTRAVENOUS at 10:03

## 2024-03-05 RX ADMIN — SACUBITRIL AND VALSARTAN 1 TABLET: 97; 103 TABLET, FILM COATED ORAL at 08:03

## 2024-03-05 RX ADMIN — AMLODIPINE BESYLATE 5 MG: 5 TABLET ORAL at 08:03

## 2024-03-05 RX ADMIN — PANTOPRAZOLE SODIUM 40 MG: 40 TABLET, DELAYED RELEASE ORAL at 08:03

## 2024-03-05 RX ADMIN — ATORVASTATIN CALCIUM 20 MG: 20 TABLET, FILM COATED ORAL at 08:03

## 2024-03-05 RX ADMIN — CARVEDILOL 12.5 MG: 12.5 TABLET, FILM COATED ORAL at 05:03

## 2024-03-05 NOTE — ASSESSMENT & PLAN NOTE
-Afib s/p ablation and pacemaker. Afib recurrence noted a few weeks ago and then again today. Symptoms resolved after pt. returned to ventricular paced rhtym  -Continue home coreg and tikosyn, monitor on telemetry.   -Device interrogation

## 2024-03-05 NOTE — ASSESSMENT & PLAN NOTE
Hx of PCI w/ stent placement in 2002, on ASA and statin  States we woke up yesterday morning with pain in his upper shoulders and across his chest which he at first attributed to soreness from doing lots of yard work with his grandson the day prior. Took his BP and noted it to be slightly higher than usual with an elevated heart rate. The pain persisted for which he took NTG. He states that NTG did not immediately resolve the pain, and the pain only abated roughly 2 hours after taking the nitro. The pain was non-localized, did not change with positioning, and was not tender to palpation    Trop elevation noted at outside hospital  At [facility], pt no longer having any chest pain  Troponin of 0.025 -> 0.029 -> 0.030    Recs:  - Although troponin appears to have flattened, continue to trend trop until we see a definitive decrease in levels  - Will obtain PET stress tomorrow

## 2024-03-05 NOTE — ASSESSMENT & PLAN NOTE
Echo    Result Date: 3/5/2024    Left Ventricle: The left ventricle is mildly dilated. Ventricular mass   is normal. Normal wall thickness. Regional wall motion abnormalities   present. See diagram for wall motion findings. There is moderate-severely   reduced systolic function. Ejection fraction by visual approximation is   30% ( upper 20s to low 30s and less than the prior). Grade III diastolic   dysfunction.    Right Ventricle: Normal right ventricular cavity size. Wall thickness   is normal. Right ventricle wall motion  is normal. Systolic function is   normal. Pacemaker lead present in the ventricle.    Left Atrium: Left atrium is moderately dilated.    Right Atrium: Right atrium is moderately dilated. Lead present in the   right atrium.    Pulmonary Artery: The estimated pulmonary artery systolic pressure is   24 mmHg.    IVC/SVC: Normal venous pressure at 3 mmHg.       -EF has decreased from prior.  -No signs of acute volume overload, monitor closely while admitted

## 2024-03-05 NOTE — ASSESSMENT & PLAN NOTE
78M pmhx HFrEF, sleep apnea (CPAP), afib (tikosyn), VT w/ AICD placement, CAD (prior stents), htn, hld, CKD admitted for chest pain and newly reduced EF. EP consulted for atrial fibrillation.   Interrogation of device shows episode of afib in February and ongoing atrial fibrillation from 3/4/24 at 4am. Follows with Dr. Isbell, compliant with tikosyn 500mcg bid. Prior pulmonary vein cryoablation 2019, RFA 2020.  -Continue home tikosyn, coreg, anticoagulation.  -Proceed first with PET stress and any ischemic interventions as indicated.  -Will continue to follow along if DCCV required.

## 2024-03-05 NOTE — PROGRESS NOTES
[facility] - Cardiology [facility] Medicine  Progress Note    Patient Name: Amish Grossman  MRN: [id number]  Patient Class: IP- Inpatient   Admission Date: 3/4/2024  Length of Stay: 1 days  Attending Physician: Aixa Jimenez MD  Primary Care Provider: Marco Antonio Devi MD        Subjective:     Principal Problem:Chest pain        HPI:  78-year-old male with a history of sleep apnea on CPAP, atrial fibrillation (on tikosyn), VT with AICD placement, coronary artery disease (prior stents), hypertension, syncope, thyroid disease, HFrEF, hyperlipidemia, and chronic kidney disease presented to [facility] ED on March 4 with chest pain going into both arms that began about 45 minutes prior to arrival. Pt. Reports he woke up drench in sweat with chest pressure and shoulder pain that lasted for about 2hrs.  He took sublingual nitroglycerin at home that resolved the chest pain.  By report chest x-ray had no acute abnormalities.  EKG showed atrial fibrillation but had no obvious ischemic changes.  Initial heart rate was around 107 but subsequently has been in the 80-90 range.  He had 1 additional episode of chest pain in the emergency department that resolved spontaneously after approximately 2 minutes. He has had no further episodes. He is hemodynamically stable with normal oxygenation and no ongoing chest pain. Pt. transferred to Hospital Medicine at [facility] for further Cardiology evaluation. In the emergency department he received 325 mg aspirin.    Of note, patient reports he was recently contacted by cards EP regarding his pacemaker showing Afib recurrence about a couple of weeks ago. He had no symptoms of that time. He scheduled an appointment for EP f/u, but he has not seen them yet.     White blood cells 4.7, hemoglobin 12.3, hematocrit 38.7, platelets 210, , sodium 142, potassium 3.9, chloride 108, CO2 22, BUN 20, creatinine 1.4, total bilirubin 1, AST 24, ALT 21, glucose 90, magnesium 2.1, high  sensitivity troponin 76 (reference range 0-60), NT-proBNP 599 (reference range 5-450)     August 2, 2023: Transesophageal echocardiogram had EF 35-40%.  Mild RV systolic enlargement with normal systolic function.     April 4, 2023: Echocardiogram with EF 40%, grade 1 diastolic dysfunction.     VS:  Temperature 97.1°, pulse 92, blood pressure 111/70, O2 sats 99%       Overview/Hospital Course:  Admitted for chest pain. Trop 0.025 > 0.029. Cardiology consulted and plan for stress test 3/6.     Interval History: chest pain resolved. No complaints. Stress test tmrw.    Review of Systems   Respiratory:  Negative for cough, chest tightness and shortness of breath.    Cardiovascular:  Negative for chest pain, palpitations and leg swelling.   All other systems reviewed and are negative.    Objective:     Vital Signs (Most Recent):  Temp: 97.9 °F (36.6 °C) (03/05/24 0750)  Pulse: 80 (03/05/24 1103)  Resp: 18 (03/05/24 0750)  BP: 128/74 (03/05/24 0750)  SpO2: 95 % (03/05/24 0750) Vital Signs (24h Range):  Temp:  [97.1 °F (36.2 °C)-97.9 °F (36.6 °C)] 97.9 °F (36.6 °C)  Pulse:  [79-85] 80  Resp:  [13-18] 18  SpO2:  [93 %-98 %] 95 %  BP: (108-128)/(67-78) 128/74     Weight: 114.3 kg (252 lb)  Body mass index is 32.35 kg/m².    Intake/Output Summary (Last 24 hours) at 3/5/2024 1135  Last data filed at 3/5/2024 0800  Gross per 24 hour   Intake 240 ml   Output 920 ml   Net -680 ml         Physical Exam  Vitals reviewed.   Constitutional:       General: He is not in acute distress.     Appearance: He is obese. He is not diaphoretic.   HENT:      Head: Normocephalic and atraumatic.      Nose: Nose normal.      Mouth/Throat:      Pharynx: No oropharyngeal exudate.   Eyes:      General: No scleral icterus.        Right eye: No discharge.         Left eye: No discharge.      Conjunctiva/sclera: Conjunctivae normal.      Pupils: Pupils are equal, round, and reactive to light.   Neck:      Thyroid: No thyromegaly.      Vascular: No JVD.       Trachea: No tracheal deviation.   Cardiovascular:      Rate and Rhythm: Normal rate and regular rhythm.      Heart sounds: Normal heart sounds. No murmur heard.     No friction rub.   Pulmonary:      Effort: Pulmonary effort is normal. No respiratory distress.      Breath sounds: Normal breath sounds. No stridor. No wheezing or rales.   Chest:      Chest wall: No tenderness.   Abdominal:      General: Bowel sounds are normal. There is no distension.      Palpations: Abdomen is soft. There is no mass.      Tenderness: There is no abdominal tenderness. There is no guarding or rebound.   Musculoskeletal:         General: No tenderness or deformity. Normal range of motion.      Cervical back: Neck supple.   Lymphadenopathy:      Cervical: No cervical adenopathy.   Skin:     General: Skin is warm and dry.      Coloration: Skin is not pale.      Findings: No erythema or rash.   Neurological:      Mental Status: He is alert and oriented to person, place, and time.      Cranial Nerves: No cranial nerve deficit.      Motor: No abnormal muscle tone.      Coordination: Coordination normal.   Psychiatric:         Behavior: Behavior normal.         Thought Content: Thought content normal.         Judgment: Judgment normal.             Significant Labs: All pertinent labs within the past 24 hours have been reviewed.    Significant Imaging: I have reviewed all pertinent imaging results/findings within the past 24 hours.    Assessment/Plan:      * Chest pain  CAD  -chest pain + diaphoresis, now resolved  -troponin 0.025 > 0.029, trend until peaks  -echo shows worse EF 30%, regional WMA's  -cardiology consulted, plan for stress test 3/6  -cont HI statin, ASA      Persistent atrial fibrillation  -Afib s/p ablation and pacemaker. Afib recurrence noted a few weeks ago and then again today. Symptoms resolved after pt. returned to ventricular paced rhtym  -Continue home coreg and tikosyn, monitor on telemetry.   -Device  interrogation    Ischemic cardiomyopathy  Echo    Result Date: 3/5/2024    Left Ventricle: The left ventricle is mildly dilated. Ventricular mass   is normal. Normal wall thickness. Regional wall motion abnormalities   present. See diagram for wall motion findings. There is moderate-severely   reduced systolic function. Ejection fraction by visual approximation is   30% ( upper 20s to low 30s and less than the prior). Grade III diastolic   dysfunction.    Right Ventricle: Normal right ventricular cavity size. Wall thickness   is normal. Right ventricle wall motion  is normal. Systolic function is   normal. Pacemaker lead present in the ventricle.    Left Atrium: Left atrium is moderately dilated.    Right Atrium: Right atrium is moderately dilated. Lead present in the   right atrium.    Pulmonary Artery: The estimated pulmonary artery systolic pressure is   24 mmHg.    IVC/SVC: Normal venous pressure at 3 mmHg.       -EF has decreased from prior.  -No signs of acute volume overload, monitor closely while admitted      Hypothyroid  -Continue home synthroid. TSH ordered given symptoms/suspect arrthymia      CKD (chronic kidney disease) stage 1, GFR 90 ml/min or greater  -Cr at baseline, no acute issues. Continue to monitor while admitted    Coronary artery disease due to lipid rich plaque  -Pt. With chest pain during Afib as above, trop mildly elevated, now WNL. Suspect related to symptomatic afib rather than worsening CAD. TTE ordered and cards consulted for further rec's  -Continue home ASA, eliquis, statin       VTE Risk Mitigation (From admission, onward)           Ordered     apixaban tablet 5 mg  2 times daily         03/05/24 0039     IP VTE HIGH RISK PATIENT  Once         03/04/24 2206     Place sequential compression device  Until discontinued         03/04/24 2206                    Discharge Planning   SHAUN: 3/6/2024     Code Status: Full Code   Is the patient medically ready for discharge?: No    Reason for  patient still in hospital (select all that apply): Patient trending condition                     Aixa Jimenez MD  Department of Hospital Medicine   [facility] - Cardiology Stepdown

## 2024-03-05 NOTE — ASSESSMENT & PLAN NOTE
-Pt. With chest pain during Afib as above, trop mildly elevated, now WNL. Suspect related to symptomatic afib rather than worsening CAD. TTE ordered and cards consulted for further rec's  -Continue home ASA, eliquis, statin

## 2024-03-05 NOTE — PLAN OF CARE
Pt educated on fall risk, pt remained free from falls/trauma/injury. Denies chest pain, SOB, palpitations or dizziness. Plan of care reviewed with pt. Bed locked in lowest position, call bell within reach, no acute distress noted, will continue to monitor.      Problem: Adult Inpatient Plan of Care  Goal: Plan of Care Review  Outcome: Ongoing, Progressing  Goal: Patient-Specific Goal (Individualized)  Outcome: Ongoing, Progressing  Goal: Absence of Hospital-Acquired Illness or Injury  Outcome: Ongoing, Progressing  Goal: Optimal Comfort and Wellbeing  Outcome: Ongoing, Progressing  Goal: Readiness for Transition of Care  Outcome: Ongoing, Progressing     Problem: Dysrhythmia  Goal: Normalized Cardiac Rhythm  3/5/2024 0606 by Chichi Tony RN  Outcome: Ongoing, Progressing  3/5/2024 0606 by Chichi Tony RN  Outcome: Ongoing, Progressing     Problem: Chest Pain  Goal: Resolution of Chest Pain Symptoms  3/5/2024 0606 by Chichi Tony RN  Outcome: Ongoing, Progressing  3/5/2024 0606 by Chichi Tony RN  Outcome: Ongoing, Progressing     Problem: Fall Injury Risk  Goal: Absence of Fall and Fall-Related Injury  Outcome: Ongoing, Progressing

## 2024-03-05 NOTE — HPI
78-year-old male with a history of sleep apnea on CPAP, atrial fibrillation (on tikosyn), VT with AICD placement, coronary artery disease (prior stents), hypertension, syncope, thyroid disease, HFrEF, hyperlipidemia, and chronic kidney disease presented to [facility] ED on March 4 with chest pain going into both arms that began about 45 minutes prior to arrival. Pt. Reports he woke up drench in sweat with chest pressure and shoulder pain that lasted for about 2hrs.  He took sublingual nitroglycerin at home that resolved the chest pain.  By report chest x-ray had no acute abnormalities.  EKG showed atrial fibrillation but had no obvious ischemic changes.  Initial heart rate was around 107 but subsequently has been in the 80-90 range.  He had 1 additional episode of chest pain in the emergency department that resolved spontaneously after approximately 2 minutes. He has had no further episodes. He is hemodynamically stable with normal oxygenation and no ongoing chest pain. Pt. transferred to Hospital Medicine at [facility] for further Cardiology evaluation. In the emergency department he received 325 mg aspirin.    Of note, patient reports he was recently contacted by cards EP regarding his pacemaker showing Afib recurrence about a couple of weeks ago. He had no symptoms of that time. He scheduled an appointment for EP f/u, but he has not seen them yet.     White blood cells 4.7, hemoglobin 12.3, hematocrit 38.7, platelets 210, , sodium 142, potassium 3.9, chloride 108, CO2 22, BUN 20, creatinine 1.4, total bilirubin 1, AST 24, ALT 21, glucose 90, magnesium 2.1, high sensitivity troponin 76 (reference range 0-60), NT-proBNP 599 (reference range 5-450)     August 2, 2023: Transesophageal echocardiogram had EF 35-40%.  Mild RV systolic enlargement with normal systolic function.     April 4, 2023: Echocardiogram with EF 40%, grade 1 diastolic dysfunction.     VS:  Temperature 97.1°, pulse 92, blood pressure  111/70, O2 sats 99%

## 2024-03-05 NOTE — SUBJECTIVE & OBJECTIVE
Past Medical History:   Diagnosis Date    Anticoagulant long-term use     Atrial fibrillation     Benign essential tremor 6/6/2018    Cardiomyopathy     Chest congestion     CHF (congestive heart failure)     Coronary artery disease     Hypertension     Left ventricular thrombus     Obstructive sleep apnea 4/3/2022    Syncope and collapse     Thyroid disease        Past Surgical History:   Procedure Laterality Date    ABLATION OF ARRHYTHMOGENIC FOCUS FOR ATRIAL FIBRILLATION N/A 12/9/2019    Procedure: ABLATION, ARRHYTHMOGENIC FOCUS, FOR ATRIAL FIBRILLATION;  Surgeon: Camron Isbell MD;  Location: [facility] EP LAB;  Service: Cardiology;  Laterality: N/A;  AF, NOE (firm), PVI, CRYO, LORENZA, GEN, DM, 3 PREP * Dual ICD SJM*    ABLATION OF ARRHYTHMOGENIC FOCUS FOR ATRIAL FIBRILLATION N/A 11/23/2020    Procedure: ABLATION, ARRHYTHMOGENIC FOCUS, FOR ATRIAL FIBRILLATION;  Surgeon: Camron Isbell MD;  Location: [facility] EP LAB;  Service: Cardiology;  Laterality: N/A;  AF, Redo PVI, RFA, CARTO, GEN, DM, 3 PREP* Dual ICD SJM in situ* NOE deferred pt compliant*    CARDIAC CATHETERIZATION      CARDIAC DEFIBRILLATOR PLACEMENT      CORONARY ANGIOPLASTY      HERNIA REPAIR      KNEE ARTHROSCOPY      REPLACEMENT OF IMPLANTABLE CARDIOVERTER-DEFIBRILLATOR (ICD) GENERATOR Left 6/7/2018    Procedure: REPLACEMENT-GENERATOR-ICD;  Surgeon: Camron Isbell MD;  Location: [facility] CATH LAB;  Service: Cardiology;  Laterality: Left;  GENIE, DUAL ICD GEN CHG, SJM, MAC, DM, 3 PREP    STENTS      TRANSESOPHAGEAL ECHOCARDIOGRAPHY N/A 10/31/2019    Procedure: ECHOCARDIOGRAM, TRANSESOPHAGEAL;  Surgeon: Eleuterio Diagnostic Provider;  Location: [facility] EP LAB;  Service: Cardiology;  Laterality: N/A;  AF, NOE, DCCV, MAC, DM, 3 PREP    TRANSESOPHAGEAL ECHOCARDIOGRAPHY N/A 12/9/2019    Procedure: ECHOCARDIOGRAM, TRANSESOPHAGEAL;  Surgeon: Sun Merchant MD;  Location: [facility] EP LAB;  Service: Cardiology;  Laterality: N/A;    TRANSESOPHAGEAL ECHOCARDIOGRAPHY N/A 10/29/2020     Procedure: ECHOCARDIOGRAM, TRANSESOPHAGEAL;  Surgeon: Dannie Mittal III, MD;  Location: [facility] EP LAB;  Service: Cardiology;  Laterality: N/A;    TREATMENT OF CARDIAC ARRHYTHMIA N/A 10/31/2019    Procedure: CARDIOVERSION;  Surgeon: Camron Isbell MD;  Location: [facility] EP LAB;  Service: Cardiology;  Laterality: N/A;  AF, NOE, DCCV, MAC, DM, 3 PREP*SJM  Dual ICD in situ*    TREATMENT OF CARDIAC ARRHYTHMIA N/A 10/29/2020    Procedure: CARDIOVERSION;  Surgeon: Camron Isbell MD;  Location: [facility] EP LAB;  Service: Cardiology;  Laterality: N/A;  AF, NOE, DCCV, MAC, DM, 3 PREP*SJM dual ICD in situ*    TREATMENT OF CARDIAC ARRHYTHMIA  11/23/2020    Procedure: Cardioversion or Defibrillation;  Surgeon: Camron Isbell MD;  Location: [facility] EP LAB;  Service: Cardiology;;    VASCULAR SURGERY      stents placed       Review of patient's allergies indicates:  No Known Allergies    Current Facility-Administered Medications on File Prior to Encounter   Medication    0.9%  NaCl infusion    sodium chloride 0.9% flush 5 mL    sodium chloride 0.9% flush 5 mL     Current Outpatient Medications on File Prior to Encounter   Medication Sig    amLODIPine (NORVASC) 5 MG tablet Take 1 tablet (5 mg total) by mouth once daily.    ascorbic acid, vitamin C, (VITAMIN C) 100 MG tablet Take 1,000 mg by mouth once daily.    aspirin (ECOTRIN) 81 MG EC tablet Take 81 mg by mouth once daily.    atorvastatin (LIPITOR) 20 MG tablet Take 1 tablet by mouth once daily    carvediloL (COREG) 12.5 MG tablet Take 1 tablet (12.5 mg total) by mouth 2 (two) times daily with meals.    coenzyme Q10 200 mg capsule Take 200 mg by mouth once daily.    dofetilide (TIKOSYN) 500 MCG Cap TAKE 1 CAPSULE BY MOUTH EVERY 12 HOURS    ELIQUIS 5 mg Tab Take 1 tablet by mouth twice daily    empagliflozin (JARDIANCE) 10 mg tablet Take 1 tablet (10 mg total) by mouth once daily.    ENTRESTO  mg per tablet Take 1 tablet by mouth twice daily    fish oil-omega-3 fatty acids  300-1,000 mg capsule Take 2 g by mouth 2 (two) times daily.    furosemide (LASIX) 40 MG tablet Take 20 mg by mouth daily as needed.    levothyroxine (SYNTHROID) 88 MCG tablet TAKE 1 TABLET BY MOUTH ONCE DAILY BEFORE BREAKFAST    magnesium oxide (MAG-OX) 400 mg (241.3 mg magnesium) tablet Take 400 mg by mouth once daily.    multivitamin capsule Take 1 capsule by mouth nightly.    nitroGLYCERIN (NITROSTAT) 0.4 MG SL tablet Place 1 tablet (0.4 mg total) under the tongue every 5 (five) minutes as needed for Chest pain.    pantoprazole (PROTONIX) 40 MG tablet Take 40 mg by mouth once daily.    SLO-NIACIN 750 mg TbSR Take 1 tablet (750 mg total) by mouth 2 (two) times daily. (Patient taking differently: Take 500 mg by mouth 2 (two) times daily.)    zinc 50 mg Tab Take 40 mg by mouth.     Family History       Problem Relation (Age of Onset)    Heart disease Mother, Father, Brother          Tobacco Use    Smoking status: Never    Smokeless tobacco: Former     Quit date: 1980   Substance and Sexual Activity    Alcohol use: Yes     Comment: occasionally    Drug use: No    Sexual activity: Not Currently     Review of Systems   Constitutional: Negative for chills, fever and malaise/fatigue.   HENT: Negative.     Eyes: Negative.    Cardiovascular:  Positive for irregular heartbeat. Negative for chest pain, dyspnea on exertion and near-syncope.   Respiratory:  Negative for cough and shortness of breath.    Musculoskeletal:         Some muscle soreness     Gastrointestinal: Negative.    Genitourinary: Negative.    Neurological: Negative.    Psychiatric/Behavioral:  Negative for altered mental status.      Objective:     Vital Signs (Most Recent):  Temp: 97.9 °F (36.6 °C) (03/05/24 1200)  Pulse: 80 (03/05/24 1200)  Resp: 18 (03/05/24 1200)  BP: 116/69 (03/05/24 1200)  SpO2: 95 % (03/05/24 1200) Vital Signs (24h Range):  Temp:  [97.1 °F (36.2 °C)-97.9 °F (36.6 °C)] 97.9 °F (36.6 °C)  Pulse:  [79-85] 80  Resp:  [13-18] 18  SpO2:  [93  %-98 %] 95 %  BP: (108-128)/(67-78) 116/69     Weight: 114.3 kg (252 lb)  Body mass index is 32.35 kg/m².    SpO2: 95 %         Intake/Output Summary (Last 24 hours) at 3/5/2024 1308  Last data filed at 3/5/2024 1208  Gross per 24 hour   Intake 240 ml   Output 1320 ml   Net -1080 ml       Lines/Drains/Airways       Peripheral Intravenous Line  Duration                  Peripheral IV - Single Lumen 03/04/24 2200 Posterior;Right Forearm <1 day                     Physical Exam  Constitutional:       General: He is not in acute distress.     Appearance: Normal appearance. He is not ill-appearing.   HENT:      Head: Normocephalic and atraumatic.      Mouth/Throat:      Pharynx: Oropharynx is clear.   Eyes:      Extraocular Movements: Extraocular movements intact.      Conjunctiva/sclera: Conjunctivae normal.   Cardiovascular:      Rate and Rhythm: Normal rate. Rhythm irregular.      Pulses: Normal pulses.      Heart sounds: Normal heart sounds.   Pulmonary:      Effort: Pulmonary effort is normal.      Breath sounds: Normal breath sounds.   Abdominal:      General: There is distension.      Tenderness: There is no abdominal tenderness.   Musculoskeletal:         General: Normal range of motion.      Cervical back: Normal range of motion.      Right lower leg: No edema.      Left lower leg: No edema.   Skin:     General: Skin is warm and dry.   Neurological:      General: No focal deficit present.      Mental Status: He is alert and oriented to person, place, and time.   Psychiatric:         Mood and Affect: Mood normal.         Behavior: Behavior normal.         Thought Content: Thought content normal.         Judgment: Judgment normal.          Significant Labs: All pertinent lab results from the last 24 hours have been reviewed.    Significant Imaging:  All pertinent imaging reviewed

## 2024-03-05 NOTE — SUBJECTIVE & OBJECTIVE
Interval History: chest pain resolved. No complaints. Stress test tmrw.    Review of Systems   Respiratory:  Negative for cough, chest tightness and shortness of breath.    Cardiovascular:  Negative for chest pain, palpitations and leg swelling.   All other systems reviewed and are negative.    Objective:     Vital Signs (Most Recent):  Temp: 97.9 °F (36.6 °C) (03/05/24 0750)  Pulse: 80 (03/05/24 1103)  Resp: 18 (03/05/24 0750)  BP: 128/74 (03/05/24 0750)  SpO2: 95 % (03/05/24 0750) Vital Signs (24h Range):  Temp:  [97.1 °F (36.2 °C)-97.9 °F (36.6 °C)] 97.9 °F (36.6 °C)  Pulse:  [79-85] 80  Resp:  [13-18] 18  SpO2:  [93 %-98 %] 95 %  BP: (108-128)/(67-78) 128/74     Weight: 114.3 kg (252 lb)  Body mass index is 32.35 kg/m².    Intake/Output Summary (Last 24 hours) at 3/5/2024 1135  Last data filed at 3/5/2024 0800  Gross per 24 hour   Intake 240 ml   Output 920 ml   Net -680 ml         Physical Exam  Vitals reviewed.   Constitutional:       General: He is not in acute distress.     Appearance: He is obese. He is not diaphoretic.   HENT:      Head: Normocephalic and atraumatic.      Nose: Nose normal.      Mouth/Throat:      Pharynx: No oropharyngeal exudate.   Eyes:      General: No scleral icterus.        Right eye: No discharge.         Left eye: No discharge.      Conjunctiva/sclera: Conjunctivae normal.      Pupils: Pupils are equal, round, and reactive to light.   Neck:      Thyroid: No thyromegaly.      Vascular: No JVD.      Trachea: No tracheal deviation.   Cardiovascular:      Rate and Rhythm: Normal rate and regular rhythm.      Heart sounds: Normal heart sounds. No murmur heard.     No friction rub.   Pulmonary:      Effort: Pulmonary effort is normal. No respiratory distress.      Breath sounds: Normal breath sounds. No stridor. No wheezing or rales.   Chest:      Chest wall: No tenderness.   Abdominal:      General: Bowel sounds are normal. There is no distension.      Palpations: Abdomen is soft. There  is no mass.      Tenderness: There is no abdominal tenderness. There is no guarding or rebound.   Musculoskeletal:         General: No tenderness or deformity. Normal range of motion.      Cervical back: Neck supple.   Lymphadenopathy:      Cervical: No cervical adenopathy.   Skin:     General: Skin is warm and dry.      Coloration: Skin is not pale.      Findings: No erythema or rash.   Neurological:      Mental Status: He is alert and oriented to person, place, and time.      Cranial Nerves: No cranial nerve deficit.      Motor: No abnormal muscle tone.      Coordination: Coordination normal.   Psychiatric:         Behavior: Behavior normal.         Thought Content: Thought content normal.         Judgment: Judgment normal.             Significant Labs: All pertinent labs within the past 24 hours have been reviewed.    Significant Imaging: I have reviewed all pertinent imaging results/findings within the past 24 hours.

## 2024-03-05 NOTE — ASSESSMENT & PLAN NOTE
-Pt. With Afib s/p blation and pacemaker. Afib recurrence noted a few weeks ago and then again today. Symptoms resolved after pt. Returned to ventricular paced rhtym  -Concerned pt. Having symptomatic Afib recurrecne. Continue home coreg and tikosyn, monitor on telemetry. Cards consult for interrogation as above

## 2024-03-05 NOTE — ASSESSMENT & PLAN NOTE
Hx of Afib s/p ablation and AICD. States he has not been in afib in 2 years, though noted a recurrence a few weeks ago and then again yesterday morning. Symptoms resolved after pt. returned to ventricular paced rhtym    Recs:  -Continue home coreg and tikosyn  -Orders placed for device interrogation

## 2024-03-05 NOTE — CONSULTS
ADDENDUM:  Known to me. CAD, PAF, s/p PVI and SVT ablations.  CP during episode of AF (ongoing). LVEF lower.   Ischemic workup. Once complete, if still in AF, will NOE/DCCV. Continue tikosyn.  Likely outpatient redo PVI.  -Camron Isbell MD    [facility] - Cardiology Stepdown  Cardiac Electrophysiology  Consult Note    Admission Date: 3/4/2024  Code Status: Full Code   Attending Provider: Aixa Jimenez MD  Consulting Provider: Jaden Farnsworth MD  Principal Problem:Chest pain    Inpatient consult to Electrophysiology  Consult performed by: Jaden Farnsworth MD  Consult ordered by: Jeremias Cleary MD  Reason for consult: atrial fibrillaton        Subjective:     Chief Complaint:  chest pain     HPI:   78M pmhx HFrEF, sleep apnea (CPAP), afib (tikosyn), VT w/ AICD placement, CAD (prior stents), htn, hld, CKD admitted for chest pain and newly reduced EF. EP consulted for atrial fibrillation. Patient reports she woke up with chest pressure and shoulder pain. Found to have low troponin leak to 0.029, but newly reduced EF from 40% -> 30%. Interrogation of device shows ongoing atrial fibrillation from 3/4/24 at 4am. As outpatient, patient follows with Dr. Isbell. Is compliant with Tikosyn 500mcg BID. Previously pulmonary vein cryoablation 2019, RFA 2020. Currently patient is hemodynamically stable with normal oxygenation and no ongoing chest pain. Pending ischemic evaluation with PET Stress tomorrow AM. No other complaints.    Past Medical History:   Diagnosis Date    Anticoagulant long-term use     Atrial fibrillation     Benign essential tremor 6/6/2018    Cardiomyopathy     Chest congestion     CHF (congestive heart failure)     Coronary artery disease     Hypertension     Left ventricular thrombus     Obstructive sleep apnea 4/3/2022    Syncope and collapse     Thyroid disease        Past Surgical History:   Procedure Laterality Date    ABLATION OF ARRHYTHMOGENIC FOCUS FOR ATRIAL FIBRILLATION N/A 12/9/2019    Procedure:  ABLATION, ARRHYTHMOGENIC FOCUS, FOR ATRIAL FIBRILLATION;  Surgeon: Camron Isbell MD;  Location: [facility] EP LAB;  Service: Cardiology;  Laterality: N/A;  AF, NOE (firm), PVI, CRYO, LORENZA, GEN, DM, 3 PREP * Dual ICD SJM*    ABLATION OF ARRHYTHMOGENIC FOCUS FOR ATRIAL FIBRILLATION N/A 11/23/2020    Procedure: ABLATION, ARRHYTHMOGENIC FOCUS, FOR ATRIAL FIBRILLATION;  Surgeon: Camron Isbell MD;  Location: [facility] EP LAB;  Service: Cardiology;  Laterality: N/A;  AF, Redo PVI, RFA, CARTO, GEN, DM, 3 PREP* Dual ICD SJM in situ* NOE deferred pt compliant*    CARDIAC CATHETERIZATION      CARDIAC DEFIBRILLATOR PLACEMENT      CORONARY ANGIOPLASTY      HERNIA REPAIR      KNEE ARTHROSCOPY      REPLACEMENT OF IMPLANTABLE CARDIOVERTER-DEFIBRILLATOR (ICD) GENERATOR Left 6/7/2018    Procedure: REPLACEMENT-GENERATOR-ICD;  Surgeon: Camron Isbell MD;  Location: [facility] CATH LAB;  Service: Cardiology;  Laterality: Left;  GENIE, DUAL ICD GEN CHG, SJM, MAC, DM, 3 PREP    STENTS      TRANSESOPHAGEAL ECHOCARDIOGRAPHY N/A 10/31/2019    Procedure: ECHOCARDIOGRAM, TRANSESOPHAGEAL;  Surgeon: [employer] Diagnostic Provider;  Location: [facility] EP LAB;  Service: Cardiology;  Laterality: N/A;  AF, NOE, DCCV, MAC, DM, 3 PREP    TRANSESOPHAGEAL ECHOCARDIOGRAPHY N/A 12/9/2019    Procedure: ECHOCARDIOGRAM, TRANSESOPHAGEAL;  Surgeon: Sun Merchant MD;  Location: [facility] EP LAB;  Service: Cardiology;  Laterality: N/A;    TRANSESOPHAGEAL ECHOCARDIOGRAPHY N/A 10/29/2020    Procedure: ECHOCARDIOGRAM, TRANSESOPHAGEAL;  Surgeon: Dnanie Mittal III, MD;  Location: [facility] EP LAB;  Service: Cardiology;  Laterality: N/A;    TREATMENT OF CARDIAC ARRHYTHMIA N/A 10/31/2019    Procedure: CARDIOVERSION;  Surgeon: Camron Isbell MD;  Location: [facility] EP LAB;  Service: Cardiology;  Laterality: N/A;  AF, NOE, DCCV, MAC, DM, 3 PREP*SJM  Dual ICD in situ*    TREATMENT OF CARDIAC ARRHYTHMIA N/A 10/29/2020    Procedure: CARDIOVERSION;  Surgeon: Camron Isbell MD;  Location: [facility]  LAB;  Service: Cardiology;  Laterality: N/A;  AF, NOE, DCCV, MAC, DM, 3 PREP*SJM dual ICD in situ*    TREATMENT OF CARDIAC ARRHYTHMIA  11/23/2020    Procedure: Cardioversion or Defibrillation;  Surgeon: Camron Isbell MD;  Location: [facility] LAB;  Service: Cardiology;;    VASCULAR SURGERY      stents placed       Review of patient's allergies indicates:  No Known Allergies    Current Facility-Administered Medications on File Prior to Encounter   Medication    0.9%  NaCl infusion    sodium chloride 0.9% flush 5 mL    sodium chloride 0.9% flush 5 mL     Current Outpatient Medications on File Prior to Encounter   Medication Sig    amLODIPine (NORVASC) 5 MG tablet Take 1 tablet (5 mg total) by mouth once daily.    ascorbic acid, vitamin C, (VITAMIN C) 100 MG tablet Take 1,000 mg by mouth once daily.    aspirin (ECOTRIN) 81 MG EC tablet Take 81 mg by mouth once daily.    atorvastatin (LIPITOR) 20 MG tablet Take 1 tablet by mouth once daily    carvediloL (COREG) 12.5 MG tablet Take 1 tablet (12.5 mg total) by mouth 2 (two) times daily with meals.    coenzyme Q10 200 mg capsule Take 200 mg by mouth once daily.    dofetilide (TIKOSYN) 500 MCG Cap TAKE 1 CAPSULE BY MOUTH EVERY 12 HOURS    ELIQUIS 5 mg Tab Take 1 tablet by mouth twice daily    empagliflozin (JARDIANCE) 10 mg tablet Take 1 tablet (10 mg total) by mouth once daily.    ENTRESTO  mg per tablet Take 1 tablet by mouth twice daily    fish oil-omega-3 fatty acids 300-1,000 mg capsule Take 2 g by mouth 2 (two) times daily.    furosemide (LASIX) 40 MG tablet Take 20 mg by mouth daily as needed.    levothyroxine (SYNTHROID) 88 MCG tablet TAKE 1 TABLET BY MOUTH ONCE DAILY BEFORE BREAKFAST    magnesium oxide (MAG-OX) 400 mg (241.3 mg magnesium) tablet Take 400 mg by mouth once daily.    multivitamin capsule Take 1 capsule by mouth nightly.    nitroGLYCERIN (NITROSTAT) 0.4 MG SL tablet Place 1 tablet (0.4 mg total) under the tongue every 5 (five) minutes as needed  for Chest pain.    pantoprazole (PROTONIX) 40 MG tablet Take 40 mg by mouth once daily.    SLO-NIACIN 750 mg TbSR Take 1 tablet (750 mg total) by mouth 2 (two) times daily. (Patient taking differently: Take 500 mg by mouth 2 (two) times daily.)    zinc 50 mg Tab Take 40 mg by mouth.     Family History       Problem Relation (Age of Onset)    Heart disease Mother, Father, Brother          Tobacco Use    Smoking status: Never    Smokeless tobacco: Former     Quit date: 1980   Substance and Sexual Activity    Alcohol use: Yes     Comment: occasionally    Drug use: No    Sexual activity: Not Currently     Review of Systems   Constitutional: Negative for chills, fever, malaise/fatigue and night sweats.   HENT:  Negative for congestion, nosebleeds, sore throat, stridor and tinnitus.    Eyes:  Negative for blurred vision, discharge, double vision, pain, vision loss in left eye, vision loss in right eye, visual disturbance and visual halos.   Cardiovascular:  Negative for chest pain, dyspnea on exertion, leg swelling, near-syncope, orthopnea, palpitations and syncope.   Respiratory:  Negative for cough, hemoptysis, shortness of breath, snoring, sputum production and wheezing.    Endocrine: Negative for cold intolerance, heat intolerance and polydipsia.   Hematologic/Lymphatic: Negative for adenopathy and bleeding problem. Does not bruise/bleed easily.   Skin:  Negative for color change, dry skin, flushing, poor wound healing and suspicious lesions.   Musculoskeletal:  Negative for arthritis, back pain, gout, joint pain and joint swelling.   Gastrointestinal:  Negative for bloating, abdominal pain, constipation and diarrhea.   Genitourinary:  Negative for dysuria, frequency and hematuria.   Neurological:  Negative for dizziness, focal weakness, headaches, light-headedness, loss of balance, numbness and weakness.   Psychiatric/Behavioral:  Negative for altered mental status, hallucinations and hypervigilance. The patient is  not nervous/anxious.      Objective:     Vital Signs (Most Recent):  Temp: 97.9 °F (36.6 °C) (03/05/24 1538)  Pulse: 80 (03/05/24 1538)  Resp: 20 (03/05/24 1538)  BP: 117/76 (03/05/24 1538)  SpO2: 97 % (03/05/24 1538) Vital Signs (24h Range):  Temp:  [97.1 °F (36.2 °C)-97.9 °F (36.6 °C)] 97.9 °F (36.6 °C)  Pulse:  [79-91] 80  Resp:  [13-20] 20  SpO2:  [93 %-98 %] 97 %  BP: (108-128)/(67-78) 117/76       Weight: 114.3 kg (252 lb)  Body mass index is 32.35 kg/m².    SpO2: 97 %        Physical Exam  Constitutional:       General: He is not in acute distress.     Appearance: Normal appearance. He is normal weight. He is not toxic-appearing.   HENT:      Head: Normocephalic and atraumatic.   Eyes:      General:         Right eye: No discharge.         Left eye: No discharge.      Extraocular Movements: Extraocular movements intact.      Conjunctiva/sclera: Conjunctivae normal.      Pupils: Pupils are equal, round, and reactive to light.   Cardiovascular:      Rate and Rhythm: Normal rate and regular rhythm.      Pulses: Normal pulses.      Heart sounds: Normal heart sounds. No murmur heard.     No friction rub.   Pulmonary:      Effort: Pulmonary effort is normal. No respiratory distress.      Breath sounds: Normal breath sounds. No wheezing or rales.   Abdominal:      General: Abdomen is flat. Bowel sounds are normal. There is no distension.      Palpations: Abdomen is soft.      Tenderness: There is no abdominal tenderness. There is no guarding.   Musculoskeletal:         General: No swelling, tenderness or deformity. Normal range of motion.      Cervical back: Normal range of motion and neck supple. No rigidity.      Right lower leg: No edema.      Left lower leg: No edema.   Skin:     General: Skin is warm and dry.      Capillary Refill: Capillary refill takes less than 2 seconds.      Coloration: Skin is not jaundiced or pale.      Findings: No bruising.   Neurological:      General: No focal deficit present.       Mental Status: He is alert and oriented to person, place, and time. Mental status is at baseline.   Psychiatric:         Mood and Affect: Mood normal.         Thought Content: Thought content normal.         Judgment: Judgment normal.            Significant Labs: None    Significant Imaging:  None            Assessment and Plan:     Persistent atrial fibrillation  78M pmhx HFrEF, sleep apnea (CPAP), afib (tikosyn), VT w/ AICD placement, CAD (prior stents), htn, hld, CKD admitted for chest pain and newly reduced EF. EP consulted for atrial fibrillation.   Interrogation of device shows ongoing atrial fibrillation from 3/4/24 at 4am. Follows with Dr. Isbell, compliant with tikosyn 500mcg bid. Prior pulmonary vein cryoablation 2019, RFA 2020.  -Continue home tikosyn, coreg, anticoagulation.  -Proceed first with PET stress and any ischemic interventions as indicated.  -Will continue to follow along if DCCV required.        Thank you for your consult. I will follow-up with patient. Please contact us if you have any additional questions.    Jaden Farnsworth MD  Cardiac Electrophysiology  [facility] - Cardiology Stepdown

## 2024-03-05 NOTE — H&P
[facility] - Cardiology [facility] Medicine  History & Physical    Patient Name: Amish Grossman  MRN: [id number]  Patient Class: IP- Inpatient  Admission Date: 3/4/2024  Attending Physician: Aixa Jimenez MD   Primary Care Provider: Marco Antonio Devi MD         Patient information was obtained from patient, past medical records, and ER records.     Subjective:     Principal Problem:Chest pain    Chief Complaint: No chief complaint on file.       HPI: 78-year-old male with a history of sleep apnea on CPAP, atrial fibrillation (on tikosyn), VT with AICD placement, coronary artery disease (prior stents), hypertension, syncope, thyroid disease, HFrEF, hyperlipidemia, and chronic kidney disease presented to [facility] ED on March 4 with chest pain going into both arms that began about 45 minutes prior to arrival. Pt. Reports he woke up drench in sweat with chest pressure and shoulder pain that lasted for about 2hrs.  He took sublingual nitroglycerin at home that resolved the chest pain.  By report chest x-ray had no acute abnormalities.  EKG showed atrial fibrillation but had no obvious ischemic changes.  Initial heart rate was around 107 but subsequently has been in the 80-90 range.  He had 1 additional episode of chest pain in the emergency department that resolved spontaneously after approximately 2 minutes. He has had no further episodes. He is hemodynamically stable with normal oxygenation and no ongoing chest pain. Pt. transferred to Hospital Medicine at [facility] for further Cardiology evaluation. In the emergency department he received 325 mg aspirin.    Of note, patient reports he was recently contacted by cards EP regarding his pacemaker showing Afib recurrence about a couple of weeks ago. He had no symptoms of that time. He scheduled an appointment for EP f/u, but he has not seen them yet.     White blood cells 4.7, hemoglobin 12.3, hematocrit 38.7, platelets 210, , sodium 142, potassium 3.9,  chloride 108, CO2 22, BUN 20, creatinine 1.4, total bilirubin 1, AST 24, ALT 21, glucose 90, magnesium 2.1, high sensitivity troponin 76 (reference range 0-60), NT-proBNP 599 (reference range 5-450)     August 2, 2023: Transesophageal echocardiogram had EF 35-40%.  Mild RV systolic enlargement with normal systolic function.     April 4, 2023: Echocardiogram with EF 40%, grade 1 diastolic dysfunction.     VS:  Temperature 97.1°, pulse 92, blood pressure 111/70, O2 sats 99%       Past Medical History:   Diagnosis Date    Anticoagulant long-term use     Atrial fibrillation     Benign essential tremor 6/6/2018    Cardiomyopathy     Chest congestion     CHF (congestive heart failure)     Coronary artery disease     Hypertension     Left ventricular thrombus     Obstructive sleep apnea 4/3/2022    Syncope and collapse     Thyroid disease        Past Surgical History:   Procedure Laterality Date    ABLATION OF ARRHYTHMOGENIC FOCUS FOR ATRIAL FIBRILLATION N/A 12/9/2019    Procedure: ABLATION, ARRHYTHMOGENIC FOCUS, FOR ATRIAL FIBRILLATION;  Surgeon: Camron Isbell MD;  Location: [facility] EP LAB;  Service: Cardiology;  Laterality: N/A;  AF, NOE (firm), PVI, CRYO, LORENZA, GEN, DM, 3 PREP * Dual ICD SJM*    ABLATION OF ARRHYTHMOGENIC FOCUS FOR ATRIAL FIBRILLATION N/A 11/23/2020    Procedure: ABLATION, ARRHYTHMOGENIC FOCUS, FOR ATRIAL FIBRILLATION;  Surgeon: Camron Isbell MD;  Location: [facility] EP LAB;  Service: Cardiology;  Laterality: N/A;  AF, Redo PVI, RFA, CARTO, GEN, DM, 3 PREP* Dual ICD SJM in situ* NOE deferred pt compliant*    CARDIAC CATHETERIZATION      CARDIAC DEFIBRILLATOR PLACEMENT      CORONARY ANGIOPLASTY      HERNIA REPAIR      KNEE ARTHROSCOPY      REPLACEMENT OF IMPLANTABLE CARDIOVERTER-DEFIBRILLATOR (ICD) GENERATOR Left 6/7/2018    Procedure: REPLACEMENT-GENERATOR-ICD;  Surgeon: Camron Isbell MD;  Location: [facility] CATH LAB;  Service: Cardiology;  Laterality: Left;  GENIE, DUAL ICD GEN CHG, SJM, MAC, DM, 3 PREP     STENTS      TRANSESOPHAGEAL ECHOCARDIOGRAPHY N/A 10/31/2019    Procedure: ECHOCARDIOGRAM, TRANSESOPHAGEAL;  Surgeon: [employer] Diagnostic Provider;  Location: [facility] EP LAB;  Service: Cardiology;  Laterality: N/A;  AF, NOE, DCCV, MAC, DM, 3 PREP    TRANSESOPHAGEAL ECHOCARDIOGRAPHY N/A 12/9/2019    Procedure: ECHOCARDIOGRAM, TRANSESOPHAGEAL;  Surgeon: Sun Merchant MD;  Location: [facility] EP LAB;  Service: Cardiology;  Laterality: N/A;    TRANSESOPHAGEAL ECHOCARDIOGRAPHY N/A 10/29/2020    Procedure: ECHOCARDIOGRAM, TRANSESOPHAGEAL;  Surgeon: Dannie Mittal III, MD;  Location: [facility] EP LAB;  Service: Cardiology;  Laterality: N/A;    TREATMENT OF CARDIAC ARRHYTHMIA N/A 10/31/2019    Procedure: CARDIOVERSION;  Surgeon: Camron Isbell MD;  Location: [facility] EP LAB;  Service: Cardiology;  Laterality: N/A;  AF, NOE, DCCV, MAC, DM, 3 PREP*SJM  Dual ICD in situ*    TREATMENT OF CARDIAC ARRHYTHMIA N/A 10/29/2020    Procedure: CARDIOVERSION;  Surgeon: Camron Isbell MD;  Location: [facility] EP LAB;  Service: Cardiology;  Laterality: N/A;  AF, NOE, DCCV, MAC, DM, 3 PREP*SJM dual ICD in situ*    TREATMENT OF CARDIAC ARRHYTHMIA  11/23/2020    Procedure: Cardioversion or Defibrillation;  Surgeon: Camron Isbell MD;  Location: [facility] EP LAB;  Service: Cardiology;;    VASCULAR SURGERY      stents placed       Review of patient's allergies indicates:  No Known Allergies    Current Facility-Administered Medications on File Prior to Encounter   Medication    0.9%  NaCl infusion    sodium chloride 0.9% flush 5 mL    sodium chloride 0.9% flush 5 mL     Current Outpatient Medications on File Prior to Encounter   Medication Sig    amLODIPine (NORVASC) 5 MG tablet Take 1 tablet (5 mg total) by mouth once daily.    ascorbic acid, vitamin C, (VITAMIN C) 100 MG tablet Take 1,000 mg by mouth once daily.    aspirin (ECOTRIN) 81 MG EC tablet Take 81 mg by mouth once daily.    atorvastatin (LIPITOR) 20 MG tablet Take 1 tablet by mouth once daily     carvediloL (COREG) 12.5 MG tablet Take 1 tablet (12.5 mg total) by mouth 2 (two) times daily with meals.    coenzyme Q10 200 mg capsule Take 200 mg by mouth once daily.    dofetilide (TIKOSYN) 500 MCG Cap TAKE 1 CAPSULE BY MOUTH EVERY 12 HOURS    ELIQUIS 5 mg Tab Take 1 tablet by mouth twice daily    empagliflozin (JARDIANCE) 10 mg tablet Take 1 tablet (10 mg total) by mouth once daily.    ENTRESTO  mg per tablet Take 1 tablet by mouth twice daily    fish oil-omega-3 fatty acids 300-1,000 mg capsule Take 2 g by mouth 2 (two) times daily.    furosemide (LASIX) 40 MG tablet Take 20 mg by mouth daily as needed.    levothyroxine (SYNTHROID) 88 MCG tablet TAKE 1 TABLET BY MOUTH ONCE DAILY BEFORE BREAKFAST    magnesium oxide (MAG-OX) 400 mg (241.3 mg magnesium) tablet Take 400 mg by mouth once daily.    multivitamin capsule Take 1 capsule by mouth nightly.    nitroGLYCERIN (NITROSTAT) 0.4 MG SL tablet Place 1 tablet (0.4 mg total) under the tongue every 5 (five) minutes as needed for Chest pain.    pantoprazole (PROTONIX) 40 MG tablet Take 40 mg by mouth once daily.    SLO-NIACIN 750 mg TbSR Take 1 tablet (750 mg total) by mouth 2 (two) times daily. (Patient taking differently: Take 500 mg by mouth 2 (two) times daily.)    zinc 50 mg Tab Take 40 mg by mouth.     Family History       Problem Relation (Age of Onset)    Heart disease Mother, Father, Brother          Tobacco Use    Smoking status: Never    Smokeless tobacco: Former     Quit date: 1980   Substance and Sexual Activity    Alcohol use: Yes     Comment: occasionally    Drug use: No    Sexual activity: Not Currently     Review of Systems   Constitutional:  Positive for diaphoresis. Negative for activity change, chills, fever and unexpected weight change.   HENT:  Negative for congestion and sore throat.    Respiratory:  Negative for cough, shortness of breath and wheezing.    Cardiovascular:  Positive for chest pain. Negative for palpitations and leg  swelling.   Gastrointestinal:  Negative for abdominal pain, blood in stool, nausea and vomiting.   Genitourinary:  Negative for dysuria and hematuria.   Musculoskeletal:  Negative for arthralgias and neck pain.   Skin:  Negative for color change and rash.   Neurological:  Negative for dizziness, seizures and numbness.   Psychiatric/Behavioral:  Negative for hallucinations and suicidal ideas.      Objective:     Vital Signs (Most Recent):  Temp: 97.8 °F (36.6 °C) (03/04/24 2334)  Pulse: 80 (03/04/24 2334)  Resp: 18 (03/04/24 2334)  BP: 108/76 (03/04/24 2334)  SpO2: (!) 93 % (03/04/24 2334) Vital Signs (24h Range):  Temp:  [97.1 °F (36.2 °C)-97.8 °F (36.6 °C)] 97.8 °F (36.6 °C)  Pulse:  [79-92] 80  Resp:  [13-18] 18  SpO2:  [93 %-99 %] 93 %  BP: (108-117)/(70-78) 108/76     Weight: 114.5 kg (252 lb 6.8 oz)  Body mass index is 32.41 kg/m².     Physical Exam  Vitals reviewed.   Constitutional:       General: He is not in acute distress.     Appearance: He is well-developed.   HENT:      Head: Normocephalic and atraumatic.   Eyes:      Extraocular Movements: Extraocular movements intact.      Pupils: Pupils are equal, round, and reactive to light.   Neck:      Vascular: No JVD.      Trachea: No tracheal deviation.   Cardiovascular:      Rate and Rhythm: Normal rate and regular rhythm.      Heart sounds: No murmur heard.     No friction rub. No gallop.   Pulmonary:      Effort: No respiratory distress.      Breath sounds: Normal breath sounds. No wheezing or rales.   Abdominal:      General: Bowel sounds are normal. There is no distension.      Palpations: Abdomen is soft. There is no mass.      Tenderness: There is no abdominal tenderness.   Musculoskeletal:         General: No deformity.      Cervical back: Neck supple.      Comments: Trace edema B/L   Lymphadenopathy:      Cervical: No cervical adenopathy.   Skin:     General: Skin is warm and dry.      Findings: No rash.   Neurological:      Mental Status: He is alert  and oriented to person, place, and time.              CRANIAL NERVES     CN III, IV, VI   Pupils are equal, round, and reactive to light.       Significant Labs: All pertinent labs within the past 24 hours have been reviewed.    Significant Imaging: I have reviewed all pertinent imaging results/findings within the past 24 hours.  Assessment/Plan:     * Chest pain  -Pt. With episode of diaphoresis, chest pain, noted to have Afib recurrence. Trop initally mildly elevated, now WNL. Symptoms resolved and pt. Not in ventricualr paced rhytm Suspect pt. Having symptomatic Afib  -Plan to monitor on telemetry, order TTE. Consult cards in am for pacemaker interrogation and further rec's on if provacative testing needed. Continue antiarrythmic medications      Persistent atrial fibrillation  -Pt. With Afib s/p blation and pacemaker. Afib recurrence noted a few weeks ago and then again today. Symptoms resolved after pt. Returned to ventricular paced rhtym  -Concerned pt. Having symptomatic Afib recurrecne. Continue home coreg and tikosyn, monitor on telemetry. Cards consult for interrogation as above    Ischemic cardiomyopathy  -Most recent TTE 4/2023 showed EF 40%. Repeat ordered given symptoms  -No signs of acute volume overload, monitor closely while admitted      Hypothyroid  -Continue home synthroid. TSH ordered given symptoms/suspect arrthymia      CKD (chronic kidney disease) stage 1, GFR 90 ml/min or greater  -Cr at baseline, no acute issues. Continue to monitor while admitted    Coronary artery disease due to lipid rich plaque  -Pt. With chest pain during Afib as above, trop mildly elevated, now WNL. Suspect related to symptomatic afib rather than worsening CAD. TTE ordered and cards consulted for further rec's  -Continue home ASA, eliquis, statin       VTE Risk Mitigation (From admission, onward)           Ordered     apixaban tablet 5 mg  2 times daily         03/05/24 0039     IP VTE HIGH RISK PATIENT  Once          03/04/24 2206     Place sequential compression device  Until discontinued         03/04/24 2206                                    Aniceto López MD  Department of Hospital Medicine  [facility] - Cardiology Stepdown

## 2024-03-05 NOTE — CONSULTS
[facility] - Cardiology Stepdown  Cardiology  Consult Note    Patient Name: Amish Grossman  MRN: [id number]  Admission Date: 3/4/2024  Hospital Length of Stay: 1 days  Code Status: Full Code   Attending Provider: Aixa Jimenez MD   Consulting Provider: Jeremias Cleary MD  Primary Care Physician: Marco Antonio Devi MD  Principal Problem:Chest pain    Patient information was obtained from patient and ER records.     Inpatient consult to Cardiology  Consult performed by: Jeremias Cleary MD  Consult ordered by: Aniceto López MD  Reason for consult: afib, pacemaker interrogation        Subjective:       HPI:   78-year-old male with a history of sleep apnea on CPAP, atrial fibrillation (on tikosyn), VT with AICD placement, coronary artery disease (prior stents), hypertension, syncope, thyroid disease, HFrEF, hyperlipidemia, and chronic kidney disease presented to [facility] ED on March 4 with chest pain going into both arms that began about 45 minutes prior to arrival. Pt. Reports he woke up drench in sweat with chest pressure and shoulder pain that lasted for about 2hrs.  He took sublingual nitroglycerin at home that resolved the chest pain.  By report chest x-ray had no acute abnormalities.  EKG showed atrial fibrillation but had no obvious ischemic changes.  Initial heart rate was around 107 but subsequently has been in the 80-90 range.  He had 1 additional episode of chest pain in the emergency department that resolved spontaneously after approximately 2 minutes. He has had no further episodes. He is hemodynamically stable with normal oxygenation and no ongoing chest pain. Pt. transferred to Hospital Medicine at [facility] for further Cardiology evaluation. In the emergency department he received 325 mg aspirin.     Of note, patient reports he was recently contacted by cards EP regarding his pacemaker showing Afib recurrence about a couple of weeks ago. He had no symptoms of that time. He scheduled an  appointment for EP f/u, but he has not seen them yet.    Cardiology consulted for afib w/ need for device interrogation    Past Medical History:   Diagnosis Date    Anticoagulant long-term use     Atrial fibrillation     Benign essential tremor 6/6/2018    Cardiomyopathy     Chest congestion     CHF (congestive heart failure)     Coronary artery disease     Hypertension     Left ventricular thrombus     Obstructive sleep apnea 4/3/2022    Syncope and collapse     Thyroid disease        Past Surgical History:   Procedure Laterality Date    ABLATION OF ARRHYTHMOGENIC FOCUS FOR ATRIAL FIBRILLATION N/A 12/9/2019    Procedure: ABLATION, ARRHYTHMOGENIC FOCUS, FOR ATRIAL FIBRILLATION;  Surgeon: Camron Isbell MD;  Location: [facility] EP LAB;  Service: Cardiology;  Laterality: N/A;  AF, NOE (firm), PVI, CRYO, LORENZA, GEN, DM, 3 PREP * Dual ICD SJM*    ABLATION OF ARRHYTHMOGENIC FOCUS FOR ATRIAL FIBRILLATION N/A 11/23/2020    Procedure: ABLATION, ARRHYTHMOGENIC FOCUS, FOR ATRIAL FIBRILLATION;  Surgeon: Camron Isbell MD;  Location: [facility] EP LAB;  Service: Cardiology;  Laterality: N/A;  AF, Redo PVI, RFA, CARTO, GEN, DM, 3 PREP* Dual ICD SJM in situ* NOE deferred pt compliant*    CARDIAC CATHETERIZATION      CARDIAC DEFIBRILLATOR PLACEMENT      CORONARY ANGIOPLASTY      HERNIA REPAIR      KNEE ARTHROSCOPY      REPLACEMENT OF IMPLANTABLE CARDIOVERTER-DEFIBRILLATOR (ICD) GENERATOR Left 6/7/2018    Procedure: REPLACEMENT-GENERATOR-ICD;  Surgeon: Camron Isbell MD;  Location: [facility] CATH LAB;  Service: Cardiology;  Laterality: Left;  GENIE, DUAL ICD GEN CHG, SJM, MAC, DM, 3 PREP    STENTS      TRANSESOPHAGEAL ECHOCARDIOGRAPHY N/A 10/31/2019    Procedure: ECHOCARDIOGRAM, TRANSESOPHAGEAL;  Surgeon: Eleuterio Diagnostic Provider;  Location: [facility] EP LAB;  Service: Cardiology;  Laterality: N/A;  AF, NOE, DCCV, MAC, DM, 3 PREP    TRANSESOPHAGEAL ECHOCARDIOGRAPHY N/A 12/9/2019    Procedure: ECHOCARDIOGRAM, TRANSESOPHAGEAL;  Surgeon: Sun BELCHER  MD Shonda;  Location: [facility] EP LAB;  Service: Cardiology;  Laterality: N/A;    TRANSESOPHAGEAL ECHOCARDIOGRAPHY N/A 10/29/2020    Procedure: ECHOCARDIOGRAM, TRANSESOPHAGEAL;  Surgeon: Dannie Mittal III, MD;  Location: [facility] EP LAB;  Service: Cardiology;  Laterality: N/A;    TREATMENT OF CARDIAC ARRHYTHMIA N/A 10/31/2019    Procedure: CARDIOVERSION;  Surgeon: Camron Isbell MD;  Location: [facility] EP LAB;  Service: Cardiology;  Laterality: N/A;  AF, NOE, DCCV, MAC, DM, 3 PREP*SJM  Dual ICD in situ*    TREATMENT OF CARDIAC ARRHYTHMIA N/A 10/29/2020    Procedure: CARDIOVERSION;  Surgeon: Camron Isbell MD;  Location: [facility] EP LAB;  Service: Cardiology;  Laterality: N/A;  AF, NOE, DCCV, MAC, DM, 3 PREP*SJM dual ICD in situ*    TREATMENT OF CARDIAC ARRHYTHMIA  11/23/2020    Procedure: Cardioversion or Defibrillation;  Surgeon: Camron Isbell MD;  Location: [facility] EP LAB;  Service: Cardiology;;    VASCULAR SURGERY      stents placed       Review of patient's allergies indicates:  No Known Allergies    Current Facility-Administered Medications on File Prior to Encounter   Medication    0.9%  NaCl infusion    sodium chloride 0.9% flush 5 mL    sodium chloride 0.9% flush 5 mL     Current Outpatient Medications on File Prior to Encounter   Medication Sig    amLODIPine (NORVASC) 5 MG tablet Take 1 tablet (5 mg total) by mouth once daily.    ascorbic acid, vitamin C, (VITAMIN C) 100 MG tablet Take 1,000 mg by mouth once daily.    aspirin (ECOTRIN) 81 MG EC tablet Take 81 mg by mouth once daily.    atorvastatin (LIPITOR) 20 MG tablet Take 1 tablet by mouth once daily    carvediloL (COREG) 12.5 MG tablet Take 1 tablet (12.5 mg total) by mouth 2 (two) times daily with meals.    coenzyme Q10 200 mg capsule Take 200 mg by mouth once daily.    dofetilide (TIKOSYN) 500 MCG Cap TAKE 1 CAPSULE BY MOUTH EVERY 12 HOURS    ELIQUIS 5 mg Tab Take 1 tablet by mouth twice daily    empagliflozin (JARDIANCE) 10 mg tablet Take 1 tablet (10 mg  total) by mouth once daily.    ENTRESTO  mg per tablet Take 1 tablet by mouth twice daily    fish oil-omega-3 fatty acids 300-1,000 mg capsule Take 2 g by mouth 2 (two) times daily.    furosemide (LASIX) 40 MG tablet Take 20 mg by mouth daily as needed.    levothyroxine (SYNTHROID) 88 MCG tablet TAKE 1 TABLET BY MOUTH ONCE DAILY BEFORE BREAKFAST    magnesium oxide (MAG-OX) 400 mg (241.3 mg magnesium) tablet Take 400 mg by mouth once daily.    multivitamin capsule Take 1 capsule by mouth nightly.    nitroGLYCERIN (NITROSTAT) 0.4 MG SL tablet Place 1 tablet (0.4 mg total) under the tongue every 5 (five) minutes as needed for Chest pain.    pantoprazole (PROTONIX) 40 MG tablet Take 40 mg by mouth once daily.    SLO-NIACIN 750 mg TbSR Take 1 tablet (750 mg total) by mouth 2 (two) times daily. (Patient taking differently: Take 500 mg by mouth 2 (two) times daily.)    zinc 50 mg Tab Take 40 mg by mouth.     Family History       Problem Relation (Age of Onset)    Heart disease Mother, Father, Brother          Tobacco Use    Smoking status: Never    Smokeless tobacco: Former     Quit date: 1980   Substance and Sexual Activity    Alcohol use: Yes     Comment: occasionally    Drug use: No    Sexual activity: Not Currently     Review of Systems   Constitutional: Negative for chills, fever and malaise/fatigue.   HENT: Negative.     Eyes: Negative.    Cardiovascular:  Positive for irregular heartbeat. Negative for chest pain, dyspnea on exertion and near-syncope.   Respiratory:  Negative for cough and shortness of breath.    Musculoskeletal:         Some muscle soreness     Gastrointestinal: Negative.    Genitourinary: Negative.    Neurological: Negative.    Psychiatric/Behavioral:  Negative for altered mental status.      Objective:     Vital Signs (Most Recent):  Temp: 97.9 °F (36.6 °C) (03/05/24 1200)  Pulse: 80 (03/05/24 1200)  Resp: 18 (03/05/24 1200)  BP: 116/69 (03/05/24 1200)  SpO2: 95 % (03/05/24 1200) Vital Signs  (24h Range):  Temp:  [97.1 °F (36.2 °C)-97.9 °F (36.6 °C)] 97.9 °F (36.6 °C)  Pulse:  [79-85] 80  Resp:  [13-18] 18  SpO2:  [93 %-98 %] 95 %  BP: (108-128)/(67-78) 116/69     Weight: 114.3 kg (252 lb)  Body mass index is 32.35 kg/m².    SpO2: 95 %         Intake/Output Summary (Last 24 hours) at 3/5/2024 1308  Last data filed at 3/5/2024 1208  Gross per 24 hour   Intake 240 ml   Output 1320 ml   Net -1080 ml       Lines/Drains/Airways       Peripheral Intravenous Line  Duration                  Peripheral IV - Single Lumen 03/04/24 2200 Posterior;Right Forearm <1 day                     Physical Exam  Constitutional:       General: He is not in acute distress.     Appearance: Normal appearance. He is not ill-appearing.   HENT:      Head: Normocephalic and atraumatic.      Mouth/Throat:      Pharynx: Oropharynx is clear.   Eyes:      Extraocular Movements: Extraocular movements intact.      Conjunctiva/sclera: Conjunctivae normal.   Cardiovascular:      Rate and Rhythm: Normal rate. Rhythm irregular.      Pulses: Normal pulses.      Heart sounds: Normal heart sounds.   Pulmonary:      Effort: Pulmonary effort is normal.      Breath sounds: Normal breath sounds.   Abdominal:      General: There is distension.      Tenderness: There is no abdominal tenderness.   Musculoskeletal:         General: Normal range of motion.      Cervical back: Normal range of motion.      Right lower leg: No edema.      Left lower leg: No edema.   Skin:     General: Skin is warm and dry.   Neurological:      General: No focal deficit present.      Mental Status: He is alert and oriented to person, place, and time.   Psychiatric:         Mood and Affect: Mood normal.         Behavior: Behavior normal.         Thought Content: Thought content normal.         Judgment: Judgment normal.          Significant Labs: All pertinent lab results from the last 24 hours have been reviewed.    Significant Imaging:  All pertinent imaging  reviewed  Assessment and Plan:     Heart failure with reduced ejection fraction  NOE in August significant for EF of 35-40%, regional wall motion abnormality in LV, no LA thrombus    Echo    Result Date: 3/5/2024    Left Ventricle: The left ventricle is mildly dilated. Ventricular mass   is normal. Normal wall thickness. Regional wall motion abnormalities   present. See diagram for wall motion findings. There is moderate-severely   reduced systolic function. Ejection fraction by visual approximation is   30% ( upper 20s to low 30s and less than the prior). Grade III diastolic   dysfunction.    Right Ventricle: Normal right ventricular cavity size. Wall thickness   is normal. Right ventricle wall motion  is normal. Systolic function is   normal. Pacemaker lead present in the ventricle.    Left Atrium: Left atrium is moderately dilated.    Right Atrium: Right atrium is moderately dilated. Lead present in the   right atrium.    Pulmonary Artery: The estimated pulmonary artery systolic pressure is   24 mmHg.    IVC/SVC: Normal venous pressure at 3 mmHg.          Recs:  - Continue GDMT    Persistent atrial fibrillation  Hx of Afib s/p ablation and AICD. States he has not been in afib in 2 years, though noted a recurrence a few weeks ago and then again yesterday morning. Symptoms resolved after pt. returned to ventricular paced rhtym    Recs:  -Continue home coreg and tikosyn  -Orders placed for device interrogation    Ischemic cardiomyopathy  Hx of PCI w/ stent placement in 2002, on ASA and statin  States we woke up yesterday morning with pain in his upper shoulders and across his chest which he at first attributed to soreness from doing lots of yard work with his grandson the day prior. Took his BP and noted it to be slightly higher than usual with an elevated heart rate. The pain persisted for which he took NTG. He states that NTG did not immediately resolve the pain, and the pain only abated roughly 2 hours after  taking the nitro. The pain was non-localized, did not change with positioning, and was not tender to palpation    Trop elevation noted at outside hospital  At [facility], pt no longer having any chest pain  Troponin of 0.025 -> 0.029 -> 0.030    Recs:  - Although troponin appears to have flattened, continue to trend trop until we see a definitive decrease in levels  - Will obtain PET stress tomorrow        VTE Risk Mitigation (From admission, onward)           Ordered     apixaban tablet 5 mg  2 times daily         03/05/24 0039     IP VTE HIGH RISK PATIENT  Once         03/04/24 2206     Place sequential compression device  Until discontinued         03/04/24 2206                    Thank you for your consult. I will follow-up with patient. Please contact us if you have any additional questions.    Jeremias Cleary MD  Cardiology   [facility] - Cardiology Stepdown

## 2024-03-05 NOTE — SUBJECTIVE & OBJECTIVE
Past Medical History:   Diagnosis Date    Anticoagulant long-term use     Atrial fibrillation     Benign essential tremor 6/6/2018    Cardiomyopathy     Chest congestion     CHF (congestive heart failure)     Coronary artery disease     Hypertension     Left ventricular thrombus     Obstructive sleep apnea 4/3/2022    Syncope and collapse     Thyroid disease        Past Surgical History:   Procedure Laterality Date    ABLATION OF ARRHYTHMOGENIC FOCUS FOR ATRIAL FIBRILLATION N/A 12/9/2019    Procedure: ABLATION, ARRHYTHMOGENIC FOCUS, FOR ATRIAL FIBRILLATION;  Surgeon: Camron Isbell MD;  Location: [facility] EP LAB;  Service: Cardiology;  Laterality: N/A;  AF, NOE (firm), PVI, CRYO, LORENZA, GEN, DM, 3 PREP * Dual ICD SJM*    ABLATION OF ARRHYTHMOGENIC FOCUS FOR ATRIAL FIBRILLATION N/A 11/23/2020    Procedure: ABLATION, ARRHYTHMOGENIC FOCUS, FOR ATRIAL FIBRILLATION;  Surgeon: Camron Isbell MD;  Location: [facility] EP LAB;  Service: Cardiology;  Laterality: N/A;  AF, Redo PVI, RFA, CARTO, GEN, DM, 3 PREP* Dual ICD SJM in situ* NOE deferred pt compliant*    CARDIAC CATHETERIZATION      CARDIAC DEFIBRILLATOR PLACEMENT      CORONARY ANGIOPLASTY      HERNIA REPAIR      KNEE ARTHROSCOPY      REPLACEMENT OF IMPLANTABLE CARDIOVERTER-DEFIBRILLATOR (ICD) GENERATOR Left 6/7/2018    Procedure: REPLACEMENT-GENERATOR-ICD;  Surgeon: Camron Isbell MD;  Location: [facility] CATH LAB;  Service: Cardiology;  Laterality: Left;  GENIE, DUAL ICD GEN CHG, SJM, MAC, DM, 3 PREP    STENTS      TRANSESOPHAGEAL ECHOCARDIOGRAPHY N/A 10/31/2019    Procedure: ECHOCARDIOGRAM, TRANSESOPHAGEAL;  Surgeon: Eleuterio Diagnostic Provider;  Location: [facility] EP LAB;  Service: Cardiology;  Laterality: N/A;  AF, NOE, DCCV, MAC, DM, 3 PREP    TRANSESOPHAGEAL ECHOCARDIOGRAPHY N/A 12/9/2019    Procedure: ECHOCARDIOGRAM, TRANSESOPHAGEAL;  Surgeon: Sun Merchant MD;  Location: [facility] EP LAB;  Service: Cardiology;  Laterality: N/A;    TRANSESOPHAGEAL ECHOCARDIOGRAPHY N/A 10/29/2020     Procedure: ECHOCARDIOGRAM, TRANSESOPHAGEAL;  Surgeon: Dannie Mittal III, MD;  Location: [facility] EP LAB;  Service: Cardiology;  Laterality: N/A;    TREATMENT OF CARDIAC ARRHYTHMIA N/A 10/31/2019    Procedure: CARDIOVERSION;  Surgeon: Camron Isbell MD;  Location: [facility] EP LAB;  Service: Cardiology;  Laterality: N/A;  AF, NOE, DCCV, MAC, DM, 3 PREP*SJM  Dual ICD in situ*    TREATMENT OF CARDIAC ARRHYTHMIA N/A 10/29/2020    Procedure: CARDIOVERSION;  Surgeon: Camron Isbell MD;  Location: [facility] EP LAB;  Service: Cardiology;  Laterality: N/A;  AF, NOE, DCCV, MAC, DM, 3 PREP*SJM dual ICD in situ*    TREATMENT OF CARDIAC ARRHYTHMIA  11/23/2020    Procedure: Cardioversion or Defibrillation;  Surgeon: Camron Isbell MD;  Location: [facility] EP LAB;  Service: Cardiology;;    VASCULAR SURGERY      stents placed       Review of patient's allergies indicates:  No Known Allergies    Current Facility-Administered Medications on File Prior to Encounter   Medication    0.9%  NaCl infusion    sodium chloride 0.9% flush 5 mL    sodium chloride 0.9% flush 5 mL     Current Outpatient Medications on File Prior to Encounter   Medication Sig    amLODIPine (NORVASC) 5 MG tablet Take 1 tablet (5 mg total) by mouth once daily.    ascorbic acid, vitamin C, (VITAMIN C) 100 MG tablet Take 1,000 mg by mouth once daily.    aspirin (ECOTRIN) 81 MG EC tablet Take 81 mg by mouth once daily.    atorvastatin (LIPITOR) 20 MG tablet Take 1 tablet by mouth once daily    carvediloL (COREG) 12.5 MG tablet Take 1 tablet (12.5 mg total) by mouth 2 (two) times daily with meals.    coenzyme Q10 200 mg capsule Take 200 mg by mouth once daily.    dofetilide (TIKOSYN) 500 MCG Cap TAKE 1 CAPSULE BY MOUTH EVERY 12 HOURS    ELIQUIS 5 mg Tab Take 1 tablet by mouth twice daily    empagliflozin (JARDIANCE) 10 mg tablet Take 1 tablet (10 mg total) by mouth once daily.    ENTRESTO  mg per tablet Take 1 tablet by mouth twice daily    fish oil-omega-3 fatty acids  300-1,000 mg capsule Take 2 g by mouth 2 (two) times daily.    furosemide (LASIX) 40 MG tablet Take 20 mg by mouth daily as needed.    levothyroxine (SYNTHROID) 88 MCG tablet TAKE 1 TABLET BY MOUTH ONCE DAILY BEFORE BREAKFAST    magnesium oxide (MAG-OX) 400 mg (241.3 mg magnesium) tablet Take 400 mg by mouth once daily.    multivitamin capsule Take 1 capsule by mouth nightly.    nitroGLYCERIN (NITROSTAT) 0.4 MG SL tablet Place 1 tablet (0.4 mg total) under the tongue every 5 (five) minutes as needed for Chest pain.    pantoprazole (PROTONIX) 40 MG tablet Take 40 mg by mouth once daily.    SLO-NIACIN 750 mg TbSR Take 1 tablet (750 mg total) by mouth 2 (two) times daily. (Patient taking differently: Take 500 mg by mouth 2 (two) times daily.)    zinc 50 mg Tab Take 40 mg by mouth.     Family History       Problem Relation (Age of Onset)    Heart disease Mother, Father, Brother          Tobacco Use    Smoking status: Never    Smokeless tobacco: Former     Quit date: 1980   Substance and Sexual Activity    Alcohol use: Yes     Comment: occasionally    Drug use: No    Sexual activity: Not Currently     Review of Systems   Constitutional: Negative for chills, fever, malaise/fatigue and night sweats.   HENT:  Negative for congestion, nosebleeds, sore throat, stridor and tinnitus.    Eyes:  Negative for blurred vision, discharge, double vision, pain, vision loss in left eye, vision loss in right eye, visual disturbance and visual halos.   Cardiovascular:  Negative for chest pain, dyspnea on exertion, leg swelling, near-syncope, orthopnea, palpitations and syncope.   Respiratory:  Negative for cough, hemoptysis, shortness of breath, snoring, sputum production and wheezing.    Endocrine: Negative for cold intolerance, heat intolerance and polydipsia.   Hematologic/Lymphatic: Negative for adenopathy and bleeding problem. Does not bruise/bleed easily.   Skin:  Negative for color change, dry skin, flushing, poor wound healing and  suspicious lesions.   Musculoskeletal:  Negative for arthritis, back pain, gout, joint pain and joint swelling.   Gastrointestinal:  Negative for bloating, abdominal pain, constipation and diarrhea.   Genitourinary:  Negative for dysuria, frequency and hematuria.   Neurological:  Negative for dizziness, focal weakness, headaches, light-headedness, loss of balance, numbness and weakness.   Psychiatric/Behavioral:  Negative for altered mental status, hallucinations and hypervigilance. The patient is not nervous/anxious.      Objective:     Vital Signs (Most Recent):  Temp: 97.9 °F (36.6 °C) (03/05/24 1538)  Pulse: 80 (03/05/24 1538)  Resp: 20 (03/05/24 1538)  BP: 117/76 (03/05/24 1538)  SpO2: 97 % (03/05/24 1538) Vital Signs (24h Range):  Temp:  [97.1 °F (36.2 °C)-97.9 °F (36.6 °C)] 97.9 °F (36.6 °C)  Pulse:  [79-91] 80  Resp:  [13-20] 20  SpO2:  [93 %-98 %] 97 %  BP: (108-128)/(67-78) 117/76       Weight: 114.3 kg (252 lb)  Body mass index is 32.35 kg/m².    SpO2: 97 %        Physical Exam  Constitutional:       General: He is not in acute distress.     Appearance: Normal appearance. He is normal weight. He is not toxic-appearing.   HENT:      Head: Normocephalic and atraumatic.   Eyes:      General:         Right eye: No discharge.         Left eye: No discharge.      Extraocular Movements: Extraocular movements intact.      Conjunctiva/sclera: Conjunctivae normal.      Pupils: Pupils are equal, round, and reactive to light.   Cardiovascular:      Rate and Rhythm: Normal rate and regular rhythm.      Pulses: Normal pulses.      Heart sounds: Normal heart sounds. No murmur heard.     No friction rub.   Pulmonary:      Effort: Pulmonary effort is normal. No respiratory distress.      Breath sounds: Normal breath sounds. No wheezing or rales.   Abdominal:      General: Abdomen is flat. Bowel sounds are normal. There is no distension.      Palpations: Abdomen is soft.      Tenderness: There is no abdominal tenderness.  There is no guarding.   Musculoskeletal:         General: No swelling, tenderness or deformity. Normal range of motion.      Cervical back: Normal range of motion and neck supple. No rigidity.      Right lower leg: No edema.      Left lower leg: No edema.   Skin:     General: Skin is warm and dry.      Capillary Refill: Capillary refill takes less than 2 seconds.      Coloration: Skin is not jaundiced or pale.      Findings: No bruising.   Neurological:      General: No focal deficit present.      Mental Status: He is alert and oriented to person, place, and time. Mental status is at baseline.   Psychiatric:         Mood and Affect: Mood normal.         Thought Content: Thought content normal.         Judgment: Judgment normal.            Significant Labs: None    Significant Imaging:  None

## 2024-03-05 NOTE — PLAN OF CARE
[facility] - Cardiology Stepdown  Initial Discharge Assessment       Primary Care Provider: Marco Antonio Devi MD    Admission Diagnosis: NSTEMI (non-ST elevated myocardial infarction) [I21.4]    Admission Date: 3/4/2024  Expected Discharge Date: 3/6/2024    Transition of Care Barriers: None    Payor: MEDICARE / Plan: MEDICARE PART A & B / Product Type: Government /     Extended Emergency Contact Information  Primary Emergency Contact: Hiwot Grossman  Address: [address] [place] [address] [id number] [place] [address]  Home Phone: [phone]  Mobile Phone: [phone]  Relation: Spouse    Discharge Plan A: Home with family  Discharge Plan B: Home      [facility] [address] [place] [address] [address]  [address] [address] [facility] [address]  Phone: [phone] Fax: [phone]      Initial Assessment (most recent)       Adult Discharge Assessment - 03/05/24 1443          Discharge Assessment    Assessment Type Discharge Planning Assessment     Confirmed/corrected address, phone number and insurance Yes     Confirmed Demographics Correct on Facesheet     Source of Information patient     Reason For Admission chest pain     People in Home spouse     Facility Arrived From: home     Do you expect to return to your current living situation? Yes     Do you have help at home or someone to help you manage your care at home? Yes     Who are your caregiver(s) and their phone number(s)? wife Hiwot Grossman [phone]     Prior to hospitilization cognitive status: Alert/Oriented     Current cognitive status: Alert/Oriented     Walking or Climbing Stairs Difficulty no     Dressing/Bathing Difficulty no     Home Layout Able to live on 1st floor     Equipment Currently Used at Home CPAP     Do you currently have service(s) that help you manage your care at home? No     Do you take prescription medications? Yes     Do you have prescription coverage? Yes     Do you have any problems affording any of your  prescribed medications? No     Is the patient taking medications as prescribed? yes     Who is going to help you get home at discharge? wife     How do you get to doctors appointments? family or friend will provide;car, drives self     Are you on dialysis? No     Do you take coumadin? No   Eliquis    Discharge Plan A Home with family     Discharge Plan B Home     DME Needed Upon Discharge  other (see comments)   TBD    Transition of Care Barriers None        Financial Resource Strain    How hard is it for you to pay for the very basics like food, housing, medical care, and heating? Not very hard        Housing Stability    In the last 12 months, was there a time when you were not able to pay the mortgage or rent on time? No     In the last 12 months, was there a time when you did not have a steady place to sleep or slept in a shelter (including now)? No        Transportation Needs    In the past 12 months, has lack of transportation kept you from medical appointments or from getting medications? No     In the past 12 months, has lack of transportation kept you from meetings, work, or from getting things needed for daily living? No        Food Insecurity    Within the past 12 months, you worried that your food would run out before you got the money to buy more. Never true     Within the past 12 months, the food you bought just didn't last and you didn't have money to get more. Never true        Social Connections    In a typical week, how many times do you talk on the phone with family, friends, or neighbors? Once a week     How often do you get together with friends or relatives? --   1x per month    Are you , , , , never , or living with a partner?         Alcohol Use    Q1: How often do you have a drink containing alcohol? --   occassionally                  Pt lives with his wife in a 1 story home with 0 steps to enter.  Pt does use a CPAC as home DME, denies HD/home  O2, does take Eliquis.  Pt's wife can provide transportation home at time of d/c.  Will continue to follow for d/c needs.    Discharge Plan A and Plan B have been determined by review of patient's clinical status, future medical and therapeutic needs, and coverage/benefits for post-acute care in coordination with multidisciplinary team members.     Quinn Karimi RN CM  Case Management  [id number]

## 2024-03-05 NOTE — HPI
78M pmhx HFrEF, sleep apnea (CPAP), afib (tikosyn), VT w/ AICD placement, CAD (prior stents), htn, hld, CKD admitted for chest pain and newly reduced EF. EP consulted for atrial fibrillation. Patient reports she woke up with chest pressure and shoulder pain. Found to have low troponin leak to 0.029, but newly reduced EF from 40% -> 30%. Interrogation of device shows episode of atrial fibrillation in February and ongoing atrial fibrillation from 3/4/24 at 4am. As outpatient, patient follows with Dr. Isbell. Is compliant with Tikosyn 500mcg BID. Previously pulmonary vein cryoablation 2019, RFA 2020. Currently patient is hemodynamically stable with normal oxygenation and no ongoing chest pain. Pending ischemic evaluation with PET Stress tomorrow AM. No other complaints.

## 2024-03-05 NOTE — HOSPITAL COURSE
Admitted for chest pain. Trop 0.025 > 0.029 downtrended. Cardiology consulted. PET 3/6 did not show acute ischemic process, just scarring. EP performed NOE/DCCV 03/06 as symptoms may be related to AFib. Patient had improvement in signs and symptoms. sCr of 1.7 03/07- patient reported that this was at baseline for him. Instructed to f/u with Dr. Isbell and Dr. Coburn. No change in discharge meds- other than stopping amlodipine in setting of reduced EF.     Pt deemed appropriate for discharge. Plan discussed with pt, who was agreeable and amenable; medications were discussed and reviewed, outpatient follow-up scheduled, ER precautions were given, all questions were answered to the pt's satisfaction, and Mr. Grossman was subsequently discharged.    Physical Exam  Gen: in NAD, appears stated age  Neuro: AAOx3, motor, sensory, and strength grossly intact BL  HEENT: NTNC, EOMI, PERRL, MMM  CVS: RRR, no m/r/g; S1/S2 auscultated with no S3 or S4; capillary refill < 2 sec  Resp: lungs CTAB, no w/r/r; no belabored breathing or accessory muscle use appreciated   Abd: BS+ in all 4 quadrants; NTND, soft to palpation; no organomegaly appreciated   Extrem: pulses full, equal, and regular over all 4 extremities; no UE or LE edema BL

## 2024-03-05 NOTE — ASSESSMENT & PLAN NOTE
-Most recent TTE 4/2023 showed EF 40%. Repeat ordered given symptoms  -No signs of acute volume overload, monitor closely while admitted

## 2024-03-05 NOTE — ASSESSMENT & PLAN NOTE
CAD  -chest pain + diaphoresis, now resolved  -troponin 0.025 > 0.029, trend until peaks  -echo shows worse EF 30%, regional WMA's  -cardiology consulted, plan for stress test 3/6  -cont HI statin, ASA

## 2024-03-05 NOTE — ASSESSMENT & PLAN NOTE
NOE in August significant for EF of 35-40%, regional wall motion abnormality in LV, no LA thrombus    Echo    Result Date: 3/5/2024    Left Ventricle: The left ventricle is mildly dilated. Ventricular mass   is normal. Normal wall thickness. Regional wall motion abnormalities   present. See diagram for wall motion findings. There is moderate-severely   reduced systolic function. Ejection fraction by visual approximation is   30% ( upper 20s to low 30s and less than the prior). Grade III diastolic   dysfunction.    Right Ventricle: Normal right ventricular cavity size. Wall thickness   is normal. Right ventricle wall motion  is normal. Systolic function is   normal. Pacemaker lead present in the ventricle.    Left Atrium: Left atrium is moderately dilated.    Right Atrium: Right atrium is moderately dilated. Lead present in the   right atrium.    Pulmonary Artery: The estimated pulmonary artery systolic pressure is   24 mmHg.    IVC/SVC: Normal venous pressure at 3 mmHg.          Recs:  - Continue GDMT

## 2024-03-05 NOTE — HPI
78-year-old male with a history of sleep apnea on CPAP, atrial fibrillation (on tikosyn), VT with AICD placement, coronary artery disease (prior stents), hypertension, syncope, thyroid disease, HFrEF, hyperlipidemia, and chronic kidney disease presented to [facility] ED on March 4 with chest pain going into both arms that began about 45 minutes prior to arrival. Pt. Reports he woke up drench in sweat with chest pressure and shoulder pain that lasted for about 2hrs.  He took sublingual nitroglycerin at home that resolved the chest pain.  By report chest x-ray had no acute abnormalities.  EKG showed atrial fibrillation but had no obvious ischemic changes.  Initial heart rate was around 107 but subsequently has been in the 80-90 range.  He had 1 additional episode of chest pain in the emergency department that resolved spontaneously after approximately 2 minutes. He has had no further episodes. He is hemodynamically stable with normal oxygenation and no ongoing chest pain. Pt. transferred to Hospital Medicine at [facility] for further Cardiology evaluation. In the emergency department he received 325 mg aspirin.     Of note, patient reports he was recently contacted by cards EP regarding his pacemaker showing Afib recurrence about a couple of weeks ago. He had no symptoms of that time. He scheduled an appointment for EP f/u, but he has not seen them yet.    Cardiology consulted for afib w/ need for device interrogation

## 2024-03-05 NOTE — NURSING TRANSFER
Nursing Transfer Note      03/04/2024   9:53 PM      Reason patient is being transferred: admitted to C[facility]    Transfer To:     Transfer via stretcher    Transported by EMS/stretcher    Transfer Vital Signs:  Blood Pressure:117/78  Heart Rate:84  O2:98% room air  Temperature:97.7  Respirations:18    Telemetry: Box Number [id number], Rate 84, and Rhythm Paced  Order for Tele Monitor? Yes      4eyes on Skin: yes    Medicines sent: N/A    Any special needs or follow-up needed: none    Patient belongings transferred with patient: Yes    Chart send with patient: Yes        Patient reassessed at: 03/04/2024 1104     Upon arrival to floor: cardiac monitor applied, patient oriented to room, call bell in reach, and bed in lowest position

## 2024-03-05 NOTE — SUBJECTIVE & OBJECTIVE
Past Medical History:   Diagnosis Date    Anticoagulant long-term use     Atrial fibrillation     Benign essential tremor 6/6/2018    Cardiomyopathy     Chest congestion     CHF (congestive heart failure)     Coronary artery disease     Hypertension     Left ventricular thrombus     Obstructive sleep apnea 4/3/2022    Syncope and collapse     Thyroid disease        Past Surgical History:   Procedure Laterality Date    ABLATION OF ARRHYTHMOGENIC FOCUS FOR ATRIAL FIBRILLATION N/A 12/9/2019    Procedure: ABLATION, ARRHYTHMOGENIC FOCUS, FOR ATRIAL FIBRILLATION;  Surgeon: Camron Isbell MD;  Location: [facility] EP LAB;  Service: Cardiology;  Laterality: N/A;  AF, NOE (firm), PVI, CRYO, LORENZA, GEN, DM, 3 PREP * Dual ICD SJM*    ABLATION OF ARRHYTHMOGENIC FOCUS FOR ATRIAL FIBRILLATION N/A 11/23/2020    Procedure: ABLATION, ARRHYTHMOGENIC FOCUS, FOR ATRIAL FIBRILLATION;  Surgeon: Camron Isbell MD;  Location: [facility] EP LAB;  Service: Cardiology;  Laterality: N/A;  AF, Redo PVI, RFA, CARTO, GEN, DM, 3 PREP* Dual ICD SJM in situ* NOE deferred pt compliant*    CARDIAC CATHETERIZATION      CARDIAC DEFIBRILLATOR PLACEMENT      CORONARY ANGIOPLASTY      HERNIA REPAIR      KNEE ARTHROSCOPY      REPLACEMENT OF IMPLANTABLE CARDIOVERTER-DEFIBRILLATOR (ICD) GENERATOR Left 6/7/2018    Procedure: REPLACEMENT-GENERATOR-ICD;  Surgeon: Camron Isbell MD;  Location: [facility] CATH LAB;  Service: Cardiology;  Laterality: Left;  GENIE, DUAL ICD GEN CHG, SJM, MAC, DM, 3 PREP    STENTS      TRANSESOPHAGEAL ECHOCARDIOGRAPHY N/A 10/31/2019    Procedure: ECHOCARDIOGRAM, TRANSESOPHAGEAL;  Surgeon: Eleuterio Diagnostic Provider;  Location: [facility] EP LAB;  Service: Cardiology;  Laterality: N/A;  AF, NOE, DCCV, MAC, DM, 3 PREP    TRANSESOPHAGEAL ECHOCARDIOGRAPHY N/A 12/9/2019    Procedure: ECHOCARDIOGRAM, TRANSESOPHAGEAL;  Surgeon: Sun Merchant MD;  Location: [facility] EP LAB;  Service: Cardiology;  Laterality: N/A;    TRANSESOPHAGEAL ECHOCARDIOGRAPHY N/A 10/29/2020     Procedure: ECHOCARDIOGRAM, TRANSESOPHAGEAL;  Surgeon: Dannie Mittal III, MD;  Location: [facility] EP LAB;  Service: Cardiology;  Laterality: N/A;    TREATMENT OF CARDIAC ARRHYTHMIA N/A 10/31/2019    Procedure: CARDIOVERSION;  Surgeon: Camron Isbell MD;  Location: [facility] EP LAB;  Service: Cardiology;  Laterality: N/A;  AF, NOE, DCCV, MAC, DM, 3 PREP*SJM  Dual ICD in situ*    TREATMENT OF CARDIAC ARRHYTHMIA N/A 10/29/2020    Procedure: CARDIOVERSION;  Surgeon: Camron Isbell MD;  Location: [facility] EP LAB;  Service: Cardiology;  Laterality: N/A;  AF, NOE, DCCV, MAC, DM, 3 PREP*SJM dual ICD in situ*    TREATMENT OF CARDIAC ARRHYTHMIA  11/23/2020    Procedure: Cardioversion or Defibrillation;  Surgeon: Camron Isbell MD;  Location: [facility] EP LAB;  Service: Cardiology;;    VASCULAR SURGERY      stents placed       Review of patient's allergies indicates:  No Known Allergies    Current Facility-Administered Medications on File Prior to Encounter   Medication    0.9%  NaCl infusion    sodium chloride 0.9% flush 5 mL    sodium chloride 0.9% flush 5 mL     Current Outpatient Medications on File Prior to Encounter   Medication Sig    amLODIPine (NORVASC) 5 MG tablet Take 1 tablet (5 mg total) by mouth once daily.    ascorbic acid, vitamin C, (VITAMIN C) 100 MG tablet Take 1,000 mg by mouth once daily.    aspirin (ECOTRIN) 81 MG EC tablet Take 81 mg by mouth once daily.    atorvastatin (LIPITOR) 20 MG tablet Take 1 tablet by mouth once daily    carvediloL (COREG) 12.5 MG tablet Take 1 tablet (12.5 mg total) by mouth 2 (two) times daily with meals.    coenzyme Q10 200 mg capsule Take 200 mg by mouth once daily.    dofetilide (TIKOSYN) 500 MCG Cap TAKE 1 CAPSULE BY MOUTH EVERY 12 HOURS    ELIQUIS 5 mg Tab Take 1 tablet by mouth twice daily    empagliflozin (JARDIANCE) 10 mg tablet Take 1 tablet (10 mg total) by mouth once daily.    ENTRESTO  mg per tablet Take 1 tablet by mouth twice daily    fish oil-omega-3 fatty acids  300-1,000 mg capsule Take 2 g by mouth 2 (two) times daily.    furosemide (LASIX) 40 MG tablet Take 20 mg by mouth daily as needed.    levothyroxine (SYNTHROID) 88 MCG tablet TAKE 1 TABLET BY MOUTH ONCE DAILY BEFORE BREAKFAST    magnesium oxide (MAG-OX) 400 mg (241.3 mg magnesium) tablet Take 400 mg by mouth once daily.    multivitamin capsule Take 1 capsule by mouth nightly.    nitroGLYCERIN (NITROSTAT) 0.4 MG SL tablet Place 1 tablet (0.4 mg total) under the tongue every 5 (five) minutes as needed for Chest pain.    pantoprazole (PROTONIX) 40 MG tablet Take 40 mg by mouth once daily.    SLO-NIACIN 750 mg TbSR Take 1 tablet (750 mg total) by mouth 2 (two) times daily. (Patient taking differently: Take 500 mg by mouth 2 (two) times daily.)    zinc 50 mg Tab Take 40 mg by mouth.     Family History       Problem Relation (Age of Onset)    Heart disease Mother, Father, Brother          Tobacco Use    Smoking status: Never    Smokeless tobacco: Former     Quit date: 1980   Substance and Sexual Activity    Alcohol use: Yes     Comment: occasionally    Drug use: No    Sexual activity: Not Currently     Review of Systems   Constitutional:  Positive for diaphoresis. Negative for activity change, chills, fever and unexpected weight change.   HENT:  Negative for congestion and sore throat.    Respiratory:  Negative for cough, shortness of breath and wheezing.    Cardiovascular:  Positive for chest pain. Negative for palpitations and leg swelling.   Gastrointestinal:  Negative for abdominal pain, blood in stool, nausea and vomiting.   Genitourinary:  Negative for dysuria and hematuria.   Musculoskeletal:  Negative for arthralgias and neck pain.   Skin:  Negative for color change and rash.   Neurological:  Negative for dizziness, seizures and numbness.   Psychiatric/Behavioral:  Negative for hallucinations and suicidal ideas.      Objective:     Vital Signs (Most Recent):  Temp: 97.8 °F (36.6 °C) (03/04/24 2334)  Pulse: 80  (03/04/24 2334)  Resp: 18 (03/04/24 2334)  BP: 108/76 (03/04/24 2334)  SpO2: (!) 93 % (03/04/24 2334) Vital Signs (24h Range):  Temp:  [97.1 °F (36.2 °C)-97.8 °F (36.6 °C)] 97.8 °F (36.6 °C)  Pulse:  [79-92] 80  Resp:  [13-18] 18  SpO2:  [93 %-99 %] 93 %  BP: (108-117)/(70-78) 108/76     Weight: 114.5 kg (252 lb 6.8 oz)  Body mass index is 32.41 kg/m².     Physical Exam  Vitals reviewed.   Constitutional:       General: He is not in acute distress.     Appearance: He is well-developed.   HENT:      Head: Normocephalic and atraumatic.   Eyes:      Extraocular Movements: Extraocular movements intact.      Pupils: Pupils are equal, round, and reactive to light.   Neck:      Vascular: No JVD.      Trachea: No tracheal deviation.   Cardiovascular:      Rate and Rhythm: Normal rate and regular rhythm.      Heart sounds: No murmur heard.     No friction rub. No gallop.   Pulmonary:      Effort: No respiratory distress.      Breath sounds: Normal breath sounds. No wheezing or rales.   Abdominal:      General: Bowel sounds are normal. There is no distension.      Palpations: Abdomen is soft. There is no mass.      Tenderness: There is no abdominal tenderness.   Musculoskeletal:         General: No deformity.      Cervical back: Neck supple.      Comments: Trace edema B/L   Lymphadenopathy:      Cervical: No cervical adenopathy.   Skin:     General: Skin is warm and dry.      Findings: No rash.   Neurological:      Mental Status: He is alert and oriented to person, place, and time.              CRANIAL NERVES     CN III, IV, VI   Pupils are equal, round, and reactive to light.       Significant Labs: All pertinent labs within the past 24 hours have been reviewed.    Significant Imaging: I have reviewed all pertinent imaging results/findings within the past 24 hours.

## 2024-03-05 NOTE — NURSING
Nurses Note -- 4 Eyes      03/04/2024   9:53 PM      Skin assessed during: Admit      [x] No Altered Skin Integrity Present    [x]Prevention Measures Documented      [] Yes- Altered Skin Integrity Present or Discovered   [] LDA Added if Not in Epic (Describe Wound)   [] New Altered Skin Integrity was Present on Admit and Documented in LDA   [] Wound Image Taken    Wound Care Consulted? No    Attending Nurse:  Chichi Jiménez RN/Staff Member:  Yesenia Roberts RN

## 2024-03-05 NOTE — ASSESSMENT & PLAN NOTE
-Pt. With episode of diaphoresis, chest pain, noted to have Afib recurrence. Trop initally mildly elevated, now WNL. Symptoms resolved and pt. Not in ventricualr paced rhytm Suspect pt. Having symptomatic Afib  -Plan to monitor on telemetry, order TTE. Consult cards in am for pacemaker interrogation and further rec's on if provacative testing needed. Continue antiarrythmic medications

## 2024-03-06 ENCOUNTER — ANESTHESIA (OUTPATIENT)
Dept: MEDSURG UNIT | Facility: HOSPITAL | Age: 79
DRG: 309 | End: 2024-03-06
Payer: MEDICARE

## 2024-03-06 ENCOUNTER — ANESTHESIA EVENT (OUTPATIENT)
Dept: MEDSURG UNIT | Facility: HOSPITAL | Age: 79
DRG: 309 | End: 2024-03-06
Payer: MEDICARE

## 2024-03-06 LAB
ALBUMIN SERPL BCP-MCNC: 3.1 G/DL (ref 3.5–5.2)
ALP SERPL-CCNC: 47 U/L (ref 55–135)
ALT SERPL W/O P-5'-P-CCNC: 15 U/L (ref 10–44)
ANION GAP SERPL CALC-SCNC: 8 MMOL/L (ref 8–16)
ASCENDING AORTA: 4 CM
AST SERPL-CCNC: 17 U/L (ref 10–40)
BASOPHILS # BLD AUTO: 0.04 K/UL (ref 0–0.2)
BASOPHILS NFR BLD: 0.7 % (ref 0–1.9)
BILIRUB SERPL-MCNC: 0.4 MG/DL (ref 0.1–1)
BSA FOR ECHO PROCEDURE: 2.44 M2
BUN SERPL-MCNC: 21 MG/DL (ref 8–23)
CALCIUM SERPL-MCNC: 9 MG/DL (ref 8.7–10.5)
CFR FLOW - ANTERIOR: 1.92
CFR FLOW - INFERIOR: 2.03
CFR FLOW - LATERAL: 1.73
CFR FLOW - MAX: 2.85
CFR FLOW - MIN: 1.14
CFR FLOW - SEPTAL: 1.65
CFR FLOW - WHOLE HEART: 1.83
CFR FLOW- DEFECT 1: 1.87
CHLORIDE SERPL-SCNC: 109 MMOL/L (ref 95–110)
CHOLEST SERPL-MCNC: 131 MG/DL (ref 120–199)
CHOLEST/HDLC SERPL: 2.3 {RATIO} (ref 2–5)
CO2 SERPL-SCNC: 26 MMOL/L (ref 23–29)
CREAT SERPL-MCNC: 1.6 MG/DL (ref 0.5–1.4)
CV STRESS BASE HR: 80 BPM
DIASTOLIC BLOOD PRESSURE: 90 MMHG
DIFFERENTIAL METHOD BLD: ABNORMAL
EJECTION FRACTION- HIGH: 59 %
EJECTION FRACTION: 28 %
END DIASTOLIC INDEX-HIGH: 155 ML/M2
END DIASTOLIC INDEX-LOW: 91 ML/M2
END SYSTOLIC INDEX-HIGH: 78 ML/M2
END SYSTOLIC INDEX-LOW: 40 ML/M2
EOSINOPHIL # BLD AUTO: 0.3 K/UL (ref 0–0.5)
EOSINOPHIL NFR BLD: 5.4 % (ref 0–8)
ERYTHROCYTE [DISTWIDTH] IN BLOOD BY AUTOMATED COUNT: 14.4 % (ref 11.5–14.5)
EST. GFR  (NO RACE VARIABLE): 44.1 ML/MIN/1.73 M^2
GLUCOSE SERPL-MCNC: 104 MG/DL (ref 70–110)
HCT VFR BLD AUTO: 41.2 % (ref 40–54)
HDLC SERPL-MCNC: 58 MG/DL (ref 40–75)
HDLC SERPL: 44.3 % (ref 20–50)
HGB BLD-MCNC: 13.2 G/DL (ref 14–18)
IMM GRANULOCYTES # BLD AUTO: 0.01 K/UL (ref 0–0.04)
IMM GRANULOCYTES NFR BLD AUTO: 0.2 % (ref 0–0.5)
LAA PV: 70 CM/S
LDLC SERPL CALC-MCNC: 60.4 MG/DL (ref 63–159)
LYMPHOCYTES # BLD AUTO: 1.9 K/UL (ref 1–4.8)
LYMPHOCYTES NFR BLD: 32.4 % (ref 18–48)
MCH RBC QN AUTO: 32 PG (ref 27–31)
MCHC RBC AUTO-ENTMCNC: 32 G/DL (ref 32–36)
MCV RBC AUTO: 100 FL (ref 82–98)
MONOCYTES # BLD AUTO: 0.6 K/UL (ref 0.3–1)
MONOCYTES NFR BLD: 10.4 % (ref 4–15)
NEUTROPHILS # BLD AUTO: 2.9 K/UL (ref 1.8–7.7)
NEUTROPHILS NFR BLD: 50.9 % (ref 38–73)
NONHDLC SERPL-MCNC: 73 MG/DL
NRBC BLD-RTO: 0 /100 WBC
NUC REST DIASTOLIC VOLUME INDEX: 136
NUC REST EJECTION FRACTION: 44
NUC REST SYSTOLIC VOLUME INDEX: 77
NUC STRESS DIASTOLIC VOLUME INDEX: 147
NUC STRESS EJECTION FRACTION: 45 %
NUC STRESS SYSTOLIC VOLUME INDEX: 82
OHS CV CPX 1 MINUTE RECOVERY HEART RATE: 89 BPM
OHS CV CPX 85 PERCENT MAX PREDICTED HEART RATE MALE: 122
OHS CV CPX MAX PREDICTED HEART RATE: 143
OHS CV CPX PATIENT IS FEMALE: 0
OHS CV CPX PATIENT IS MALE: 1
OHS CV CPX PEAK DIASTOLIC BLOOD PRESSURE: 69 MMHG
OHS CV CPX PEAK HEAR RATE: 86 BPM
OHS CV CPX PEAK RATE PRESSURE PRODUCT: 9976
OHS CV CPX PEAK SYSTOLIC BLOOD PRESSURE: 116 MMHG
OHS CV CPX PERCENT MAX PREDICTED HEART RATE ACHIEVED: 60
OHS CV CPX RATE PRESSURE PRODUCT PRESENTING: 9040
PERFUSION DEFECT 1 SIZE IN %: 31 %
PERFUSION DEFECT SIZE WORSENS % 1: 31 %
PLATELET # BLD AUTO: 249 K/UL (ref 150–450)
PMV BLD AUTO: 10.4 FL (ref 9.2–12.9)
POTASSIUM SERPL-SCNC: 4.2 MMOL/L (ref 3.5–5.1)
PROT SERPL-MCNC: 6.3 G/DL (ref 6–8.4)
RA PRESSURE ESTIMATED: 3 MMHG
RBC # BLD AUTO: 4.12 M/UL (ref 4.6–6.2)
REST FLOW - ANTERIOR: 0.61 CC/MIN/G
REST FLOW - INFERIOR: 0.73 CC/MIN/G
REST FLOW - LATERAL: 0.73 CC/MIN/G
REST FLOW - MAX: 1.09 CC/MIN/G
REST FLOW - MIN: 0.18 CC/MIN/G
REST FLOW - SEPTAL: 0.58 CC/MIN/G
REST FLOW - WHOLE HEART: 0.66 CC/MIN/G
REST FLOW- DEFECT 1: 0.25 CC/MIN/G
RETIRED EF AND QEF - SEE NOTES: 47 %
SINUS: 3.8 CM
SODIUM SERPL-SCNC: 143 MMOL/L (ref 136–145)
STJ: 3.8 CM
STRESS FLOW - ANTERIOR: 1.12 CC/MIN/G
STRESS FLOW - INFERIOR: 1.48 CC/MIN/G
STRESS FLOW - LATERAL: 1.24 CC/MIN/G
STRESS FLOW - MAX: 2.13 CC/MIN/G
STRESS FLOW - MIN: 0.25 CC/MIN/G
STRESS FLOW - SEPTAL: 0.94 CC/MIN/G
STRESS FLOW - WHOLE HEART: 1.2 CC/MIN/G
STRESS FLOW- DEFECT 1: 0.53 CC/MIN/G
SYSTOLIC BLOOD PRESSURE: 113 MMHG
TRIGL SERPL-MCNC: 63 MG/DL (ref 30–150)
WBC # BLD AUTO: 5.77 K/UL (ref 3.9–12.7)

## 2024-03-06 PROCEDURE — 92960 CARDIOVERSION ELECTRIC EXT: CPT | Performed by: INTERNAL MEDICINE

## 2024-03-06 PROCEDURE — 5A2204Z RESTORATION OF CARDIAC RHYTHM, SINGLE: ICD-10-PCS | Performed by: INTERNAL MEDICINE

## 2024-03-06 PROCEDURE — A9555 RB82 RUBIDIUM: HCPCS | Performed by: STUDENT IN AN ORGANIZED HEALTH CARE EDUCATION/TRAINING PROGRAM

## 2024-03-06 PROCEDURE — 25000003 PHARM REV CODE 250: Performed by: HOSPITALIST

## 2024-03-06 PROCEDURE — 37000009 HC ANESTHESIA EA ADD 15 MINS: Performed by: INTERNAL MEDICINE

## 2024-03-06 PROCEDURE — D9220A PRA ANESTHESIA: Mod: ANES,,, | Performed by: STUDENT IN AN ORGANIZED HEALTH CARE EDUCATION/TRAINING PROGRAM

## 2024-03-06 PROCEDURE — 27000221 HC OXYGEN, UP TO 24 HOURS

## 2024-03-06 PROCEDURE — 80053 COMPREHEN METABOLIC PANEL: CPT | Performed by: HOSPITALIST

## 2024-03-06 PROCEDURE — 93010 ELECTROCARDIOGRAM REPORT: CPT | Mod: ,,, | Performed by: INTERNAL MEDICINE

## 2024-03-06 PROCEDURE — 63600175 PHARM REV CODE 636 W HCPCS: Performed by: REGISTERED NURSE

## 2024-03-06 PROCEDURE — 80061 LIPID PANEL: CPT | Performed by: HOSPITALIST

## 2024-03-06 PROCEDURE — 99233 SBSQ HOSP IP/OBS HIGH 50: CPT | Mod: 25,ICN,, | Performed by: INTERNAL MEDICINE

## 2024-03-06 PROCEDURE — 92960 CARDIOVERSION ELECTRIC EXT: CPT | Mod: ,,, | Performed by: INTERNAL MEDICINE

## 2024-03-06 PROCEDURE — 36415 COLL VENOUS BLD VENIPUNCTURE: CPT | Performed by: HOSPITALIST

## 2024-03-06 PROCEDURE — 93005 ELECTROCARDIOGRAM TRACING: CPT

## 2024-03-06 PROCEDURE — 85025 COMPLETE CBC W/AUTO DIFF WBC: CPT | Performed by: HOSPITALIST

## 2024-03-06 PROCEDURE — D9220A PRA ANESTHESIA: Mod: CRNA,,, | Performed by: REGISTERED NURSE

## 2024-03-06 PROCEDURE — 20600001 HC STEP DOWN PRIVATE ROOM

## 2024-03-06 PROCEDURE — 99900035 HC TECH TIME PER 15 MIN (STAT)

## 2024-03-06 PROCEDURE — 94761 N-INVAS EAR/PLS OXIMETRY MLT: CPT

## 2024-03-06 PROCEDURE — 37000008 HC ANESTHESIA 1ST 15 MINUTES: Performed by: INTERNAL MEDICINE

## 2024-03-06 PROCEDURE — 25000003 PHARM REV CODE 250: Performed by: REGISTERED NURSE

## 2024-03-06 RX ORDER — AMINOPHYLLINE 25 MG/ML
75 INJECTION, SOLUTION INTRAVENOUS ONCE
Status: COMPLETED | OUTPATIENT
Start: 2024-03-06 | End: 2024-03-06

## 2024-03-06 RX ORDER — LIDOCAINE HYDROCHLORIDE 20 MG/ML
SOLUTION OROPHARYNGEAL
Status: DISCONTINUED | OUTPATIENT
Start: 2024-03-06 | End: 2024-03-06

## 2024-03-06 RX ORDER — SODIUM CHLORIDE 0.9 % (FLUSH) 0.9 %
3 SYRINGE (ML) INJECTION EVERY 4 HOURS PRN
Status: DISCONTINUED | OUTPATIENT
Start: 2024-03-06 | End: 2024-03-07 | Stop reason: HOSPADM

## 2024-03-06 RX ORDER — PROPOFOL 10 MG/ML
VIAL (ML) INTRAVENOUS
Status: DISCONTINUED | OUTPATIENT
Start: 2024-03-06 | End: 2024-03-06

## 2024-03-06 RX ORDER — REGADENOSON 0.08 MG/ML
0.4 INJECTION, SOLUTION INTRAVENOUS ONCE
Status: COMPLETED | OUTPATIENT
Start: 2024-03-06 | End: 2024-03-06

## 2024-03-06 RX ORDER — FENTANYL CITRATE 50 UG/ML
25 INJECTION, SOLUTION INTRAMUSCULAR; INTRAVENOUS EVERY 5 MIN PRN
Status: DISCONTINUED | OUTPATIENT
Start: 2024-03-06 | End: 2024-03-07 | Stop reason: HOSPADM

## 2024-03-06 RX ORDER — PHENYLEPHRINE HYDROCHLORIDE 10 MG/ML
INJECTION INTRAVENOUS
Status: DISCONTINUED | OUTPATIENT
Start: 2024-03-06 | End: 2024-03-06

## 2024-03-06 RX ORDER — PROPOFOL 10 MG/ML
INJECTION, EMULSION INTRAVENOUS CONTINUOUS PRN
Status: DISCONTINUED | OUTPATIENT
Start: 2024-03-06 | End: 2024-03-06

## 2024-03-06 RX ORDER — LIDOCAINE HYDROCHLORIDE 20 MG/ML
INJECTION INTRAVENOUS
Status: DISCONTINUED | OUTPATIENT
Start: 2024-03-06 | End: 2024-03-06

## 2024-03-06 RX ADMIN — PROPOFOL 20 MG: 10 INJECTION, EMULSION INTRAVENOUS at 02:03

## 2024-03-06 RX ADMIN — ACETAMINOPHEN 650 MG: 325 TABLET ORAL at 08:03

## 2024-03-06 RX ADMIN — PROPOFOL 125 MCG/KG/MIN: 10 INJECTION, EMULSION INTRAVENOUS at 02:03

## 2024-03-06 RX ADMIN — LEVOTHYROXINE SODIUM 88 MCG: 88 TABLET ORAL at 06:03

## 2024-03-06 RX ADMIN — RUBIDIUM CHLORIDE RB-82 35 MILLICURIE: 150 INJECTION, SOLUTION INTRAVENOUS at 09:03

## 2024-03-06 RX ADMIN — PROPOFOL 20 MG: 10 INJECTION, EMULSION INTRAVENOUS at 03:03

## 2024-03-06 RX ADMIN — DOFETILIDE 500 MCG: 0.25 CAPSULE ORAL at 09:03

## 2024-03-06 RX ADMIN — PROPOFOL 10 MG: 10 INJECTION, EMULSION INTRAVENOUS at 02:03

## 2024-03-06 RX ADMIN — PROPOFOL 30 MG: 10 INJECTION, EMULSION INTRAVENOUS at 02:03

## 2024-03-06 RX ADMIN — ASPIRIN 81 MG: 81 TABLET, COATED ORAL at 08:03

## 2024-03-06 RX ADMIN — APIXABAN 5 MG: 5 TABLET, FILM COATED ORAL at 08:03

## 2024-03-06 RX ADMIN — LIDOCAINE HYDROCHLORIDE 15 ML: 20 SOLUTION OROPHARYNGEAL at 02:03

## 2024-03-06 RX ADMIN — CARVEDILOL 12.5 MG: 12.5 TABLET, FILM COATED ORAL at 08:03

## 2024-03-06 RX ADMIN — PHENYLEPHRINE HYDROCHLORIDE 200 MCG: 10 INJECTION INTRAVENOUS at 03:03

## 2024-03-06 RX ADMIN — LIDOCAINE HYDROCHLORIDE 100 MG: 20 INJECTION INTRAVENOUS at 02:03

## 2024-03-06 RX ADMIN — RUBIDIUM CHLORIDE RB-82 34.7 MILLICURIE: 150 INJECTION, SOLUTION INTRAVENOUS at 09:03

## 2024-03-06 RX ADMIN — PANTOPRAZOLE SODIUM 40 MG: 40 TABLET, DELAYED RELEASE ORAL at 08:03

## 2024-03-06 RX ADMIN — DOFETILIDE 500 MCG: 0.25 CAPSULE ORAL at 08:03

## 2024-03-06 RX ADMIN — SODIUM CHLORIDE: 9 INJECTION, SOLUTION INTRAVENOUS at 02:03

## 2024-03-06 RX ADMIN — CARVEDILOL 12.5 MG: 12.5 TABLET, FILM COATED ORAL at 05:03

## 2024-03-06 RX ADMIN — AMLODIPINE BESYLATE 5 MG: 5 TABLET ORAL at 08:03

## 2024-03-06 RX ADMIN — ATORVASTATIN CALCIUM 80 MG: 20 TABLET, FILM COATED ORAL at 08:03

## 2024-03-06 NOTE — PLAN OF CARE
VSS. Spouse at bedside. Pt educated on fall risk and remained free from falls/trauma/injury. Denies chest pain, SOB, palpitations, dizziness, pain, or discomfort. Plan of care reviewed with pt, all questions answered. Bed locked in lowest position, call bell within reach, no acute distress noted, will continue to monitor.

## 2024-03-06 NOTE — SUBJECTIVE & OBJECTIVE
Interval History: no new complaints. PET stress similar to previous, nothing acute. EP planning NOE/DCCV as symptoms may be from AFib.     Review of Systems   Respiratory:  Negative for cough, chest tightness and shortness of breath.    Cardiovascular:  Negative for chest pain, palpitations and leg swelling.   All other systems reviewed and are negative.    Objective:     Vital Signs (Most Recent):  Temp: 97.3 °F (36.3 °C) (03/06/24 1539)  Pulse: 60 (03/06/24 1603)  Resp: 20 (03/06/24 1603)  BP: (!) 92/55 (03/06/24 1603)  SpO2: 98 % (03/06/24 1603) Vital Signs (24h Range):  Temp:  [97.3 °F (36.3 °C)-98.3 °F (36.8 °C)] 97.3 °F (36.3 °C)  Pulse:  [60-87] 60  Resp:  [15-20] 20  SpO2:  [93 %-98 %] 98 %  BP: ()/(53-97) 92/55     Weight: 114.3 kg (252 lb)  Body mass index is 32.35 kg/m².    Intake/Output Summary (Last 24 hours) at 3/6/2024 1607  Last data filed at 3/6/2024 1522  Gross per 24 hour   Intake 322 ml   Output 3376 ml   Net -3054 ml           Physical Exam  Vitals reviewed.   Constitutional:       General: He is not in acute distress.     Appearance: He is obese. He is not diaphoretic.   HENT:      Head: Normocephalic and atraumatic.      Nose: Nose normal.      Mouth/Throat:      Pharynx: No oropharyngeal exudate.   Eyes:      General: No scleral icterus.        Right eye: No discharge.         Left eye: No discharge.      Conjunctiva/sclera: Conjunctivae normal.      Pupils: Pupils are equal, round, and reactive to light.   Neck:      Thyroid: No thyromegaly.      Vascular: No JVD.      Trachea: No tracheal deviation.   Cardiovascular:      Rate and Rhythm: Normal rate and regular rhythm.      Heart sounds: Normal heart sounds. No murmur heard.     No friction rub.   Pulmonary:      Effort: Pulmonary effort is normal. No respiratory distress.      Breath sounds: Normal breath sounds. No stridor. No wheezing or rales.   Chest:      Chest wall: No tenderness.   Abdominal:      General: Bowel sounds are  normal. There is no distension.      Palpations: Abdomen is soft. There is no mass.      Tenderness: There is no abdominal tenderness. There is no guarding or rebound.   Musculoskeletal:         General: No tenderness or deformity. Normal range of motion.      Cervical back: Neck supple.   Lymphadenopathy:      Cervical: No cervical adenopathy.   Skin:     General: Skin is warm and dry.      Coloration: Skin is not pale.      Findings: No erythema or rash.   Neurological:      Mental Status: He is alert and oriented to person, place, and time.      Cranial Nerves: No cranial nerve deficit.      Motor: No abnormal muscle tone.      Coordination: Coordination normal.   Psychiatric:         Behavior: Behavior normal.         Thought Content: Thought content normal.         Judgment: Judgment normal.             Significant Labs: All pertinent labs within the past 24 hours have been reviewed.    Significant Imaging: I have reviewed all pertinent imaging results/findings within the past 24 hours.

## 2024-03-06 NOTE — ASSESSMENT & PLAN NOTE
78M pmhx HFrEF, sleep apnea (CPAP), afib (tikosyn), VT w/ AICD placement, CAD (prior stents), htn, hld, CKD admitted for chest pain and newly reduced EF. EP consulted for atrial fibrillation.   Interrogation of device shows episode of afib in February and ongoing atrial fibrillation from 3/4/24 at 4am. Follows with Dr. Isbell, compliant with tikosyn 500mcg bid. Prior pulmonary vein cryoablation 2019, RFA 2020.  -Continue home tikosyn, coreg, anticoagulation.  -Proceed first with PET stress and any ischemic interventions as indicated.  -DCCV/NOE today.

## 2024-03-06 NOTE — ASSESSMENT & PLAN NOTE
-Afib s/p ablation and pacemaker. Afib recurrence noted a few weeks ago and then again  -Rate controlled  -Continue home coreg and tikosyn, monitor on telemetry. Cont DOAC  -Device interrogation completed  -EP planning NOE/DCCV today as presenting symptoms may be related to AFib

## 2024-03-06 NOTE — NURSING TRANSFER
Nursing Transfer Note      3/6/2024   3:33 PM    Nurse giving handoff:Hung CASTRO Rn  Nurse receiving handoff:Leyla Lorenzo    Reason patient is being transferred: postop    Transfer To: 312    Transfer via stretcher    Transfer with     Transported by transport    Transfer Vital Signs:  Blood Pressure:see flowsheet  Heart Rate:  O2:  Temperature:  Respirations:    Telemetry:   Order for Tele Monitor?     Additional Lines:       Medicines sent:     Any special needs or follow-up needed:     Patient belongings transferred with patient:  n/a    Chart send with patient: Yes    Notified:     Patient reassessed at: 3/6/24  1  Upon arrival to floor: bed in lowest position   Performed

## 2024-03-06 NOTE — NURSING TRANSFER
Nursing Transfer Note      3/6/2024   2:25 PM      Reason patient is being transferred: NOE and CV    Transfer To: room 312    Transfer via stretcher    Transfer with cardiac monitoring    Transported by transportation team      Chart send with patient: Yes    Notified: spouse

## 2024-03-06 NOTE — NURSING TRANSFER
Nursing Transfer Note      3/6/2024   10:00AM    Reason patient is being transferred: stress test done    Transfer To: room 312    Transfer via wheelchair    Transfer with cardiac monitoring    Transported by transportation team    Upon arrival to floor: cardiac monitor applied, patient oriented to room, call bell in reach, and bed in lowest position

## 2024-03-06 NOTE — PLAN OF CARE
Problem: Adult Inpatient Plan of Care  Goal: Plan of Care Review  Outcome: Ongoing, Progressing     Problem: Dysrhythmia  Goal: Normalized Cardiac Rhythm  Outcome: Ongoing, Progressing     Problem: Chest Pain  Goal: Resolution of Chest Pain Symptoms  Outcome: Ongoing, Progressing

## 2024-03-06 NOTE — PROGRESS NOTES
[facility] - Surgery ([facility])  [facility]  Progress Note    Patient Name: Amish Grossman  MRN: [id number]  Patient Class: IP- Inpatient   Admission Date: 3/4/2024  Length of Stay: 2 days  Attending Physician: Aixa Jimenez MD  Primary Care Provider: Marco Antonio Devi MD        Subjective:     Principal Problem:Chest pain        HPI:  78-year-old male with a history of sleep apnea on CPAP, atrial fibrillation (on tikosyn), VT with AICD placement, coronary artery disease (prior stents), hypertension, syncope, thyroid disease, HFrEF, hyperlipidemia, and chronic kidney disease presented to [facility] ED on March 4 with chest pain going into both arms that began about 45 minutes prior to arrival. Pt. Reports he woke up drench in sweat with chest pressure and shoulder pain that lasted for about 2hrs.  He took sublingual nitroglycerin at home that resolved the chest pain.  By report chest x-ray had no acute abnormalities.  EKG showed atrial fibrillation but had no obvious ischemic changes.  Initial heart rate was around 107 but subsequently has been in the 80-90 range.  He had 1 additional episode of chest pain in the emergency department that resolved spontaneously after approximately 2 minutes. He has had no further episodes. He is hemodynamically stable with normal oxygenation and no ongoing chest pain. Pt. transferred to Hospital Medicine at [facility] for further Cardiology evaluation. In the emergency department he received 325 mg aspirin.    Of note, patient reports he was recently contacted by cards EP regarding his pacemaker showing Afib recurrence about a couple of weeks ago. He had no symptoms of that time. He scheduled an appointment for EP f/u, but he has not seen them yet.     White blood cells 4.7, hemoglobin 12.3, hematocrit 38.7, platelets 210, , sodium 142, potassium 3.9, chloride 108, CO2 22, BUN 20, creatinine 1.4, total bilirubin 1, AST 24, ALT 21, glucose 90, magnesium 2.1, high  sensitivity troponin 76 (reference range 0-60), NT-proBNP 599 (reference range 5-450)     August 2, 2023: Transesophageal echocardiogram had EF 35-40%.  Mild RV systolic enlargement with normal systolic function.     April 4, 2023: Echocardiogram with EF 40%, grade 1 diastolic dysfunction.     VS:  Temperature 97.1°, pulse 92, blood pressure 111/70, O2 sats 99%       Overview/Hospital Course:  Admitted for chest pain. Trop 0.025 > 0.029 downtrended. Cardiology consulted. PET 3/6 did not show acute ischemic process, just scarring. EP planning NOE/DCCV today as symptoms may be related to AFib.     Interval History: no new complaints. PET stress similar to previous, nothing acute. EP planning NOE/DCCV as symptoms may be from AFib.     Review of Systems   Respiratory:  Negative for cough, chest tightness and shortness of breath.    Cardiovascular:  Negative for chest pain, palpitations and leg swelling.   All other systems reviewed and are negative.    Objective:     Vital Signs (Most Recent):  Temp: 97.3 °F (36.3 °C) (03/06/24 1539)  Pulse: 60 (03/06/24 1603)  Resp: 20 (03/06/24 1603)  BP: (!) 92/55 (03/06/24 1603)  SpO2: 98 % (03/06/24 1603) Vital Signs (24h Range):  Temp:  [97.3 °F (36.3 °C)-98.3 °F (36.8 °C)] 97.3 °F (36.3 °C)  Pulse:  [60-87] 60  Resp:  [15-20] 20  SpO2:  [93 %-98 %] 98 %  BP: ()/(53-97) 92/55     Weight: 114.3 kg (252 lb)  Body mass index is 32.35 kg/m².    Intake/Output Summary (Last 24 hours) at 3/6/2024 1607  Last data filed at 3/6/2024 1522  Gross per 24 hour   Intake 322 ml   Output 3376 ml   Net -3054 ml           Physical Exam  Vitals reviewed.   Constitutional:       General: He is not in acute distress.     Appearance: He is obese. He is not diaphoretic.   HENT:      Head: Normocephalic and atraumatic.      Nose: Nose normal.      Mouth/Throat:      Pharynx: No oropharyngeal exudate.   Eyes:      General: No scleral icterus.        Right eye: No discharge.         Left eye: No  discharge.      Conjunctiva/sclera: Conjunctivae normal.      Pupils: Pupils are equal, round, and reactive to light.   Neck:      Thyroid: No thyromegaly.      Vascular: No JVD.      Trachea: No tracheal deviation.   Cardiovascular:      Rate and Rhythm: Normal rate and regular rhythm.      Heart sounds: Normal heart sounds. No murmur heard.     No friction rub.   Pulmonary:      Effort: Pulmonary effort is normal. No respiratory distress.      Breath sounds: Normal breath sounds. No stridor. No wheezing or rales.   Chest:      Chest wall: No tenderness.   Abdominal:      General: Bowel sounds are normal. There is no distension.      Palpations: Abdomen is soft. There is no mass.      Tenderness: There is no abdominal tenderness. There is no guarding or rebound.   Musculoskeletal:         General: No tenderness or deformity. Normal range of motion.      Cervical back: Neck supple.   Lymphadenopathy:      Cervical: No cervical adenopathy.   Skin:     General: Skin is warm and dry.      Coloration: Skin is not pale.      Findings: No erythema or rash.   Neurological:      Mental Status: He is alert and oriented to person, place, and time.      Cranial Nerves: No cranial nerve deficit.      Motor: No abnormal muscle tone.      Coordination: Coordination normal.   Psychiatric:         Behavior: Behavior normal.         Thought Content: Thought content normal.         Judgment: Judgment normal.             Significant Labs: All pertinent labs within the past 24 hours have been reviewed.    Significant Imaging: I have reviewed all pertinent imaging results/findings within the past 24 hours.    Assessment/Plan:      * Chest pain  CAD  -chest pain + diaphoresis, now resolved  -troponin 0.025 > 0.029, flat  -echo shows worse EF 30%, regional WMA's  -cardiology consulted. PET did not show reversible defect, similar to previous.   -cont HI statin, ASA      Persistent atrial fibrillation  -Afib s/p ablation and pacemaker. Afib  recurrence noted a few weeks ago and then again  -Rate controlled  -Continue home coreg and tikosyn, monitor on telemetry. Cont DOAC  -Device interrogation completed  -EP planning NOE/DCCV today as presenting symptoms may be related to AFib    Ischemic cardiomyopathy  Echo    Result Date: 3/5/2024    Left Ventricle: The left ventricle is mildly dilated. Ventricular mass   is normal. Normal wall thickness. Regional wall motion abnormalities   present. See diagram for wall motion findings. There is moderate-severely   reduced systolic function. Ejection fraction by visual approximation is   30% ( upper 20s to low 30s and less than the prior). Grade III diastolic   dysfunction.    Right Ventricle: Normal right ventricular cavity size. Wall thickness   is normal. Right ventricle wall motion  is normal. Systolic function is   normal. Pacemaker lead present in the ventricle.    Left Atrium: Left atrium is moderately dilated.    Right Atrium: Right atrium is moderately dilated. Lead present in the   right atrium.    Pulmonary Artery: The estimated pulmonary artery systolic pressure is   24 mmHg.    IVC/SVC: Normal venous pressure at 3 mmHg.       -EF has decreased from prior.  -No signs of acute volume overload, monitor closely while admitted. Continue BB. Resume home lasix & ARNI if Cr stable.      Hypothyroid  -Continue home synthroid. TSH wnl      CKD (chronic kidney disease) stage 1, GFR 90 ml/min or greater  -Cr at baseline, no acute issues. Continue to monitor while admitted  -Cr 1.3 > 1.6 today, which is actually within his baseline range. Will hold diuretic and ARNI for now though and monitor daily.    Coronary artery disease due to lipid rich plaque  -Pt. With chest pain during Afib as above, trop mildly elevated, now WNL. Suspect related to symptomatic afib rather than worsening CAD. TTE ordered and cards consulted for further rec's  -Continue home ASA, eliquis, statin       VTE Risk Mitigation (From admission,  onward)           Ordered     apixaban tablet 5 mg  2 times daily         03/05/24 0039     IP VTE HIGH RISK PATIENT  Once         03/04/24 2206     Place sequential compression device  Until discontinued         03/04/24 2206                    Discharge Planning   SHAUN: 3/6/2024     Code Status: Full Code   Is the patient medically ready for discharge?: No    Reason for patient still in hospital (select all that apply): Patient trending condition and Treatment  Discharge Plan A: Home with family                  Aixa Jimenez MD  Department of Hospital Medicine   [facility] - Surgery ([facility])

## 2024-03-06 NOTE — NURSING TRANSFER
Nursing Transfer Note      3/6/2024   4:42 PM      Reason patient is being transferred: NOE and CV done    Transfer To: room 312 around 1642    Transfer via stretcher    Transfer with cardiac monitoring    Transported by transportation team    Telemetry:   Order for Tele Monitor? yes    Patient belongings transferred with patient: No    Chart send with patient: Yes    Notified: spouse    Patient reassessed at: 1642 03/06/2024     Upon arrival to floor: cardiac monitor applied, patient oriented to room, call bell in reach, and bed in lowest position

## 2024-03-06 NOTE — CONSULTS
[facility]  NOE Consult      Amish Grossman  YOB: 1945  Medical Record Number:  [id number]  Attending Physician:  Aixa Jimenez MD   Current Principal Problem:  Chest pain    History     Cc:  chest pain      HPI:   78M pmhx HFrEF, ICM s/p MI 1993 with LV thrombus, JABIER (CPAP), afib (tikosyn), SVT, VT w/ AICD placement, CAD (prior stents), htn, hld, CKD admitted for chest pain and newly reduced EF.     EP consulted for atrial fibrillation. Patient reports he woke up with chest pressure and shoulder pain. Found to have low troponin leak to 0.029, but newly reduced EF from 40% -> 30%. Interrogation of device shows ongoing atrial fibrillation from 3/4/24 at 4am. As outpatient, patient follows with Dr. Isbell. Is compliant with Tikosyn 500mcg BID. Previously pulmonary vein cryoablation 2019, RFA 2020. Currently patient is hemodynamically stable with normal oxygenation and no ongoing chest pain. 3/5/24 ischemic evaluation with PET Stress consistent with scar from prior MI. No other complaints.     Consult for NOE/DCCV for Afib. Rhythm AFib-VS on device interrogation. Pacing at 80 currently.    [id number]  Anticoagulant/antiplatelets: eliquis, compliant    NOE 10/29/2020  Normal appearing left atrial appendage. No thrombus is present in the appendage. Abnormal appendage velocities of 0.8m/s.  There is a residual atrial septal defect due to prior trans-septal puncture for PVI. There is bidirectional flow across this defect, but mostly left to right.  The left ventricle is normal in size with normal systolic function. The estimated ejection fraction is 30%.  Mildly reduced right ventricular systolic function.  Grade 3 plaque present in the descending aorta.  The sinuses of Valsalva is mildly dilated. The ascending aorta is moderately dilated.    TTE 3/5/24    Left Ventricle: The left ventricle is mildly dilated. Ventricular mass is normal. Normal wall thickness. Regional wall  motion abnormalities present. See diagram for wall motion findings. There is moderate-severely reduced systolic function. Ejection fraction by visual approximation is 30% ( upper 20s to low 30s and less than the prior). Grade III diastolic dysfunction.    Right Ventricle: Normal right ventricular cavity size. Wall thickness is normal. Right ventricle wall motion  is normal. Systolic function is normal. Pacemaker lead present in the ventricle.    Left Atrium: Left atrium is moderately dilated.    Right Atrium: Right atrium is moderately dilated. Lead present in the right atrium.    Pulmonary Artery: The estimated pulmonary artery systolic pressure is 24 mmHg.    IVC/SVC: Normal venous pressure at 3 mmHg.     Dysphagia or odynophagia:  No  Liver Disease, esophageal disease, or known varices:  No  Upper GI Bleeding: No  Snoring:  No  Sleep Apnea:  Yes  Prior neck surgery or radiation:  No  History of anesthetic difficulties:  No  Family history of anesthetic difficulties:  No  Last oral intake: yesterday before midnight  Able to move neck in all directions:  Yes    Medications - Outpatient  Prior to Admission medications    Medication Sig Start Date End Date Taking? Authorizing Provider   amLODIPine (NORVASC) 5 MG tablet Take 1 tablet (5 mg total) by mouth once daily. 12/27/23   Angel Coburn MD   ascorbic acid, vitamin C, (VITAMIN C) 100 MG tablet Take 1,000 mg by mouth once daily.    Provider, Historical   aspirin (ECOTRIN) 81 MG EC tablet Take 81 mg by mouth once daily.    Provider, Historical   atorvastatin (LIPITOR) 20 MG tablet Take 1 tablet by mouth once daily 10/10/23   Angel Coburn MD   carvediloL (COREG) 12.5 MG tablet Take 1 tablet (12.5 mg total) by mouth 2 (two) times daily with meals. 5/1/23   Angel Coburn MD   coenzyme Q10 200 mg capsule Take 200 mg by mouth once daily.    Provider, Historical   dofetilide (TIKOSYN) 500 MCG Cap TAKE 1 CAPSULE BY MOUTH EVERY 12 HOURS 2/12/24   Camron Isbell MD    ELIQUIS 5 mg Tab Take 1 tablet by mouth twice daily 12/18/23   Laila Torres NP   empagliflozin (JARDIANCE) 10 mg tablet Take 1 tablet (10 mg total) by mouth once daily. 8/3/23   Megan Herrera MD   ENTRESTO  mg per tablet Take 1 tablet by mouth twice daily 10/16/23   Angel Coburn MD   fish oil-omega-3 fatty acids 300-1,000 mg capsule Take 2 g by mouth 2 (two) times daily.    Provider, Historical   furosemide (LASIX) 40 MG tablet Take 20 mg by mouth daily as needed.    Provider, Historical   levothyroxine (SYNTHROID) 88 MCG tablet TAKE 1 TABLET BY MOUTH ONCE DAILY BEFORE BREAKFAST 6/18/23   Angel Coburn MD   magnesium oxide (MAG-OX) 400 mg (241.3 mg magnesium) tablet Take 400 mg by mouth once daily.    Provider, Historical   multivitamin capsule Take 1 capsule by mouth nightly.    Provider, Historical   nitroGLYCERIN (NITROSTAT) 0.4 MG SL tablet Place 1 tablet (0.4 mg total) under the tongue every 5 (five) minutes as needed for Chest pain. 12/3/13   Angel Coburn MD   pantoprazole (PROTONIX) 40 MG tablet Take 40 mg by mouth once daily.    Provider, Historical   SLO-NIACIN 750 mg TbSR Take 1 tablet (750 mg total) by mouth 2 (two) times daily.  Patient taking differently: Take 500 mg by mouth 2 (two) times daily. 12/3/13   Angel Coburn MD   zinc 50 mg Tab Take 40 mg by mouth.    Provider, Historical     Medications - Current  Scheduled Meds:   amLODIPine  5 mg Oral Daily    apixaban  5 mg Oral BID    aspirin  81 mg Oral Daily    atorvastatin  80 mg Oral Daily    carvediloL  12.5 mg Oral BID WM    dofetilide  500 mcg Oral Q12H    levothyroxine  88 mcg Oral Before breakfast    pantoprazole  40 mg Oral Daily     Continuous Infusions:  Allergies  Review of patient's allergies indicates:  No Known Allergies  Past Medical History  Past Medical History:   Diagnosis Date    Anticoagulant long-term use     Atrial fibrillation     Benign essential tremor 6/6/2018    Cardiomyopathy     Chest  congestion     CHF (congestive heart failure)     Coronary artery disease     Hypertension     Left ventricular thrombus     Obstructive sleep apnea 4/3/2022    Syncope and collapse     Thyroid disease      Past Surgical History  Past Surgical History:   Procedure Laterality Date    ABLATION OF ARRHYTHMOGENIC FOCUS FOR ATRIAL FIBRILLATION N/A 12/9/2019    Procedure: ABLATION, ARRHYTHMOGENIC FOCUS, FOR ATRIAL FIBRILLATION;  Surgeon: Camron Isbell MD;  Location: [facility] EP LAB;  Service: Cardiology;  Laterality: N/A;  AF, NOE (firm), PVI, CRYO, LORENZA, GEN, DM, 3 PREP * Dual ICD SJM*    ABLATION OF ARRHYTHMOGENIC FOCUS FOR ATRIAL FIBRILLATION N/A 11/23/2020    Procedure: ABLATION, ARRHYTHMOGENIC FOCUS, FOR ATRIAL FIBRILLATION;  Surgeon: Camron Isbell MD;  Location: [facility] EP LAB;  Service: Cardiology;  Laterality: N/A;  AF, Redo PVI, RFA, CARTO, GEN, DM, 3 PREP* Dual ICD SJM in situ* NOE deferred pt compliant*    CARDIAC CATHETERIZATION      CARDIAC DEFIBRILLATOR PLACEMENT      CORONARY ANGIOPLASTY      HERNIA REPAIR      KNEE ARTHROSCOPY      REPLACEMENT OF IMPLANTABLE CARDIOVERTER-DEFIBRILLATOR (ICD) GENERATOR Left 6/7/2018    Procedure: REPLACEMENT-GENERATOR-ICD;  Surgeon: Camron Isbell MD;  Location: [facility] CATH LAB;  Service: Cardiology;  Laterality: Left;  GENIE, DUAL ICD GEN CHG, SJM, MAC, DM, 3 PREP    STENTS      TRANSESOPHAGEAL ECHOCARDIOGRAPHY N/A 10/31/2019    Procedure: ECHOCARDIOGRAM, TRANSESOPHAGEAL;  Surgeon: [facility] Provider;  Location: [facility] EP LAB;  Service: Cardiology;  Laterality: N/A;  AF, NOE, DCCV, MAC, DM, 3 PREP    TRANSESOPHAGEAL ECHOCARDIOGRAPHY N/A 12/9/2019    Procedure: ECHOCARDIOGRAM, TRANSESOPHAGEAL;  Surgeon: Sun Merchant MD;  Location: [facility] EP LAB;  Service: Cardiology;  Laterality: N/A;    TRANSESOPHAGEAL ECHOCARDIOGRAPHY N/A 10/29/2020    Procedure: ECHOCARDIOGRAM, TRANSESOPHAGEAL;  Surgeon: Dannie Mittal III, MD;  Location: [facility] EP LAB;  Service: Cardiology;   "Laterality: N/A;    TREATMENT OF CARDIAC ARRHYTHMIA N/A 10/31/2019    Procedure: CARDIOVERSION;  Surgeon: Camron Isbell MD;  Location: [facility] EP LAB;  Service: Cardiology;  Laterality: N/A;  AF, NOE, DCCV, MAC, DM, 3 PREP*SJM  Dual ICD in situ*    TREATMENT OF CARDIAC ARRHYTHMIA N/A 10/29/2020    Procedure: CARDIOVERSION;  Surgeon: Camron Isbell MD;  Location: [facility] EP LAB;  Service: Cardiology;  Laterality: N/A;  AF, NOE, DCCV, MAC, DM, 3 PREP*SJM dual ICD in situ*    TREATMENT OF CARDIAC ARRHYTHMIA  11/23/2020    Procedure: Cardioversion or Defibrillation;  Surgeon: Camron Isbell MD;  Location: [facility] EP LAB;  Service: Cardiology;;    VASCULAR SURGERY      stents placed     ROS  10 point ROS performed and negative except as stated in HPI     Physical Examination   Vital Signs  Vitals  Temp: 98 °F (36.7 °C)  Temp Source: Oral  Pulse: 80  Heart Rate Source: Monitor  Resp: 18  SpO2: 95 %  BP: 133/84  MAP (mmHg): 101  BP Location: Left arm  BP Method: Automatic  Patient Position: Lying    24 Hour VS Range  Temp:  [97.6 °F (36.4 °C)-98.3 °F (36.8 °C)]   Pulse:  [79-91]   Resp:  [16-20]   BP: (106-133)/(56-90)   SpO2:  [93 %-97 %]     Intake/Output Summary (Last 24 hours) at 3/6/2024 1318  Last data filed at 3/6/2024 1215  Gross per 24 hour   Intake 342 ml   Output 3475 ml   Net -3133 ml         Physical Exam:   Constitutional: no acute distress  HEENT: NCAT, EOMI, no scleral icterus  Cardiovascular: Regular rate and irregularly irregular rhythm  Pulmonary: Normal respiratory effort   Abdomen: nontender, non-distended   Neuro: alert and oriented, no focal deficits  Extremities: warm, no edema   MSK: no deformities  Skin: intact, no rashes     Data     Recent Labs   Lab 03/04/24  2239 03/05/24  0545 03/06/24  0349   WBC 5.72 4.65 5.77   HGB 11.9* 12.9* 13.2*   HCT 37.0* 39.8* 41.2    216 249      No results for input(s): "PROTIME", "INR" in the last 168 hours.   Recent Labs   Lab 03/04/24  2239 03/05/24  0545 " 03/06/24  0349    143 143   K 4.3 4.2 4.2   * 111* 109   CO2 22* 19* 26   BUN 18 17 21   CREATININE 1.4 1.3 1.6*   ANIONGAP 9 13 8   CALCIUM 9.0 9.5 9.0      Assessment & Plan     NOE for PRISCA assessment prior to cardioversion  -No absolute contraindications of esophageal stricture, tumor, perforation, laceration,or diverticulum and/or active GI bleed  -The risks, benefits & alternatives of the procedure were explained to the patient.   -The risks of transesophageal echo include but are not limited to:  Dental trauma, esophageal trauma/perforation, bleeding, laryngospasm/brochospasm, aspiration, sore throat/hoarseness, & dislodgement of the endotracheal tube/nasogastric tube (where applicable).    -The risks of ANES monitored sedation include hypotension, respiratory depression, arrhythmias, bronchospasm, & death.    -Informed consent was obtained. The patient is agreeable to proceed with the procedure and all questions and concerns addressed.    Case discussed with an attending in echocardiography lab.    Hipolito Willett MD  [facility]   Cardiovascular Disease PGY-V

## 2024-03-06 NOTE — ANESTHESIA PREPROCEDURE EVALUATION
Pre-operative evaluation for Procedure(s) (LRB):  Cardioversion or Defibrillation (N/A)  Transesophageal echo (NOE) intra-procedure log documentation (N/A)    Amish Grossman is a 78 y.o. male with CHF (EF 30%), ICM s/p MI 1993, CAD s/p stents, HTN, JABIER (CPAP), afib, SVT, VT w/ AICD who presents in a fib. Plan for the above procedure.     2D Echo:  3/5/24:    Left Ventricle: The left ventricle is mildly dilated. Ventricular mass is normal. Normal wall thickness. Regional wall motion abnormalities present. See diagram for wall motion findings. There is moderate-severely reduced systolic function. Ejection fraction by visual approximation is 30% ( upper 20s to low 30s and less than the prior). Grade III diastolic dysfunction.    Right Ventricle: Normal right ventricular cavity size. Wall thickness is normal. Right ventricle wall motion  is normal. Systolic function is normal. Pacemaker lead present in the ventricle.    Left Atrium: Left atrium is moderately dilated.    Right Atrium: Right atrium is moderately dilated. Lead present in the right atrium.    Pulmonary Artery: The estimated pulmonary artery systolic pressure is 24 mmHg.    IVC/SVC: Normal venous pressure at 3 mmHg.    Patient Active Problem List   Diagnosis    Coronary artery disease due to lipid rich plaque    Old myocardial infarction    S/P PTCA (percutaneous transluminal coronary angioplasty)    CKD (chronic kidney disease) stage 1, GFR 90 ml/min or greater    BPH (benign prostatic hyperplasia)    Dyslipidemia    Venous insufficiency of leg    Erectile dysfunction    HTN (hypertension), benign    Hypothyroid    Automatic implantable cardioverter-defibrillator in situ    Ischemic cardiomyopathy    Visit for monitoring Tikosyn therapy    Ventricular tachyarrhythmia    Persistent atrial fibrillation    Chest pain    Benign essential tremor    SOB (shortness of breath)    Interstitial lung disease    Solitary pulmonary nodule    Class 1  obesity with serious comorbidity in adult    Chronic anticoagulation    Acute on chronic combined systolic and diastolic heart failure    Obstructive sleep apnea    Fever    Heart failure with reduced ejection fraction        Current Facility-Administered Medications on File Prior to Encounter   Medication Dose Route Frequency Provider Last Rate Last Admin    0.9%  NaCl infusion   Intravenous Continuous Brittney Gardner NP   Stopped at 11/23/20 1201    sodium chloride 0.9% flush 5 mL  5 mL Intravenous PRBrittney Haskins NP        sodium chloride 0.9% flush 5 mL  5 mL Intravenous Brittney Porter NP         Current Outpatient Medications on File Prior to Encounter   Medication Sig Dispense Refill    amLODIPine (NORVASC) 5 MG tablet Take 1 tablet (5 mg total) by mouth once daily. 90 tablet 3    ascorbic acid, vitamin C, (VITAMIN C) 100 MG tablet Take 1,000 mg by mouth once daily.      aspirin (ECOTRIN) 81 MG EC tablet Take 81 mg by mouth once daily.      atorvastatin (LIPITOR) 20 MG tablet Take 1 tablet by mouth once daily 90 tablet 3    carvediloL (COREG) 12.5 MG tablet Take 1 tablet (12.5 mg total) by mouth 2 (two) times daily with meals. 180 tablet 3    coenzyme Q10 200 mg capsule Take 200 mg by mouth once daily.      dofetilide (TIKOSYN) 500 MCG Cap TAKE 1 CAPSULE BY MOUTH EVERY 12 HOURS 180 capsule 3    ELIQUIS 5 mg Tab Take 1 tablet by mouth twice daily 180 tablet 1    empagliflozin (JARDIANCE) 10 mg tablet Take 1 tablet (10 mg total) by mouth once daily. 30 tablet 11    ENTRESTO  mg per tablet Take 1 tablet by mouth twice daily 60 tablet 6    fish oil-omega-3 fatty acids 300-1,000 mg capsule Take 2 g by mouth 2 (two) times daily.      furosemide (LASIX) 40 MG tablet Take 20 mg by mouth daily as needed.      levothyroxine (SYNTHROID) 88 MCG tablet TAKE 1 TABLET BY MOUTH ONCE DAILY BEFORE BREAKFAST 90 tablet 3    magnesium oxide (MAG-OX) 400 mg (241.3 mg magnesium) tablet Take 400 mg  by mouth once daily.      multivitamin capsule Take 1 capsule by mouth nightly.      nitroGLYCERIN (NITROSTAT) 0.4 MG SL tablet Place 1 tablet (0.4 mg total) under the tongue every 5 (five) minutes as needed for Chest pain. 25 tablet 4    pantoprazole (PROTONIX) 40 MG tablet Take 40 mg by mouth once daily.      SLO-NIACIN 750 mg TbSR Take 1 tablet (750 mg total) by mouth 2 (two) times daily. (Patient taking differently: Take 500 mg by mouth 2 (two) times daily.) 180 each 3    zinc 50 mg Tab Take 40 mg by mouth.         Past Surgical History:   Procedure Laterality Date    ABLATION OF ARRHYTHMOGENIC FOCUS FOR ATRIAL FIBRILLATION N/A 12/9/2019    Procedure: ABLATION, ARRHYTHMOGENIC FOCUS, FOR ATRIAL FIBRILLATION;  Surgeon: Camron Isbell MD;  Location: [facility] EP LAB;  Service: Cardiology;  Laterality: N/A;  AF, NOE (firm), PVI, CRYO, LORENZA, GEN, DM, 3 PREP * Dual ICD SJM*    ABLATION OF ARRHYTHMOGENIC FOCUS FOR ATRIAL FIBRILLATION N/A 11/23/2020    Procedure: ABLATION, ARRHYTHMOGENIC FOCUS, FOR ATRIAL FIBRILLATION;  Surgeon: Camron Isbell MD;  Location: [facility] EP [facility];  Service: Cardiology;  Laterality: N/A;  AF, Redo PVI, RFA, CARTO, GEN, DM, 3 PREP* Dual ICD SJM in situ* NOE deferred pt compliant*    CARDIAC CATHETERIZATION      CARDIAC DEFIBRILLATOR PLACEMENT      CORONARY ANGIOPLASTY      HERNIA REPAIR      KNEE ARTHROSCOPY      REPLACEMENT OF IMPLANTABLE CARDIOVERTER-DEFIBRILLATOR (ICD) GENERATOR Left 6/7/2018    Procedure: REPLACEMENT-GENERATOR-ICD;  Surgeon: Camron Isbell MD;  Location: [facility] CATH LAB;  Service: Cardiology;  Laterality: Left;  GENIE, DUAL ICD GEN CHG, SJM, MAC, DM, 3 PREP    STENTS      TRANSESOPHAGEAL ECHOCARDIOGRAPHY N/A 10/31/2019    Procedure: ECHOCARDIOGRAM, TRANSESOPHAGEAL;  Surgeon: [facility];  Location: [facility] EP LAB;  Service: Cardiology;  Laterality: N/A;  AF, NOE, DCCV, MAC, DM, 3 PREP    TRANSESOPHAGEAL ECHOCARDIOGRAPHY N/A 12/9/2019    Procedure: ECHOCARDIOGRAM,  TRANSESOPHAGEAL;  Surgeon: Sun Merchant MD;  Location: [facility] EP LAB;  Service: Cardiology;  Laterality: N/A;    TRANSESOPHAGEAL ECHOCARDIOGRAPHY N/A 10/29/2020    Procedure: ECHOCARDIOGRAM, TRANSESOPHAGEAL;  Surgeon: Dannie Mittal III, MD;  Location: [facility] EP LAB;  Service: Cardiology;  Laterality: N/A;    TREATMENT OF CARDIAC ARRHYTHMIA N/A 10/31/2019    Procedure: CARDIOVERSION;  Surgeon: Camron Isbell MD;  Location: [facility] EP LAB;  Service: Cardiology;  Laterality: N/A;  AF, NOE, DCCV, MAC, DM, 3 PREP*SJM  Dual ICD in situ*    TREATMENT OF CARDIAC ARRHYTHMIA N/A 10/29/2020    Procedure: CARDIOVERSION;  Surgeon: Camron Isbell MD;  Location: [facility] EP LAB;  Service: Cardiology;  Laterality: N/A;  AF, NOE, DCCV, MAC, DM, 3 PREP*SJM dual ICD in situ*    TREATMENT OF CARDIAC ARRHYTHMIA  11/23/2020    Procedure: Cardioversion or Defibrillation;  Surgeon: Camron Isbell MD;  Location: [facility] EP LAB;  Service: Cardiology;;    VASCULAR SURGERY      stents placed           Pre-op Assessment    I have reviewed the Patient Summary Reports.     I have reviewed the Nursing Notes. I have reviewed the NPO Status.   I have reviewed the Medications.     Review of Systems  Anesthesia Hx:    System negative unless otherwise specified in the HPI or problem list above           Denies Family Hx of Anesthesia complications.    Denies Personal Hx of Anesthesia complications.                    Hematology/Oncology:                   Hematology Comments: System negative unless otherwise specified in the HPI or problem list above                Oncology Comments: System negative unless otherwise specified in the HPI or problem list above     EENT/Dental:   System negative unless otherwise specified in the HPI or problem list above          Cardiovascular:                    System negative unless otherwise specified in the HPI or problem list above                         Pulmonary:         System negative unless otherwise  specified in the HPI or problem list above               Renal/:     System negative unless otherwise specified in the HPI or problem list above             Hepatic/GI:        System negative unless otherwise specified in the HPI or problem list above          Musculoskeletal:     System negative unless otherwise specified in the HPI or problem list above            OB/GYN/PEDS:          System negative unless otherwise specified in the HPI or problem list above   Neurological:           System negative unless otherwise specified in the HPI or problem list above                            Endocrine:     System negative unless otherwise specified in the HPI or problem list above        Dermatological:  System negative unless otherwise specified in the HPI or problem list above   Psych:     System negative unless otherwise specified in the HPI or problem list above               Physical Exam  General: Well nourished, Cooperative, Alert and Oriented    Airway:  Mouth Opening: Normal  TM Distance: Normal  Tongue: Normal  Neck ROM: Normal ROM    Chest/Lungs:  Clear to auscultation, Normal Respiratory Rate    Heart:  Rate: Normal  Rhythm: Regular Rhythm  Sounds: Normal        Anesthesia Plan  Type of Anesthesia, risks & benefits discussed:    Anesthesia Type: MAC, Gen Natural Airway, Gen ETT  Intra-op Monitoring Plan: Standard ASA Monitors  Post Op Pain Control Plan: multimodal analgesia  Induction:  IV  Informed Consent: Informed consent signed with the Patient and all parties understand the risks and agree with anesthesia plan.  All questions answered.   ASA Score: 4  Day of Surgery Review of History & Physical: H&P Update referred to the surgeon/provider.    Ready For Surgery From Anesthesia Perspective.     .

## 2024-03-06 NOTE — ASSESSMENT & PLAN NOTE
-Cr at baseline, no acute issues. Continue to monitor while admitted  -Cr 1.3 > 1.6 today, which is actually within his baseline range. Will hold diuretic and ARNI for now though and monitor daily.

## 2024-03-06 NOTE — SUBJECTIVE & OBJECTIVE
Past Medical History:   Diagnosis Date    Anticoagulant long-term use     Atrial fibrillation     Benign essential tremor 6/6/2018    Cardiomyopathy     Chest congestion     CHF (congestive heart failure)     Coronary artery disease     Hypertension     Left ventricular thrombus     Obstructive sleep apnea 4/3/2022    Syncope and collapse     Thyroid disease        Past Surgical History:   Procedure Laterality Date    ABLATION OF ARRHYTHMOGENIC FOCUS FOR ATRIAL FIBRILLATION N/A 12/9/2019    Procedure: ABLATION, ARRHYTHMOGENIC FOCUS, FOR ATRIAL FIBRILLATION;  Surgeon: Camron Isbell MD;  Location: [facility] EP LAB;  Service: Cardiology;  Laterality: N/A;  AF, NOE (firm), PVI, CRYO, LORENZA, GEN, DM, 3 PREP * Dual ICD SJM*    ABLATION OF ARRHYTHMOGENIC FOCUS FOR ATRIAL FIBRILLATION N/A 11/23/2020    Procedure: ABLATION, ARRHYTHMOGENIC FOCUS, FOR ATRIAL FIBRILLATION;  Surgeon: Camron Isbell MD;  Location: [facility] EP LAB;  Service: Cardiology;  Laterality: N/A;  AF, Redo PVI, RFA, CARTO, GEN, DM, 3 PREP* Dual ICD SJM in situ* NOE deferred pt compliant*    CARDIAC CATHETERIZATION      CARDIAC DEFIBRILLATOR PLACEMENT      CORONARY ANGIOPLASTY      HERNIA REPAIR      KNEE ARTHROSCOPY      REPLACEMENT OF IMPLANTABLE CARDIOVERTER-DEFIBRILLATOR (ICD) GENERATOR Left 6/7/2018    Procedure: REPLACEMENT-GENERATOR-ICD;  Surgeon: Camron Isbell MD;  Location: [facility] CATH LAB;  Service: Cardiology;  Laterality: Left;  GENIE, DUAL ICD GEN CHG, SJM, MAC, DM, 3 PREP    STENTS      TRANSESOPHAGEAL ECHOCARDIOGRAPHY N/A 10/31/2019    Procedure: ECHOCARDIOGRAM, TRANSESOPHAGEAL;  Surgeon: [facility];  Location: [facility] EP LAB;  Service: Cardiology;  Laterality: N/A;  AF, NOE, DCCV, MAC, DM, 3 PREP    TRANSESOPHAGEAL ECHOCARDIOGRAPHY N/A 12/9/2019    Procedure: ECHOCARDIOGRAM, TRANSESOPHAGEAL;  Surgeon: Sun Merchant MD;  Location: [facility] EP LAB;  Service: Cardiology;  Laterality: N/A;    TRANSESOPHAGEAL ECHOCARDIOGRAPHY N/A 10/29/2020     Procedure: ECHOCARDIOGRAM, TRANSESOPHAGEAL;  Surgeon: Dannie Mittal III, MD;  Location: [facility] EP LAB;  Service: Cardiology;  Laterality: N/A;    TREATMENT OF CARDIAC ARRHYTHMIA N/A 10/31/2019    Procedure: CARDIOVERSION;  Surgeon: Camron Isbell MD;  Location: [facility] EP LAB;  Service: Cardiology;  Laterality: N/A;  AF, NOE, DCCV, MAC, DM, 3 PREP*SJM  Dual ICD in situ*    TREATMENT OF CARDIAC ARRHYTHMIA N/A 10/29/2020    Procedure: CARDIOVERSION;  Surgeon: Camron Isbell MD;  Location: [facility] EP LAB;  Service: Cardiology;  Laterality: N/A;  AF, NOE, DCCV, MAC, DM, 3 PREP*SJM dual ICD in situ*    TREATMENT OF CARDIAC ARRHYTHMIA  11/23/2020    Procedure: Cardioversion or Defibrillation;  Surgeon: Camron Isbell MD;  Location: [facility] EP LAB;  Service: Cardiology;;    VASCULAR SURGERY      stents placed       Review of patient's allergies indicates:  No Known Allergies    Current Facility-Administered Medications on File Prior to Encounter   Medication    0.9%  NaCl infusion    sodium chloride 0.9% flush 5 mL    sodium chloride 0.9% flush 5 mL     Current Outpatient Medications on File Prior to Encounter   Medication Sig    amLODIPine (NORVASC) 5 MG tablet Take 1 tablet (5 mg total) by mouth once daily.    ascorbic acid, vitamin C, (VITAMIN C) 100 MG tablet Take 1,000 mg by mouth once daily.    aspirin (ECOTRIN) 81 MG EC tablet Take 81 mg by mouth once daily.    atorvastatin (LIPITOR) 20 MG tablet Take 1 tablet by mouth once daily    carvediloL (COREG) 12.5 MG tablet Take 1 tablet (12.5 mg total) by mouth 2 (two) times daily with meals.    coenzyme Q10 200 mg capsule Take 200 mg by mouth once daily.    dofetilide (TIKOSYN) 500 MCG Cap TAKE 1 CAPSULE BY MOUTH EVERY 12 HOURS    ELIQUIS 5 mg Tab Take 1 tablet by mouth twice daily    empagliflozin (JARDIANCE) 10 mg tablet Take 1 tablet (10 mg total) by mouth once daily.    ENTRESTO  mg per tablet Take 1 tablet by mouth twice daily    fish oil-omega-3 fatty acids  300-1,000 mg capsule Take 2 g by mouth 2 (two) times daily.    furosemide (LASIX) 40 MG tablet Take 20 mg by mouth daily as needed.    levothyroxine (SYNTHROID) 88 MCG tablet TAKE 1 TABLET BY MOUTH ONCE DAILY BEFORE BREAKFAST    magnesium oxide (MAG-OX) 400 mg (241.3 mg magnesium) tablet Take 400 mg by mouth once daily.    multivitamin capsule Take 1 capsule by mouth nightly.    nitroGLYCERIN (NITROSTAT) 0.4 MG SL tablet Place 1 tablet (0.4 mg total) under the tongue every 5 (five) minutes as needed for Chest pain.    pantoprazole (PROTONIX) 40 MG tablet Take 40 mg by mouth once daily.    SLO-NIACIN 750 mg TbSR Take 1 tablet (750 mg total) by mouth 2 (two) times daily. (Patient taking differently: Take 500 mg by mouth 2 (two) times daily.)    zinc 50 mg Tab Take 40 mg by mouth.     Family History       Problem Relation (Age of Onset)    Heart disease Mother, Father, Brother          Tobacco Use    Smoking status: Never    Smokeless tobacco: Former     Quit date: 1980   Substance and Sexual Activity    Alcohol use: Yes     Comment: occasionally    Drug use: No    Sexual activity: Not Currently     Review of Systems   Constitutional: Negative for chills, fever, malaise/fatigue and night sweats.   HENT:  Negative for congestion, nosebleeds, sore throat, stridor and tinnitus.    Eyes:  Negative for blurred vision, discharge, double vision, pain, vision loss in left eye, vision loss in right eye, visual disturbance and visual halos.   Cardiovascular:  Negative for chest pain, dyspnea on exertion, leg swelling, near-syncope, orthopnea, palpitations and syncope.   Respiratory:  Negative for cough, hemoptysis, shortness of breath, snoring, sputum production and wheezing.    Endocrine: Negative for cold intolerance, heat intolerance and polydipsia.   Hematologic/Lymphatic: Negative for adenopathy and bleeding problem. Does not bruise/bleed easily.   Skin:  Negative for color change, dry skin, flushing, poor wound healing and  suspicious lesions.   Musculoskeletal:  Negative for arthritis, back pain, gout, joint pain and joint swelling.   Gastrointestinal:  Negative for bloating, abdominal pain, constipation and diarrhea.   Genitourinary:  Negative for dysuria, frequency and hematuria.   Neurological:  Negative for dizziness, focal weakness, headaches, light-headedness, loss of balance, numbness and weakness.   Psychiatric/Behavioral:  Negative for altered mental status, hallucinations and hypervigilance. The patient is not nervous/anxious.      Objective:     Vital Signs (Most Recent):  Temp: 97.6 °F (36.4 °C) (03/06/24 0750)  Pulse: 82 (03/06/24 1121)  Resp: 16 (03/06/24 0949)  BP: (!) 106/56 (03/06/24 0949)  SpO2: 95 % (03/06/24 0750) Vital Signs (24h Range):  Temp:  [97.6 °F (36.4 °C)-98.3 °F (36.8 °C)] 97.6 °F (36.4 °C)  Pulse:  [79-91] 82  Resp:  [16-20] 16  SpO2:  [93 %-97 %] 95 %  BP: (106-128)/(56-90) 106/56       Weight: 114.3 kg (252 lb)  Body mass index is 32.35 kg/m².    SpO2: 95 %        Physical Exam  Constitutional:       General: He is not in acute distress.     Appearance: Normal appearance. He is normal weight. He is not toxic-appearing.   HENT:      Head: Normocephalic and atraumatic.   Eyes:      General:         Right eye: No discharge.         Left eye: No discharge.      Extraocular Movements: Extraocular movements intact.      Conjunctiva/sclera: Conjunctivae normal.      Pupils: Pupils are equal, round, and reactive to light.   Cardiovascular:      Rate and Rhythm: Normal rate and regular rhythm.      Pulses: Normal pulses.      Heart sounds: Normal heart sounds. No murmur heard.     No friction rub.   Pulmonary:      Effort: Pulmonary effort is normal. No respiratory distress.      Breath sounds: Normal breath sounds. No wheezing or rales.   Abdominal:      General: Abdomen is flat. Bowel sounds are normal. There is no distension.      Palpations: Abdomen is soft.      Tenderness: There is no abdominal  tenderness. There is no guarding.   Musculoskeletal:         General: No swelling, tenderness or deformity. Normal range of motion.      Cervical back: Normal range of motion and neck supple. No rigidity.      Right lower leg: No edema.      Left lower leg: No edema.   Skin:     General: Skin is warm and dry.      Capillary Refill: Capillary refill takes less than 2 seconds.      Coloration: Skin is not jaundiced or pale.      Findings: No bruising.   Neurological:      General: No focal deficit present.      Mental Status: He is alert and oriented to person, place, and time. Mental status is at baseline.   Psychiatric:         Mood and Affect: Mood normal.         Thought Content: Thought content normal.         Judgment: Judgment normal.            Significant Labs: None    Significant Imaging:  None

## 2024-03-06 NOTE — PROGRESS NOTES
[facility] - Cardiology Stepdown  Cardiac Electrophysiology  Progress Note    Admission Date: 3/4/2024  Code Status: Full Code   Attending Physician: Aixa Jimenez MD   Expected Discharge Date: 3/7/2024  Principal Problem:Chest pain    Subjective:   Telemetry similar to prior. Pacing at 80 with HR intermittently rising to afib in 100s. Otherwise no complaints, no changes. Pending PET Stress.    Past Medical History:   Diagnosis Date    Anticoagulant long-term use     Atrial fibrillation     Benign essential tremor 6/6/2018    Cardiomyopathy     Chest congestion     CHF (congestive heart failure)     Coronary artery disease     Hypertension     Left ventricular thrombus     Obstructive sleep apnea 4/3/2022    Syncope and collapse     Thyroid disease        Past Surgical History:   Procedure Laterality Date    ABLATION OF ARRHYTHMOGENIC FOCUS FOR ATRIAL FIBRILLATION N/A 12/9/2019    Procedure: ABLATION, ARRHYTHMOGENIC FOCUS, FOR ATRIAL FIBRILLATION;  Surgeon: Camron Isbell MD;  Location: [facility] EP LAB;  Service: Cardiology;  Laterality: N/A;  AF, NOE (firm), PVI, CRYO, LORENZA, GEN, DM, 3 PREP * Dual ICD SJM*    ABLATION OF ARRHYTHMOGENIC FOCUS FOR ATRIAL FIBRILLATION N/A 11/23/2020    Procedure: ABLATION, ARRHYTHMOGENIC FOCUS, FOR ATRIAL FIBRILLATION;  Surgeon: Camron Isbell MD;  Location: [facility] EP LAB;  Service: Cardiology;  Laterality: N/A;  AF, Redo PVI, RFA, CARTO, GEN, DM, 3 PREP* Dual ICD SJM in situ* NOE deferred pt compliant*    CARDIAC CATHETERIZATION      CARDIAC DEFIBRILLATOR PLACEMENT      CORONARY ANGIOPLASTY      HERNIA REPAIR      KNEE ARTHROSCOPY      REPLACEMENT OF IMPLANTABLE CARDIOVERTER-DEFIBRILLATOR (ICD) GENERATOR Left 6/7/2018    Procedure: REPLACEMENT-GENERATOR-ICD;  Surgeon: Camron Isbell MD;  Location: [facility] CATH LAB;  Service: Cardiology;  Laterality: Left;  GENIE, DUAL ICD GEN CHG, SJM, MAC, DM, 3 PREP    STENTS      TRANSESOPHAGEAL ECHOCARDIOGRAPHY N/A 10/31/2019    Procedure:  ECHOCARDIOGRAM, TRANSESOPHAGEAL;  Surgeon: Eleuterio Diagnostic Provider;  Location: [facility] EP LAB;  Service: Cardiology;  Laterality: N/A;  AF, NOE, DCCV, MAC, DM, 3 PREP    TRANSESOPHAGEAL ECHOCARDIOGRAPHY N/A 12/9/2019    Procedure: ECHOCARDIOGRAM, TRANSESOPHAGEAL;  Surgeon: Sun Merchant MD;  Location: [facility] EP LAB;  Service: Cardiology;  Laterality: N/A;    TRANSESOPHAGEAL ECHOCARDIOGRAPHY N/A 10/29/2020    Procedure: ECHOCARDIOGRAM, TRANSESOPHAGEAL;  Surgeon: Dannie Mittal III, MD;  Location: [facility] EP LAB;  Service: Cardiology;  Laterality: N/A;    TREATMENT OF CARDIAC ARRHYTHMIA N/A 10/31/2019    Procedure: CARDIOVERSION;  Surgeon: Camron Isbell MD;  Location: [facility] EP LAB;  Service: Cardiology;  Laterality: N/A;  AF, NOE, DCCV, MAC, DM, 3 PREP*SJM  Dual ICD in situ*    TREATMENT OF CARDIAC ARRHYTHMIA N/A 10/29/2020    Procedure: CARDIOVERSION;  Surgeon: Camron Isbell MD;  Location: [facility] EP LAB;  Service: Cardiology;  Laterality: N/A;  AF, NOE, DCCV, MAC, DM, 3 PREP*SJM dual ICD in situ*    TREATMENT OF CARDIAC ARRHYTHMIA  11/23/2020    Procedure: Cardioversion or Defibrillation;  Surgeon: Camron Isbell MD;  Location: [facility] EP LAB;  Service: Cardiology;;    VASCULAR SURGERY      stents placed       Review of patient's allergies indicates:  No Known Allergies    Current Facility-Administered Medications on File Prior to Encounter   Medication    0.9%  NaCl infusion    sodium chloride 0.9% flush 5 mL    sodium chloride 0.9% flush 5 mL     Current Outpatient Medications on File Prior to Encounter   Medication Sig    amLODIPine (NORVASC) 5 MG tablet Take 1 tablet (5 mg total) by mouth once daily.    ascorbic acid, vitamin C, (VITAMIN C) 100 MG tablet Take 1,000 mg by mouth once daily.    aspirin (ECOTRIN) 81 MG EC tablet Take 81 mg by mouth once daily.    atorvastatin (LIPITOR) 20 MG tablet Take 1 tablet by mouth once daily    carvediloL (COREG) 12.5 MG tablet Take 1 tablet (12.5 mg total) by mouth 2 (two)  times daily with meals.    coenzyme Q10 200 mg capsule Take 200 mg by mouth once daily.    dofetilide (TIKOSYN) 500 MCG Cap TAKE 1 CAPSULE BY MOUTH EVERY 12 HOURS    ELIQUIS 5 mg Tab Take 1 tablet by mouth twice daily    empagliflozin (JARDIANCE) 10 mg tablet Take 1 tablet (10 mg total) by mouth once daily.    ENTRESTO  mg per tablet Take 1 tablet by mouth twice daily    fish oil-omega-3 fatty acids 300-1,000 mg capsule Take 2 g by mouth 2 (two) times daily.    furosemide (LASIX) 40 MG tablet Take 20 mg by mouth daily as needed.    levothyroxine (SYNTHROID) 88 MCG tablet TAKE 1 TABLET BY MOUTH ONCE DAILY BEFORE BREAKFAST    magnesium oxide (MAG-OX) 400 mg (241.3 mg magnesium) tablet Take 400 mg by mouth once daily.    multivitamin capsule Take 1 capsule by mouth nightly.    nitroGLYCERIN (NITROSTAT) 0.4 MG SL tablet Place 1 tablet (0.4 mg total) under the tongue every 5 (five) minutes as needed for Chest pain.    pantoprazole (PROTONIX) 40 MG tablet Take 40 mg by mouth once daily.    SLO-NIACIN 750 mg TbSR Take 1 tablet (750 mg total) by mouth 2 (two) times daily. (Patient taking differently: Take 500 mg by mouth 2 (two) times daily.)    zinc 50 mg Tab Take 40 mg by mouth.     Family History       Problem Relation (Age of Onset)    Heart disease Mother, Father, Brother          Tobacco Use    Smoking status: Never    Smokeless tobacco: Former     Quit date: 1980   Substance and Sexual Activity    Alcohol use: Yes     Comment: occasionally    Drug use: No    Sexual activity: Not Currently     Review of Systems   Constitutional: Negative for chills, fever, malaise/fatigue and night sweats.   HENT:  Negative for congestion, nosebleeds, sore throat, stridor and tinnitus.    Eyes:  Negative for blurred vision, discharge, double vision, pain, vision loss in left eye, vision loss in right eye, visual disturbance and visual halos.   Cardiovascular:  Negative for chest pain, dyspnea on exertion, leg swelling,  near-syncope, orthopnea, palpitations and syncope.   Respiratory:  Negative for cough, hemoptysis, shortness of breath, snoring, sputum production and wheezing.    Endocrine: Negative for cold intolerance, heat intolerance and polydipsia.   Hematologic/Lymphatic: Negative for adenopathy and bleeding problem. Does not bruise/bleed easily.   Skin:  Negative for color change, dry skin, flushing, poor wound healing and suspicious lesions.   Musculoskeletal:  Negative for arthritis, back pain, gout, joint pain and joint swelling.   Gastrointestinal:  Negative for bloating, abdominal pain, constipation and diarrhea.   Genitourinary:  Negative for dysuria, frequency and hematuria.   Neurological:  Negative for dizziness, focal weakness, headaches, light-headedness, loss of balance, numbness and weakness.   Psychiatric/Behavioral:  Negative for altered mental status, hallucinations and hypervigilance. The patient is not nervous/anxious.      Objective:     Vital Signs (Most Recent):  Temp: 97.6 °F (36.4 °C) (03/06/24 0750)  Pulse: 82 (03/06/24 1121)  Resp: 16 (03/06/24 0949)  BP: (!) 106/56 (03/06/24 0949)  SpO2: 95 % (03/06/24 0750) Vital Signs (24h Range):  Temp:  [97.6 °F (36.4 °C)-98.3 °F (36.8 °C)] 97.6 °F (36.4 °C)  Pulse:  [79-91] 82  Resp:  [16-20] 16  SpO2:  [93 %-97 %] 95 %  BP: (106-128)/(56-90) 106/56       Weight: 114.3 kg (252 lb)  Body mass index is 32.35 kg/m².    SpO2: 95 %        Physical Exam  Constitutional:       General: He is not in acute distress.     Appearance: Normal appearance. He is normal weight. He is not toxic-appearing.   HENT:      Head: Normocephalic and atraumatic.   Eyes:      General:         Right eye: No discharge.         Left eye: No discharge.      Extraocular Movements: Extraocular movements intact.      Conjunctiva/sclera: Conjunctivae normal.      Pupils: Pupils are equal, round, and reactive to light.   Cardiovascular:      Rate and Rhythm: Normal rate and regular rhythm.       Pulses: Normal pulses.      Heart sounds: Normal heart sounds. No murmur heard.     No friction rub.   Pulmonary:      Effort: Pulmonary effort is normal. No respiratory distress.      Breath sounds: Normal breath sounds. No wheezing or rales.   Abdominal:      General: Abdomen is flat. Bowel sounds are normal. There is no distension.      Palpations: Abdomen is soft.      Tenderness: There is no abdominal tenderness. There is no guarding.   Musculoskeletal:         General: No swelling, tenderness or deformity. Normal range of motion.      Cervical back: Normal range of motion and neck supple. No rigidity.      Right lower leg: No edema.      Left lower leg: No edema.   Skin:     General: Skin is warm and dry.      Capillary Refill: Capillary refill takes less than 2 seconds.      Coloration: Skin is not jaundiced or pale.      Findings: No bruising.   Neurological:      General: No focal deficit present.      Mental Status: He is alert and oriented to person, place, and time. Mental status is at baseline.   Psychiatric:         Mood and Affect: Mood normal.         Thought Content: Thought content normal.         Judgment: Judgment normal.            Significant Labs: None    Significant Imaging:  None  Assessment and Plan:     Persistent atrial fibrillation  78M pmhx HFrEF, sleep apnea (CPAP), afib (tikosyn), VT w/ AICD placement, CAD (prior stents), htn, hld, CKD admitted for chest pain and newly reduced EF. EP consulted for atrial fibrillation.   Interrogation of device shows episode of afib in February and ongoing atrial fibrillation from 3/4/24 at 4am. Follows with Dr. Isbell, compliant with tikosyn 500mcg bid. Prior pulmonary vein cryoablation 2019, RFA 2020.  -Continue home tikosyn, coreg, anticoagulation.  -Proceed first with PET stress and any ischemic interventions as indicated.  -DCCV/NOE today.        Jaden Farnsworth MD  Cardiac Electrophysiology  [facility] - Cardiology Stepdown

## 2024-03-06 NOTE — TRANSFER OF CARE
"Anesthesia Transfer of Care Note    Patient: Amish Grossman    Procedure(s) Performed: Procedure(s) (LRB):  Cardioversion or Defibrillation (N/A)  Transesophageal echo (NOE) intra-procedure log documentation (N/A)    Patient location: PACU    Anesthesia Type: general    Transport from OR: Transported from OR on 2-3 L/min O2 by NC with adequate spontaneous ventilation    Post pain: adequate analgesia    Post assessment: no apparent anesthetic complications and tolerated procedure well    Post vital signs: stable    Level of consciousness: awake, alert and oriented    Nausea/Vomiting: no nausea/vomiting    Complications: none    Transfer of care protocol was followedComments: Nurse at bedside, VSS, spont reg resp noted    Last vitals: Visit Vitals  BP 92/69   Pulse 60   Temp 36.3 °C (97.3 °F) (Temporal)   Resp 15   Ht 6' 2" (1.88 m)   Wt 114.3 kg (252 lb)   SpO2 95%   BMI 32.35 kg/m²     "

## 2024-03-06 NOTE — ASSESSMENT & PLAN NOTE
Echo    Result Date: 3/5/2024    Left Ventricle: The left ventricle is mildly dilated. Ventricular mass   is normal. Normal wall thickness. Regional wall motion abnormalities   present. See diagram for wall motion findings. There is moderate-severely   reduced systolic function. Ejection fraction by visual approximation is   30% ( upper 20s to low 30s and less than the prior). Grade III diastolic   dysfunction.    Right Ventricle: Normal right ventricular cavity size. Wall thickness   is normal. Right ventricle wall motion  is normal. Systolic function is   normal. Pacemaker lead present in the ventricle.    Left Atrium: Left atrium is moderately dilated.    Right Atrium: Right atrium is moderately dilated. Lead present in the   right atrium.    Pulmonary Artery: The estimated pulmonary artery systolic pressure is   24 mmHg.    IVC/SVC: Normal venous pressure at 3 mmHg.       -EF has decreased from prior.  -No signs of acute volume overload, monitor closely while admitted. Continue BB. Resume home lasix & ARNI if Cr stable.

## 2024-03-06 NOTE — ASSESSMENT & PLAN NOTE
CAD  -chest pain + diaphoresis, now resolved  -troponin 0.025 > 0.029, flat  -echo shows worse EF 30%, regional WMA's  -cardiology consulted. PET did not show reversible defect, similar to previous.   -cont HI statin, ASA

## 2024-03-06 NOTE — NURSING TRANSFER
Nursing Transfer Note      3/6/2024   0857 AM    Reason patient is being transferred: stress test    Transfer To: stress test unit    Transfer via wheelchair    Transfer with cardiac monitoring    Transported by transportation team

## 2024-03-07 VITALS
SYSTOLIC BLOOD PRESSURE: 122 MMHG | WEIGHT: 248.25 LBS | RESPIRATION RATE: 16 BRPM | BODY MASS INDEX: 31.86 KG/M2 | OXYGEN SATURATION: 96 % | HEIGHT: 74 IN | DIASTOLIC BLOOD PRESSURE: 76 MMHG | HEART RATE: 60 BPM | TEMPERATURE: 97 F

## 2024-03-07 LAB
ALBUMIN SERPL BCP-MCNC: 3.4 G/DL (ref 3.5–5.2)
ALP SERPL-CCNC: 49 U/L (ref 55–135)
ALT SERPL W/O P-5'-P-CCNC: 17 U/L (ref 10–44)
ANION GAP SERPL CALC-SCNC: 7 MMOL/L (ref 8–16)
AST SERPL-CCNC: 20 U/L (ref 10–40)
BASOPHILS # BLD AUTO: 0.04 K/UL (ref 0–0.2)
BASOPHILS NFR BLD: 0.6 % (ref 0–1.9)
BILIRUB SERPL-MCNC: 0.5 MG/DL (ref 0.1–1)
BUN SERPL-MCNC: 21 MG/DL (ref 8–23)
CALCIUM SERPL-MCNC: 9.2 MG/DL (ref 8.7–10.5)
CHLORIDE SERPL-SCNC: 109 MMOL/L (ref 95–110)
CO2 SERPL-SCNC: 24 MMOL/L (ref 23–29)
CREAT SERPL-MCNC: 1.7 MG/DL (ref 0.5–1.4)
DIFFERENTIAL METHOD BLD: ABNORMAL
EOSINOPHIL # BLD AUTO: 0.4 K/UL (ref 0–0.5)
EOSINOPHIL NFR BLD: 6.2 % (ref 0–8)
ERYTHROCYTE [DISTWIDTH] IN BLOOD BY AUTOMATED COUNT: 14.5 % (ref 11.5–14.5)
EST. GFR  (NO RACE VARIABLE): 41 ML/MIN/1.73 M^2
GLUCOSE SERPL-MCNC: 96 MG/DL (ref 70–110)
HCT VFR BLD AUTO: 41.9 % (ref 40–54)
HGB BLD-MCNC: 13.4 G/DL (ref 14–18)
IMM GRANULOCYTES # BLD AUTO: 0.01 K/UL (ref 0–0.04)
IMM GRANULOCYTES NFR BLD AUTO: 0.2 % (ref 0–0.5)
LYMPHOCYTES # BLD AUTO: 2.2 K/UL (ref 1–4.8)
LYMPHOCYTES NFR BLD: 35.4 % (ref 18–48)
MCH RBC QN AUTO: 31.9 PG (ref 27–31)
MCHC RBC AUTO-ENTMCNC: 32 G/DL (ref 32–36)
MCV RBC AUTO: 100 FL (ref 82–98)
MONOCYTES # BLD AUTO: 0.6 K/UL (ref 0.3–1)
MONOCYTES NFR BLD: 10 % (ref 4–15)
NEUTROPHILS # BLD AUTO: 3 K/UL (ref 1.8–7.7)
NEUTROPHILS NFR BLD: 47.6 % (ref 38–73)
NRBC BLD-RTO: 0 /100 WBC
OHS QRS DURATION: 88 MS
OHS QRS DURATION: 90 MS
OHS QTC CALCULATION: 466 MS
OHS QTC CALCULATION: 495 MS
PLATELET # BLD AUTO: 248 K/UL (ref 150–450)
PMV BLD AUTO: 10.2 FL (ref 9.2–12.9)
POTASSIUM SERPL-SCNC: 4.3 MMOL/L (ref 3.5–5.1)
PROT SERPL-MCNC: 6.7 G/DL (ref 6–8.4)
RBC # BLD AUTO: 4.2 M/UL (ref 4.6–6.2)
SODIUM SERPL-SCNC: 140 MMOL/L (ref 136–145)
WBC # BLD AUTO: 6.28 K/UL (ref 3.9–12.7)

## 2024-03-07 PROCEDURE — 80053 COMPREHEN METABOLIC PANEL: CPT | Performed by: HOSPITALIST

## 2024-03-07 PROCEDURE — 85025 COMPLETE CBC W/AUTO DIFF WBC: CPT | Performed by: HOSPITALIST

## 2024-03-07 PROCEDURE — 99233 SBSQ HOSP IP/OBS HIGH 50: CPT | Mod: ,,, | Performed by: INTERNAL MEDICINE

## 2024-03-07 PROCEDURE — 25000003 PHARM REV CODE 250: Performed by: HOSPITALIST

## 2024-03-07 PROCEDURE — 36415 COLL VENOUS BLD VENIPUNCTURE: CPT | Performed by: HOSPITALIST

## 2024-03-07 RX ADMIN — AMLODIPINE BESYLATE 5 MG: 5 TABLET ORAL at 08:03

## 2024-03-07 RX ADMIN — APIXABAN 5 MG: 5 TABLET, FILM COATED ORAL at 08:03

## 2024-03-07 RX ADMIN — LEVOTHYROXINE SODIUM 88 MCG: 88 TABLET ORAL at 05:03

## 2024-03-07 RX ADMIN — ATORVASTATIN CALCIUM 80 MG: 20 TABLET, FILM COATED ORAL at 08:03

## 2024-03-07 RX ADMIN — ASPIRIN 81 MG: 81 TABLET, COATED ORAL at 08:03

## 2024-03-07 RX ADMIN — PANTOPRAZOLE SODIUM 40 MG: 40 TABLET, DELAYED RELEASE ORAL at 08:03

## 2024-03-07 RX ADMIN — DOFETILIDE 500 MCG: 0.25 CAPSULE ORAL at 08:03

## 2024-03-07 RX ADMIN — CARVEDILOL 12.5 MG: 12.5 TABLET, FILM COATED ORAL at 08:03

## 2024-03-07 NOTE — SUBJECTIVE & OBJECTIVE
Interval:  Patient remains out of atrial fibrillation after cardioversion. Reports he feels well, no acute complaints.    Past Medical History:   Diagnosis Date    Anticoagulant long-term use     Atrial fibrillation     Benign essential tremor 6/6/2018    Cardiomyopathy     Chest congestion     CHF (congestive heart failure)     Coronary artery disease     Hypertension     Left ventricular thrombus     Obstructive sleep apnea 4/3/2022    Syncope and collapse     Thyroid disease        Past Surgical History:   Procedure Laterality Date    ABLATION OF ARRHYTHMOGENIC FOCUS FOR ATRIAL FIBRILLATION N/A 12/9/2019    Procedure: ABLATION, ARRHYTHMOGENIC FOCUS, FOR ATRIAL FIBRILLATION;  Surgeon: Camron Isbell MD;  Location: [facility];  Service: Cardiology;  Laterality: N/A;  AF, NOE (firm), PVI, CRYO, LORENZA, GEN, DM, 3 PREP * Dual ICD SJM*    ABLATION OF ARRHYTHMOGENIC FOCUS FOR ATRIAL FIBRILLATION N/A 11/23/2020    Procedure: ABLATION, ARRHYTHMOGENIC FOCUS, FOR ATRIAL FIBRILLATION;  Surgeon: Camron Isbell MD;  Location: [facility];  Service: Cardiology;  Laterality: N/A;  AF, Redo PVI, RFA, CARTO, GEN, DM, 3 PREP* Dual ICD SJM in situ* NOE deferred pt compliant*    CARDIAC CATHETERIZATION      CARDIAC DEFIBRILLATOR PLACEMENT      CORONARY ANGIOPLASTY      ECHOCARDIOGRAM,TRANSESOPHAGEAL N/A 3/6/2024    Procedure: Transesophageal echo (NOE) intra-procedure log documentation;  Surgeon: Provider, Malcom Diagnostic;  Location: [facility];  Service: Cardiology;  Laterality: N/A;    HERNIA REPAIR      KNEE ARTHROSCOPY      REPLACEMENT OF IMPLANTABLE CARDIOVERTER-DEFIBRILLATOR (ICD) GENERATOR Left 6/7/2018    Procedure: REPLACEMENT-GENERATOR-ICD;  Surgeon: Camron Isbell MD;  Location: [facility] C[facility];  Service: Cardiology;  Laterality: Left;  GENIE, DUAL ICD GEN CHG, SJM, MAC, DM, 3 PREP    STENTS      TRANSESOPHAGEAL ECHOCARDIOGRAPHY N/A 10/31/2019    Procedure: ECHOCARDIOGRAM, TRANSESOPHAGEAL;  Surgeon: Dosc Diagnostic  Provider;  Location: [facility] EP LAB;  Service: Cardiology;  Laterality: N/A;  AF, NOE, DCCV, MAC, DM, 3 PREP    TRANSESOPHAGEAL ECHOCARDIOGRAPHY N/A 12/9/2019    Procedure: ECHOCARDIOGRAM, TRANSESOPHAGEAL;  Surgeon: Sun Merchant MD;  Location: [facility] EP LAB;  Service: Cardiology;  Laterality: N/A;    TRANSESOPHAGEAL ECHOCARDIOGRAPHY N/A 10/29/2020    Procedure: ECHOCARDIOGRAM, TRANSESOPHAGEAL;  Surgeon: Dannie Mittal III, MD;  Location: [facility] EP LAB;  Service: Cardiology;  Laterality: N/A;    TREATMENT OF CARDIAC ARRHYTHMIA N/A 10/31/2019    Procedure: CARDIOVERSION;  Surgeon: Camron Isbell MD;  Location: [facility] EP LAB;  Service: Cardiology;  Laterality: N/A;  AF, NOE, DCCV, MAC, DM, 3 PREP*SJM  Dual ICD in situ*    TREATMENT OF CARDIAC ARRHYTHMIA N/A 10/29/2020    Procedure: CARDIOVERSION;  Surgeon: Camron Isbell MD;  Location: [facility] EP LAB;  Service: Cardiology;  Laterality: N/A;  AF, NEO, DCCV, MAC, DM, 3 PREP*SJM dual ICD in situ*    TREATMENT OF CARDIAC ARRHYTHMIA  11/23/2020    Procedure: Cardioversion or Defibrillation;  Surgeon: Camron Isbell MD;  Location: [facility] EP LAB;  Service: Cardiology;;    TREATMENT OF CARDIAC ARRHYTHMIA N/A 3/6/2024    Procedure: Cardioversion or Defibrillation;  Surgeon: Camron Isbell MD;  Location: [facility] EP LAB;  Service: Cardiology;  Laterality: N/A;  AF, NOE/DCCV, DM, ANES, Rm 312    VASCULAR SURGERY      stents placed       Review of patient's allergies indicates:  No Known Allergies    Current Facility-Administered Medications on File Prior to Encounter   Medication    0.9%  NaCl infusion    sodium chloride 0.9% flush 5 mL    sodium chloride 0.9% flush 5 mL     Current Outpatient Medications on File Prior to Encounter   Medication Sig    amLODIPine (NORVASC) 5 MG tablet Take 1 tablet (5 mg total) by mouth once daily.    ascorbic acid, vitamin C, (VITAMIN C) 100 MG tablet Take 1,000 mg by mouth once daily.    aspirin (ECOTRIN) 81 MG EC tablet Take 81 mg by mouth once  daily.    atorvastatin (LIPITOR) 20 MG tablet Take 1 tablet by mouth once daily    carvediloL (COREG) 12.5 MG tablet Take 1 tablet (12.5 mg total) by mouth 2 (two) times daily with meals.    coenzyme Q10 200 mg capsule Take 200 mg by mouth once daily.    dofetilide (TIKOSYN) 500 MCG Cap TAKE 1 CAPSULE BY MOUTH EVERY 12 HOURS    ELIQUIS 5 mg Tab Take 1 tablet by mouth twice daily    empagliflozin (JARDIANCE) 10 mg tablet Take 1 tablet (10 mg total) by mouth once daily.    ENTRESTO  mg per tablet Take 1 tablet by mouth twice daily    fish oil-omega-3 fatty acids 300-1,000 mg capsule Take 2 g by mouth 2 (two) times daily.    furosemide (LASIX) 40 MG tablet Take 20 mg by mouth daily as needed.    levothyroxine (SYNTHROID) 88 MCG tablet TAKE 1 TABLET BY MOUTH ONCE DAILY BEFORE BREAKFAST    magnesium oxide (MAG-OX) 400 mg (241.3 mg magnesium) tablet Take 400 mg by mouth once daily.    multivitamin capsule Take 1 capsule by mouth nightly.    nitroGLYCERIN (NITROSTAT) 0.4 MG SL tablet Place 1 tablet (0.4 mg total) under the tongue every 5 (five) minutes as needed for Chest pain.    pantoprazole (PROTONIX) 40 MG tablet Take 40 mg by mouth once daily.    SLO-NIACIN 750 mg TbSR Take 1 tablet (750 mg total) by mouth 2 (two) times daily. (Patient taking differently: Take 500 mg by mouth 2 (two) times daily.)    zinc 50 mg Tab Take 40 mg by mouth.     Family History       Problem Relation (Age of Onset)    Heart disease Mother, Father, Brother          Tobacco Use    Smoking status: Never    Smokeless tobacco: Former     Quit date: 1980   Substance and Sexual Activity    Alcohol use: Yes     Comment: occasionally    Drug use: No    Sexual activity: Not Currently     Review of Systems   Constitutional: Negative for chills, fever, malaise/fatigue and night sweats.   HENT:  Negative for congestion, nosebleeds, sore throat, stridor and tinnitus.    Eyes:  Negative for blurred vision, discharge, double vision, pain, vision loss  in left eye, vision loss in right eye, visual disturbance and visual halos.   Cardiovascular:  Negative for chest pain, dyspnea on exertion, leg swelling, near-syncope, orthopnea, palpitations and syncope.   Respiratory:  Negative for cough, hemoptysis, shortness of breath, snoring, sputum production and wheezing.    Endocrine: Negative for cold intolerance, heat intolerance and polydipsia.   Hematologic/Lymphatic: Negative for adenopathy and bleeding problem. Does not bruise/bleed easily.   Skin:  Negative for color change, dry skin, flushing, poor wound healing and suspicious lesions.   Musculoskeletal:  Negative for arthritis, back pain, gout, joint pain and joint swelling.   Gastrointestinal:  Negative for bloating, abdominal pain, constipation and diarrhea.   Genitourinary:  Negative for dysuria, frequency and hematuria.   Neurological:  Negative for dizziness, focal weakness, headaches, light-headedness, loss of balance, numbness and weakness.   Psychiatric/Behavioral:  Negative for altered mental status, hallucinations and hypervigilance. The patient is not nervous/anxious.      Objective:     Vital Signs (Most Recent):  Temp: 97.4 °F (36.3 °C) (03/07/24 0742)  Pulse: 64 (03/07/24 0742)  Resp: 16 (03/07/24 0742)  BP: 122/76 (03/07/24 0742)  SpO2: 96 % (03/07/24 0742) Vital Signs (24h Range):  Temp:  [97.3 °F (36.3 °C)-98.1 °F (36.7 °C)] 97.4 °F (36.3 °C)  Pulse:  [60-87] 64  Resp:  [10-20] 16  SpO2:  [94 %-98 %] 96 %  BP: ()/(53-97) 122/76       Weight: 112.6 kg (248 lb 3.8 oz)  Body mass index is 31.87 kg/m².    SpO2: 96 %        Physical Exam  Constitutional:       General: He is not in acute distress.     Appearance: Normal appearance. He is normal weight. He is not toxic-appearing.   HENT:      Head: Normocephalic and atraumatic.   Eyes:      General:         Right eye: No discharge.         Left eye: No discharge.      Extraocular Movements: Extraocular movements intact.      Conjunctiva/sclera:  Conjunctivae normal.      Pupils: Pupils are equal, round, and reactive to light.   Cardiovascular:      Rate and Rhythm: Normal rate and regular rhythm.      Pulses: Normal pulses.      Heart sounds: Normal heart sounds. No murmur heard.     No friction rub.   Pulmonary:      Effort: Pulmonary effort is normal. No respiratory distress.      Breath sounds: Normal breath sounds. No wheezing or rales.   Abdominal:      General: Abdomen is flat. Bowel sounds are normal. There is no distension.      Palpations: Abdomen is soft.      Tenderness: There is no abdominal tenderness. There is no guarding.   Musculoskeletal:         General: No swelling, tenderness or deformity. Normal range of motion.      Cervical back: Normal range of motion and neck supple. No rigidity.      Right lower leg: No edema.      Left lower leg: No edema.   Skin:     General: Skin is warm and dry.      Capillary Refill: Capillary refill takes less than 2 seconds.      Coloration: Skin is not jaundiced or pale.      Findings: No bruising.   Neurological:      General: No focal deficit present.      Mental Status: He is alert and oriented to person, place, and time. Mental status is at baseline.   Psychiatric:         Mood and Affect: Mood normal.         Thought Content: Thought content normal.         Judgment: Judgment normal.            Significant Labs: None    Significant Imaging:  None

## 2024-03-07 NOTE — PROGRESS NOTES
[facility] - Cardiology Stepdown  Cardiac Electrophysiology  Progress Note    Admission Date: 3/4/2024  Code Status: Full Code   Attending Physician: Ariadna Alcala MD   Expected Discharge Date: 3/7/2024  Principal Problem:Chest pain    Subjective:     Interval:  Patient remains out of atrial fibrillation after cardioversion. Reports he feels well, no acute complaints.    Past Medical History:   Diagnosis Date    Anticoagulant long-term use     Atrial fibrillation     Benign essential tremor 6/6/2018    Cardiomyopathy     Chest congestion     CHF (congestive heart failure)     Coronary artery disease     Hypertension     Left ventricular thrombus     Obstructive sleep apnea 4/3/2022    Syncope and collapse     Thyroid disease        Past Surgical History:   Procedure Laterality Date    ABLATION OF ARRHYTHMOGENIC FOCUS FOR ATRIAL FIBRILLATION N/A 12/9/2019    Procedure: ABLATION, ARRHYTHMOGENIC FOCUS, FOR ATRIAL FIBRILLATION;  Surgeon: Camron Isbell MD;  Location: [facility] EP LAB;  Service: Cardiology;  Laterality: N/A;  AF, NOE (firm), PVI, CRYO, LORENZA, GEN, DM, 3 PREP * Dual ICD SJM*    ABLATION OF ARRHYTHMOGENIC FOCUS FOR ATRIAL FIBRILLATION N/A 11/23/2020    Procedure: ABLATION, ARRHYTHMOGENIC FOCUS, FOR ATRIAL FIBRILLATION;  Surgeon: Camron Isbell MD;  Location: [facility] EP LAB;  Service: Cardiology;  Laterality: N/A;  AF, Redo PVI, RFA, CARTO, GEN, DM, 3 PREP* Dual ICD SJM in situ* NOE deferred pt compliant*    CARDIAC CATHETERIZATION      CARDIAC DEFIBRILLATOR PLACEMENT      CORONARY ANGIOPLASTY      ECHOCARDIOGRAM,TRANSESOPHAGEAL N/A 3/6/2024    Procedure: Transesophageal echo (NOE) intra-procedure log documentation;  Surgeon: Provider, Dosc Diagnostic;  Location: [facility] EP LAB;  Service: Cardiology;  Laterality: N/A;    HERNIA REPAIR      KNEE ARTHROSCOPY      REPLACEMENT OF IMPLANTABLE CARDIOVERTER-DEFIBRILLATOR (ICD) GENERATOR Left 6/7/2018    Procedure: REPLACEMENT-GENERATOR-ICD;  Surgeon: Camron Isbell  MD;  Location: [facility] CATH LAB;  Service: Cardiology;  Laterality: Left;  GENIE, DUAL ICD GEN CHG, SJM, MAC, DM, 3 PREP    STENTS      TRANSESOPHAGEAL ECHOCARDIOGRAPHY N/A 10/31/2019    Procedure: ECHOCARDIOGRAM, TRANSESOPHAGEAL;  Surgeon: Eleuterio Diagnostic Provider;  Location: [facility] EP LAB;  Service: Cardiology;  Laterality: N/A;  AF, NOE, DCCV, MAC, DM, 3 PREP    TRANSESOPHAGEAL ECHOCARDIOGRAPHY N/A 12/9/2019    Procedure: ECHOCARDIOGRAM, TRANSESOPHAGEAL;  Surgeon: Sun Merchant MD;  Location: [facility] EP LAB;  Service: Cardiology;  Laterality: N/A;    TRANSESOPHAGEAL ECHOCARDIOGRAPHY N/A 10/29/2020    Procedure: ECHOCARDIOGRAM, TRANSESOPHAGEAL;  Surgeon: Dannie Mittal III, MD;  Location: [facility] EP LAB;  Service: Cardiology;  Laterality: N/A;    TREATMENT OF CARDIAC ARRHYTHMIA N/A 10/31/2019    Procedure: CARDIOVERSION;  Surgeon: Camron Isbell MD;  Location: [facility] EP LAB;  Service: Cardiology;  Laterality: N/A;  AF, NOE, DCCV, MAC, DM, 3 PREP*SJM  Dual ICD in situ*    TREATMENT OF CARDIAC ARRHYTHMIA N/A 10/29/2020    Procedure: CARDIOVERSION;  Surgeon: Camron Isbell MD;  Location: [facility] EP LAB;  Service: Cardiology;  Laterality: N/A;  AF, NOE, DCCV, MAC, DM, 3 PREP*SJM dual ICD in situ*    TREATMENT OF CARDIAC ARRHYTHMIA  11/23/2020    Procedure: Cardioversion or Defibrillation;  Surgeon: Camron Isbell MD;  Location: [facility] EP LAB;  Service: Cardiology;;    TREATMENT OF CARDIAC ARRHYTHMIA N/A 3/6/2024    Procedure: Cardioversion or Defibrillation;  Surgeon: Camron Isbell MD;  Location: [facility] EP LAB;  Service: Cardiology;  Laterality: N/A;  AF, NOE/DCCV, DM, ANES, Rm 312    VASCULAR SURGERY      stents placed       Review of patient's allergies indicates:  No Known Allergies    Current Facility-Administered Medications on File Prior to Encounter   Medication    0.9%  NaCl infusion    sodium chloride 0.9% flush 5 mL    sodium chloride 0.9% flush 5 mL     Current Outpatient Medications on File Prior to Encounter    Medication Sig    amLODIPine (NORVASC) 5 MG tablet Take 1 tablet (5 mg total) by mouth once daily.    ascorbic acid, vitamin C, (VITAMIN C) 100 MG tablet Take 1,000 mg by mouth once daily.    aspirin (ECOTRIN) 81 MG EC tablet Take 81 mg by mouth once daily.    atorvastatin (LIPITOR) 20 MG tablet Take 1 tablet by mouth once daily    carvediloL (COREG) 12.5 MG tablet Take 1 tablet (12.5 mg total) by mouth 2 (two) times daily with meals.    coenzyme Q10 200 mg capsule Take 200 mg by mouth once daily.    dofetilide (TIKOSYN) 500 MCG Cap TAKE 1 CAPSULE BY MOUTH EVERY 12 HOURS    ELIQUIS 5 mg Tab Take 1 tablet by mouth twice daily    empagliflozin (JARDIANCE) 10 mg tablet Take 1 tablet (10 mg total) by mouth once daily.    ENTRESTO  mg per tablet Take 1 tablet by mouth twice daily    fish oil-omega-3 fatty acids 300-1,000 mg capsule Take 2 g by mouth 2 (two) times daily.    furosemide (LASIX) 40 MG tablet Take 20 mg by mouth daily as needed.    levothyroxine (SYNTHROID) 88 MCG tablet TAKE 1 TABLET BY MOUTH ONCE DAILY BEFORE BREAKFAST    magnesium oxide (MAG-OX) 400 mg (241.3 mg magnesium) tablet Take 400 mg by mouth once daily.    multivitamin capsule Take 1 capsule by mouth nightly.    nitroGLYCERIN (NITROSTAT) 0.4 MG SL tablet Place 1 tablet (0.4 mg total) under the tongue every 5 (five) minutes as needed for Chest pain.    pantoprazole (PROTONIX) 40 MG tablet Take 40 mg by mouth once daily.    SLO-NIACIN 750 mg TbSR Take 1 tablet (750 mg total) by mouth 2 (two) times daily. (Patient taking differently: Take 500 mg by mouth 2 (two) times daily.)    zinc 50 mg Tab Take 40 mg by mouth.     Family History       Problem Relation (Age of Onset)    Heart disease Mother, Father, Brother          Tobacco Use    Smoking status: Never    Smokeless tobacco: Former     Quit date: 1980   Substance and Sexual Activity    Alcohol use: Yes     Comment: occasionally    Drug use: No    Sexual activity: Not Currently     Review of  Systems   Constitutional: Negative for chills, fever, malaise/fatigue and night sweats.   HENT:  Negative for congestion, nosebleeds, sore throat, stridor and tinnitus.    Eyes:  Negative for blurred vision, discharge, double vision, pain, vision loss in left eye, vision loss in right eye, visual disturbance and visual halos.   Cardiovascular:  Negative for chest pain, dyspnea on exertion, leg swelling, near-syncope, orthopnea, palpitations and syncope.   Respiratory:  Negative for cough, hemoptysis, shortness of breath, snoring, sputum production and wheezing.    Endocrine: Negative for cold intolerance, heat intolerance and polydipsia.   Hematologic/Lymphatic: Negative for adenopathy and bleeding problem. Does not bruise/bleed easily.   Skin:  Negative for color change, dry skin, flushing, poor wound healing and suspicious lesions.   Musculoskeletal:  Negative for arthritis, back pain, gout, joint pain and joint swelling.   Gastrointestinal:  Negative for bloating, abdominal pain, constipation and diarrhea.   Genitourinary:  Negative for dysuria, frequency and hematuria.   Neurological:  Negative for dizziness, focal weakness, headaches, light-headedness, loss of balance, numbness and weakness.   Psychiatric/Behavioral:  Negative for altered mental status, hallucinations and hypervigilance. The patient is not nervous/anxious.      Objective:     Vital Signs (Most Recent):  Temp: 97.4 °F (36.3 °C) (03/07/24 0742)  Pulse: 64 (03/07/24 0742)  Resp: 16 (03/07/24 0742)  BP: 122/76 (03/07/24 0742)  SpO2: 96 % (03/07/24 0742) Vital Signs (24h Range):  Temp:  [97.3 °F (36.3 °C)-98.1 °F (36.7 °C)] 97.4 °F (36.3 °C)  Pulse:  [60-87] 64  Resp:  [10-20] 16  SpO2:  [94 %-98 %] 96 %  BP: ()/(53-97) 122/76       Weight: 112.6 kg (248 lb 3.8 oz)  Body mass index is 31.87 kg/m².    SpO2: 96 %        Physical Exam  Constitutional:       General: He is not in acute distress.     Appearance: Normal appearance. He is normal  weight. He is not toxic-appearing.   HENT:      Head: Normocephalic and atraumatic.   Eyes:      General:         Right eye: No discharge.         Left eye: No discharge.      Extraocular Movements: Extraocular movements intact.      Conjunctiva/sclera: Conjunctivae normal.      Pupils: Pupils are equal, round, and reactive to light.   Cardiovascular:      Rate and Rhythm: Normal rate and regular rhythm.      Pulses: Normal pulses.      Heart sounds: Normal heart sounds. No murmur heard.     No friction rub.   Pulmonary:      Effort: Pulmonary effort is normal. No respiratory distress.      Breath sounds: Normal breath sounds. No wheezing or rales.   Abdominal:      General: Abdomen is flat. Bowel sounds are normal. There is no distension.      Palpations: Abdomen is soft.      Tenderness: There is no abdominal tenderness. There is no guarding.   Musculoskeletal:         General: No swelling, tenderness or deformity. Normal range of motion.      Cervical back: Normal range of motion and neck supple. No rigidity.      Right lower leg: No edema.      Left lower leg: No edema.   Skin:     General: Skin is warm and dry.      Capillary Refill: Capillary refill takes less than 2 seconds.      Coloration: Skin is not jaundiced or pale.      Findings: No bruising.   Neurological:      General: No focal deficit present.      Mental Status: He is alert and oriented to person, place, and time. Mental status is at baseline.   Psychiatric:         Mood and Affect: Mood normal.         Thought Content: Thought content normal.         Judgment: Judgment normal.            Significant Labs: None    Significant Imaging:  None  Assessment and Plan:     Persistent atrial fibrillation  78M pmhx HFrEF, sleep apnea (CPAP), afib (tikosyn), VT w/ AICD placement, CAD (prior stents), htn, hld, CKD admitted for chest pain and newly reduced EF. EP consulted for atrial fibrillation.   Interrogation of device shows episode of afib in February  and ongoing atrial fibrillation from 3/4/24 at 4am. Follows with Dr. Isbell, compliant with tikosyn 500mcg bid. Prior pulmonary vein cryoablation 2019, RFA 2020.  -Continue home tikosyn, coreg, anticoagulation.  -Successful DCCV/NOE on 3/6/24.  -Patient to follow up with Dr. Isbell on 3/13/24  Thank you for this consult, EP will sign off        Jaden Farnsworth MD  Cardiac Electrophysiology  [facility] - Cardiology Stepdown

## 2024-03-07 NOTE — DISCHARGE INSTRUCTIONS
Stop amlodipine- follow-up with Dr. Isbell on outpatient basis. In the case of worsening chest pain, shortness of breath- return to ED for further evaluation.

## 2024-03-07 NOTE — ASSESSMENT & PLAN NOTE
78M pmhx HFrEF, sleep apnea (CPAP), afib (tikosyn), VT w/ AICD placement, CAD (prior stents), htn, hld, CKD admitted for chest pain and newly reduced EF. EP consulted for atrial fibrillation.   Interrogation of device shows episode of afib in February and ongoing atrial fibrillation from 3/4/24 at 4am. Follows with Dr. Isbell, compliant with tikosyn 500mcg bid. Prior pulmonary vein cryoablation 2019, RFA 2020.  -Continue home tikosyn, coreg, anticoagulation.  -Successful DCCV/NOE on 3/6/24.  -Patient to follow up with Dr. Isbell on 3/13/24  Thank you for this consult, EP will sign off

## 2024-03-07 NOTE — PLAN OF CARE
Pt discharged home with no post-acute needs. F/U appts scheduled by W. Transported by spouse, Hiwot Grossman.    [place]pan [place] [facility]  [email or web address]    [facility] - Cardiology Stepdown  Discharge Final Note    Primary Care Provider: Marco Antonio Devi MD    Expected Discharge Date: 3/7/2024    Final Discharge Note (most recent)       Final Note - 03/07/24 1402          Final Note    Assessment Type Final Discharge Note     Anticipated Discharge Disposition Home or Self Care     What phone number can be called within the next 1-3 days to see how you are doing after discharge? [phone]     Hospital Resources/Appts/Education Provided Provided patient/caregiver with written discharge plan information        Post-Acute Status    Post-Acute Authorization Other     Other Status No Post-Acute Service Needs     Discharge Delays None known at this time                     Important Message from Medicare  Important Message from Medicare regarding Discharge Appeal Rights: Given to patient/caregiver, Signed/date by patient/caregiver, Explained to patient/caregiver     Date IMM was signed: 03/07/24  Time IMM was signed: 1301      Future Appointments   Date Time Provider Department Center   3/13/2024  8:20 AM LAB, APPOINTMENT [facility] LAB [facility]   3/13/2024  8:45 AM EKG, APPT [facility] EKG [facility]   3/13/2024  9:00 AM COORDINATED DEVICE CHECK [facility] ARRHPRO [facility]   3/13/2024 10:00 AM Camron Isbell MD NOM ARRHYTH [facility]   3/14/2024  9:30 AM Angel Coburn MD [facility] CARDIO [facility]

## 2024-03-07 NOTE — ANESTHESIA POSTPROCEDURE EVALUATION
Anesthesia Post Evaluation    Patient: Amish Grossman    Procedure(s) Performed: Procedure(s) (LRB):  Cardioversion or Defibrillation (N/A)  Transesophageal echo (NOE) intra-procedure log documentation (N/A)    Final Anesthesia Type: general      Patient location during evaluation: PACU  Patient participation: Yes- Able to Participate  Level of consciousness: awake and alert  Post-procedure vital signs: reviewed and stable  Pain management: adequate  Airway patency: patent    PONV status at discharge: No PONV  Anesthetic complications: no      Cardiovascular status: blood pressure returned to baseline  Respiratory status: unassisted  Hydration status: euvolemic  Follow-up not needed.              Vitals Value Taken Time   /72 03/07/24 0310   Temp 36.6 °C (97.8 °F) 03/07/24 0310   Pulse 60 03/07/24 0400   Resp 20 03/07/24 0310   SpO2 95 % 03/07/24 0310         No case tracking events are documented in the log.      Pain/Talya Score: Pain Rating Prior to Med Admin: 2 (3/6/2024  8:34 AM)  Pain Rating Post Med Admin: 0 (3/6/2024  9:34 AM)  Talya Score: 9 (3/6/2024  4:15 PM)

## 2024-03-07 NOTE — PLAN OF CARE
Patient is ready for discharge. Patient stable alert and oriented. IVs removed. No complaints of pain. Discussed discharge plan. Reviewed medications and side effects, appointments, and answered questions with patient and family. RX sent to pt.'s pharmacy.       Problem: Adult Inpatient Plan of Care  Goal: Plan of Care Review  Outcome: Met  Goal: Patient-Specific Goal (Individualized)  Outcome: Met  Goal: Absence of Hospital-Acquired Illness or Injury  Outcome: Met  Goal: Optimal Comfort and Wellbeing  Outcome: Met  Goal: Readiness for Transition of Care  Outcome: Met     Problem: Dysrhythmia  Goal: Normalized Cardiac Rhythm  Outcome: Met     Problem: Chest Pain  Goal: Resolution of Chest Pain Symptoms  Outcome: Met     Problem: Fall Injury Risk  Goal: Absence of Fall and Fall-Related Injury  Outcome: Met

## 2024-03-07 NOTE — PLAN OF CARE
Notified Tony.MD EKG shows A paced with prolonged AV condution after CV. VSS. Spouse at bedside. Stress test, NOE and CV done during the day. Pt educated on fall risk and remained free from falls/trauma/injury. Denies chest pain, SOB, palpitations, dizziness, pain, or discomfort. Plan of care reviewed with pt, all questions answered. Bed locked in lowest position, call bell within reach, no acute distress noted, will continue to monitor.

## 2024-03-07 NOTE — PLAN OF CARE
Problem: Adult Inpatient Plan of Care  Goal: Optimal Comfort and Wellbeing  Outcome: Ongoing, Progressing     Problem: Dysrhythmia  Goal: Normalized Cardiac Rhythm  Outcome: Ongoing, Progressing     Problem: Chest Pain  Goal: Resolution of Chest Pain Symptoms  Outcome: Ongoing, Progressing     Problem: Fall Injury Risk  Goal: Absence of Fall and Fall-Related Injury  Outcome: Ongoing, Progressing

## 2024-03-08 ENCOUNTER — DOCUMENTATION ONLY (OUTPATIENT)
Dept: CARDIOLOGY | Facility: CLINIC | Age: 79
End: 2024-03-08
Payer: MEDICARE

## 2024-03-08 ENCOUNTER — TELEPHONE (OUTPATIENT)
Dept: ELECTROPHYSIOLOGY | Facility: CLINIC | Age: 79
End: 2024-03-08
Payer: MEDICARE

## 2024-03-08 DIAGNOSIS — I48.19 PERSISTENT ATRIAL FIBRILLATION: Primary | ICD-10-CM

## 2024-03-08 NOTE — PROGRESS NOTES
[facility] ([facility]) Team notified of pt referral via Ambulatory Referral to Heart Failure Transitional Care ([id number]).    Patient screened today March 8, 2024 by provider and RN for enrollment to program.      Pt was deemed not a candidate for enrollment at this time related to patient is located outside of [facility] area and will need to follow up with local services.  [facility] Clinic ([id number]) is [facility] location only.      Pt will require additional follow up planning per primary team.     If pt status, diagnosis, or treatment plan changes , please place AMB referral to [facility] Failure [facility] ([id number]) for HFTC enrollment re-evalution.

## 2024-03-08 NOTE — Clinical Note
Confirmed with patient procedure was relocated to the hospital for the same date 3/12. Patient asked why was he able to have [facility] but needs the hospital for the implant because of his pacemaker. Explained to patient anesthesia is not required for the trials whereas the implant is. Gave patient location information and will send it to his mychart as well.      Ankita Peoples on 3/8/2024 at 8:45 AM     ID band present and verified.

## 2024-03-08 NOTE — DISCHARGE SUMMARY
[facility] - Cardiology [facility] Medicine  Discharge Summary      Patient Name: Amish Grossman  MRN: [id number]  RUSTAM: [id number]  Patient Class: IP- Inpatient  Admission Date: 3/4/2024  Hospital Length of Stay: 3 days  Discharge Date and Time:  03/07/2024 8:18 PM  Attending Physician: Sarita att. providers found   Discharging Provider: Ariadna Alcala MD  Primary Care Provider: Marco Antonio Devi MD  Hospital Medicine Team: [facility] Ariadna Alcala MD  Primary Care Team: [facility]    HPI:   78-year-old male with a history of sleep apnea on CPAP, atrial fibrillation (on tikosyn), VT with AICD placement, coronary artery disease (prior stents), hypertension, syncope, thyroid disease, HFrEF, hyperlipidemia, and chronic kidney disease presented to [facility] ED on March 4 with chest pain going into both arms that began about 45 minutes prior to arrival. Pt. Reports he woke up drench in sweat with chest pressure and shoulder pain that lasted for about 2hrs.  He took sublingual nitroglycerin at home that resolved the chest pain.  By report chest x-ray had no acute abnormalities.  EKG showed atrial fibrillation but had no obvious ischemic changes.  Initial heart rate was around 107 but subsequently has been in the 80-90 range.  He had 1 additional episode of chest pain in the emergency department that resolved spontaneously after approximately 2 minutes. He has had no further episodes. He is hemodynamically stable with normal oxygenation and no ongoing chest pain. Pt. transferred to Hospital Medicine at [facility] for further Cardiology evaluation. In the emergency department he received 325 mg aspirin.    Of note, patient reports he was recently contacted by cards EP regarding his pacemaker showing Afib recurrence about a couple of weeks ago. He had no symptoms of that time. He scheduled an appointment for EP f/u, but he has not seen them yet.     White blood cells 4.7, hemoglobin 12.3, hematocrit  38.7, platelets 210, , sodium 142, potassium 3.9, chloride 108, CO2 22, BUN 20, creatinine 1.4, total bilirubin 1, AST 24, ALT 21, glucose 90, magnesium 2.1, high sensitivity troponin 76 (reference range 0-60), NT-proBNP 599 (reference range 5-450)     August 2, 2023: Transesophageal echocardiogram had EF 35-40%.  Mild RV systolic enlargement with normal systolic function.     April 4, 2023: Echocardiogram with EF 40%, grade 1 diastolic dysfunction.     VS:  Temperature 97.1°, pulse 92, blood pressure 111/70, O2 sats 99%       Procedure(s) (LRB):  Cardioversion or Defibrillation (N/A)  Transesophageal echo (NOE) intra-procedure log documentation (N/A)      Hospital Course:   Admitted for chest pain. Trop 0.025 > 0.029 downtrended. Cardiology consulted. PET 3/6 did not show acute ischemic process, just scarring. EP performed NOE/DCCV 03/06 as symptoms may be related to AFib. Patient had improvement in signs and symptoms. sCr of 1.7 03/07- patient reported that this was at baseline for him. Instructed to f/u with Dr. Isbell and Dr. Coburn. No change in discharge meds- other than stopping amlodipine in setting of reduced EF.     Pt deemed appropriate for discharge. Plan discussed with pt, who was agreeable and amenable; medications were discussed and reviewed, outpatient follow-up scheduled, ER precautions were given, all questions were answered to the pt's satisfaction, and Mr. Grossman was subsequently discharged.    Physical Exam  Gen: in NAD, appears stated age  Neuro: AAOx3, motor, sensory, and strength grossly intact BL  HEENT: NTNC, EOMI, PERRL, MMM  CVS: RRR, no m/r/g; S1/S2 auscultated with no S3 or S4; capillary refill < 2 sec  Resp: lungs CTAB, no w/r/r; no belabored breathing or accessory muscle use appreciated   Abd: BS+ in all 4 quadrants; NTND, soft to palpation; no organomegaly appreciated   Extrem: pulses full, equal, and regular over all 4 extremities; no UE or LE edema BL       Goals of Care  Treatment Preferences:  Code Status: Full Code      Consults:   Consults (From admission, onward)          Status Ordering Provider     Inpatient consult to Electrophysiology  Once        Provider:  (Not yet assigned)    Completed ALY JEAN BAPTISTE     Inpatient consult to Cardiology  Once        Provider:  (Not yet assigned)    Completed GALLO ALONZO            Final Active Diagnoses:    Diagnosis Date Noted POA    PRINCIPAL PROBLEM:  Chest pain [R07.9] 01/19/2017 Yes    Heart failure with reduced ejection fraction [I50.20] 03/05/2024 Yes    Persistent atrial fibrillation [I48.19] 03/15/2016 Yes    CKD (chronic kidney disease) stage 1, GFR 90 ml/min or greater [N18.1] 10/11/2012 Yes    Hypothyroid [E03.9] 10/11/2012 Yes    Coronary artery disease due to lipid rich plaque [I25.10, I25.83] 10/11/2012 Yes    Ischemic cardiomyopathy [I25.5] 10/11/2012 Yes      Problems Resolved During this Admission:       Discharged Condition: stable    Disposition: Home or Self Care    Follow Up: Dr. Isbell    Patient Instructions:      ACCEPT - Ambulatory referral/consult to [facility]   Standing Status: Future   Referral Priority: Routine Referral Type: Consultation   Referral Reason: Specialty Services Required   Requested Specialty: Cardiology   Number of Visits Requested: 1     Diet Cardiac     Notify your health care provider if you experience any of the following:  severe uncontrolled pain     Notify your health care provider if you experience any of the following:  difficulty breathing or increased cough     Activity as tolerated       Significant Diagnostic Studies: Labs: CMP   Recent Labs   Lab 03/06/24 0349 03/07/24  0504    140   K 4.2 4.3    109   CO2 26 24    96   BUN 21 21   CREATININE 1.6* 1.7*   CALCIUM 9.0 9.2   PROT 6.3 6.7   ALBUMIN 3.1* 3.4*   BILITOT 0.4 0.5   ALKPHOS 47* 49*   AST 17 20   ALT 15 17   ANIONGAP 8 7*   , CBC   Recent Labs   Lab 03/06/24 0349  03/07/24  0504   WBC 5.77 6.28   HGB 13.2* 13.4*   HCT 41.2 41.9    248   , and All labs within the past 24 hours have been reviewed    Pending Diagnostic Studies:       None           Medications:  Reconciled Home Medications:      Medication List        CONTINUE taking these medications      ascorbic acid (vitamin C) 100 MG tablet  Commonly known as: VITAMIN C  Take 1,000 mg by mouth once daily.     aspirin 81 MG EC tablet  Commonly known as: ECOTRIN  Take 81 mg by mouth once daily.     atorvastatin 20 MG tablet  Commonly known as: LIPITOR  Take 1 tablet by mouth once daily     carvediloL 12.5 MG tablet  Commonly known as: COREG  Take 1 tablet (12.5 mg total) by mouth 2 (two) times daily with meals.     coenzyme Q10 200 mg capsule  Take 200 mg by mouth once daily.     dofetilide 500 MCG Cap  Commonly known as: TIKOSYN  TAKE 1 CAPSULE BY MOUTH EVERY 12 HOURS     ELIQUIS 5 mg Tab  Generic drug: apixaban  Take 1 tablet by mouth twice daily     empagliflozin 10 mg tablet  Commonly known as: Jardiance  Take 1 tablet (10 mg total) by mouth once daily.     ENTRESTO  mg per tablet  Generic drug: sacubitriL-valsartan  Take 1 tablet by mouth twice daily     fish oil-omega-3 fatty acids 300-1,000 mg capsule  Take 2 g by mouth 2 (two) times daily.     furosemide 40 MG tablet  Commonly known as: LASIX  Take 20 mg by mouth daily as needed.     levothyroxine 88 MCG tablet  Commonly known as: SYNTHROID  TAKE 1 TABLET BY MOUTH ONCE DAILY BEFORE BREAKFAST     magnesium oxide 400 mg (241.3 mg magnesium) tablet  Commonly known as: MAG-OX  Take 400 mg by mouth once daily.     multivitamin capsule  Take 1 capsule by mouth nightly.     nitroGLYCERIN 0.4 MG SL tablet  Commonly known as: NITROSTAT  Place 1 tablet (0.4 mg total) under the tongue every 5 (five) minutes as needed for Chest pain.     pantoprazole 40 MG tablet  Commonly known as: PROTONIX  Take 40 mg by mouth once daily.     SLO-NIACIN 750 mg Tbsr  Generic drug:  niacin  Take 1 tablet (750 mg total) by mouth 2 (two) times daily.     zinc 50 mg Tab  Take 40 mg by mouth.            STOP taking these medications      amLODIPine 5 MG tablet  Commonly known as: NORVASC              Indwelling Lines/Drains at time of discharge:   Lines/Drains/Airways       None                   Time spent on the discharge of patient: 45 minutes             Ariadna Alcala MD  Department of Hospital Medicine  [facility] - Cardiology Stepdown

## 2024-03-08 NOTE — TELEPHONE ENCOUNTER
Spoke with pt, scheduled for PVI on 5/7, he is coming here next week for appt with another doctor, scheduled his CTA to be done next week

## 2024-03-14 ENCOUNTER — HOSPITAL ENCOUNTER (OUTPATIENT)
Dept: RADIOLOGY | Facility: HOSPITAL | Age: 79
Discharge: HOME | End: 2024-03-14
Attending: INTERNAL MEDICINE
Payer: MEDICARE

## 2024-03-14 ENCOUNTER — OFFICE VISIT (OUTPATIENT)
Dept: CARDIOLOGY | Facility: CLINIC | Age: 79
End: 2024-03-14
Payer: MEDICARE

## 2024-03-14 VITALS
BODY MASS INDEX: 32.51 KG/M2 | DIASTOLIC BLOOD PRESSURE: 70 MMHG | HEIGHT: 74 IN | SYSTOLIC BLOOD PRESSURE: 120 MMHG | HEART RATE: 60 BPM | WEIGHT: 253.31 LBS

## 2024-03-14 DIAGNOSIS — E78.5 DYSLIPIDEMIA: ICD-10-CM

## 2024-03-14 DIAGNOSIS — I25.5 ISCHEMIC CARDIOMYOPATHY: Primary | ICD-10-CM

## 2024-03-14 DIAGNOSIS — I87.2 VENOUS INSUFFICIENCY OF LOWER EXTREMITY, UNSPECIFIED LATERALITY: ICD-10-CM

## 2024-03-14 DIAGNOSIS — I50.20 HEART FAILURE WITH REDUCED EJECTION FRACTION: ICD-10-CM

## 2024-03-14 DIAGNOSIS — N52.01 ERECTILE DYSFUNCTION DUE TO ARTERIAL INSUFFICIENCY: ICD-10-CM

## 2024-03-14 DIAGNOSIS — Z95.810 AUTOMATIC IMPLANTABLE CARDIOVERTER-DEFIBRILLATOR IN SITU: ICD-10-CM

## 2024-03-14 DIAGNOSIS — I48.19 PERSISTENT ATRIAL FIBRILLATION: ICD-10-CM

## 2024-03-14 DIAGNOSIS — I10 HTN (HYPERTENSION), BENIGN: ICD-10-CM

## 2024-03-14 DIAGNOSIS — I25.2 OLD MYOCARDIAL INFARCTION: ICD-10-CM

## 2024-03-14 PROCEDURE — 99215 OFFICE O/P EST HI 40 MIN: CPT | Mod: S$PBB,,, | Performed by: INTERNAL MEDICINE

## 2024-03-14 PROCEDURE — 71275 CT ANGIOGRAPHY CHEST: CPT | Mod: 26,,, | Performed by: RADIOLOGY

## 2024-03-14 PROCEDURE — 25500020 PHARM REV CODE 255: Performed by: INTERNAL MEDICINE

## 2024-03-14 PROCEDURE — 99999 PR PBB SHADOW E&M-EST. PATIENT-LVL IV: CPT | Mod: PBBFAC,,, | Performed by: INTERNAL MEDICINE

## 2024-03-14 PROCEDURE — 71275 CT ANGIOGRAPHY CHEST: CPT | Mod: TC

## 2024-03-14 PROCEDURE — 99214 OFFICE O/P EST MOD 30 MIN: CPT | Mod: PBBFAC,25 | Performed by: INTERNAL MEDICINE

## 2024-03-14 RX ORDER — ATORVASTATIN CALCIUM 80 MG/1
80 TABLET, FILM COATED ORAL DAILY
Qty: 90 TABLET | Refills: 3 | Status: SHIPPED | OUTPATIENT
Start: 2024-03-14 | End: 2024-04-13

## 2024-03-14 RX ADMIN — IOHEXOL 100 ML: 350 INJECTION, SOLUTION INTRAVENOUS at 11:03

## 2024-03-14 NOTE — PATIENT INSTRUCTIONS
Discussed diet , achieving and maintaining ideal body weight, and exercise.   We reviewed meds in detail.  Reassured-Discussed goals, options, plan.  Omega-3 > 800 mg/d combined EPA/DHA.  Goal LDL < 70 probably < 55 now  Goal BP< 130/80.  Co Q 10 200 mg/d  Agree with AF treatment  Increase atorva to 40 mg -2 20s or half 80  Will consider spironolactone instead of Amlodipine pending labs now or future

## 2024-03-14 NOTE — PROGRESS NOTES
Subjective:   Patient ID:  Amish Grossman is a 78 y.o. male who presents for follow-up of Coronary Artery Disease and Cardiomyopathy      HPI:  The patient is here for CAD.Patient is here for congestive heart failure.Recent AF    The patient has no chest pain, SOB, TIA, palpitations, syncope or pre-syncope.Patient does exercise as much as past.All BPs low        Review of Systems   Constitutional: Negative for chills, decreased appetite, diaphoresis, fever, malaise/fatigue, night sweats, weight gain and weight loss.   HENT:  Negative for congestion, hoarse voice, nosebleeds, sore throat and tinnitus.    Eyes:  Negative for blurred vision, double vision, vision loss in left eye, vision loss in right eye, visual disturbance and visual halos.   Cardiovascular:  Negative for chest pain, claudication, cyanosis, dyspnea on exertion, irregular heartbeat, leg swelling, near-syncope, orthopnea, palpitations, paroxysmal nocturnal dyspnea and syncope.   Respiratory:  Negative for cough, hemoptysis, shortness of breath, sleep disturbances due to breathing, snoring, sputum production and wheezing.    Endocrine: Negative for cold intolerance, heat intolerance, polydipsia, polyphagia and polyuria.   Hematologic/Lymphatic: Negative for adenopathy and bleeding problem. Does not bruise/bleed easily.   Skin:  Negative for color change, dry skin, flushing, itching, nail changes, poor wound healing, rash, skin cancer, suspicious lesions and unusual hair distribution.   Musculoskeletal:  Negative for arthritis, back pain, falls, gout, joint pain, joint swelling, muscle cramps, muscle weakness, myalgias and stiffness.   Gastrointestinal:  Negative for abdominal pain, anorexia, change in bowel habit, constipation, diarrhea, dysphagia, heartburn, hematemesis, hematochezia, melena and vomiting.   Genitourinary:  Negative for decreased libido, dysuria, hematuria, hesitancy and urgency.   Neurological:  Negative for excessive daytime  "sleepiness, dizziness, focal weakness, headaches, light-headedness, loss of balance, numbness, paresthesias, seizures, sensory change, tremors, vertigo and weakness.   Psychiatric/Behavioral:  Negative for altered mental status, depression, hallucinations, memory loss, substance abuse and suicidal ideas. The patient does not have insomnia and is not nervous/anxious.    Allergic/Immunologic: Negative for environmental allergies and hives.       Objective: /70   Pulse 60   Ht 6' 2" (1.88 m)   Wt 114.9 kg (253 lb 4.9 oz)   BMI 32.52 kg/m²      Physical Exam  Constitutional:       General: He is not in acute distress.     Appearance: He is well-developed. He is not diaphoretic.   HENT:      Head: Normocephalic.   Eyes:      Pupils: Pupils are equal, round, and reactive to light.   Neck:      Thyroid: No thyromegaly.   Cardiovascular:      Rate and Rhythm: Normal rate and regular rhythm.      Pulses: Intact distal pulses.           Carotid pulses are 3+ on the right side and 3+ on the left side.       Radial pulses are 3+ on the right side and 3+ on the left side.        Femoral pulses are 3+ on the right side and 3+ on the left side.       Popliteal pulses are 3+ on the right side and 3+ on the left side.        Dorsalis pedis pulses are 3+ on the right side and 3+ on the left side.        Posterior tibial pulses are 3+ on the right side and 3+ on the left side.      Heart sounds: Normal heart sounds. No murmur heard.     No friction rub. No gallop.   Pulmonary:      Effort: Pulmonary effort is normal. No respiratory distress.      Breath sounds: Normal breath sounds. No wheezing or rales.   Chest:      Chest wall: No tenderness.   Abdominal:      General: There is no distension.      Palpations: Abdomen is soft. There is no mass.      Tenderness: There is no abdominal tenderness.   Musculoskeletal:         General: Normal range of motion.      Cervical back: Normal range of motion.   Lymphadenopathy:      " Cervical: No cervical adenopathy.   Skin:     General: Skin is warm.      Nails: There is no clubbing.   Neurological:      Mental Status: He is alert and oriented to person, place, and time.   Psychiatric:         Speech: Speech normal.         Behavior: Behavior normal.         Thought Content: Thought content normal.         Judgment: Judgment normal.         Assessment:     1. Ischemic cardiomyopathy    2. HTN (hypertension), benign    3. Erectile dysfunction due to arterial insufficiency    4. Old myocardial infarction    5. Dyslipidemia    6. Automatic implantable cardioverter-defibrillator in situ    7. Venous insufficiency of lower extremity, unspecified laterality    8. Heart failure with reduced ejection fraction    9. Persistent atrial fibrillation        Plan:   Discussed diet , achieving and maintaining ideal body weight, and exercise.   We reviewed meds in detail.  Reassured-Discussed goals, options, plan.  Omega-3 > 800 mg/d combined EPA/DHA.  Goal LDL < 70 probably < 55 now  Goal BP< 130/80.  Co Q 10 200 mg/d  Agree with AF treatment  Increase atorva to 40 mg -2 20s or half 80  Will consider spironolactone instead of Amlodipine pending labs now or future    Amish was seen today for coronary artery disease and cardiomyopathy.    Diagnoses and all orders for this visit:    Ischemic cardiomyopathy    HTN (hypertension), benign    Erectile dysfunction due to arterial insufficiency    Old myocardial infarction    Dyslipidemia    Automatic implantable cardioverter-defibrillator in situ    Venous insufficiency of lower extremity, unspecified laterality    Heart failure with reduced ejection fraction    Persistent atrial fibrillation            Follow up for labs now.

## 2024-03-15 ENCOUNTER — PATIENT MESSAGE (OUTPATIENT)
Dept: CARDIOLOGY | Facility: CLINIC | Age: 79
End: 2024-03-15
Payer: MEDICARE

## 2024-03-15 LAB
OHS CV AF BURDEN PERCENT: < 1
OHS CV DC REMOTE DEVICE TYPE: NORMAL
OHS CV RV PACING PERCENT: 2 %

## 2024-03-18 ENCOUNTER — PATIENT MESSAGE (OUTPATIENT)
Dept: ELECTROPHYSIOLOGY | Facility: CLINIC | Age: 79
End: 2024-03-18
Payer: MEDICARE

## 2024-04-18 DIAGNOSIS — I25.83 CORONARY ARTERY DISEASE DUE TO LIPID RICH PLAQUE: ICD-10-CM

## 2024-04-18 DIAGNOSIS — I50.43 ACUTE ON CHRONIC COMBINED SYSTOLIC AND DIASTOLIC HEART FAILURE: ICD-10-CM

## 2024-04-18 DIAGNOSIS — I25.5 ISCHEMIC CARDIOMYOPATHY: ICD-10-CM

## 2024-04-18 DIAGNOSIS — I25.10 CORONARY ARTERY DISEASE DUE TO LIPID RICH PLAQUE: ICD-10-CM

## 2024-04-18 RX ORDER — SACUBITRIL AND VALSARTAN 97; 103 MG/1; MG/1
1 TABLET, FILM COATED ORAL 2 TIMES DAILY
Qty: 60 TABLET | Refills: 11 | Status: SHIPPED | OUTPATIENT
Start: 2024-04-18

## 2024-04-26 ENCOUNTER — TELEPHONE (OUTPATIENT)
Dept: ELECTROPHYSIOLOGY | Facility: CLINIC | Age: 79
End: 2024-04-26
Payer: MEDICARE

## 2024-04-26 NOTE — TELEPHONE ENCOUNTER
"Spoke with patient and he agrees that he will continue with lifestyle changes and cancel the ablation on 5/7/24 and Irlanda will cancel when she returns on Monday. GLORIA Chandler    ----- Message from Camron Isbell MD sent at 4/26/2024  4:18 PM CDT -----  Regarding: RE: Episodes of AF  It's up to him. If the "threat" of AF is encouraging him to exercise and do other healthy things I think that's great, especially if it's having a good effect on his AF. Could hold off on the ablation, and see if what he's doing continues to work.  cc: Irlanda  Edu  ----- Message -----  From: Jenni Soriano RN  Sent: 4/26/2024   2:23 PM CDT  To: Camron Isbell MD  Subject: FW: Episodes of AF                               Mr Grossman scheduled for PVI on 5/7 but wanted you to know that he has made some lifestyle changes and started exercising and lost 14 lbs and quit drinking. He has had 2 short episodes of AF since 4/8 and just wondering if you thought he could continue to work on lifestyle changes for a while to see if he may not require another ablation or do you think he should have the PVI on 5/7. He has already had 2 ablations and wondered if he should hold #3 till he really needs it?? Rhianna. Jenni  ----- Message -----  From: Shannon Solano  Sent: 4/26/2024   1:30 PM CDT  To: Jenni Soriano RN  Subject: RE: Episodes of AF                               Hi Jenni, this is the first message that I received.  Patient had a total of 2 Afib episodes since the last transmission on 04/08/2024.   The most recent was on 04/22/2024 that lasted 12 seconds. One episode on 04/16 that lasted 8 seconds.  I hope this information is helpful.  Let me know if you need anything else.  Thanks.  ----- Message -----  From: Jenni Soriano RN  Sent: 4/26/2024  12:59 PM CDT  To: [facility] Arrhythmia Device Staff  Subject: Episodes of AF                                   Hi there-I sent a message a few hours ago about whether or not this patient has had more " episodes of AF. I need to share that with Dr Isbell at some point today. Thanks

## 2024-04-29 ENCOUNTER — PATIENT MESSAGE (OUTPATIENT)
Dept: ELECTROPHYSIOLOGY | Facility: CLINIC | Age: 79
End: 2024-04-29
Payer: MEDICARE

## 2024-04-29 NOTE — TELEPHONE ENCOUNTER
Called and spoke with pt, confirmed he wishes to cancel his ablation that was scheduled for 5/7 at this time, he will call back for any further episodes of af

## 2024-05-14 ENCOUNTER — CLINICAL SUPPORT (OUTPATIENT)
Dept: CARDIOLOGY | Facility: HOSPITAL | Age: 79
End: 2024-05-14
Attending: INTERNAL MEDICINE
Payer: MEDICARE

## 2024-05-14 ENCOUNTER — CLINICAL SUPPORT (OUTPATIENT)
Dept: CARDIOLOGY | Facility: HOSPITAL | Age: 79
End: 2024-05-14
Payer: MEDICARE

## 2024-05-14 DIAGNOSIS — I48.19 OTHER PERSISTENT ATRIAL FIBRILLATION: ICD-10-CM

## 2024-05-14 PROCEDURE — 93295 DEV INTERROG REMOTE 1/2/MLT: CPT | Mod: ,,, | Performed by: INTERNAL MEDICINE

## 2024-05-19 LAB
OHS CV AF BURDEN PERCENT: < 1
OHS CV DC REMOTE DEVICE TYPE: NORMAL
OHS CV ICD SHOCK: NO
OHS CV RV PACING PERCENT: 3.9 %

## 2024-05-20 DIAGNOSIS — I48.19 PERSISTENT ATRIAL FIBRILLATION: ICD-10-CM

## 2024-05-20 DIAGNOSIS — I10 HTN (HYPERTENSION), BENIGN: ICD-10-CM

## 2024-05-20 RX ORDER — CARVEDILOL 12.5 MG/1
12.5 TABLET ORAL 2 TIMES DAILY WITH MEALS
Qty: 180 TABLET | Refills: 3 | Status: SHIPPED | OUTPATIENT
Start: 2024-05-20

## 2024-06-17 RX ORDER — LEVOTHYROXINE SODIUM 88 UG/1
TABLET ORAL
Qty: 90 TABLET | Refills: 3 | Status: SHIPPED | OUTPATIENT
Start: 2024-06-17

## 2024-06-17 RX ORDER — APIXABAN 5 MG/1
TABLET, FILM COATED ORAL
Qty: 180 TABLET | Refills: 0 | Status: SHIPPED | OUTPATIENT
Start: 2024-06-17

## 2024-08-09 NOTE — TELEPHONE ENCOUNTER
Patient notified. Verbalized understanding    Spoke with pt's wife,Hiwot, and notified her of time change.

## 2024-08-13 ENCOUNTER — CLINICAL SUPPORT (OUTPATIENT)
Dept: CARDIOLOGY | Facility: HOSPITAL | Age: 79
End: 2024-08-13
Attending: INTERNAL MEDICINE
Payer: MEDICARE

## 2024-08-13 ENCOUNTER — CLINICAL SUPPORT (OUTPATIENT)
Dept: CARDIOLOGY | Facility: HOSPITAL | Age: 79
End: 2024-08-13
Payer: MEDICARE

## 2024-08-13 DIAGNOSIS — I48.19 OTHER PERSISTENT ATRIAL FIBRILLATION: ICD-10-CM

## 2024-08-13 PROCEDURE — 93295 DEV INTERROG REMOTE 1/2/MLT: CPT | Mod: ,,, | Performed by: INTERNAL MEDICINE

## 2024-08-17 LAB
OHS CV AF BURDEN PERCENT: < 1
OHS CV DC REMOTE DEVICE TYPE: NORMAL
OHS CV ICD SHOCK: NO
OHS CV RV PACING PERCENT: 3.3 %

## 2024-08-27 ENCOUNTER — PATIENT MESSAGE (OUTPATIENT)
Dept: CARDIOLOGY | Facility: CLINIC | Age: 79
End: 2024-08-27
Payer: MEDICARE

## 2024-09-03 ENCOUNTER — PATIENT MESSAGE (OUTPATIENT)
Dept: CARDIOLOGY | Facility: CLINIC | Age: 79
End: 2024-09-03
Payer: MEDICARE

## 2024-09-04 ENCOUNTER — PATIENT MESSAGE (OUTPATIENT)
Dept: CARDIOLOGY | Facility: CLINIC | Age: 79
End: 2024-09-04
Payer: MEDICARE

## 2024-09-04 DIAGNOSIS — R42 DIZZINESS: ICD-10-CM

## 2024-09-04 DIAGNOSIS — K62.5 RECTAL BLEEDING: Primary | ICD-10-CM

## 2024-09-04 DIAGNOSIS — R35.89 DIURESIS: ICD-10-CM

## 2024-09-04 DIAGNOSIS — I50.20 HEART FAILURE WITH REDUCED EJECTION FRACTION: Primary | ICD-10-CM

## 2024-09-04 DIAGNOSIS — I95.9 HYPOTENSION, UNSPECIFIED HYPOTENSION TYPE: ICD-10-CM

## 2024-09-05 DIAGNOSIS — E78.5 DYSLIPIDEMIA: ICD-10-CM

## 2024-09-05 DIAGNOSIS — I25.83 CORONARY ARTERY DISEASE DUE TO LIPID RICH PLAQUE: Primary | ICD-10-CM

## 2024-09-05 DIAGNOSIS — I25.10 CORONARY ARTERY DISEASE DUE TO LIPID RICH PLAQUE: Primary | ICD-10-CM

## 2024-09-17 RX ORDER — APIXABAN 5 MG/1
TABLET, FILM COATED ORAL
Qty: 180 TABLET | Refills: 0 | OUTPATIENT
Start: 2024-09-17

## 2024-10-15 ENCOUNTER — TELEPHONE (OUTPATIENT)
Dept: ELECTROPHYSIOLOGY | Facility: CLINIC | Age: 79
End: 2024-10-15
Payer: MEDICARE

## 2024-10-15 NOTE — TELEPHONE ENCOUNTER
Spoke with patient.  States when he woke up today and took BP, noted HR at 80 (normally 60) and concluded he must be in AF.  RHM transmission confirms AF x 25 hours.  States he's been feeling a light HA, mild fatigue.  Compliant with medications.  States he drinks occasionally and has lost 20-25lbs.  Advised that since he is not symptomatic, lets observe til tomorrow am.  If still in AF tomorrow, will escalate to MD for further guidance.  Pt verbalizes understanding, and states he will send a manual transmission when he wakes up tomorrow.    ----- Message from Tech Shannon sent at 10/15/2024 10:32 AM CDT -----  Patient called and sent a transmission from his [place] ICD.  Transmission shows he is in Afib and has been for over a day. He has a history of Afib. He stated Dr. Isbell informed him we would watch the Afib as he may need an ablation in the future. He can be reached at [phone].

## 2024-10-16 ENCOUNTER — TELEPHONE (OUTPATIENT)
Dept: ELECTROPHYSIOLOGY | Facility: CLINIC | Age: 79
End: 2024-10-16
Payer: MEDICARE

## 2024-10-16 ENCOUNTER — PATIENT MESSAGE (OUTPATIENT)
Dept: ELECTROPHYSIOLOGY | Facility: CLINIC | Age: 79
End: 2024-10-16
Payer: MEDICARE

## 2024-10-16 DIAGNOSIS — Z79.01 CHRONIC ANTICOAGULATION: ICD-10-CM

## 2024-10-16 DIAGNOSIS — Z01.818 PRE-OP TESTING: ICD-10-CM

## 2024-10-16 DIAGNOSIS — I48.19 PERSISTENT ATRIAL FIBRILLATION: Primary | ICD-10-CM

## 2024-10-16 NOTE — TELEPHONE ENCOUNTER
"Pt is post DCCV on 3/6/24.  Recurrence of persistent atrial fibrillation is noted since 10/14/24.  Ventricular rates appear controlled.  The patient is asymptomatic.  Only complains of a "light headache" since start of episode    Compliant with Eliquis, Coreg 12.5mg BID, and Tikosyn 500mcg BID    BP at home today 124/78 at 8am and at 9am 117/74 P 80                        "

## 2024-10-16 NOTE — TELEPHONE ENCOUNTER
Pt called the Device Clinic on this am to inform that as instructed on yesterday he sent a manual transmission this morning and wanted to make sure it was received.  Advised pt tx was received and that it reveals he is still in AF.  Pt with c/o HA and that his HR is @ 80 bpm which is the AMS programmed rate.  Let the pt know that this information would be sent to Device RN (Derick) who will review with Dr. Isbell.  Understanding was verbalized.    Message routed to Device RN (Derick).

## 2024-10-18 ENCOUNTER — TELEPHONE (OUTPATIENT)
Dept: ELECTROPHYSIOLOGY | Facility: CLINIC | Age: 79
End: 2024-10-18
Payer: MEDICARE

## 2024-10-18 ENCOUNTER — ANESTHESIA (OUTPATIENT)
Dept: MEDSURG UNIT | Facility: HOSPITAL | Age: 79
End: 2024-10-18
Payer: MEDICARE

## 2024-10-18 ENCOUNTER — HOSPITAL ENCOUNTER (OUTPATIENT)
Facility: HOSPITAL | Age: 79
Discharge: HOME | End: 2024-10-18
Attending: INTERNAL MEDICINE | Admitting: INTERNAL MEDICINE
Payer: MEDICARE

## 2024-10-18 ENCOUNTER — ANESTHESIA EVENT (OUTPATIENT)
Dept: MEDSURG UNIT | Facility: HOSPITAL | Age: 79
End: 2024-10-18
Payer: MEDICARE

## 2024-10-18 ENCOUNTER — HOSPITAL ENCOUNTER (OUTPATIENT)
Dept: CARDIOLOGY | Facility: HOSPITAL | Age: 79
Discharge: HOME | End: 2024-10-18
Attending: INTERNAL MEDICINE | Admitting: INTERNAL MEDICINE
Payer: MEDICARE

## 2024-10-18 VITALS
HEIGHT: 74 IN | BODY MASS INDEX: 29.52 KG/M2 | WEIGHT: 230 LBS | DIASTOLIC BLOOD PRESSURE: 77 MMHG | SYSTOLIC BLOOD PRESSURE: 123 MMHG

## 2024-10-18 VITALS
BODY MASS INDEX: 29.52 KG/M2 | HEIGHT: 74 IN | WEIGHT: 230 LBS | TEMPERATURE: 97 F | RESPIRATION RATE: 16 BRPM | OXYGEN SATURATION: 98 % | DIASTOLIC BLOOD PRESSURE: 71 MMHG | HEART RATE: 60 BPM | SYSTOLIC BLOOD PRESSURE: 109 MMHG

## 2024-10-18 DIAGNOSIS — I48.19 PERSISTENT ATRIAL FIBRILLATION: ICD-10-CM

## 2024-10-18 DIAGNOSIS — I48.19 PERSISTENT ATRIAL FIBRILLATION: Primary | ICD-10-CM

## 2024-10-18 DIAGNOSIS — I48.91 ATRIAL FIBRILLATION: ICD-10-CM

## 2024-10-18 LAB
BSA FOR ECHO PROCEDURE: 2.33 M2
OHS QRS DURATION: 192 MS
OHS QRS DURATION: 96 MS
OHS QTC CALCULATION: 448 MS
OHS QTC CALCULATION: 553 MS

## 2024-10-18 PROCEDURE — 93320 DOPPLER ECHO COMPLETE: CPT

## 2024-10-18 PROCEDURE — 92960 CARDIOVERSION ELECTRIC EXT: CPT | Performed by: INTERNAL MEDICINE

## 2024-10-18 PROCEDURE — 93005 ELECTROCARDIOGRAM TRACING: CPT

## 2024-10-18 PROCEDURE — 25000003 PHARM REV CODE 250: Performed by: NURSE ANESTHETIST, CERTIFIED REGISTERED

## 2024-10-18 PROCEDURE — 93010 ELECTROCARDIOGRAM REPORT: CPT | Mod: ,,, | Performed by: INTERNAL MEDICINE

## 2024-10-18 PROCEDURE — 93325 DOPPLER ECHO COLOR FLOW MAPG: CPT | Mod: 26,,, | Performed by: INTERNAL MEDICINE

## 2024-10-18 PROCEDURE — 93320 DOPPLER ECHO COMPLETE: CPT | Mod: 26,,, | Performed by: INTERNAL MEDICINE

## 2024-10-18 PROCEDURE — 93010 ELECTROCARDIOGRAM REPORT: CPT | Mod: XE,,, | Performed by: INTERNAL MEDICINE

## 2024-10-18 PROCEDURE — 37000009 HC ANESTHESIA EA ADD 15 MINS: Performed by: INTERNAL MEDICINE

## 2024-10-18 PROCEDURE — 93312 ECHO TRANSESOPHAGEAL: CPT | Mod: 26,,, | Performed by: INTERNAL MEDICINE

## 2024-10-18 PROCEDURE — 25000003 PHARM REV CODE 250

## 2024-10-18 PROCEDURE — 93325 DOPPLER ECHO COLOR FLOW MAPG: CPT

## 2024-10-18 PROCEDURE — 63600175 PHARM REV CODE 636 W HCPCS: Performed by: NURSE ANESTHETIST, CERTIFIED REGISTERED

## 2024-10-18 PROCEDURE — 37000008 HC ANESTHESIA 1ST 15 MINUTES: Performed by: INTERNAL MEDICINE

## 2024-10-18 PROCEDURE — 92960 CARDIOVERSION ELECTRIC EXT: CPT | Mod: ,,, | Performed by: INTERNAL MEDICINE

## 2024-10-18 RX ORDER — SILVER SULFADIAZINE 10 G/1000G
CREAM TOPICAL DAILY
Status: DISCONTINUED | OUTPATIENT
Start: 2024-10-18 | End: 2024-10-18 | Stop reason: HOSPADM

## 2024-10-18 RX ORDER — PROPOFOL 10 MG/ML
VIAL (ML) INTRAVENOUS CONTINUOUS PRN
Status: DISCONTINUED | OUTPATIENT
Start: 2024-10-18 | End: 2024-10-18

## 2024-10-18 RX ORDER — DIPHENHYDRAMINE HYDROCHLORIDE 50 MG/ML
25 INJECTION INTRAMUSCULAR; INTRAVENOUS EVERY 6 HOURS PRN
OUTPATIENT
Start: 2024-10-18

## 2024-10-18 RX ORDER — HYDROMORPHONE HYDROCHLORIDE 1 MG/ML
0.2 INJECTION, SOLUTION INTRAMUSCULAR; INTRAVENOUS; SUBCUTANEOUS EVERY 5 MIN PRN
OUTPATIENT
Start: 2024-10-18

## 2024-10-18 RX ORDER — LIDOCAINE HYDROCHLORIDE 20 MG/ML
SOLUTION OROPHARYNGEAL
Status: DISCONTINUED | OUTPATIENT
Start: 2024-10-18 | End: 2024-10-18

## 2024-10-18 RX ORDER — ONDANSETRON HYDROCHLORIDE 2 MG/ML
4 INJECTION, SOLUTION INTRAVENOUS ONCE AS NEEDED
OUTPATIENT
Start: 2024-10-18 | End: 2036-03-16

## 2024-10-18 RX ORDER — LIDOCAINE HYDROCHLORIDE 20 MG/ML
INJECTION INTRAVENOUS
Status: DISCONTINUED | OUTPATIENT
Start: 2024-10-18 | End: 2024-10-18

## 2024-10-18 RX ORDER — PROCHLORPERAZINE EDISYLATE 5 MG/ML
5 INJECTION INTRAMUSCULAR; INTRAVENOUS EVERY 30 MIN PRN
OUTPATIENT
Start: 2024-10-18

## 2024-10-18 RX ORDER — FENTANYL CITRATE 50 UG/ML
25 INJECTION, SOLUTION INTRAMUSCULAR; INTRAVENOUS EVERY 5 MIN PRN
OUTPATIENT
Start: 2024-10-18

## 2024-10-18 RX ADMIN — GLYCOPYRROLATE 0.1 MG: 0.2 INJECTION, SOLUTION INTRAMUSCULAR; INTRAVENOUS at 12:10

## 2024-10-18 RX ADMIN — SODIUM CHLORIDE: 0.9 INJECTION, SOLUTION INTRAVENOUS at 11:10

## 2024-10-18 RX ADMIN — LIDOCAINE HYDROCHLORIDE 1 ML: 20 SOLUTION ORAL at 11:10

## 2024-10-18 RX ADMIN — SILVER SULFADIAZINE: 10 CREAM TOPICAL at 01:10

## 2024-10-18 RX ADMIN — PROPOFOL 150 MCG/KG/MIN: 10 INJECTION, EMULSION INTRAVENOUS at 12:10

## 2024-10-18 RX ADMIN — LIDOCAINE HYDROCHLORIDE 20 MG: 20 INJECTION INTRAVENOUS at 12:10

## 2024-10-18 NOTE — TRANSFER OF CARE
"Anesthesia Transfer of Care Note    Patient: Amish Grossman    Procedure(s) Performed: Procedure(s) (LRB):  Cardioversion or Defibrillation (N/A)  Transesophageal echo (NOE) intra-procedure log documentation (N/A)    Patient location: PACU    Anesthesia Type: general    Transport from OR: Transported from OR on 2-3 L/min O2 by NC with adequate spontaneous ventilation    Post pain: adequate analgesia    Post assessment: no apparent anesthetic complications    Post vital signs: stable    Level of consciousness: awake    Nausea/Vomiting: no nausea/vomiting    Complications: none    Transfer of care protocol was followed      Last vitals: Visit Vitals  /77 (BP Location: Left arm, Patient Position: Lying)   Pulse 80   Temp 36.5 °C (97.7 °F) (Tympanic)   Resp 16   Ht 6' 2" (1.88 m)   Wt 104.3 kg (230 lb)   SpO2 99%   BMI 29.53 kg/m²     "

## 2024-10-18 NOTE — DISCHARGE INSTRUCTIONS
Medications:  -Continue to take your home medications as listed on your medication list after you are discharged.    New Medications:  -None    Diet  -You may resume oral intake after you are discharged, as long you have no swallowing difficulties.    Because you have received sedation for this procedure:  -Limit activity for the remainder of the day.  -Do not smoke for at least 6 hours and until you are fully awake and alert.  -Do not drink alcoholic beverage for 24 hours.  -Do not drive for 24 hours.  -Defer important decision making until the following day.     Go to the Emergency Department if you develop:   -Bleeding  -Weakness or numbness  -Visual, gait or speech disturbance  -New chest pain, palpitations, shortness of breath, rapid heart beat, or fainting  -Fever    Follow up:  -EKG in 1 week. Can send in remote transmission in lieu of EKG  -Dr. Isbell in 1 month.    If you have a pacemaker, defibrillator or loop recorder that is monitored remotely by the [facility] clinic, you may be eligible to send in a remote transmission on the day of your EKG appointment in lieu of the EKG. You will be informed at discharge if this is an option. If you are instructed to send in a remote transmission, please send a manual transmission from your home monitor one week after your procedure and then call the clinic at [phone], option 4, to confirm that your remote transmission was received.    Any need to reschedule or cancel procedures, or any questions regarding your procedures should be addressed directly with the Arrhythmia Department Nurses at the following phone number: [phone].

## 2024-10-18 NOTE — ANESTHESIA POSTPROCEDURE EVALUATION
Anesthesia Post Evaluation    Patient: Amish Grossman    Procedure(s) Performed: Procedure(s) (LRB):  Cardioversion or Defibrillation (N/A)  Transesophageal echo (NOE) intra-procedure log documentation (N/A)    Final Anesthesia Type: general      Level of consciousness: awake and alert  Post-procedure vital signs: reviewed and stable  Pain control: Pain has been treated.  Airway patency: patent    PONV status: Absent or treated.  Anesthetic complications: no      Cardiovascular status: hemodynamically stable  Respiratory status: unassisted  Hydration status: euvolemic                Vitals Value Taken Time   /71 10/18/24 1316   Temp 36.2 °C (97.2 °F) 10/18/24 1315   Pulse 60 10/18/24 1330   Resp 15 10/18/24 1328   SpO2 99 % 10/18/24 1330   Vitals shown include unfiled device data.      No case tracking events are documented in the log.      Pain/Talya Score: Talya Score: 10 (10/18/2024  1:15 PM)

## 2024-10-18 NOTE — ANESTHESIA PREPROCEDURE EVALUATION
10/18/2024  Amish Grossman is a 78 y.o., male.      Pre-op Assessment    I have reviewed the Patient Summary Reports.       I have reviewed the Medications.     Review of Systems  Anesthesia Hx:  No problems with previous Anesthesia               Denies Personal Hx of Anesthesia complications.                    Cardiovascular:     Hypertension  Past MI CAD       CHF                Shortness of Breath    Coronary Artery Disease:          Hx of Myocardial Infarction     Congestive Heart Failure (CHF)                Hypertension     Atrial Fibrillation     Pulmonary:      Shortness of breath  Sleep Apnea     Obstructive Sleep Apnea (JABIER).           Renal/:  Chronic Renal Disease        Kidney Function/Disease             Endocrine:   Hypothyroidism       Hypothyroidism              Physical Exam    Airway:  No airway management difficulties anticipated  Dental:  No active dental issues noted  Chest/Lungs:  Clear to auscultation    Heart:  Rate: Normal  Rhythm: Regular Rhythm  Sounds: Normal        Anesthesia Plan  Type of Anesthesia, risks & benefits discussed:    Anesthesia Type: Gen ETT  Intra-op Monitoring Plan: Art Line  Informed Consent: Informed consent signed with the Patient and all parties understand the risks and agree with anesthesia plan.  All questions answered.   ASA Score: 3  Anesthesia Plan Notes: Chart reviewed. Patient seen and examined. Anesthesia plan discussed and questions answered. E-consent signed. Yinka Cardona MD    Ready For Surgery From Anesthesia Perspective.     .

## 2024-10-18 NOTE — H&P
"[facility]  Cardiology  NOE/DCCV History & Physical      Amish Grossman  YOB: 1945  Medical Record Number:  [id number]  Attending Physician:  Camron Isbell MD   Date of Admission: 10/18/2024       Hospital Day:  0  Current Principal Problem:  <principal problem not specified>    Patient information was obtained from patient and past medical records.  History     Cc: NOE/DCCV for AF    HPI  Amish Grossman is a 78 year old male with a history of  HFrEF, ICM s/p MI 1993 with LV thrombus, JABIER (CPAP), afib (tikosyn), SVT, VT w/ AICD placement, CAD (prior stents), HTN, HLD, CKD who presented to  [facility] ED on March 4 and was admitted for chest pain and newly reduced EF. EP consulted for atrial fibrillation. Patient reports he woke up with chest pressure and shoulder pain. Found to have low troponin leak to 0.029, but newly reduced EF from 40% -> 30%. Interrogation of device shows ongoing atrial fibrillation from 3/4/24 at 4am. As outpatient, patient follows with Dr. Isbell. Is compliant with Tikosyn 500mcg BID. Previously pulmonary vein cryoablation 2019, RFA 2020. He had a successful NOE/DCCV on 3/6 with restoration of sinus rhythm with plan for redo PVI as outpatient. Was initially scheduled for redo PVI ablation on 5/7/24, this was cancelled as patient had made lifestyle changes and wanted to hold off on procedure for the time being.    10/15/24: Patient sent in remote transmission that showed he was in Afib. RHM transmission confirms AF x 25 hours. States he's been feeling a light HA, mild fatigue. Compliant with medications. States he drinks occasionally and has lost 20-25lbs.    10/16/24: Remote transmission showed recurrence of  persistent atrial fibrillation is noted since 10/14/24.  Ventricular rates appear controlled.  The patient is asymptomatic.  Only complains of a "light headache" since start of episode     Compliant with Eliquis, Coreg 12.5mg BID, and " Tikosyn 500mcg BID        Today, in good spirits. He reports SHOOK and some L arm pain. No other cardiac complaints.    Rate/rhythm control: Tikosyn 500 mcg BID/carvedilol 12.5 mg BID  Anticoagulant/antiplatelets: ASA 81 mg qd/Eliquis 5 mg BID  ECG: Ventricular-paced rhythm at 80 bpm   Platelet count: 277  INR: 1.19    History of stroke:  no  Dysphagia or odynophagia:  no  Liver Disease, esophageal disease, or known varices:  no  Upper GI Bleeding:  no  Snoring:  no   Sleep Apnea:  no  Prior neck surgery or radiation:  no  History of anesthetic difficulties:  no  Family history of anesthetic difficulties:  no  Last oral intake: last pm   Able to move neck in all directions:  yes  GLP-1 Use: no      Medications - Outpatient  Prior to Admission medications    Medication Sig Start Date End Date Taking? Authorizing Provider   apixaban (ELIQUIS) 5 mg Tab Take 1 tablet (5 mg total) by mouth 2 (two) times daily. 9/18/24   Camron Isbell MD   ascorbic acid, vitamin C, (VITAMIN C) 100 MG tablet Take 1,000 mg by mouth once daily.    Provider, Historical   aspirin (ECOTRIN) 81 MG EC tablet Take 81 mg by mouth once daily.    Provider, Historical   atorvastatin (LIPITOR) 80 MG tablet Take 1 tablet (80 mg total) by mouth once daily. 3/14/24 4/13/24  Angel Coburn MD   carvediloL (COREG) 12.5 MG tablet Take 1 tablet (12.5 mg total) by mouth 2 (two) times daily with meals. 5/20/24   Angel Coburn MD   coenzyme Q10 200 mg capsule Take 200 mg by mouth once daily.    Provider, Historical   dofetilide (TIKOSYN) 500 MCG Cap TAKE 1 CAPSULE BY MOUTH EVERY 12 HOURS 2/12/24   Camron Isbell MD   empagliflozin (JARDIANCE) 10 mg tablet Take 1 tablet (10 mg total) by mouth once daily. 8/28/24   Angel Coburn MD   fish oil-omega-3 fatty acids 300-1,000 mg capsule Take 2 g by mouth 2 (two) times daily.    Provider, Historical   furosemide (LASIX) 40 MG tablet Take 20 mg by mouth daily as needed. Takes half tablet twice a week or as  directed    Provider, Historical   levothyroxine (SYNTHROID) 88 MCG tablet TAKE 1 TABLET BY MOUTH ONCE DAILY BEFORE BREAKFAST 6/17/24   Angel Coburn MD   magnesium oxide (MAG-OX) 400 mg (241.3 mg magnesium) tablet Take 400 mg by mouth once daily.    Provider, Historical   multivitamin capsule Take 1 capsule by mouth nightly.    Provider, Historical   nitroGLYCERIN (NITROSTAT) 0.4 MG SL tablet Place 1 tablet (0.4 mg total) under the tongue every 5 (five) minutes as needed for Chest pain. 12/3/13   Angel Coburn MD   pantoprazole (PROTONIX) 40 MG tablet Take 40 mg by mouth once daily.    Provider, Historical   sacubitriL-valsartan (ENTRESTO)  mg per tablet Take 1 tablet by mouth 2 (two) times daily. 4/18/24   Angel Coburn MD   SLO-NIACIN 750 mg TbSR Take 1 tablet (750 mg total) by mouth 2 (two) times daily.  Patient taking differently: Take 500 mg by mouth 2 (two) times daily. 12/3/13   Angel Coburn MD   zinc 50 mg Tab Take 40 mg by mouth.    Provider, Historical       Medications - Current  Scheduled Meds:  Continuous Infusions:  PRN Meds:.      Allergies  Review of patient's allergies indicates:  No Known Allergies      Past Medical History  Past Medical History:   Diagnosis Date    Anticoagulant long-term use     Atrial fibrillation     Benign essential tremor 6/6/2018    Cardiomyopathy     Chest congestion     CHF (congestive heart failure)     Coronary artery disease     Hypertension     Left ventricular thrombus     Obstructive sleep apnea 4/3/2022    Syncope and collapse     Thyroid disease        Past Surgical History  Past Surgical History:   Procedure Laterality Date    ABLATION OF ARRHYTHMOGENIC FOCUS FOR ATRIAL FIBRILLATION N/A 12/9/2019    Procedure: ABLATION, ARRHYTHMOGENIC FOCUS, FOR ATRIAL FIBRILLATION;  Surgeon: Camron Isbell MD;  Location: [facility] EP LAB;  Service: Cardiology;  Laterality: N/A;  AF, NOE (firm), PVI, CRYO, LORENZA, GEN, DM, 3 PREP * Dual ICD SJM*    ABLATION OF  ARRHYTHMOGENIC FOCUS FOR ATRIAL FIBRILLATION N/A 11/23/2020    Procedure: ABLATION, ARRHYTHMOGENIC FOCUS, FOR ATRIAL FIBRILLATION;  Surgeon: Camron Isbell MD;  Location: [facility] EP LAB;  Service: Cardiology;  Laterality: N/A;  AF, Redo PVI, RFA, CARTO, GEN, DM, 3 PREP* Dual ICD SJM in situ* NOE deferred pt compliant*    CARDIAC CATHETERIZATION      CARDIAC DEFIBRILLATOR PLACEMENT      CORONARY ANGIOPLASTY      ECHOCARDIOGRAM,TRANSESOPHAGEAL N/A 3/6/2024    Procedure: Transesophageal echo (NOE) intra-procedure log documentation;  Surgeon: Provider, [facility];  Location: [facility] EP LAB;  Service: Cardiology;  Laterality: N/A;    HERNIA REPAIR      KNEE ARTHROSCOPY      REPLACEMENT OF IMPLANTABLE CARDIOVERTER-DEFIBRILLATOR (ICD) GENERATOR Left 6/7/2018    Procedure: REPLACEMENT-GENERATOR-ICD;  Surgeon: Camron Isbell MD;  Location: [facility] CATH LAB;  Service: Cardiology;  Laterality: Left;  GENIE, DUAL ICD GEN CHG, SJM, MAC, DM, 3 PREP    STENTS      TRANSESOPHAGEAL ECHOCARDIOGRAPHY N/A 10/31/2019    Procedure: ECHOCARDIOGRAM, TRANSESOPHAGEAL;  Surgeon: [facility] Provider;  Location: [facility] EP LAB;  Service: Cardiology;  Laterality: N/A;  AF, NOE, DCCV, MAC, DM, 3 PREP    TRANSESOPHAGEAL ECHOCARDIOGRAPHY N/A 12/9/2019    Procedure: ECHOCARDIOGRAM, TRANSESOPHAGEAL;  Surgeon: Snu Merchant MD;  Location: [facility] EP LAB;  Service: Cardiology;  Laterality: N/A;    TRANSESOPHAGEAL ECHOCARDIOGRAPHY N/A 10/29/2020    Procedure: ECHOCARDIOGRAM, TRANSESOPHAGEAL;  Surgeon: Dannie Mittal III, MD;  Location: [facility] EP LAB;  Service: Cardiology;  Laterality: N/A;    TREATMENT OF CARDIAC ARRHYTHMIA N/A 10/31/2019    Procedure: CARDIOVERSION;  Surgeon: Camron Isbell MD;  Location: [facility] EP LAB;  Service: Cardiology;  Laterality: N/A;  AF, NOE, DCCV, MAC, DM, 3 PREP*SJM  Dual ICD in situ*    TREATMENT OF CARDIAC ARRHYTHMIA N/A 10/29/2020    Procedure: CARDIOVERSION;  Surgeon: Camron Isbell MD;  Location: [facility] EP LAB;   Service: Cardiology;  Laterality: N/A;  AF, NOE, DCCV, MAC, DM, 3 PREP*SJM dual ICD in situ*    TREATMENT OF CARDIAC ARRHYTHMIA  11/23/2020    Procedure: Cardioversion or Defibrillation;  Surgeon: Camron Isbell MD;  Location: [facility] EP LAB;  Service: Cardiology;;    TREATMENT OF CARDIAC ARRHYTHMIA N/A 3/6/2024    Procedure: Cardioversion or Defibrillation;  Surgeon: Camron Isbell MD;  Location: [facility] EP LAB;  Service: Cardiology;  Laterality: N/A;  AF, NOE/DCCV, DM, ANES, Rm 312    VASCULAR SURGERY      stents placed       Social History  Social History     Socioeconomic History    Marital status:    Occupational History     Employer: Retired   Tobacco Use    Smoking status: Never    Smokeless tobacco: Former     Quit date: 1980   Substance and Sexual Activity    Alcohol use: Yes     Comment: occasionally    Drug use: No    Sexual activity: Not Currently   Social History Narrative    Environmental History:     How many years in current home: 23    House/Apt/Mobile Home: house    Pets in the home: none.    Bedroom Evens: [id number] carpeting    Main Area Evens: [id number] carpeting    Climate Control: central or room air conditioning    Dust Mite Controls: Dust mite controls are not in place.    Tobacco Smoke in Home: no    Outdoor Smoker in Home: no    Other notes:         Social Drivers of Health     Financial Resource Strain: Low Risk  (3/5/2024)    Overall Financial Resource Strain (CARDIA)     Difficulty of Paying Living Expenses: Not very hard   Food Insecurity: No Food Insecurity (3/5/2024)    Hunger Vital Sign     Worried About Running Out of Food in the Last Year: Never true     Ran Out of Food in the Last Year: Never true   Transportation Needs: No Transportation Needs (3/5/2024)    PRAPARE - Transportation     Lack of Transportation (Medical): No     Lack of Transportation (Non-Medical): No   Physical Activity: Unknown (8/2/2023)    Exercise Vital Sign     Days of Exercise per Week: 5  days   Stress: No Stress Concern Present (8/2/2023)    [employer] - Occupational Stress Questionnaire     Feeling of Stress : Not at all   Housing Stability: Low Risk  (3/5/2024)    Housing Stability Vital Sign     Unable to Pay for Housing in the Last Year: No     Number of Places Lived in the Last Year: 1     Unstable Housing in the Last Year: No       ROS  Review of Systems   Constitutional: Negative for chills.   HENT: Negative.     Eyes: Negative.    Cardiovascular:  Negative for chest pain, dyspnea on exertion and palpitations.   Respiratory: Negative.  Negative for shortness of breath and sleep disturbances due to breathing.    Endocrine: Negative.    Musculoskeletal: Negative.    Gastrointestinal:  Negative for hematemesis, melena, nausea and vomiting.   Genitourinary: Negative.    Neurological: Negative.    Psychiatric/Behavioral: Negative.  Negative for altered mental status.    Allergic/Immunologic: Negative.    Physical Examination     Vital Signs  24 Hour VS Range    Temp:  [97.7 °F (36.5 °C)]   Pulse:  [80]   Resp:  [16]   BP: (123-126)/(77-81)   SpO2:  [99 %]   No intake or output data in the 24 hours ending 10/18/24 1054      Physical Exam:   Physical Exam  Constitutional:       General: He is not in acute distress.     Appearance: Normal appearance. He is not ill-appearing or diaphoretic.   HENT:      Head: Normocephalic and atraumatic.      Nose: Nose normal. No congestion or rhinorrhea.      Mouth/Throat:      Mouth: Mucous membranes are moist.      Pharynx: Oropharynx is clear. No oropharyngeal exudate or posterior oropharyngeal erythema.   Eyes:      General:         Right eye: No discharge.         Left eye: No discharge.      Extraocular Movements: Extraocular movements intact.      Conjunctiva/sclera: Conjunctivae normal.   Cardiovascular:      Rate and Rhythm: Normal rate. Rhythm irregularly irregular.   Pulmonary:      Effort: Pulmonary effort is normal. No  "respiratory distress.      Breath sounds: Normal breath sounds. No stridor. No wheezing or rales.   Musculoskeletal:         General: No swelling. Normal range of motion.      Cervical back: Normal range of motion. No rigidity or tenderness.      Right lower leg: No edema.      Left lower leg: No edema.   Skin:     General: Skin is warm and dry.      Coloration: Skin is not jaundiced.      Findings: No bruising or lesion.   Neurological:      General: No focal deficit present.      Mental Status: He is alert and oriented to person, place, and time. Mental status is at baseline.   Psychiatric:         Mood and Affect: Mood normal.         Behavior: Behavior normal.         Thought Content: Thought content normal.         Judgment: Judgment normal.         Data       Recent Labs   Lab 10/17/24  1207   WBC 5.52   HGB 10.8*   HCT 35.5*           Recent Labs   Lab 10/17/24  1204   INR 1.19        Recent Labs   Lab 10/17/24  1204      K 5.2*      CO2 23   BUN 20   CREATININE 1.6*   ANIONGAP 7*   CALCIUM 9.1        No results for input(s): "PROT", "ALBUMIN", "BILITOT", "ALKPHOS", "AST", "ALT" in the last 168 hours.     No results for input(s): "TROPONINI" in the last 168 hours.     BNP (pg/mL)   Date Value   03/04/2024 783 (H)   08/02/2023 982 (H)   03/16/2022 145 (H)   03/24/2021 325 (H)   10/13/2020 169 (H)       No results for input(s): "LABBLOO" in the last 168 hours.       Assessment & Plan     #Atrial fibrillation   -patient presents for NOE/DCCV   -on Eliquis for CVA prophylaxis, last dose this morning       Dr. Isbell Plan from 10/16/24:   his LVEF is low. We should pursue rhythm control better. Please arrange for NOE/DCCV, and on follow-up after that we (he) will decide whether to undergo another ablation.       NOE 03/06/24    Left Ventricle: The left ventricle is dilated. Normal wall thickness. Regional wall motion abnormalities present. See diagram for wall motion findings. There is severely " reduced systolic function with a visually estimated ejection fraction of 25 - 30%. Ejection fraction by visual approximation is 28%. There is normal diastolic function.    Right Ventricle: Normal right ventricular cavity size. Wall thickness is normal. Right ventricle wall motion  is normal. Systolic function is normal. Pacemaker lead present in the ventricle.    Left Atrium: Left atrium is severely dilated. No patent foramen ovale. 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 The left atrial appendage appears normal. The left atrial appendage has a windsock morphology. Appendage velocity is normal at greater than 40 cm/sec. There is no thrombus in the left atrial appendage.    Right Atrium: Right atrium is mildly dilated. Lead present in the right atrium.    Aortic Valve: The aortic valve is a trileaflet valve. There is mild aortic valve sclerosis.    Mitral Valve: There is no stenosis. There is mild to moderate regurgitation.    Tricuspid Valve: There is mild to moderate regurgitation.    Pulmonic Valve: There is no stenosis.    Aorta: Grade 3 atherosclerosis of the descending aorta.    IVC/SVC: Normal venous pressure at 3 mmHg.    TTE 03/05/24    Left Ventricle: The left ventricle is mildly dilated. Ventricular mass is normal. Normal wall thickness. Regional wall motion abnormalities present. See diagram for wall motion findings. There is moderate-severely reduced systolic function. Ejection fraction by visual approximation is 30% ( upper 20s to low 30s and less than the prior). Grade III diastolic dysfunction.    Right Ventricle: Normal right ventricular cavity size. Wall thickness is normal. Right ventricle wall motion  is normal. Systolic function is normal. Pacemaker lead present in the ventricle.    Left Atrium: Left atrium is moderately dilated.    Right Atrium: Right atrium is moderately dilated. Lead present in the right atrium.    Pulmonary Artery: The estimated pulmonary artery systolic pressure  is 24 mmHg.    IVC/SVC: Normal venous pressure at 3 mmHg.    -No absolute contraindications of esophageal stricture, tumor, perforation, laceration,or diverticulum and/or active GI bleed.  -The risks, benefits & alternatives of the procedure were explained to the patient.   -The risks of transesophageal echo include but are not limited to:  Dental trauma, esophageal trauma/perforation, bleeding, laryngospasm/brochospasm, aspiration, sore throat/hoarseness, & dislodgement of the endotracheal tube/nasogastric tube (where applicable).    -The risks of moderate sedation include hypotension, respiratory depression, arrhythmias, bronchospasm, & death.    -Prior to procedure, extensive discussion with patient regarding risks and benefits of DCCV at bedside today. The patient voices understanding, all questions have been answered, and patient would like to proceed.  -Informed consent was obtained. The patient is agreeable to proceed with the procedure and all questions and concerns addressed.    Case was discussed with an attending physician prior to procedure.    Hiwot Burr PA-C  [facility]

## 2024-10-18 NOTE — NURSING
Pt DC'd per orders.  DC paperwork reviewed w pt and spouse.  Pt verbalized DC instructions.    IV removed. Tele DC'd.  Pt was able to drink, eat, and walk with no difficulties.    Pt being wheeled off unit per transport in wheelchair.

## 2024-10-18 NOTE — HOSPITAL COURSE
Patient underwent NOE without evidence of PRISCA thrombus. Proceeded with DCCV, converting from atrial fibrillation to sinus rhythm. Patient tolerated the procedure without any acute complications. Post-DCCV ECG revealed atrial-paced rhythm at 60 bpm. Plan to continue all home medications including Tikosyn 500 mcg BID, Eliquis 5 mg BID, ASA 81 mg qd, Carvedilol 12.5 mg BID. Instructed to return in 1 week for follow up ECG, can send in remote transmission in lieu of EKG, and in 4 weeks for clinic follow up with Dr. Isbell.   Patient was assessed at bedside prior to discharge, they reported feeling well and denied chest discomfort, shortness of breath, palpitations, lightheadedness, or any other acute symptoms. Discharge instructions were discussed with patient and all questions were answered. Patient was discharged home in stable condition.

## 2024-10-18 NOTE — PLAN OF CARE
Received report from Denilson CASTRO EP and CRNA. Pt is s/p NOE/DCCV. Pt is aaox4. Vss. Resp even and unlabored. Pt on continuou bedside monitoring. Bed locked and in low position. Side rails raised x 2. RN at bedside. Will monitor

## 2024-10-18 NOTE — TELEPHONE ENCOUNTER
----- Message from Hiwot Burr PA-C sent at 10/18/2024  2:04 PM CDT -----  Good afternoon,   Patient of Dr. Isbell's presented for NOE/DCCV today. He would like to follow up with Dr. Isbell in a month if possible to discuss ablation procedure and who Dr. Isbell would recommend he transfer his care to. If you could please assist with getting the patient scheduled with Dr. Isbell that would be appreciated!    Thank you,  Hiwot

## 2024-10-18 NOTE — TELEPHONE ENCOUNTER
Called patient and scheduled him to see Dr. Alva in the clinic to discuss ablation. Patient agreed to the appointment on 12/6/24.

## 2024-10-18 NOTE — DISCHARGE SUMMARY
"[facility] - Short Stay Cardiac Unit  Cardiology  Discharge Summary      Patient Name: Amish Grossman  MRN: [id number]  Admission Date: 10/18/2024  Hospital Length of Stay: 0 days  Discharge Date and Time:  10/18/2024 1:36 PM  Attending Physician: Camron Isbell MD    Discharging Provider: Hiwot Burr PA-C  Primary Care Physician: Marco Antonio Devi MD    HPI:   Amish Grossman is a 78 year old male with a history of  HFrEF, ICM s/p MI 1993 with LV thrombus, JABIER (CPAP), afib (tikosyn), SVT, VT w/ AICD placement, CAD (prior stents), HTN, HLD, CKD who presented to  [facility] ED on March 4 and was admitted for chest pain and newly reduced EF. EP consulted for atrial fibrillation. Patient reports he woke up with chest pressure and shoulder pain. Found to have low troponin leak to 0.029, but newly reduced EF from 40% -> 30%. Interrogation of device shows ongoing atrial fibrillation from 3/4/24 at 4am. As outpatient, patient follows with Dr. Isbell. Is compliant with Tikosyn 500mcg BID. Previously pulmonary vein cryoablation 2019, RFA 2020. He had a successful NOE/DCCV on 3/6 with restoration of sinus rhythm with plan for redo PVI as outpatient. Was initially scheduled for redo PVI ablation on 5/7/24, this was cancelled as patient had made lifestyle changes and wanted to hold off on procedure for the time being.     10/15/24: Patient sent in remote transmission that showed he was in Afib. RHM transmission confirms AF x 25 hours. States he's been feeling a light HA, mild fatigue. Compliant with medications. States he drinks occasionally and has lost 20-25lbs.     10/16/24: Remote transmission showed recurrence of  persistent atrial fibrillation is noted since 10/14/24.  Ventricular rates appear controlled.  The patient is asymptomatic.  Only complains of a "light headache" since start of episode     Compliant with Eliquis, Coreg 12.5mg BID, and Tikosyn 500mcg BID         Today, in good spirits. He reports SHOOK and some L " arm pain. No other cardiac complaints.       Procedure(s) (LRB):  Cardioversion or Defibrillation (N/A)  Transesophageal echo (NOE) intra-procedure log documentation (N/A)     Indwelling Lines/Drains at time of discharge:  Lines/Drains/Airways       None     Hospital Course:  Patient underwent NOE without evidence of PRISCA thrombus. Proceeded with DCCV, converting from atrial fibrillation to sinus rhythm. Patient tolerated the procedure without any acute complications. Post-DCCV ECG revealed atrial-paced rhythm at 60 bpm. Plan to continue all home medications including Tikosyn 500 mcg BID, Eliquis 5 mg BID, ASA 81 mg qd, Carvedilol 12.5 mg BID. Instructed to return in 1 week for follow up ECG, can send in remote transmission in lieu of EKG, and in 4 weeks for clinic follow up with Dr. Isbell.   Patient was assessed at bedside prior to discharge, they reported feeling well and denied chest discomfort, shortness of breath, palpitations, lightheadedness, or any other acute symptoms. Discharge instructions were discussed with patient and all questions were answered. Patient was discharged home in stable condition.       Goals of Care Treatment Preferences:  Code Status: Full Code      Procedure: Transesophageal echo (NOE)  Result Date: 10/18/2024    NOE prior to DCCV - there is no evidence of intracardiac thrombus.   Left Ventricle: The left ventricle is normal in size. Normal wall thickness. Global hypokinesis present. There is moderately reduced systolic function with a visually estimated ejection fraction of 30 - 35%.   Right Ventricle: Normal right ventricular cavity size. Wall thickness is normal. Systolic function is mildly reduced. Pacemaker lead present in the ventricle.   Biatrial enlargement. The left atrial appendage appears normal. Appendage velocity is normal at greater than 40 cm/sec. There is no thrombus in the cavity.        Significant Diagnostic Studies: Cardiac Graphics: ECG: Atrial-paced rhythm at 60  bpm     NOE report pending; Formal report to follow     Pending Diagnostic Studies:       None            There are no hospital problems to display for this patient.    No new Assessment & Plan notes have been filed under this hospital service since the last note was generated.  Service: Cardiology      Discharged Condition: stable    Disposition: Home or Self Care    Follow Up:   Follow-up Information       Camron Isbell MD. Schedule an appointment as soon as possible for a visit in 1 month(s).    Specialties: Electrophysiology, Cardiology  Contact information:  [address]  [facility] [id number] [phone]                           Patient Instructions:   No discharge procedures on file.  Medications:  Reconciled Home Medications:      Medication List        CONTINUE taking these medications      apixaban 5 mg Tab  Commonly known as: ELIQUIS  Take 1 tablet (5 mg total) by mouth 2 (two) times daily.     ascorbic acid (vitamin C) 100 MG tablet  Commonly known as: VITAMIN C  Take 1,000 mg by mouth once daily.     aspirin 81 MG EC tablet  Commonly known as: ECOTRIN  Take 81 mg by mouth once daily.     atorvastatin 80 MG tablet  Commonly known as: LIPITOR  Take 1 tablet (80 mg total) by mouth once daily.     carvediloL 12.5 MG tablet  Commonly known as: COREG  Take 1 tablet (12.5 mg total) by mouth 2 (two) times daily with meals.     coenzyme Q10 200 mg capsule  Take 200 mg by mouth once daily.     dofetilide 500 MCG Cap  Commonly known as: TIKOSYN  TAKE 1 CAPSULE BY MOUTH EVERY 12 HOURS     empagliflozin 10 mg tablet  Commonly known as: Jardiance  Take 1 tablet (10 mg total) by mouth once daily.     ENTRESTO  mg per tablet  Generic drug: sacubitriL-valsartan  Take 1 tablet by mouth 2 (two) times daily.     fish oil-omega-3 fatty acids 300-1,000 mg capsule  Take 2 g by mouth 2 (two) times daily.     furosemide 40 MG tablet  Commonly known as: LASIX  Take 20 mg by mouth daily as needed. Takes half  tablet twice a week or as directed     levothyroxine 88 MCG tablet  Commonly known as: SYNTHROID  TAKE 1 TABLET BY MOUTH ONCE DAILY BEFORE BREAKFAST     magnesium oxide 400 mg (241.3 mg magnesium) tablet  Commonly known as: MAG-OX  Take 400 mg by mouth once daily.     multivitamin capsule  Take 1 capsule by mouth nightly.     nitroGLYCERIN 0.4 MG SL tablet  Commonly known as: NITROSTAT  Place 1 tablet (0.4 mg total) under the tongue every 5 (five) minutes as needed for Chest pain.     pantoprazole 40 MG tablet  Commonly known as: PROTONIX  Take 40 mg by mouth once daily.     SLO-NIACIN 750 mg Tbsr  Generic drug: niacin  Take 1 tablet (750 mg total) by mouth 2 (two) times daily.     zinc 50 mg Tab  Take 40 mg by mouth.              Time spent on the discharge of patient: 35 minutes    Hiwot Burr PA-C  Cardiology  [facility] - Cardiology

## 2024-10-22 ENCOUNTER — TELEPHONE (OUTPATIENT)
Dept: ELECTROPHYSIOLOGY | Facility: CLINIC | Age: 79
End: 2024-10-22
Payer: MEDICARE

## 2024-10-22 DIAGNOSIS — I48.19 PERSISTENT ATRIAL FIBRILLATION: Primary | ICD-10-CM

## 2024-11-12 ENCOUNTER — CLINICAL SUPPORT (OUTPATIENT)
Dept: CARDIOLOGY | Facility: HOSPITAL | Age: 79
End: 2024-11-12
Attending: INTERNAL MEDICINE
Payer: MEDICARE

## 2024-11-12 ENCOUNTER — CLINICAL SUPPORT (OUTPATIENT)
Dept: CARDIOLOGY | Facility: HOSPITAL | Age: 79
End: 2024-11-12
Payer: MEDICARE

## 2024-11-12 DIAGNOSIS — I48.19 OTHER PERSISTENT ATRIAL FIBRILLATION: ICD-10-CM

## 2024-11-12 PROCEDURE — 93295 DEV INTERROG REMOTE 1/2/MLT: CPT | Mod: ,,, | Performed by: INTERNAL MEDICINE

## 2024-11-15 ENCOUNTER — TELEPHONE (OUTPATIENT)
Dept: CARDIOLOGY | Facility: HOSPITAL | Age: 79
End: 2024-11-15
Payer: MEDICARE

## 2024-11-15 NOTE — TELEPHONE ENCOUNTER
Spoke with patient's wife regarding disconnected RHM. She states that the patient is at a hunting camp and that his internet service is spotty. Reports he will be back home on Sunday or Monday. Requested that she ask the patient to send a manual remote transmission from his home monitor once he arrives home. She states she will have him do so. Understanding was verbalized and she appreciated the call.

## 2024-11-27 ENCOUNTER — PATIENT MESSAGE (OUTPATIENT)
Dept: CARDIOLOGY | Facility: CLINIC | Age: 79
End: 2024-11-27
Payer: MEDICARE

## 2024-12-06 ENCOUNTER — CLINICAL SUPPORT (OUTPATIENT)
Dept: CARDIOLOGY | Facility: HOSPITAL | Age: 79
End: 2024-12-06
Attending: STUDENT IN AN ORGANIZED HEALTH CARE EDUCATION/TRAINING PROGRAM
Payer: MEDICARE

## 2024-12-06 ENCOUNTER — HOSPITAL ENCOUNTER (OUTPATIENT)
Dept: CARDIOLOGY | Facility: CLINIC | Age: 79
Discharge: HOME | End: 2024-12-06
Payer: MEDICARE

## 2024-12-06 ENCOUNTER — OFFICE VISIT (OUTPATIENT)
Dept: ELECTROPHYSIOLOGY | Facility: CLINIC | Age: 79
End: 2024-12-06
Payer: MEDICARE

## 2024-12-06 VITALS
BODY MASS INDEX: 31.31 KG/M2 | HEART RATE: 67 BPM | DIASTOLIC BLOOD PRESSURE: 84 MMHG | SYSTOLIC BLOOD PRESSURE: 124 MMHG | WEIGHT: 243.81 LBS

## 2024-12-06 DIAGNOSIS — I50.42 CHRONIC COMBINED SYSTOLIC AND DIASTOLIC HEART FAILURE: ICD-10-CM

## 2024-12-06 DIAGNOSIS — I48.19 PERSISTENT ATRIAL FIBRILLATION: ICD-10-CM

## 2024-12-06 DIAGNOSIS — E66.811 CLASS 1 OBESITY WITH BODY MASS INDEX (BMI) OF 31.0 TO 31.9 IN ADULT, UNSPECIFIED OBESITY TYPE, UNSPECIFIED WHETHER SERIOUS COMORBIDITY PRESENT: ICD-10-CM

## 2024-12-06 DIAGNOSIS — E78.2 MIXED HYPERLIPIDEMIA: ICD-10-CM

## 2024-12-06 DIAGNOSIS — I47.10 PSVT (PAROXYSMAL SUPRAVENTRICULAR TACHYCARDIA): ICD-10-CM

## 2024-12-06 DIAGNOSIS — Z98.890 H/O CARDIAC RADIOFREQUENCY ABLATION: ICD-10-CM

## 2024-12-06 DIAGNOSIS — Z51.81 VISIT FOR MONITORING TIKOSYN THERAPY: ICD-10-CM

## 2024-12-06 DIAGNOSIS — I25.10 CORONARY ARTERY DISEASE INVOLVING NATIVE CORONARY ARTERY OF NATIVE HEART WITHOUT ANGINA PECTORIS: ICD-10-CM

## 2024-12-06 DIAGNOSIS — Z79.01 CHRONIC ANTICOAGULATION: ICD-10-CM

## 2024-12-06 DIAGNOSIS — G47.33 OSA (OBSTRUCTIVE SLEEP APNEA): ICD-10-CM

## 2024-12-06 DIAGNOSIS — Z95.810 ICD (IMPLANTABLE CARDIOVERTER-DEFIBRILLATOR) IN PLACE: ICD-10-CM

## 2024-12-06 DIAGNOSIS — I25.5 ISCHEMIC CARDIOMYOPATHY: ICD-10-CM

## 2024-12-06 DIAGNOSIS — I47.20 VENTRICULAR TACHYARRHYTHMIA: ICD-10-CM

## 2024-12-06 DIAGNOSIS — I50.20 HFREF (HEART FAILURE WITH REDUCED EJECTION FRACTION): ICD-10-CM

## 2024-12-06 DIAGNOSIS — Z95.5 HISTORY OF CORONARY ANGIOPLASTY WITH INSERTION OF STENT: ICD-10-CM

## 2024-12-06 DIAGNOSIS — E03.9 ACQUIRED HYPOTHYROIDISM: ICD-10-CM

## 2024-12-06 DIAGNOSIS — I10 PRIMARY HYPERTENSION: ICD-10-CM

## 2024-12-06 DIAGNOSIS — N18.30 STAGE 3 CHRONIC KIDNEY DISEASE, UNSPECIFIED WHETHER STAGE 3A OR 3B CKD: ICD-10-CM

## 2024-12-06 DIAGNOSIS — I48.0 PAROXYSMAL ATRIAL FIBRILLATION: Primary | ICD-10-CM

## 2024-12-06 DIAGNOSIS — Z79.899 VISIT FOR MONITORING TIKOSYN THERAPY: ICD-10-CM

## 2024-12-06 LAB
OHS QRS DURATION: 90 MS
OHS QTC CALCULATION: 476 MS

## 2024-12-06 PROCEDURE — 93283 PRGRMG EVAL IMPLANTABLE DFB: CPT | Mod: 26,,, | Performed by: INTERNAL MEDICINE

## 2024-12-06 PROCEDURE — 99999 PR PBB SHADOW E&M-EST. PATIENT-LVL III: CPT | Mod: PBBFAC,,, | Performed by: STUDENT IN AN ORGANIZED HEALTH CARE EDUCATION/TRAINING PROGRAM

## 2024-12-06 PROCEDURE — 93010 ELECTROCARDIOGRAM REPORT: CPT | Mod: S$PBB,,, | Performed by: INTERNAL MEDICINE

## 2024-12-06 PROCEDURE — 99213 OFFICE O/P EST LOW 20 MIN: CPT | Mod: PBBFAC,25 | Performed by: STUDENT IN AN ORGANIZED HEALTH CARE EDUCATION/TRAINING PROGRAM

## 2024-12-06 PROCEDURE — 93005 ELECTROCARDIOGRAM TRACING: CPT | Mod: PBBFAC | Performed by: INTERNAL MEDICINE

## 2024-12-06 PROCEDURE — 93283 PRGRMG EVAL IMPLANTABLE DFB: CPT

## 2024-12-06 PROCEDURE — 99215 OFFICE O/P EST HI 40 MIN: CPT | Mod: S$PBB,,, | Performed by: STUDENT IN AN ORGANIZED HEALTH CARE EDUCATION/TRAINING PROGRAM

## 2024-12-06 NOTE — PROGRESS NOTES
Electrophysiology Clinic Note    Reason for follow-up patient visit: Ongoing evaluation and recommendations regarding paroxysmal atrial fibrillation s/p successful NOE and cardioversion on 10/18/2024 with routine device surveillance of a [employer] dual-chamber ICD.    PRESENTING HISTORY:     History of Present Illness:  Mr. Amish Grossman is a zackary 79-year-old gentleman who presents today for ongoing evaluation and recommendations regarding paroxysmal atrial fibrillation s/p successful NOE and cardioversion on 10/18/2024 with routine device surveillance of a [employer] dual-chamber ICD. He has a past medical history significant for paroxysmal atrial fibrillation s/p successful NOE and cardioversion on 10/18/2024, a history of a prior pulmonary vein isolation with cryoballoon in 2019 and a redo-pulmonary vein isolation with radiofrequency catheter ablation in 2020, ischemic cardiomyopathy with a history of a prior MI with an LV thrombus in 1993, HFrEF with most recent LVEF 30-35%, s/p implantation of a primary prevention [employer] dual-chamber ICD 4/28/2010 with a generator change on 6/7/2018, a history of prior sustained VT events requiring device tachytherapies, coronary artery disease with prior PCI of the LAD and the LCx, hypertension, hyperlipidemia, CKD stage III, hypothyroidism, JABIER - maintained on CPAP therapy, and obesity.     He was previously followed in EP with Dr. Isbell and was noted on a recent device interrogation to have remained in persistent atrial fibrillation. He underwent a successful NOE and cardioversion on 10/18/2024. On his device interrogation today, he has remained in sinus rhythm with no subsequent events of recurrent atrial fibrillation following his recent cardioversion. He remains on dofetilide 500mcg po BID, carvedilol 12.5mg po BID, in addition to oral anticoagulation with apixaban 5mg po BID. He denies any adverse bleeding events while  "maintained on oral anticoagulation. He had previously considered undergoing a third ablation with Dr. Isbell and this had been scheduled for 5/7/2024; however, he cancelled his procedure, as he had made lifestyle changes and was unsure that a third procedure would have any improved results. He has intentionally lost about 25-30 lbs and reports that he has been consuming less alcohol, and these modifications have improved his atrial fibrillation. He reports that he had previously been briefly on amiodarone, and this therapy had been discontinued secondary to "lung spots" on his routine CXR imaging. He tells me that his blood pressure is occasionally low in the evenings following increased dosing of his Entresto. He has cut down on his caffeine consumption from 5-6 cups of coffee to 1-2 cups per day. He and his wife continue to camp often in their motor home. He remains in sinus rhythm, with atrial paced, ventricularly sensed rhythm on ECG assessment today.     In discussion with Mr. Grossman today, he tells me that he is feeling overall quite well. He denies any episodes of dizziness, lightheadedness, syncope/presyncope, chest pain or chest discomfort, palpitations, nausea or vomiting, orthopnea, or PND. He reports baseline mild shortness of breath and dyspnea with exertion that he feels has remained stable. He can climb one flight of stairs prior to needing to take a break and continues to attempt to increase his level of physical activity.     Review of Systems:  Review of Systems   Constitutional:  Negative for activity change.   HENT:  Negative for nasal congestion, nosebleeds, postnasal drip, rhinorrhea, sinus pressure/congestion, sneezing and sore throat.    Respiratory:  Positive for shortness of breath. Negative for apnea, cough, chest tightness and wheezing.    Cardiovascular:  Negative for chest pain, palpitations and leg swelling.   Gastrointestinal:  Negative for abdominal distention, abdominal pain, blood " in stool, change in bowel habit, constipation, diarrhea, nausea and vomiting.   Genitourinary:  Negative for dysuria and hematuria.   Musculoskeletal:  Positive for arthralgias and back pain. Negative for gait problem.   Neurological:  Negative for dizziness, seizures, syncope, weakness, light-headedness, headaches and coordination difficulties.        PAST HISTORY:     Past Medical History:   Diagnosis Date    Anticoagulant long-term use     Atrial fibrillation     Benign essential tremor 6/6/2018    Cardiomyopathy     Chest congestion     CHF (congestive heart failure)     Coronary artery disease     Hypertension     Left ventricular thrombus     Obstructive sleep apnea 4/3/2022    Syncope and collapse     Thyroid disease        Past Surgical History:   Procedure Laterality Date    ABLATION OF ARRHYTHMOGENIC FOCUS FOR ATRIAL FIBRILLATION N/A 12/9/2019    Procedure: ABLATION, ARRHYTHMOGENIC FOCUS, FOR ATRIAL FIBRILLATION;  Surgeon: Camron Isbell MD;  Location: [facility] EP LAB;  Service: Cardiology;  Laterality: N/A;  AF, NOE (firm), PVI, CRYO, LORENZA, GEN, DM, 3 PREP * Dual ICD SJM*    ABLATION OF ARRHYTHMOGENIC FOCUS FOR ATRIAL FIBRILLATION N/A 11/23/2020    Procedure: ABLATION, ARRHYTHMOGENIC FOCUS, FOR ATRIAL FIBRILLATION;  Surgeon: Camron Isbell MD;  Location: [facility] EP LAB;  Service: Cardiology;  Laterality: N/A;  AF, Redo PVI, RFA, CARTO, GEN, DM, 3 PREP* Dual ICD SJM in situ* NOE deferred pt compliant*    CARDIAC CATHETERIZATION      CARDIAC DEFIBRILLATOR PLACEMENT      CORONARY ANGIOPLASTY      ECHOCARDIOGRAM,TRANSESOPHAGEAL N/A 3/6/2024    Procedure: Transesophageal echo (NOE) intra-procedure log documentation;  Surgeon: Provider, [facility];  Location: [facility] EP LAB;  Service: Cardiology;  Laterality: N/A;    ECHOCARDIOGRAM,TRANSESOPHAGEAL N/A 10/18/2024    Procedure: Transesophageal echo (NOE) intra-procedure log documentation;  Surgeon: Rodolfo Mcrae MD;  Location: [facility] EP LAB;  Service: Cardiology;   Laterality: N/A;    HERNIA REPAIR      KNEE ARTHROSCOPY      REPLACEMENT OF IMPLANTABLE CARDIOVERTER-DEFIBRILLATOR (ICD) GENERATOR Left 6/7/2018    Procedure: REPLACEMENT-GENERATOR-ICD;  Surgeon: Camron Isbell MD;  Location: [facility] CATH LAB;  Service: Cardiology;  Laterality: Left;  GENIE, DUAL ICD GEN CHG, SJM, MAC, DM, 3 PREP    STENTS      TRANSESOPHAGEAL ECHOCARDIOGRAPHY N/A 10/31/2019    Procedure: ECHOCARDIOGRAM, TRANSESOPHAGEAL;  Surgeon: [facility] Provider;  Location: [facility] EP LAB;  Service: Cardiology;  Laterality: N/A;  AF, NOE, DCCV, MAC, DM, 3 PREP    TRANSESOPHAGEAL ECHOCARDIOGRAPHY N/A 12/9/2019    Procedure: ECHOCARDIOGRAM, TRANSESOPHAGEAL;  Surgeon: Sun Merchant MD;  Location: [facility] EP LAB;  Service: Cardiology;  Laterality: N/A;    TRANSESOPHAGEAL ECHOCARDIOGRAPHY N/A 10/29/2020    Procedure: ECHOCARDIOGRAM, TRANSESOPHAGEAL;  Surgeon: Dannie Mittal III, MD;  Location: [facility] EP LAB;  Service: Cardiology;  Laterality: N/A;    TREATMENT OF CARDIAC ARRHYTHMIA N/A 10/31/2019    Procedure: CARDIOVERSION;  Surgeon: Camron Isbell MD;  Location: [facility] EP LAB;  Service: Cardiology;  Laterality: N/A;  AF, NOE, DCCV, MAC, DM, 3 PREP*SJM  Dual ICD in situ*    TREATMENT OF CARDIAC ARRHYTHMIA N/A 10/29/2020    Procedure: CARDIOVERSION;  Surgeon: Camron Isbell MD;  Location: [facility] EP LAB;  Service: Cardiology;  Laterality: N/A;  AF, NOE, DCCV, MAC, DM, 3 PREP*SJM dual ICD in situ*    TREATMENT OF CARDIAC ARRHYTHMIA  11/23/2020    Procedure: Cardioversion or Defibrillation;  Surgeon: Camron Isbell MD;  Location: [facility] EP LAB;  Service: Cardiology;;    TREATMENT OF CARDIAC ARRHYTHMIA N/A 3/6/2024    Procedure: Cardioversion or Defibrillation;  Surgeon: Camron Isbell MD;  Location: [facility] EP LAB;  Service: Cardiology;  Laterality: N/A;  AF, NOE/DCCV, DM, ANES, Rm 312    TREATMENT OF CARDIAC ARRHYTHMIA N/A 10/18/2024    Procedure: Cardioversion or Defibrillation;  Surgeon: Camron Isbell MD;   Location: [facility] EP LAB;  Service: Cardiology;  Laterality: N/A;  AF, NOE, DCCV, MAC, DM, 3 Prep *SJM ICD in situ*    VASCULAR SURGERY      stents placed       Family History:  Family History   Problem Relation Name Age of Onset    Heart disease Mother      Heart disease Father      Heart disease Brother         Social History:  He  reports that he has never smoked. He quit smokeless tobacco use about 44 years ago. He reports current alcohol use. He reports that he does not use drugs.      MEDICATIONS & ALLERGIES:     Review of patient's allergies indicates:  No Known Allergies    Current Outpatient Medications on File Prior to Visit   Medication Sig Dispense Refill    apixaban (ELIQUIS) 5 mg Tab Take 1 tablet (5 mg total) by mouth 2 (two) times daily. 180 tablet 3    ascorbic acid, vitamin C, (VITAMIN C) 100 MG tablet Take 1,000 mg by mouth once daily.      aspirin (ECOTRIN) 81 MG EC tablet Take 81 mg by mouth once daily.      atorvastatin (LIPITOR) 80 MG tablet Take 1 tablet (80 mg total) by mouth once daily. (Patient taking differently: Take 80 mg by mouth once daily. Takes 1/2 tab 40 mg daily) 90 tablet 3    carvediloL (COREG) 12.5 MG tablet Take 1 tablet (12.5 mg total) by mouth 2 (two) times daily with meals. 180 tablet 3    coenzyme Q10 200 mg capsule Take 200 mg by mouth once daily.      dofetilide (TIKOSYN) 500 MCG Cap TAKE 1 CAPSULE BY MOUTH EVERY 12 HOURS 180 capsule 3    empagliflozin (JARDIANCE) 10 mg tablet Take 1 tablet (10 mg total) by mouth once daily. 30 tablet 11    fish oil-omega-3 fatty acids 300-1,000 mg capsule Take 2 g by mouth 2 (two) times daily.      furosemide (LASIX) 40 MG tablet Take 20 mg by mouth daily as needed. Takes half tablet twice a week or as directed      levothyroxine (SYNTHROID) 88 MCG tablet TAKE 1 TABLET BY MOUTH ONCE DAILY BEFORE BREAKFAST 90 tablet 3    magnesium oxide (MAG-OX) 400 mg (241.3 mg magnesium) tablet Take 400 mg by mouth once daily.      multivitamin capsule  Take 1 capsule by mouth nightly.      nitroGLYCERIN (NITROSTAT) 0.4 MG SL tablet Place 1 tablet (0.4 mg total) under the tongue every 5 (five) minutes as needed for Chest pain. 25 tablet 4    pantoprazole (PROTONIX) 40 MG tablet Take 40 mg by mouth once daily.      sacubitriL-valsartan (ENTRESTO)  mg per tablet Take 1 tablet by mouth 2 (two) times daily. 60 tablet 11    SLO-NIACIN 750 mg TbSR Take 1 tablet (750 mg total) by mouth 2 (two) times daily. (Patient taking differently: Take 500 mg by mouth 2 (two) times daily.) 180 each 3    zinc 50 mg Tab Take 40 mg by mouth.       Current Facility-Administered Medications on File Prior to Visit   Medication Dose Route Frequency Provider Last Rate Last Admin    0.9%  NaCl infusion   Intravenous Continuous Brittney Gardner NP   Stopped at 11/23/20 1201    sodium chloride 0.9% flush 5 mL  5 mL Intravenous PRN Brittney Gardner NP        sodium chloride 0.9% flush 5 mL  5 mL Intravenous PRN Brittney Gardner NP            OBJECTIVE:     Vital Signs:  /84   Pulse 67   Wt 110.6 kg (243 lb 13.3 oz)   BMI 31.31 kg/m²     Physical Exam:  Physical Exam  Constitutional:       General: He is not in acute distress.     Appearance: Normal appearance. He is obese. He is not ill-appearing or diaphoretic.      Comments: Well-appearing man in NAD.   HENT:      Head: Normocephalic and atraumatic.      Nose: Nose normal.      Mouth/Throat:      Mouth: Mucous membranes are moist.      Pharynx: Oropharynx is clear.   Eyes:      Pupils: Pupils are equal, round, and reactive to light.   Cardiovascular:      Rate and Rhythm: Normal rate and regular rhythm.      Pulses: Normal pulses.      Heart sounds: Normal heart sounds. No murmur heard.     No friction rub. No gallop.      Comments: Left anterior chest wall incision site is in excellent repair with no evidence of device adhesion or erosion.   Pulmonary:      Effort: Pulmonary effort is normal. No respiratory  distress.      Breath sounds: Normal breath sounds. No wheezing, rhonchi or rales.   Chest:      Chest wall: No tenderness.   Abdominal:      General: There is no distension.      Palpations: Abdomen is soft.      Tenderness: There is no abdominal tenderness.   Musculoskeletal:         General: No swelling or tenderness.      Cervical back: Normal range of motion.      Right lower leg: No edema.      Left lower leg: No edema.   Skin:     General: Skin is warm and dry.      Findings: No erythema, lesion or rash.   Neurological:      General: No focal deficit present.      Mental Status: He is alert and oriented to person, place, and time. Mental status is at baseline.      Motor: No weakness.      Gait: Gait normal.   Psychiatric:         Mood and Affect: Mood normal.         Behavior: Behavior normal.        Laboratory Data:  Lab Results   Component Value Date    WBC 5.52 10/17/2024    HGB 10.8 (L) 10/17/2024    HCT 35.5 (L) 10/17/2024    MCV 95 10/17/2024     10/17/2024     Lab Results   Component Value Date    GLU 96 03/14/2024     10/17/2024    K 5.2 (H) 10/17/2024     10/17/2024    CO2 23 10/17/2024    BUN 20 10/17/2024    CREATININE 1.6 (H) 10/17/2024    CALCIUM 9.1 10/17/2024    MG 1.9 08/03/2023     Lab Results   Component Value Date    INR 1.19 10/17/2024    INR 1.0 08/02/2023    INR 1.0 03/24/2021       Pertinent Cardiac Data:  Personal interpretation of today's ECG: Atrially paced, ventricularly sensed rhythm with rate of 67 bpm, AV delay 238 ms, QRS 90 ms, QT/QTc 464/490 ms.     Coronary Angiogram - 1/19/2017:  Diagnostic:        Patient has a right dominant coronary artery.      - Left Main Coronary Artery:              The LM is normal. There is MARIA E 3 flow.      - Left Anterior Descending Artery:              The LAD has luminal irregularities. There is MARIA E 3 flow. Patent stent.      - Left Circumflex Artery:              The LCX has luminal irregularities. There is MARIA E 3 flow.  Patent stent.      - Right Coronary Artery:              The RCA has luminal irregularities. There is MARIA E 3 flow.      - Radial:              The radial.    Non-obstructive CAD.     Resting 2D Transthoracic Echocardiogram - 6/6/2018:    1 - Severely depressed left ventricular systolic function (EF 25-30%).     2 - Impaired LV relaxation, normal LAP (grade 1 diastolic dysfunction).     3 - Mild mitral regurgitation.     4 - Mild tricuspid regurgitation.     5 - The estimated PA systolic pressure is 24 mmHg.     NOE - 10/31/2019:  Severely decreased left ventricular systolic function. The estimated ejection fraction is low to mid 20s with large ines-pical septal RWMAs.  Severe global hypokinetic wall motion.  Normal right ventricular systolic function.  Mild mitral regurgitation.  Grade 2 plaque present.  Normal appearing left atrial appendage. No thrombus is present in the appendage. Normal appendage velocities.    Pharmacologic Nuclear Cardiac Stress Test - PET - 11/27/2019:    There is a large sized, severe intensity mid to apical anteroseptal resting perfusion abnormality involving 25% of the LV myocardium in the distribution of the mid LAD consistent with transmural  infarct.    Whole heart absolute myocardial perfusion (cc/min/g) averaged 0.56 cc/min/g at rest (which is reduced), 0.75 cc/min/g at stress (which is severely reduced), and 1.38 CFR (which is moderately reduced). Stress flow is severely reduced in part due to large infarct.    Although no significant perfusion abnormalities are present in the remaining territories, there is moderately reduced coronary flow capacity indicative of diffuse coronary artery disease.    Gated perfusion images showed an ejection fraction of 40% at rest and 46% during stress. Normal ejection fraction is greater than 51%.    There is apical wall akinesis at rest and stress.    There is moderate global hypokinesis at rest and mild global hypokinesis at stress.    LV cavity  size is normal at rest and stress.    The EKG portion of this study is negative for ischemia.    Arrhythmias during stress: rare PVCs.    The patient reported chest pain during the stress test.    When compared to the prior study on 3/14/2016, there are no significant changes.    NOE - 12/9/2019:  Severely decreased left ventricular systolic function. The estimated ejection fraction is 20%. Wall motion abnormalities noted in the LAD distribution  Severe left atrial enlargement.  Severe right atrial enlargement.  Normal right ventricular systolic function.  Mild mitral regurgitation.  Mild tricuspid regurgitation.  Abnormal appearing left atrial appendage. Abnormal appendage velocities.  The ascending aorta is mildly dilated.  No LA or PRISCA thrombus with appendage velocity of 0.7m/sec    Cryoballoon PVI - 12/9/2019:  Successful pulmonary vein cryoablation.  Intracardiac echo.  Dual-chamber ICD reprogramming and analysis (pre- and post-procedure).  3D mapping performed with adQite.  CS catheter placement and pacing.  Cryoblative pulmonary vein isolation of all 4 pulmonary veins.  Device reprogramming.  Electrophysiology study with coronary sinus pacing.  His bundle recording.  ICD programming and analysis.  Invasive electrophysiology study.  Pulmonary vein isolation with cryoablation.  Transeptal puncture.    NOE - 10/13/2020:  There is a residual atrial septal defect due to prior trans-septal puncture for PVI. There is bidirectional flow across this defect, but mostly left to right.  The left ventricle is normal in size with normal systolic function. The estimated ejection fraction is 30%.  Mildly reduced right ventricular systolic function.  Grade 3 plaque present in the descending aorta.  The sinuses of Valsalva is mildly dilated. The ascending aorta is moderately dilated.    EPS with RFA PVI and AVNRT Ablation - 11/23/2020:  Successful pulmonary vein RF ablation.  Successful slow pathway modification.  Catheter  ablation of two mechanistically distinct tachycardias.  Cardioversion was successfully performed with restoration of normal sinus rhythm.  Intracardiac echo.  Transeptal puncture.  Isuprel stimulation with attempted tachycardia induction.  SVT ablation (slow pathway modification).  Pulmonary vein isolation.  Posterior wall isolation.  ICD reprogramming and analysis.  ICD programming and analysis.  His bundle recording.  Electrophysiology study with coronary sinus pacing.  3D mapping performed with Cerelink 3.    Resting 2D Transthoracic Echocardiogram - 3/16/2022:  The left ventricle is normal in size with mildly decreased systolic function. The estimated ejection fraction is 40%.  The quantitatively derived ejection fraction is 39%.  There are segmental left ventricular wall motion abnormalities.  Moderate right ventricular enlargement with normal right ventricular systolic function.  Grade I left ventricular diastolic dysfunction.  Biatrial enlargement.  The estimated PA systolic pressure is 21 mmHg.  Normal central venous pressure (3 mmHg).  The sinuses of Valsalva is mildly dilated.  The ascending aorta is mildly dilated.    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  The left ventricle is mildly enlarged with mildly decreased systolic function.  Moderate to severe left atrial enlargement.  The estimated ejection fraction is 40%.  The quantitatively derived ejection fraction is 44%.  Grade I left ventricular diastolic dysfunction.  Mild tricuspid regurgitation.  Normal right ventricular size with normal right ventricular systolic function.  Mild mitral regurgitation.  Mild aortic regurgitation.  Normal central venous pressure (3 mmHg).  The estimated PA systolic pressure is 29 mmHg.  The ascending aorta is mildly dilated.  No thrombus in apex (contrast used)    NOE - 8/2/2023:    Left Ventricle: The left ventricle is moderately dilated. regional wall motion  abnormalities present. There is moderately reduced systolic function with a visually estimated ejection fraction of 35 - 40%.    Right Ventricle: Mild right ventricular enlargement. Systolic function is normal.    Left Atrium: Left atrium is moderately dilated. The left atrial appendage appears normal. The left atrial appendage has a chicken wing morphology. There is no thrombus in the left atrial appendage. Aneurysmal septum    Right Atrium: Right atrium is moderately dilated. there is a mobile echodenisty on the RV lead above the level of tricuspid valve.    Tricuspid Valve: There is mild regurgitation.    All intracardiac valves are structurally normal and without evidence of associated vegetations.    There is a very small mobile echodensity on the RV pacing wire just above the tricuspid valve.  This is most consistent with fibrinous material, as is often seen on indwelling device wires in the right atrium, but a vegetation cannot be entirely excluded.    Resting 2D Transthoracic Echocardiogram - 3/5/2024:    Left Ventricle: The left ventricle is mildly dilated. Ventricular mass is normal. Normal wall thickness. Regional wall motion abnormalities present. See diagram for wall motion findings. There is moderate-severely reduced systolic function. Ejection fraction by visual approximation is 30% ( upper 20s to low 30s and less than the prior). Grade III diastolic dysfunction.    Right Ventricle: Normal right ventricular cavity size. Wall thickness is normal. Right ventricle wall motion  is normal. Systolic function is normal. Pacemaker lead present in the ventricle.    Left Atrium: Left atrium is moderately dilated.    Right Atrium: Right atrium is moderately dilated. Lead present in the right atrium.    Pulmonary Artery: The estimated pulmonary artery systolic pressure is 24 mmHg.    IVC/SVC: Normal venous pressure at 3 mmHg.    Pharmacologic Nuclear Cardiac Stress Test - PET - 3/6/2024:    There is a large  sized, severe intensity mid to apical anterior, septal, anteroseptal and anterolateral resting perfusion abnormality involving 31% of LV myocardium in the typical distribution of the mid LAD territory. After pharmacologic stress, this defect is unchanged, indicative of transmural scar.    Within the perfusion abnormality, absolute myocardial perfusion (cc/min/gm) averaged 0.25 cc/min/g at rest, 0.53 cc/min/g at stress and CFR was 1.87 , which equates to moderately reduced coronary flow capacity within the LAD territory.    There are no other significant perfusion abnormalities.    The whole heart absolute myocardial perfusion values averaged 0.66 cc/min/g at rest, which is normal; 1.20 cc/min/g at stress, which is mildly reduced; and CFR is 1.83 , which is mildly reduced.    CT attenuation images demonstrate severe diffuse coronary calcifications in the LAD, LCX, RCA and LM territory and mild diffuse aortic calcifications in the descending aorta.    The gated perfusion images showed an ejection fraction of 44% at rest and 45% during stress. A normal ejection fraction is greater than 47%.    There is mid to apical anterior, septal and anteroseptal wall akinesis at rest and during stress.    The LV cavity size is normal at rest and stress.    The ECG portion of the study is uninterpretable due to ventricular paced rhythm.    During stress, occasional PVCs are noted.    The patient reported chest pain during the stress test.    When compared to the previous study from 11/27/2019, there are no significant changes.    NOE and DCCV - 10/18/2024:    NOE prior to DCCV - there is no evidence of intracardiac thrombus.    Left Ventricle: The left ventricle is normal in size. Normal wall thickness. Global hypokinesis present. There is moderately reduced systolic function with a visually estimated ejection fraction of 30 - 35%.    Right Ventricle: Normal right ventricular cavity size. Wall thickness is normal. Systolic function  is mildly reduced. Pacemaker lead present in the ventricle.    Biatrial enlargement. The left atrial appendage appears normal. Appendage velocity is normal at greater than 40 cm/sec. There is no thrombus in the cavity.    Personal interpretation of today's Device Interrogation: Personal review of his device interrogation ([employer] [employer] ICD, implanted initially on 4/28/2010 with a generator change on 6/7/2018) reveals adequate battery longevity with an estimated 2.4 years of battery life remaining. His leads were interrogated with acceptable and stable lead parameters. He is presently AP-VS with underlying sinus bradycardia with first degree AV block at a rate of 43 bpm. He is RA paced 92%, RV paced 1.6%. He has not had any subsequent events of atrial fibrillation since his recent NOE and cardioversion, with an overall AT/AF burden of 0%. There are no concerning AHR or VHR episodes detected.        ASSESSMENT & PLAN:   Mr. Amish Grossman is a zackary 79-year-old gentleman who presents today for ongoing evaluation and recommendations regarding paroxysmal atrial fibrillation s/p successful NOE and cardioversion on 10/18/2024 with routine device surveillance of a [employer] dual-chamber ICD. He has a past medical history significant for paroxysmal atrial fibrillation s/p successful NOE and cardioversion on 10/18/2024, a history of a prior pulmonary vein isolation with cryoballoon in 2019 and a redo-pulmonary vein isolation with radiofrequency catheter ablation in 2020, ischemic cardiomyopathy with a history of a prior MI with an LV thrombus in 1993, HFrEF with most recent LVEF 30-35%, s/p implantation of a primary prevention [employer] dual-chamber ICD 4/28/2010 with a generator change on 6/7/2018, a history of prior sustained VT events requiring device tachytherapies, coronary artery disease with prior PCI of the LAD and the LCx, hypertension, hyperlipidemia, CKD stage  III, hypothyroidism, JABIER - maintained on CPAP therapy, and obesity.     - On device interrogation in-office today, he has a normally functioning [employer] dual-chamber ICD. He has not had any events of ventricular tachycardia or ventricular fibrillation following his prior counter clearance and has not required any device tachytherapies. He has not captured any recurrent atrial fibrillation since his recent NOE and cardioversion on 10/18/2024 with an overall AT/AF burden of 0%. He is RA paced 92%, with relatively infrequent RV pacing of 1.6%. His intrinsic QRSd remains narrow at 90ms. He has no present indication for device upgrade.   - He remains on dofetilide 500mcg po BID in addition to carvedilol 12.5mg po BID with overall excellent control of his atrial fibrillation. He remains on uninterrupted oral anticoagulation with apixaban 5mg po BID with no adverse bleeding events reported. He remains on guideline-directed medical therapy as per the Advanced Heart Failure team.  - He will continue to follow with his PCP, general cardiology, and Advanced Heart Failure team for ongoing evaluation and recommendations regarding his comorbid conditions.       This patient will return to clinic in six months with repeat remote transmissions in the interim. All questions and concerns were addressed at this encounter. Total time spent in this patient encounter: 45 minutes.     Signing Physician:       MANJIT Alva MD  Electrophysiology Attending

## 2024-12-12 LAB
OHS CV AF BURDEN PERCENT: 4
OHS CV DC REMOTE DEVICE TYPE: NORMAL
OHS CV DC REMOTE DEVICE TYPE: NORMAL
OHS CV ICD SHOCK: NO
OHS CV RV PACING PERCENT: 1.6
OHS CV RV PACING PERCENT: 8.1 %

## 2024-12-17 PROBLEM — R07.9 CHEST PAIN: Status: RESOLVED | Noted: 2017-01-19 | Resolved: 2024-12-17

## 2024-12-17 PROBLEM — I50.42 CHRONIC COMBINED SYSTOLIC AND DIASTOLIC HEART FAILURE: Status: ACTIVE | Noted: 2020-11-23

## 2024-12-17 PROBLEM — Z95.5 HISTORY OF CORONARY ANGIOPLASTY WITH INSERTION OF STENT: Status: ACTIVE | Noted: 2024-12-17

## 2024-12-17 PROBLEM — E78.2 MIXED HYPERLIPIDEMIA: Status: ACTIVE | Noted: 2024-12-17

## 2024-12-17 PROBLEM — Z98.890 H/O CARDIAC RADIOFREQUENCY ABLATION: Status: ACTIVE | Noted: 2024-12-17

## 2024-12-19 ENCOUNTER — PATIENT MESSAGE (OUTPATIENT)
Dept: CARDIOLOGY | Facility: CLINIC | Age: 79
End: 2024-12-19
Payer: MEDICARE

## 2025-01-16 ENCOUNTER — TELEPHONE (OUTPATIENT)
Dept: CARDIOLOGY | Facility: HOSPITAL | Age: 80
End: 2025-01-16
Payer: MEDICARE

## 2025-01-16 NOTE — TELEPHONE ENCOUNTER
"Pt called the [facility] on this am with c/o LH, "feels bad" since a little after 4 this am and thinks he is back in AF.  Had pt send a manual transmission via his remote ICD home monitor.  Transmission was received and reviewed.  Advised pt that it does show that he has been in ongoing AF since ~ 4:03 this am.  Pt is away from home on a fishing trip.  Asked pt if he was staying hydrated.  Pt stated he had only drank on bottle of water until mid afternoon on yesterday and after thinking about it, he stated in the past when he had gone into AF he was always away from home either fishing and/or hunting for hours and did not drink more than one bottle of water during those times as well.  Pt with hx of pAF is on Eliquis, Carvedilol 12.5 mg twice daily and Tikosyn 500 mg q 12 hours with no missed doses. Advised pt would review with Device RN (Yulissa) to review with Dr. Alva.  Understanding was verbalized.     Message routed to Device RN (Yulissa)   "

## 2025-02-11 ENCOUNTER — CLINICAL SUPPORT (OUTPATIENT)
Dept: CARDIOLOGY | Facility: HOSPITAL | Age: 80
End: 2025-02-11
Payer: MEDICARE

## 2025-02-11 ENCOUNTER — CLINICAL SUPPORT (OUTPATIENT)
Dept: CARDIOLOGY | Facility: HOSPITAL | Age: 80
End: 2025-02-11
Attending: STUDENT IN AN ORGANIZED HEALTH CARE EDUCATION/TRAINING PROGRAM
Payer: MEDICARE

## 2025-02-11 DIAGNOSIS — I48.19 OTHER PERSISTENT ATRIAL FIBRILLATION: ICD-10-CM

## 2025-02-14 LAB
OHS CV AF BURDEN PERCENT: 2
OHS CV DC REMOTE DEVICE TYPE: NORMAL
OHS CV ICD SHOCK: NO
OHS CV RV PACING PERCENT: 7.9 %

## 2025-02-17 ENCOUNTER — PATIENT MESSAGE (OUTPATIENT)
Dept: ELECTROPHYSIOLOGY | Facility: CLINIC | Age: 80
End: 2025-02-17
Payer: MEDICARE

## 2025-02-17 DIAGNOSIS — I48.0 PAROXYSMAL ATRIAL FIBRILLATION: ICD-10-CM

## 2025-02-18 RX ORDER — DOFETILIDE 0.5 MG/1
500 CAPSULE ORAL EVERY 12 HOURS
Qty: 180 CAPSULE | Refills: 3 | Status: SHIPPED | OUTPATIENT
Start: 2025-02-18

## 2025-04-03 ENCOUNTER — PATIENT MESSAGE (OUTPATIENT)
Dept: ELECTROPHYSIOLOGY | Facility: CLINIC | Age: 80
End: 2025-04-03
Payer: MEDICARE

## 2025-04-21 DIAGNOSIS — I25.5 ISCHEMIC CARDIOMYOPATHY: ICD-10-CM

## 2025-04-21 DIAGNOSIS — I50.43 ACUTE ON CHRONIC COMBINED SYSTOLIC AND DIASTOLIC HEART FAILURE: ICD-10-CM

## 2025-04-21 DIAGNOSIS — I25.10 CORONARY ARTERY DISEASE DUE TO LIPID RICH PLAQUE: ICD-10-CM

## 2025-04-21 DIAGNOSIS — I25.83 CORONARY ARTERY DISEASE DUE TO LIPID RICH PLAQUE: ICD-10-CM

## 2025-04-23 ENCOUNTER — PATIENT MESSAGE (OUTPATIENT)
Dept: CARDIOLOGY | Facility: CLINIC | Age: 80
End: 2025-04-23
Payer: MEDICARE

## 2025-04-23 DIAGNOSIS — I25.5 ISCHEMIC CARDIOMYOPATHY: ICD-10-CM

## 2025-04-23 DIAGNOSIS — I25.83 CORONARY ARTERY DISEASE DUE TO LIPID RICH PLAQUE: ICD-10-CM

## 2025-04-23 DIAGNOSIS — I50.43 ACUTE ON CHRONIC COMBINED SYSTOLIC AND DIASTOLIC HEART FAILURE: ICD-10-CM

## 2025-04-23 DIAGNOSIS — I25.10 CORONARY ARTERY DISEASE DUE TO LIPID RICH PLAQUE: ICD-10-CM

## 2025-04-23 RX ORDER — SACUBITRIL AND VALSARTAN 97; 103 MG/1; MG/1
1 TABLET, FILM COATED ORAL 2 TIMES DAILY
Qty: 60 TABLET | Refills: 11 | Status: SHIPPED | OUTPATIENT
Start: 2025-04-23

## 2025-04-24 RX ORDER — SACUBITRIL AND VALSARTAN 97; 103 MG/1; MG/1
1 TABLET, FILM COATED ORAL 2 TIMES DAILY
Qty: 60 TABLET | Refills: 11 | OUTPATIENT
Start: 2025-04-24

## 2025-05-13 ENCOUNTER — CLINICAL SUPPORT (OUTPATIENT)
Dept: CARDIOLOGY | Facility: HOSPITAL | Age: 80
End: 2025-05-13
Payer: MEDICARE

## 2025-05-13 ENCOUNTER — CLINICAL SUPPORT (OUTPATIENT)
Dept: CARDIOLOGY | Facility: HOSPITAL | Age: 80
End: 2025-05-13
Attending: STUDENT IN AN ORGANIZED HEALTH CARE EDUCATION/TRAINING PROGRAM
Payer: MEDICARE

## 2025-05-13 DIAGNOSIS — I48.19 OTHER PERSISTENT ATRIAL FIBRILLATION: ICD-10-CM

## 2025-05-16 LAB
OHS CV AF BURDEN PERCENT: < 1
OHS CV DC REMOTE DEVICE TYPE: NORMAL
OHS CV ICD SHOCK: NO
OHS CV RV PACING PERCENT: 11 %

## 2025-06-16 NOTE — TELEPHONE ENCOUNTER
Refill Routing Note   Medication(s) are not appropriate for processing by [facility] for the following reason(s):        Patient not seen by provider within 15 months  Required labs outdated    [facility] action(s):  Defer               Appointments  past 12m or future 3m with PCP    Date Provider   Last Visit   3/14/2024 Angel Coburn MD   Next Visit   Visit date not found Angel Coburn MD   ED visits in past 90 days: 0        Note composed:10:32 AM 06/16/2025

## 2025-06-18 RX ORDER — LEVOTHYROXINE SODIUM 88 UG/1
88 TABLET ORAL
Qty: 90 TABLET | Refills: 3 | Status: SHIPPED | OUTPATIENT
Start: 2025-06-18

## 2025-07-03 ENCOUNTER — OFFICE VISIT (OUTPATIENT)
Dept: ELECTROPHYSIOLOGY | Facility: CLINIC | Age: 80
End: 2025-07-03
Payer: MEDICARE

## 2025-07-03 ENCOUNTER — CLINICAL SUPPORT (OUTPATIENT)
Dept: CARDIOLOGY | Facility: HOSPITAL | Age: 80
End: 2025-07-03
Attending: INTERNAL MEDICINE
Payer: MEDICARE

## 2025-07-03 ENCOUNTER — HOSPITAL ENCOUNTER (OUTPATIENT)
Dept: CARDIOLOGY | Facility: CLINIC | Age: 80
Discharge: HOME | End: 2025-07-03
Payer: MEDICARE

## 2025-07-03 VITALS
SYSTOLIC BLOOD PRESSURE: 120 MMHG | BODY MASS INDEX: 32 KG/M2 | HEIGHT: 74 IN | HEART RATE: 65 BPM | WEIGHT: 249.31 LBS | DIASTOLIC BLOOD PRESSURE: 80 MMHG

## 2025-07-03 DIAGNOSIS — Z95.5 HISTORY OF CORONARY ANGIOPLASTY WITH INSERTION OF STENT: ICD-10-CM

## 2025-07-03 DIAGNOSIS — I25.5 ISCHEMIC CARDIOMYOPATHY: ICD-10-CM

## 2025-07-03 DIAGNOSIS — E66.811 CLASS 1 OBESITY WITH BODY MASS INDEX (BMI) OF 32.0 TO 32.9 IN ADULT, UNSPECIFIED OBESITY TYPE, UNSPECIFIED WHETHER SERIOUS COMORBIDITY PRESENT: ICD-10-CM

## 2025-07-03 DIAGNOSIS — Z79.899 ENCOUNTER FOR MONITORING DOFETILIDE THERAPY: ICD-10-CM

## 2025-07-03 DIAGNOSIS — I48.0 PAROXYSMAL ATRIAL FIBRILLATION: Primary | ICD-10-CM

## 2025-07-03 DIAGNOSIS — E03.9 ACQUIRED HYPOTHYROIDISM: ICD-10-CM

## 2025-07-03 DIAGNOSIS — N18.30 STAGE 3 CHRONIC KIDNEY DISEASE, UNSPECIFIED WHETHER STAGE 3A OR 3B CKD: ICD-10-CM

## 2025-07-03 DIAGNOSIS — Z98.890 H/O CARDIAC RADIOFREQUENCY ABLATION: ICD-10-CM

## 2025-07-03 DIAGNOSIS — I47.20 VENTRICULAR TACHYARRHYTHMIA: ICD-10-CM

## 2025-07-03 DIAGNOSIS — I47.10 PSVT (PAROXYSMAL SUPRAVENTRICULAR TACHYCARDIA): ICD-10-CM

## 2025-07-03 DIAGNOSIS — Z79.01 CHRONIC ANTICOAGULATION: ICD-10-CM

## 2025-07-03 DIAGNOSIS — I50.20 HFREF (HEART FAILURE WITH REDUCED EJECTION FRACTION): ICD-10-CM

## 2025-07-03 DIAGNOSIS — I50.42 CHRONIC COMBINED SYSTOLIC AND DIASTOLIC HEART FAILURE: ICD-10-CM

## 2025-07-03 DIAGNOSIS — Z51.81 ENCOUNTER FOR MONITORING DOFETILIDE THERAPY: ICD-10-CM

## 2025-07-03 DIAGNOSIS — Z95.810 ICD (IMPLANTABLE CARDIOVERTER-DEFIBRILLATOR) IN PLACE: ICD-10-CM

## 2025-07-03 DIAGNOSIS — I10 HTN (HYPERTENSION), BENIGN: ICD-10-CM

## 2025-07-03 DIAGNOSIS — I10 PRIMARY HYPERTENSION: ICD-10-CM

## 2025-07-03 DIAGNOSIS — E78.2 MIXED HYPERLIPIDEMIA: ICD-10-CM

## 2025-07-03 DIAGNOSIS — I25.10 CORONARY ARTERY DISEASE INVOLVING NATIVE CORONARY ARTERY OF NATIVE HEART WITHOUT ANGINA PECTORIS: ICD-10-CM

## 2025-07-03 DIAGNOSIS — G47.33 OSA (OBSTRUCTIVE SLEEP APNEA): ICD-10-CM

## 2025-07-03 DIAGNOSIS — I48.19 PERSISTENT ATRIAL FIBRILLATION: ICD-10-CM

## 2025-07-03 LAB
OHS CV AF BURDEN PERCENT: < 1
OHS CV DC REMOTE DEVICE TYPE: NORMAL
OHS CV RV PACING PERCENT: 7.7
OHS QRS DURATION: 88 MS
OHS QTC CALCULATION: 438 MS

## 2025-07-03 PROCEDURE — 93283 PRGRMG EVAL IMPLANTABLE DFB: CPT

## 2025-07-03 PROCEDURE — 93005 ELECTROCARDIOGRAM TRACING: CPT | Mod: PBBFAC | Performed by: INTERNAL MEDICINE

## 2025-07-03 PROCEDURE — 99213 OFFICE O/P EST LOW 20 MIN: CPT | Mod: PBBFAC,25 | Performed by: STUDENT IN AN ORGANIZED HEALTH CARE EDUCATION/TRAINING PROGRAM

## 2025-07-03 PROCEDURE — 93010 ELECTROCARDIOGRAM REPORT: CPT | Mod: S$PBB,,, | Performed by: INTERNAL MEDICINE

## 2025-07-03 PROCEDURE — 99999 PR PBB SHADOW E&M-EST. PATIENT-LVL III: CPT | Mod: PBBFAC,,, | Performed by: STUDENT IN AN ORGANIZED HEALTH CARE EDUCATION/TRAINING PROGRAM

## 2025-07-03 RX ORDER — ATORVASTATIN CALCIUM 40 MG/1
40 TABLET, FILM COATED ORAL DAILY
Qty: 90 TABLET | Refills: 3 | Status: SHIPPED | OUTPATIENT
Start: 2025-07-03 | End: 2026-06-28

## 2025-07-03 RX ORDER — CARVEDILOL 6.25 MG/1
6.25 TABLET ORAL 2 TIMES DAILY WITH MEALS
Qty: 180 TABLET | Refills: 3 | Status: SHIPPED | OUTPATIENT
Start: 2025-07-03

## 2025-07-03 NOTE — PROGRESS NOTES
Electrophysiology Clinic Note    Reason for follow-up patient visit: Ongoing evaluation and recommendations regarding paroxysmal atrial fibrillation s/p successful NOE and cardioversion on 10/18/2024 with routine device surveillance of a [employer] dual-chamber ICD.    PRESENTING HISTORY:     History of Present Illness:  Mr. Amish Grossman is a zackary 79-year-old gentleman who returns to clinic today for ongoing evaluation and recommendations regarding paroxysmal atrial fibrillation s/p successful NOE and cardioversion on 10/18/2024 with routine device surveillance of a [employer] dual-chamber ICD. He has a past medical history significant for paroxysmal atrial fibrillation s/p successful NOE and cardioversion on 10/18/2024, a history of a prior pulmonary vein isolation with cryoballoon in 2019 and a redo-pulmonary vein isolation with radiofrequency catheter ablation in 2020, ischemic cardiomyopathy with a history of a prior MI with an LV thrombus in 1993, HFrEF with most recent LVEF 30-35%, s/p implantation of a primary prevention [employer] dual-chamber ICD 4/28/2010 with a generator change on 6/7/2018, a history of prior sustained VT events requiring device tachytherapies, coronary artery disease with prior PCI of the LAD and the LCx, hypertension, hyperlipidemia, CKD stage III, hypothyroidism, JABIER - maintained on CPAP therapy, and obesity.     He was previously followed in EP with Dr. Isbell and was noted on a remote device interrogation to have remained in persistent atrial fibrillation. He underwent a successful NOE and cardioversion on 10/18/2024. On his device interrogation today, he has remained in sinus rhythm with no subsequent events of sustained recurrent atrial fibrillation following this cardioversion. He remains on dofetilide 500mcg po BID, carvedilol 6.25mg po BID - decreased in the setting of periods of orthostatic hypotension, in addition to oral  "anticoagulation with apixaban 5mg po BID. He denies any adverse bleeding events while maintained on oral anticoagulation. He previously considered undergoing a third ablation with Dr. Isbell and this had been scheduled for 5/7/2024; however, he cancelled his procedure, as he had made lifestyle changes and was unsure that a third procedure would have any improved results. He has intentionally lost about 25-30 lbs and reports that he has been consuming less alcohol, and these modifications have improved his atrial fibrillation. He reports that he had previously been briefly on amiodarone, and this therapy had been discontinued secondary to "lung spots" on his routine CXR imaging. He tells me that his blood pressure is occasionally low in the evenings following increased dosing of his Entresto, but notes that since decreasing his carvedilol, he has recorded improved ambulatory blood pressure readings. He has cut down on his caffeine consumption from 5-6 cups of coffee to 1-2 cups per day. He and his wife continue to camp often in their motor home. He has been vigilant to increase his hydration and notes that dehydration is an atrial fibrillation trigger. He remains in sinus rhythm, with atrial paced, ventricularly sensed rhythm on ECG assessment today.     In discussion with Mr. Grossman today, he tells me that he is feeling overall quite well. He denies any episodes of dizziness, lightheadedness, syncope/presyncope, chest pain or chest discomfort, palpitations, nausea or vomiting, orthopnea, or PND. He reports baseline mild shortness of breath and dyspnea with exertion that he feels has remained stable. He can climb one flight of stairs prior to needing to take a break and continues to attempt to increase his level of physical activity.     Review of Systems:  Review of Systems   Constitutional:  Negative for activity change.   HENT:  Negative for nasal congestion, nosebleeds, postnasal drip, rhinorrhea, sinus " pressure/congestion, sneezing and sore throat.    Respiratory:  Positive for shortness of breath. Negative for apnea, cough, chest tightness and wheezing.    Cardiovascular:  Negative for chest pain, palpitations and leg swelling.   Gastrointestinal:  Negative for abdominal distention, abdominal pain, blood in stool, change in bowel habit, constipation, diarrhea, nausea and vomiting.   Genitourinary:  Negative for dysuria and hematuria.   Musculoskeletal:  Positive for arthralgias and back pain. Negative for gait problem.   Neurological:  Negative for dizziness, seizures, syncope, weakness, light-headedness, headaches and coordination difficulties.        PAST HISTORY:     Past Medical History:   Diagnosis Date    Anticoagulant long-term use     Atrial fibrillation     Benign essential tremor 6/6/2018    Cardiomyopathy     Chest congestion     CHF (congestive heart failure)     Coronary artery disease     Hypertension     Left ventricular thrombus     Obstructive sleep apnea 4/3/2022    Syncope and collapse     Thyroid disease        Past Surgical History:   Procedure Laterality Date    ABLATION OF ARRHYTHMOGENIC FOCUS FOR ATRIAL FIBRILLATION N/A 12/9/2019    Procedure: ABLATION, ARRHYTHMOGENIC FOCUS, FOR ATRIAL FIBRILLATION;  Surgeon: Camron Isbell MD;  Location: [facility] EP LAB;  Service: Cardiology;  Laterality: N/A;  AF, NOE (firm), PVI, CRYO, LORENZA, GEN, DM, 3 PREP * Dual ICD SJM*    ABLATION OF ARRHYTHMOGENIC FOCUS FOR ATRIAL FIBRILLATION N/A 11/23/2020    Procedure: ABLATION, ARRHYTHMOGENIC FOCUS, FOR ATRIAL FIBRILLATION;  Surgeon: Camron Isbell MD;  Location: [facility] EP LAB;  Service: Cardiology;  Laterality: N/A;  AF, Redo PVI, RFA, CARTO, GEN, DM, 3 PREP* Dual ICD SJM in situ* NOE deferred pt compliant*    CARDIAC CATHETERIZATION      CARDIAC DEFIBRILLATOR PLACEMENT      CORONARY ANGIOPLASTY      ECHOCARDIOGRAM,TRANSESOPHAGEAL N/A 3/6/2024    Procedure: Transesophageal echo (NOE) intra-procedure log  documentation;  Surgeon: Provider, [facility];  Location: [facility] EP LAB;  Service: Cardiology;  Laterality: N/A;    ECHOCARDIOGRAM,TRANSESOPHAGEAL N/A 10/18/2024    Procedure: Transesophageal echo (NOE) intra-procedure log documentation;  Surgeon: Rodolfo Mcrae MD;  Location: [facility] EP LAB;  Service: Cardiology;  Laterality: N/A;    HERNIA REPAIR      KNEE ARTHROSCOPY      REPLACEMENT OF IMPLANTABLE CARDIOVERTER-DEFIBRILLATOR (ICD) GENERATOR Left 6/7/2018    Procedure: REPLACEMENT-GENERATOR-ICD;  Surgeon: Camron Isbell MD;  Location: [facility] CATH LAB;  Service: Cardiology;  Laterality: Left;  GENIE, DUAL ICD GEN CHG, SJM, MAC, DM, 3 PREP    STENTS      TRANSESOPHAGEAL ECHOCARDIOGRAPHY N/A 10/31/2019    Procedure: ECHOCARDIOGRAM, TRANSESOPHAGEAL;  Surgeon: [facility] Diagnostic Provider;  Location: [facility] EP LAB;  Service: Cardiology;  Laterality: N/A;  AF, NOE, DCCV, MAC, DM, 3 PREP    TRANSESOPHAGEAL ECHOCARDIOGRAPHY N/A 12/9/2019    Procedure: ECHOCARDIOGRAM, TRANSESOPHAGEAL;  Surgeon: Sun Merchant MD;  Location: [facility] EP LAB;  Service: Cardiology;  Laterality: N/A;    TRANSESOPHAGEAL ECHOCARDIOGRAPHY N/A 10/29/2020    Procedure: ECHOCARDIOGRAM, TRANSESOPHAGEAL;  Surgeon: Dannie Mittal III, MD;  Location: [facility] EP LAB;  Service: Cardiology;  Laterality: N/A;    TREATMENT OF CARDIAC ARRHYTHMIA N/A 10/31/2019    Procedure: CARDIOVERSION;  Surgeon: Camron Isbell MD;  Location: [facility] EP LAB;  Service: Cardiology;  Laterality: N/A;  AF, NOE, DCCV, MAC, DM, 3 PREP*SJM  Dual ICD in situ*    TREATMENT OF CARDIAC ARRHYTHMIA N/A 10/29/2020    Procedure: CARDIOVERSION;  Surgeon: Camron Isbell MD;  Location: [facility] EP LAB;  Service: Cardiology;  Laterality: N/A;  AF, NOE, DCCV, MAC, DM, 3 PREP*SJM dual ICD in situ*    TREATMENT OF CARDIAC ARRHYTHMIA  11/23/2020    Procedure: Cardioversion or Defibrillation;  Surgeon: Camron Isbell MD;  Location: [facility] EP LAB;  Service: Cardiology;;    TREATMENT OF CARDIAC ARRHYTHMIA  N/A 3/6/2024    Procedure: Cardioversion or Defibrillation;  Surgeon: Camron Isbell MD;  Location: [facility] EP LAB;  Service: Cardiology;  Laterality: N/A;  AF, NOE/DCCV, DM, ANES, Rm 312    TREATMENT OF CARDIAC ARRHYTHMIA N/A 10/18/2024    Procedure: Cardioversion or Defibrillation;  Surgeon: Camron Isbell MD;  Location: [facility] EP LAB;  Service: Cardiology;  Laterality: N/A;  AF, NOE, DCCV, MAC, DM, 3 Prep *SJM ICD in situ*    VASCULAR SURGERY      stents placed       Family History:  Family History   Problem Relation Name Age of Onset    Heart disease Mother      Heart disease Father      Heart disease Brother         Social History:  He  reports that he has never smoked. He quit smokeless tobacco use about 45 years ago. He reports that he does not currently use alcohol. He reports that he does not use drugs.      MEDICATIONS & ALLERGIES:     Review of patient's allergies indicates:  No Known Allergies    Current Outpatient Medications on File Prior to Visit   Medication Sig Dispense Refill    apixaban (ELIQUIS) 5 mg Tab Take 1 tablet (5 mg total) by mouth 2 (two) times daily. 180 tablet 3    ascorbic acid, vitamin C, (VITAMIN C) 100 MG tablet Take 1,000 mg by mouth once daily.      aspirin (ECOTRIN) 81 MG EC tablet Take 81 mg by mouth once daily.      atorvastatin (LIPITOR) 80 MG tablet Take 1 tablet (80 mg total) by mouth once daily. (Patient taking differently: Take 80 mg by mouth once daily. Takes 1/2 tab 40 mg daily) 90 tablet 3    carvediloL (COREG) 12.5 MG tablet Take 1 tablet (12.5 mg total) by mouth 2 (two) times daily with meals. 180 tablet 3    coenzyme Q10 200 mg capsule Take 200 mg by mouth once daily.      dofetilide (TIKOSYN) 500 MCG Cap Take 1 capsule (500 mcg total) by mouth every 12 (twelve) hours. 180 capsule 3    empagliflozin (JARDIANCE) 10 mg tablet Take 1 tablet (10 mg total) by mouth once daily. 30 tablet 11    ENTRESTO  mg per tablet Take 1 tablet by mouth twice daily 60 tablet 11  "   fish oil-omega-3 fatty acids 300-1,000 mg capsule Take 2 g by mouth 2 (two) times daily.      levothyroxine (SYNTHROID) 88 MCG tablet Take 1 tablet (88 mcg total) by mouth before breakfast. 90 tablet 3    magnesium oxide (MAG-OX) 400 mg (241.3 mg magnesium) tablet Take 400 mg by mouth once daily.      multivitamin capsule Take 1 capsule by mouth nightly.      nitroGLYCERIN (NITROSTAT) 0.4 MG SL tablet Place 1 tablet (0.4 mg total) under the tongue every 5 (five) minutes as needed for Chest pain. 25 tablet 4    pantoprazole (PROTONIX) 40 MG tablet Take 40 mg by mouth once daily.      SLO-NIACIN 750 mg TbSR Take 1 tablet (750 mg total) by mouth 2 (two) times daily. (Patient taking differently: Take 500 mg by mouth 2 (two) times daily.) 180 each 3    zinc 50 mg Tab Take 40 mg by mouth.       Current Facility-Administered Medications on File Prior to Visit   Medication Dose Route Frequency Provider Last Rate Last Admin    0.9%  NaCl infusion   Intravenous Continuous Brittney Gardner NP   Stopped at 11/23/20 1201    sodium chloride 0.9% flush 5 mL  5 mL Intravenous PRN Brittney Gardner, NP        sodium chloride 0.9% flush 5 mL  5 mL Intravenous PRN Brittney Gardner NP            OBJECTIVE:     Vital Signs:  /80 (Patient Position: Sitting)   Pulse 65   Ht 6' 2" (1.88 m)   Wt 113.1 kg (249 lb 5.4 oz)   BMI 32.01 kg/m²     Physical Exam:  Physical Exam  Constitutional:       General: He is not in acute distress.     Appearance: Normal appearance. He is obese. He is not ill-appearing or diaphoretic.      Comments: Well-appearing man in NAD.   HENT:      Head: Normocephalic and atraumatic.      Nose: Nose normal.      Mouth/Throat:      Mouth: Mucous membranes are moist.      Pharynx: Oropharynx is clear.   Eyes:      Pupils: Pupils are equal, round, and reactive to light.   Cardiovascular:      Rate and Rhythm: Normal rate and regular rhythm.      Pulses: Normal pulses.      Heart sounds: " Normal heart sounds. No murmur heard.     No friction rub. No gallop.      Comments: Left anterior chest wall incision site is in excellent repair with no evidence of device adhesion or erosion.   Pulmonary:      Effort: Pulmonary effort is normal. No respiratory distress.      Breath sounds: Normal breath sounds. No wheezing, rhonchi or rales.   Chest:      Chest wall: No tenderness.   Abdominal:      General: There is no distension.      Palpations: Abdomen is soft.      Tenderness: There is no abdominal tenderness.   Musculoskeletal:         General: No swelling or tenderness.      Cervical back: Normal range of motion.      Right lower leg: No edema.      Left lower leg: No edema.   Skin:     General: Skin is warm and dry.      Findings: No erythema, lesion or rash.   Neurological:      General: No focal deficit present.      Mental Status: He is alert and oriented to person, place, and time. Mental status is at baseline.      Motor: No weakness.      Gait: Gait normal.   Psychiatric:         Mood and Affect: Mood normal.         Behavior: Behavior normal.        Laboratory Data:  Lab Results   Component Value Date    WBC 5.52 10/17/2024    HGB 10.8 (L) 10/17/2024    HCT 35.5 (L) 10/17/2024    MCV 95 10/17/2024     10/17/2024     Lab Results   Component Value Date     10/17/2024     10/17/2024    K 5.2 (H) 10/17/2024     10/17/2024    CO2 23 10/17/2024    BUN 20 10/17/2024    CREATININE 1.6 (H) 10/17/2024    CALCIUM 9.1 10/17/2024    MG 1.9 08/03/2023     Lab Results   Component Value Date    INR 1.19 10/17/2024    INR 1.0 08/02/2023    INR 1.0 03/24/2021    PROTIME 13.9 (H) 10/17/2024       Pertinent Cardiac Data:  Personal interpretation of today's ECG: Atrially paced, ventricularly sensed rhythm with rate of 65 bpm, isolated PVC, AV delay 226 ms, QRS 88 ms, QT/QTc 422/438 ms.     Coronary Angiogram - 1/19/2017:  Diagnostic:        Patient has a right dominant coronary artery.      -  Left Main Coronary Artery:              The LM is normal. There is MARIA E 3 flow.      - Left Anterior Descending Artery:              The LAD has luminal irregularities. There is MARIA E 3 flow. Patent stent.      - Left Circumflex Artery:              The LCX has luminal irregularities. There is MARIA E 3 flow. Patent stent.      - Right Coronary Artery:              The RCA has luminal irregularities. There is MARIA E 3 flow.      - Radial:              The radial.    Non-obstructive CAD.     Resting 2D Transthoracic Echocardiogram - 6/6/2018:    1 - Severely depressed left ventricular systolic function (EF 25-30%).     2 - Impaired LV relaxation, normal LAP (grade 1 diastolic dysfunction).     3 - Mild mitral regurgitation.     4 - Mild tricuspid regurgitation.     5 - The estimated PA systolic pressure is 24 mmHg.     NOE - 10/31/2019:  Severely decreased left ventricular systolic function. The estimated ejection fraction is low to mid 20s with large ines-pical septal RWMAs.  Severe global hypokinetic wall motion.  Normal right ventricular systolic function.  Mild mitral regurgitation.  Grade 2 plaque present.  Normal appearing left atrial appendage. No thrombus is present in the appendage. Normal appendage velocities.    Pharmacologic Nuclear Cardiac Stress Test - PET - 11/27/2019:    There is a large sized, severe intensity mid to apical anteroseptal resting perfusion abnormality involving 25% of the LV myocardium in the distribution of the mid LAD consistent with transmural  infarct.    Whole heart absolute myocardial perfusion (cc/min/g) averaged 0.56 cc/min/g at rest (which is reduced), 0.75 cc/min/g at stress (which is severely reduced), and 1.38 CFR (which is moderately reduced). Stress flow is severely reduced in part due to large infarct.    Although no significant perfusion abnormalities are present in the remaining territories, there is moderately reduced coronary flow capacity indicative of diffuse coronary  artery disease.    Gated perfusion images showed an ejection fraction of 40% at rest and 46% during stress. Normal ejection fraction is greater than 51%.    There is apical wall akinesis at rest and stress.    There is moderate global hypokinesis at rest and mild global hypokinesis at stress.    LV cavity size is normal at rest and stress.    The EKG portion of this study is negative for ischemia.    Arrhythmias during stress: rare PVCs.    The patient reported chest pain during the stress test.    When compared to the prior study on 3/14/2016, there are no significant changes.    NOE - 12/9/2019:  Severely decreased left ventricular systolic function. The estimated ejection fraction is 20%. Wall motion abnormalities noted in the LAD distribution  Severe left atrial enlargement.  Severe right atrial enlargement.  Normal right ventricular systolic function.  Mild mitral regurgitation.  Mild tricuspid regurgitation.  Abnormal appearing left atrial appendage. Abnormal appendage velocities.  The ascending aorta is mildly dilated.  No LA or PRISCA thrombus with appendage velocity of 0.7m/sec    Cryoballoon PVI - 12/9/2019:  Successful pulmonary vein cryoablation.  Intracardiac echo.  Dual-chamber ICD reprogramming and analysis (pre- and post-procedure).  3D mapping performed with [employer].  CS catheter placement and pacing.  Cryoblative pulmonary vein isolation of all 4 pulmonary veins.  Device reprogramming.  Electrophysiology study with coronary sinus pacing.  His bundle recording.  ICD programming and analysis.  Invasive electrophysiology study.  Pulmonary vein isolation with cryoablation.  Transeptal puncture.    NOE - 10/13/2020:  There is a residual atrial septal defect due to prior trans-septal puncture for PVI. There is bidirectional flow across this defect, but mostly left to right.  The left ventricle is normal in size with normal systolic function. The estimated ejection fraction is 30%.  Mildly reduced right  ventricular systolic function.  Grade 3 plaque present in the descending aorta.  The sinuses of Valsalva is mildly dilated. The ascending aorta is moderately dilated.    EPS with RFA PVI and AVNRT Ablation - 11/23/2020:  Successful pulmonary vein RF ablation.  Successful slow pathway modification.  Catheter ablation of two mechanistically distinct tachycardias.  Cardioversion was successfully performed with restoration of normal sinus rhythm.  Intracardiac echo.  Transeptal puncture.  Isuprel stimulation with attempted tachycardia induction.  SVT ablation (slow pathway modification).  Pulmonary vein isolation.  Posterior wall isolation.  ICD reprogramming and analysis.  ICD programming and analysis.  His bundle recording.  Electrophysiology study with coronary sinus pacing.  3D mapping performed with [employer] 3.    Resting 2D Transthoracic Echocardiogram - 3/16/2022:  The left ventricle is normal in size with mildly decreased systolic function. The estimated ejection fraction is 40%.  The quantitatively derived ejection fraction is 39%.  There are segmental left ventricular wall motion abnormalities.  Moderate right ventricular enlargement with normal right ventricular systolic function.  Grade I left ventricular diastolic dysfunction.  Biatrial enlargement.  The estimated PA systolic pressure is 21 mmHg.  Normal central venous pressure (3 mmHg).  The sinuses of Valsalva is mildly dilated.  The ascending aorta is mildly dilated.    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  The left ventricle is mildly enlarged with mildly decreased systolic function.  Moderate to severe left atrial enlargement.  The estimated ejection fraction is 40%.  The quantitatively derived ejection fraction is 44%.  Grade I left ventricular diastolic dysfunction.  Mild tricuspid regurgitation.  Normal right ventricular size with normal right ventricular systolic function.  Mild mitral  regurgitation.  Mild aortic regurgitation.  Normal central venous pressure (3 mmHg).  The estimated PA systolic pressure is 29 mmHg.  The ascending aorta is mildly dilated.  No thrombus in apex (contrast used)    NOE - 8/2/2023:    Left Ventricle: The left ventricle is moderately dilated. regional wall motion abnormalities present. There is moderately reduced systolic function with a visually estimated ejection fraction of 35 - 40%.    Right Ventricle: Mild right ventricular enlargement. Systolic function is normal.    Left Atrium: Left atrium is moderately dilated. The left atrial appendage appears normal. The left atrial appendage has a chicken wing morphology. There is no thrombus in the left atrial appendage. Aneurysmal septum    Right Atrium: Right atrium is moderately dilated. there is a mobile echodenisty on the RV lead above the level of tricuspid valve.    Tricuspid Valve: There is mild regurgitation.    All intracardiac valves are structurally normal and without evidence of associated vegetations.    There is a very small mobile echodensity on the RV pacing wire just above the tricuspid valve.  This is most consistent with fibrinous material, as is often seen on indwelling device wires in the right atrium, but a vegetation cannot be entirely excluded.    Resting 2D Transthoracic Echocardiogram - 3/5/2024:    Left Ventricle: The left ventricle is mildly dilated. Ventricular mass is normal. Normal wall thickness. Regional wall motion abnormalities present. See diagram for wall motion findings. There is moderate-severely reduced systolic function. Ejection fraction by visual approximation is 30% ( upper 20s to low 30s and less than the prior). Grade III diastolic dysfunction.    Right Ventricle: Normal right ventricular cavity size. Wall thickness is normal. Right ventricle wall motion  is normal. Systolic function is normal. Pacemaker lead present in the ventricle.    Left Atrium: Left atrium is moderately  dilated.    Right Atrium: Right atrium is moderately dilated. Lead present in the right atrium.    Pulmonary Artery: The estimated pulmonary artery systolic pressure is 24 mmHg.    IVC/SVC: Normal venous pressure at 3 mmHg.    Pharmacologic Nuclear Cardiac Stress Test - PET - 3/6/2024:    There is a large sized, severe intensity mid to apical anterior, septal, anteroseptal and anterolateral resting perfusion abnormality involving 31% of LV myocardium in the typical distribution of the mid LAD territory. After pharmacologic stress, this defect is unchanged, indicative of transmural scar.    Within the perfusion abnormality, absolute myocardial perfusion (cc/min/gm) averaged 0.25 cc/min/g at rest, 0.53 cc/min/g at stress and CFR was 1.87 , which equates to moderately reduced coronary flow capacity within the LAD territory.    There are no other significant perfusion abnormalities.    The whole heart absolute myocardial perfusion values averaged 0.66 cc/min/g at rest, which is normal; 1.20 cc/min/g at stress, which is mildly reduced; and CFR is 1.83 , which is mildly reduced.    CT attenuation images demonstrate severe diffuse coronary calcifications in the LAD, LCX, RCA and LM territory and mild diffuse aortic calcifications in the descending aorta.    The gated perfusion images showed an ejection fraction of 44% at rest and 45% during stress. A normal ejection fraction is greater than 47%.    There is mid to apical anterior, septal and anteroseptal wall akinesis at rest and during stress.    The LV cavity size is normal at rest and stress.    The ECG portion of the study is uninterpretable due to ventricular paced rhythm.    During stress, occasional PVCs are noted.    The patient reported chest pain during the stress test.    When compared to the previous study from 11/27/2019, there are no significant changes.    NOE and DCCV - 10/18/2024:    NOE prior to DCCV - there is no evidence of intracardiac thrombus.     Left Ventricle: The left ventricle is normal in size. Normal wall thickness. Global hypokinesis present. There is moderately reduced systolic function with a visually estimated ejection fraction of 30 - 35%.    Right Ventricle: Normal right ventricular cavity size. Wall thickness is normal. Systolic function is mildly reduced. Pacemaker lead present in the ventricle.    Biatrial enlargement. The left atrial appendage appears normal. Appendage velocity is normal at greater than 40 cm/sec. There is no thrombus in the cavity.    Personal interpretation of today's Device Interrogation: Personal review of his device interrogation ([employer] Fort[employer] Assura ICD, implanted initially on 4/28/2010 with a generator change on 6/7/2018) reveals adequate battery longevity with an estimated 2.0 years of battery life remaining. His leads were interrogated with acceptable and stable lead parameters. He is presently AP-VS with underlying sinus bradycardia with first degree AV block at a rate of 48 bpm. He is RA paced 79%, RV paced 7.7%. He has not had any subsequent events of atrial fibrillation since his NOE and cardioversion, with an overall AT/AF burden of 0%. There are no concerning AHR or VHR episodes detected.        ASSESSMENT & PLAN:   Mr. Amish Grossman is a zackary 79-year-old gentleman who returns to clinic today for ongoing evaluation and recommendations regarding paroxysmal atrial fibrillation s/p successful NOE and cardioversion on 10/18/2024 with routine device surveillance of a [employer] dual-chamber ICD. He has a past medical history significant for paroxysmal atrial fibrillation s/p successful NOE and cardioversion on 10/18/2024, a history of a prior pulmonary vein isolation with cryoballoon in 2019 and a redo-pulmonary vein isolation with radiofrequency catheter ablation in 2020, ischemic cardiomyopathy with a history of a prior MI with an LV thrombus in 1993, HFrEF with most recent LVEF  30-35%, s/p implantation of a primary prevention [employer] dual-chamber ICD 4/28/2010 with a generator change on 6/7/2018, a history of prior sustained VT events requiring device tachytherapies, coronary artery disease with prior PCI of the LAD and the LCx, hypertension, hyperlipidemia, CKD stage III, hypothyroidism, JABIER - maintained on CPAP therapy, and obesity.     - On device interrogation in-office today, he has a normally functioning [employer] dual-chamber ICD. He has not had any events of ventricular tachycardia or ventricular fibrillation following his prior counter clearance and has not required any device tachytherapies. He has not captured any recurrent atrial fibrillation since his prior NOE and cardioversion on 10/18/2024 with an overall AT/AF burden of 0%. He is RA paced 79%, with relatively infrequent RV pacing of 7.7%. His intrinsic QRSd remains narrow at 88ms. He has no present indication for device upgrade.   - He remains on dofetilide 500mcg po BID in addition to carvedilol 6.25mg po BID with overall excellent control of his atrial fibrillation. He remains on uninterrupted oral anticoagulation with apixaban 5mg po BID with no adverse bleeding events reported. He remains on guideline-directed medical therapy as per the Advanced Heart Failure team.  - He will continue to follow with his PCP, general cardiology, and Advanced Heart Failure team for ongoing evaluation and recommendations regarding his comorbid conditions.       This patient will return to clinic in six months with repeat remote transmissions in the interim. All questions and concerns were addressed at this encounter. Total time spent in this patient encounter: 36 minutes.     Signing Physician:       MANJIT Alva MD  Electrophysiology Attending

## 2025-07-10 DIAGNOSIS — E78.2 MIXED HYPERLIPIDEMIA: ICD-10-CM

## 2025-07-10 DIAGNOSIS — I25.5 ISCHEMIC CARDIOMYOPATHY: ICD-10-CM

## 2025-07-10 DIAGNOSIS — Z95.810 ICD (IMPLANTABLE CARDIOVERTER-DEFIBRILLATOR) IN PLACE: ICD-10-CM

## 2025-07-10 DIAGNOSIS — I25.10 CORONARY ARTERY DISEASE INVOLVING NATIVE CORONARY ARTERY OF NATIVE HEART WITHOUT ANGINA PECTORIS: ICD-10-CM

## 2025-07-10 DIAGNOSIS — I48.0 PAROXYSMAL ATRIAL FIBRILLATION: Primary | ICD-10-CM

## 2025-07-10 DIAGNOSIS — I47.20 VENTRICULAR TACHYARRHYTHMIA: ICD-10-CM

## 2025-07-10 RX ORDER — ATORVASTATIN CALCIUM 40 MG/1
40 TABLET, FILM COATED ORAL DAILY
Qty: 90 TABLET | Refills: 3 | Status: SHIPPED | OUTPATIENT
Start: 2025-07-10 | End: 2026-07-10

## 2025-08-12 ENCOUNTER — CLINICAL SUPPORT (OUTPATIENT)
Dept: CARDIOLOGY | Facility: HOSPITAL | Age: 80
End: 2025-08-12
Payer: MEDICARE

## 2025-08-12 ENCOUNTER — CLINICAL SUPPORT (OUTPATIENT)
Dept: CARDIOLOGY | Facility: HOSPITAL | Age: 80
End: 2025-08-12
Attending: STUDENT IN AN ORGANIZED HEALTH CARE EDUCATION/TRAINING PROGRAM
Payer: MEDICARE

## 2025-08-12 DIAGNOSIS — I48.19 OTHER PERSISTENT ATRIAL FIBRILLATION: ICD-10-CM

## 2025-08-18 RX ORDER — EMPAGLIFLOZIN 10 MG/1
10 TABLET, FILM COATED ORAL
Qty: 90 TABLET | Refills: 0 | OUTPATIENT
Start: 2025-08-18

## 2025-08-21 DIAGNOSIS — R07.9 CHEST PAIN, UNSPECIFIED TYPE: ICD-10-CM

## 2025-08-21 DIAGNOSIS — I25.5 ISCHEMIC CARDIOMYOPATHY: ICD-10-CM

## 2025-08-21 DIAGNOSIS — I25.83 CORONARY ARTERY DISEASE DUE TO LIPID RICH PLAQUE: Primary | ICD-10-CM

## 2025-08-21 DIAGNOSIS — I25.10 CORONARY ARTERY DISEASE DUE TO LIPID RICH PLAQUE: Primary | ICD-10-CM

## 2025-08-21 DIAGNOSIS — I21.4 NSTEMI (NON-ST ELEVATED MYOCARDIAL INFARCTION): ICD-10-CM

## 2025-08-21 LAB
OHS CV AF BURDEN PERCENT: < 1
OHS CV DC REMOTE DEVICE TYPE: NORMAL
OHS CV RV PACING PERCENT: 7.8 %

## 2025-08-22 ENCOUNTER — PATIENT MESSAGE (OUTPATIENT)
Dept: CARDIOLOGY | Facility: CLINIC | Age: 80
End: 2025-08-22
Payer: MEDICARE

## 2025-08-22 ENCOUNTER — TELEPHONE (OUTPATIENT)
Dept: CARDIOLOGY | Facility: CLINIC | Age: 80
End: 2025-08-22
Payer: MEDICARE

## 2025-08-22 DIAGNOSIS — R07.9 CHEST PAIN, UNSPECIFIED TYPE: Primary | ICD-10-CM

## 2025-08-26 ENCOUNTER — HOSPITAL ENCOUNTER (OUTPATIENT)
Dept: CARDIOLOGY | Facility: HOSPITAL | Age: 80
Discharge: HOME | End: 2025-08-26
Attending: INTERNAL MEDICINE
Payer: MEDICARE

## 2025-08-26 ENCOUNTER — OFFICE VISIT (OUTPATIENT)
Dept: CARDIOLOGY | Facility: CLINIC | Age: 80
End: 2025-08-26
Payer: MEDICARE

## 2025-08-26 ENCOUNTER — HOSPITAL ENCOUNTER (OUTPATIENT)
Dept: CARDIOLOGY | Facility: CLINIC | Age: 80
Discharge: HOME | End: 2025-08-26
Payer: MEDICARE

## 2025-08-26 VITALS
DIASTOLIC BLOOD PRESSURE: 53 MMHG | HEART RATE: 68 BPM | HEIGHT: 74 IN | SYSTOLIC BLOOD PRESSURE: 102 MMHG | RESPIRATION RATE: 16 BRPM | WEIGHT: 252 LBS | BODY MASS INDEX: 32.34 KG/M2

## 2025-08-26 VITALS
SYSTOLIC BLOOD PRESSURE: 112 MMHG | DIASTOLIC BLOOD PRESSURE: 73 MMHG | BODY MASS INDEX: 31.66 KG/M2 | OXYGEN SATURATION: 98 % | WEIGHT: 246.69 LBS | HEART RATE: 73 BPM | HEIGHT: 74 IN

## 2025-08-26 DIAGNOSIS — Z95.810 ICD (IMPLANTABLE CARDIOVERTER-DEFIBRILLATOR) IN PLACE: ICD-10-CM

## 2025-08-26 DIAGNOSIS — I25.83 CORONARY ARTERY DISEASE DUE TO LIPID RICH PLAQUE: ICD-10-CM

## 2025-08-26 DIAGNOSIS — R07.9 CHEST PAIN, UNSPECIFIED TYPE: ICD-10-CM

## 2025-08-26 DIAGNOSIS — I10 PRIMARY HYPERTENSION: ICD-10-CM

## 2025-08-26 DIAGNOSIS — I50.20 HEART FAILURE WITH REDUCED EJECTION FRACTION: ICD-10-CM

## 2025-08-26 DIAGNOSIS — I25.2 OLD MYOCARDIAL INFARCTION: ICD-10-CM

## 2025-08-26 DIAGNOSIS — E78.5 DYSLIPIDEMIA: ICD-10-CM

## 2025-08-26 DIAGNOSIS — I25.10 CORONARY ARTERY DISEASE DUE TO LIPID RICH PLAQUE: ICD-10-CM

## 2025-08-26 DIAGNOSIS — I48.0 PAROXYSMAL ATRIAL FIBRILLATION: ICD-10-CM

## 2025-08-26 DIAGNOSIS — Z98.890 H/O CARDIAC RADIOFREQUENCY ABLATION: ICD-10-CM

## 2025-08-26 DIAGNOSIS — I21.4 NSTEMI (NON-ST ELEVATED MYOCARDIAL INFARCTION): ICD-10-CM

## 2025-08-26 DIAGNOSIS — Z95.5 HISTORY OF CORONARY ANGIOPLASTY WITH INSERTION OF STENT: ICD-10-CM

## 2025-08-26 DIAGNOSIS — I25.5 ISCHEMIC CARDIOMYOPATHY: ICD-10-CM

## 2025-08-26 DIAGNOSIS — I50.42 CHRONIC COMBINED SYSTOLIC AND DIASTOLIC HEART FAILURE: Primary | ICD-10-CM

## 2025-08-26 LAB
CFR FLOW - ANTERIOR: 1.23
CFR FLOW - INFERIOR: 1.46
CFR FLOW - LATERAL: 1.38
CFR FLOW - MAX: 2.1
CFR FLOW - MIN: 0.96
CFR FLOW - SEPTAL: 1.36
CFR FLOW - WHOLE HEART: 1.36
CV STRESS BASE HR: 70 BPM
DIASTOLIC BLOOD PRESSURE: 76 MMHG
EJECTION FRACTION- HIGH: 65 %
END DIASTOLIC INDEX-HIGH: 153 ML/M2
END DIASTOLIC INDEX-LOW: 93 ML/M2
END SYSTOLIC INDEX-HIGH: 71 ML/M2
END SYSTOLIC INDEX-LOW: 31 ML/M2
NUC REST DIASTOLIC VOLUME INDEX: 162
NUC REST EJECTION FRACTION: 31
NUC REST SYSTOLIC VOLUME INDEX: 112
NUC STRESS DIASTOLIC VOLUME INDEX: 173
NUC STRESS EJECTION FRACTION: 36 %
NUC STRESS SYSTOLIC VOLUME INDEX: 110
OHS CV CPX 1 MINUTE RECOVERY HEART RATE: 77 BPM
OHS CV CPX 85 PERCENT MAX PREDICTED HEART RATE MALE: 121
OHS CV CPX MAX PREDICTED HEART RATE: 142
OHS CV CPX PATIENT HEIGHT IN: 74
OHS CV CPX PATIENT IS FEMALE: 0
OHS CV CPX PATIENT IS MALE: 1
OHS CV CPX PEAK DIASTOLIC BLOOD PRESSURE: 70 MMHG
OHS CV CPX PEAK HEAR RATE: 68 BPM
OHS CV CPX PEAK RATE PRESSURE PRODUCT: 7752
OHS CV CPX PEAK SYSTOLIC BLOOD PRESSURE: 114 MMHG
OHS CV CPX PERCENT MAX PREDICTED HEART RATE ACHIEVED: 48
OHS CV CPX RATE PRESSURE PRODUCT PRESENTING: 9170
OHS CV INITIAL DOSE: 34.6 MCG/KG/MIN
OHS CV MODERATELY REDUCED FLOW CAPACITY: 36 %
OHS CV MYOCARDIAL STEAL: 5 %
OHS CV NO ISCHEMIA MILDLY REDUCED FLOW CAPACTY: 30 %
OHS CV NO ISCHEMIA MINIMALLY REDUCED FLOW CAPACITY: 0 %
OHS CV NORMAL FLOW CAPACITY COMPARABLE TO HEALTHY YOUNG VOLUNTEERS: 0 %
OHS CV PEAK DOSE: 33.9 MCG/KG/MIN
OHS CV PET ID: 9620
OHS CV PRE-DOMINANTLY MYOCARDIAL SCAR: 33 %
OHS CV SEVERELY REDUCED FLOW CAPACITY: 34 %
OHS CV TOTAL EXAM DLP: 457.52 MGY-CM
OHS QRS DURATION: 92 MS
OHS QTC CALCULATION: 448 MS
PERFUSION DEFECT 1 SIZE IN %: 36 %
REST FLOW - ANTERIOR: 0.51 CC/MIN/G
REST FLOW - INFERIOR: 0.79 CC/MIN/G
REST FLOW - LATERAL: 0.67 CC/MIN/G
REST FLOW - MAX: 1.09 CC/MIN/G
REST FLOW - MIN: 0.16 CC/MIN/G
REST FLOW - SEPTAL: 0.58 CC/MIN/G
REST FLOW - WHOLE HEART: 0.64 CC/MIN/G
RETIRED EF AND QEF - SEE NOTES: 53 %
STRESS FLOW - ANTERIOR: 0.65 CC/MIN/G
STRESS FLOW - INFERIOR: 1.14 CC/MIN/G
STRESS FLOW - LATERAL: 0.93 CC/MIN/G
STRESS FLOW - MAX: 1.72 CC/MIN/G
STRESS FLOW - MIN: 0.17 CC/MIN/G
STRESS FLOW - SEPTAL: 0.78 CC/MIN/G
STRESS FLOW - WHOLE HEART: 0.88 CC/MIN/G
SYSTOLIC BLOOD PRESSURE: 131 MMHG

## 2025-08-26 PROCEDURE — 93005 ELECTROCARDIOGRAM TRACING: CPT | Mod: PBBFAC | Performed by: INTERNAL MEDICINE

## 2025-08-26 PROCEDURE — 78434 AQMBF PET REST & RX STRESS: CPT

## 2025-08-26 PROCEDURE — 63600175 PHARM REV CODE 636 W HCPCS: Performed by: INTERNAL MEDICINE

## 2025-08-26 PROCEDURE — 99999 PR PBB SHADOW E&M-EST. PATIENT-LVL III: CPT | Mod: PBBFAC,,,

## 2025-08-26 PROCEDURE — 99214 OFFICE O/P EST MOD 30 MIN: CPT | Mod: S$PBB,25,,

## 2025-08-26 PROCEDURE — 78434 AQMBF PET REST & RX STRESS: CPT | Mod: 26,,, | Performed by: INTERNAL MEDICINE

## 2025-08-26 PROCEDURE — 93016 CV STRESS TEST SUPVJ ONLY: CPT | Mod: ,,, | Performed by: INTERNAL MEDICINE

## 2025-08-26 PROCEDURE — 93010 ELECTROCARDIOGRAM REPORT: CPT | Mod: S$PBB,,, | Performed by: INTERNAL MEDICINE

## 2025-08-26 PROCEDURE — 78431 MYOCRD IMG PET RST&STRS CT: CPT | Mod: 26,,, | Performed by: INTERNAL MEDICINE

## 2025-08-26 PROCEDURE — 93018 CV STRESS TEST I&R ONLY: CPT | Mod: ,,, | Performed by: INTERNAL MEDICINE

## 2025-08-26 PROCEDURE — A9555 RB82 RUBIDIUM: HCPCS | Performed by: INTERNAL MEDICINE

## 2025-08-26 PROCEDURE — 99213 OFFICE O/P EST LOW 20 MIN: CPT | Mod: PBBFAC,25

## 2025-08-26 RX ORDER — AMINOPHYLLINE 25 MG/ML
75 INJECTION, SOLUTION INTRAVENOUS
Status: COMPLETED | OUTPATIENT
Start: 2025-08-26 | End: 2025-08-26

## 2025-08-26 RX ORDER — SUCRALFATE 1 G/1
TABLET ORAL
COMMUNITY
Start: 2025-08-21

## 2025-08-26 RX ORDER — HYDROXYZINE PAMOATE 25 MG/1
25 CAPSULE ORAL 4 TIMES DAILY PRN
COMMUNITY
Start: 2025-08-21

## 2025-08-26 RX ORDER — REGADENOSON 0.08 MG/ML
0.4 INJECTION, SOLUTION INTRAVENOUS
Status: COMPLETED | OUTPATIENT
Start: 2025-08-26 | End: 2025-08-26

## 2025-08-26 RX ADMIN — REGADENOSON 0.4 MG: 0.08 INJECTION, SOLUTION INTRAVENOUS at 11:08

## 2025-08-26 RX ADMIN — RUBIDIUM CHLORIDE RB-82 33.9 MILLICURIE: 150 INJECTION, SOLUTION INTRAVENOUS at 11:08

## 2025-08-26 RX ADMIN — AMINOPHYLLINE 75 MG: 25 INJECTION, SOLUTION INTRAVENOUS at 11:08

## 2025-08-26 RX ADMIN — RUBIDIUM CHLORIDE RB-82 34.6 MILLICURIE: 150 INJECTION, SOLUTION INTRAVENOUS at 11:08

## (undated) DEVICE — PATCH CARTO REFERENCE

## (undated) DEVICE — INTRODUCER HEMOSTASIS 7.5F

## (undated) DEVICE — PAD DEFIB CADENCE ADULT R2

## (undated) DEVICE — CATH ACUSON ACUNAV 8FR

## (undated) DEVICE — BOWL FLUID - BACK STOP

## (undated) DEVICE — SPIKE CONTRAST CONTROLLER

## (undated) DEVICE — SEE MEDLINE ITEM 107746

## (undated) DEVICE — CATH HIS OCTAPOLAR 7FRX115CM

## (undated) DEVICE — SHEATH HEMOSTASIS 8.5FR

## (undated) DEVICE — PAD RADI FEMORAL

## (undated) DEVICE — WIRE GUIDE WHOLEY MOD J .035

## (undated) DEVICE — PATCH ENSITE PRECISION NAVX SE

## (undated) DEVICE — GUIDEWIRE TORAY INOUE

## (undated) DEVICE — CABLE COAXIAL UMBILICAL

## (undated) DEVICE — VALVE HEMOSTASIS HONOR

## (undated) DEVICE — CABLE ELECTRICAL UMBILICAL

## (undated) DEVICE — NDL TRNSSPTL BRK-1 18GA 71CM

## (undated) DEVICE — SHEATH FLEXCATH STEERABLE 12F

## (undated) DEVICE — ELECTRODE POLYHESIVEPRE-ATTACH

## (undated) DEVICE — Device

## (undated) DEVICE — CATH LASSO NAV 25/15

## (undated) DEVICE — CATH ACHIEVE ADVANCE 20MM

## (undated) DEVICE — DILATOR 12FR

## (undated) DEVICE — CATH CS OCTAPOLAR DX 7FRX110CM

## (undated) DEVICE — INTRO FAST-CATH SL1 8.5FR 63CM

## (undated) DEVICE — LINE PRESSURE MONITORING 96IN

## (undated) DEVICE — COVER DRAPE ACUSON STERILE

## (undated) DEVICE — SET SMARTABLATE IRR TUBE

## (undated) DEVICE — REPROCESSED CATH ACUNAV 8FR

## (undated) DEVICE — CATH RESPONSE QPLR JSN 6F 120

## (undated) DEVICE — CATH THERMOCOOL SMTCH SF D F

## (undated) DEVICE — KIT CUSTOM MANIFOLD

## (undated) DEVICE — INTRO AGILIS MED CRL 8.5F 71CM

## (undated) DEVICE — PACK EP DRAPE

## (undated) DEVICE — CATH ARCTIC FRONT ADVANCE 28MM

## (undated) DEVICE — COVER SURG TABLE BACK 44X76IN